# Patient Record
Sex: FEMALE | Race: BLACK OR AFRICAN AMERICAN | Employment: PART TIME | URBAN - METROPOLITAN AREA
[De-identification: names, ages, dates, MRNs, and addresses within clinical notes are randomized per-mention and may not be internally consistent; named-entity substitution may affect disease eponyms.]

---

## 2017-01-12 ENCOUNTER — TELEPHONE (OUTPATIENT)
Dept: CARDIOLOGY CLINIC | Age: 67
End: 2017-01-12

## 2017-01-12 PROBLEM — I10 ESSENTIAL HYPERTENSION: Status: ACTIVE | Noted: 2017-01-12

## 2017-01-12 PROBLEM — I71.20 THORACIC AORTIC ANEURYSM WITHOUT RUPTURE (HCC): Status: ACTIVE | Noted: 2017-01-12

## 2017-01-13 ENCOUNTER — OFFICE VISIT (OUTPATIENT)
Dept: CARDIOLOGY CLINIC | Age: 67
End: 2017-01-13

## 2017-01-13 VITALS
OXYGEN SATURATION: 97 % | BODY MASS INDEX: 40.95 KG/M2 | DIASTOLIC BLOOD PRESSURE: 70 MMHG | HEART RATE: 86 BPM | SYSTOLIC BLOOD PRESSURE: 112 MMHG | HEIGHT: 65 IN | WEIGHT: 245.8 LBS | RESPIRATION RATE: 16 BRPM

## 2017-01-13 DIAGNOSIS — I10 ESSENTIAL HYPERTENSION: ICD-10-CM

## 2017-01-13 DIAGNOSIS — I71.20 THORACIC AORTIC ANEURYSM WITHOUT RUPTURE: Primary | ICD-10-CM

## 2017-01-13 RX ORDER — MELATONIN
DAILY
COMMUNITY
End: 2019-12-13 | Stop reason: SDUPTHER

## 2017-01-13 RX ORDER — HYDROCHLOROTHIAZIDE 25 MG/1
25 TABLET ORAL DAILY
COMMUNITY
End: 2017-10-05 | Stop reason: SDUPTHER

## 2017-01-13 RX ORDER — ASPIRIN 81 MG/1
81 TABLET ORAL DAILY
COMMUNITY
End: 2022-06-18

## 2017-01-13 NOTE — MR AVS SNAPSHOT
Visit Information Date & Time Provider Department Dept. Phone Encounter #  
 1/13/2017  2:40 PM Klaus Aguilar MD [facility] Teresa Larose [phone] [id number] Follow-up Instructions Return in about 6 months (around 7/13/2017). Your Appointments 1/13/2017  2:40 PM  
New Patient with Klaus Aguilar MD  
[facility] ([address]) Appt Note: appt belia'd by [facility]/[address] Mely Llamas MD 2nd opinion aortic aneursym kmr  
 [address] [facility] [address] [facility] [address] [facility] [address] Upcoming Health Maintenance Date Due Hepatitis C Screening 1950 DTaP/Tdap/Td series (1 - Tdap) 5/10/1972 FOBT Q 1 YEAR AGE 50-75 5/10/2001 ZOSTER VACCINE AGE 60> 5/10/2011 GLAUCOMA SCREENING Q2Y 5/10/2016 OSTEOPOROSIS SCREENING (DEXA) 5/10/2016 Pneumococcal 65+ Low/Medium Risk (1 of 2 - PCV13) 5/10/2016 MEDICARE YEARLY EXAM 5/10/2016 INFLUENZA AGE 9 TO ADULT 8/1/2016 BREAST CANCER SCRN MAMMOGRAM 8/4/2017 Allergies as of 1/13/2017  Review Complete On: 1/13/2017 By: Klaus Aguilar MD  
  
 Severity Noted Reaction Type Reactions Latex  12/07/2012    Hives Fruit C [Ascorbic Acid]  12/17/2012    Hives ALL FRUITS Pcn [Penicillins]  07/10/2012    Hives Current Immunizations  Never Reviewed No immunizations on file. Not reviewed this visit You Were Diagnosed With   
  
 Codes Comments Thoracic aortic aneurysm without rupture ([facility] [address]    -  Primary ICD-10-CM: I71.2 ICD-9-CM: 441.2 Essential hypertension     ICD-10-CM: I10 
ICD-9-CM: 401.9 Vitals BP Pulse Resp Height(growth percentile) Weight(growth percentile) SpO2  
 112/70 (BP 1 Location: Left arm, BP Patient Position: Sitting) 86 16 5' 4.5\" (1.638 m) 245 lb 12.8 oz (111.5 kg) 97% BMI OB Status Smoking Status 41.54 kg/m2 Hysterectomy Never Smoker Vitals History BMI and BSA Data Body Mass Index Body Surface Area 41.54 kg/m 2 2.25 m 2 Preferred Pharmacy Pharmacy Name Phone [facility] AT [address] [place] [address] AT [address] [place] [address] [phone] Your Updated Medication List  
  
   
This list is accurate as of: 1/13/17  2:17 PM.  Always use your most recent med list.  
  
  
  
  
 aspirin delayed-release 81 mg tablet Take  by mouth daily. fluticasone 50 mcg/actuation nasal spray Commonly known as:  Caffie Euler 2 Sprays by Both Nostrils route daily. 1 spray in each nostril.  
  
 hydroCHLOROthiazide 25 mg tablet Commonly known as:  HYDRODIURIL Take 25 mg by mouth daily. VITAMIN D3 1,000 unit tablet Generic drug:  cholecalciferol Take  by mouth daily. Follow-up Instructions Return in about 6 months (around 7/13/2017). Introducing [facility]! Beti Rojas introduces iWarda patient portal. Now you can access parts of your medical record, email your doctor's office, and request medication refills online. 1. In your internet browser, go to [email or web address] 2. Click on the First Time User? Click Here link in the Sign In box. You will see the New Member Sign Up page. 3. Enter your iWarda Access Code exactly as it appears below. You will not need to use this code after youve completed the sign-up process. If you do not sign up before the expiration date, you must request a new code. · iWarda Access Code: [email or web address] Expires: 3/16/2017 10:51 AM 
 
4. Enter the last four digits of your Social Security Number (xxxx) and Date of Birth (mm/dd/yyyy) as indicated and click Submit. You will be taken to the next sign-up page. 5. Create a iWarda ID.  This will be your iWarda login ID and cannot be changed, so think of one that is secure and easy to remember. 6. Create a [email or web address] password. You can change your password at any time. 7. Enter your Password Reset Question and Answer. This can be used at a later time if you forget your password. 8. Enter your e-mail address. You will receive e-mail notification when new information is available in [address] 9. Click Sign Up. You can now view and download portions of your medical record. 10. Click the Download Summary menu link to download a portable copy of your medical information. If you have questions, please visit the Frequently Asked Questions section of the [email or web address] website. Remember, [email or web address] is NOT to be used for urgent needs. For medical emergencies, dial 911. Now available from your iPhone and Android! Please provide this summary of care documentation to your next provider. Your primary care clinician is listed as Marcos Roca. If you have any questions after today's visit, please call [phone].

## 2017-01-13 NOTE — PROGRESS NOTES
HISTORY OF PRESENT ILLNESS  Linwood Yates is a 73 y.o. female     SUMMARY:   Problem List  Date Reviewed: 1/13/2017          Codes Class Noted    Thoracic aortic aneurysm without rupture [facility]) ICD-10-CM: I71.2  ICD-9-CM: 441.2  1/12/2017    Overview Signed 1/12/2017  5:05 PM by Chet Dennis MD     12/16/2016 CTA chest 2.8 by 2.5 by 2.9cm descending thoracic aortic aneursym without evidence of rupture. Multiple bilateral lung nodules with mediastinal and axillary adenopathy suspicious for metastatic disease. Essential hypertension ICD-10-CM: I10  ICD-9-CM: 401.9  1/12/2017    Overview Signed 1/12/2017  5:18 PM by Chet Dennis MD     12/22/16 normal echo, [facility]                   Current Outpatient Prescriptions on File Prior to Visit   Medication Sig    fluticasone (FLONASE) 50 mcg/actuation nasal spray 2 Sprays by Both Nostrils route daily. 1 spray in each nostril. (Patient taking differently: 2 Sprays by Both Nostrils route daily as needed. 1 spray in each nostril.)     No current facility-administered medications on file prior to visit. CARDIOLOGY STUDIES TO DATE:  12/22/16 normal echo, [facility]      Chief Complaint   Patient presents with    Hypertension     HPI :  Ms. Lulu Yusuf is a 73year old referred by her primary care for an opinion on a recent CT scan of the chest. Prior to Christmas she woke up with swelling in her throat and neck area, thought she had a sinus infection, and ended up at urgent care where they got a chest x-ray, and based on an abnormality noted there she was sent to the emergency room where she underwent a CTA of the chest. This revealed a relatively small calcified saccular aneurysm in the proximal descending thoracic aorta, and she had multiple enlarged lymph nodes and parenchymal lung densities. She was then sent to [facility] where she had an echocardiogram which was normal and showed no proximal or ascending aortic dilatation. She has long standing hypertension. She has never smoked, there is no history of diabetes, family history is negative for premature coronary disease, and she says her cholesterol has been okay. She is relatively inactive and works at [employer]. She gets no regular exercise and does notice some mild dyspnea on exertion which she has had for years with no recent change. CARDIAC ROS:   negative for chest pain, palpitations, syncope, orthopnea, paroxysmal nocturnal dyspnea, exertional chest pressure/discomfort, claudication, lower extremity edema    Family History   Problem Relation Age of Onset    Diabetes Mother     Hypertension Mother     Hypertension Sister     Hypertension Sister        Past Medical History   Diagnosis Date    Arthritis     Asthma     HTN (hypertension)     Hypertension     Sciatica        GENERAL ROS:  A comprehensive review of systems was negative except for that written in the HPI. Visit Vitals    /70 (BP 1 Location: Left arm, BP Patient Position: Sitting)    Pulse 86    Resp 16    Ht 5' 4.5\" (1.638 m)    Wt 245 lb 12.8 oz (111.5 kg)    SpO2 97%    BMI 41.54 kg/m2       Wt Readings from Last 3 Encounters:   01/13/17 245 lb 12.8 oz (111.5 kg)   12/16/16 249 lb 1.9 oz (113 kg)   04/17/15 233 lb (105.7 kg)            BP Readings from Last 3 Encounters:   01/13/17 112/70   12/16/16 131/79   04/17/15 110/60       PHYSICAL EXAM  General appearance: alert, cooperative, no distress, appears stated age  Neurologic: Alert and oriented X 3  Neck: supple, symmetrical, trachea midline, no adenopathy, no carotid bruit and no JVD  Lungs: clear to auscultation bilaterally  Heart: regular rate and rhythm, S1, S2 normal, no murmur, click, rub or gallop  Abdomen: soft, non-tender.  Bowel sounds normal. No masses,  no organomegaly  Extremities: extremities normal, atraumatic, no cyanosis or edema  Pulses: 2+ and symmetric    Lab Results   Component Value Date/Time    Cholesterol, total 153 03/21/2015 01:56 AM    HDL Cholesterol 53 03/21/2015 01:56 AM    LDL, calculated 73.8 03/21/2015 01:56 AM    Triglyceride 131 03/21/2015 01:56 AM    CHOL/HDL Ratio 2.9 03/21/2015 01:56 AM     ASSESSMENT  Ms. Joseph Mattson aneurysm is not a significant size at this point and requires no specific therapy. I would recommend that she have a CTA with contrast in about two years. It sounds like she gets multiple chest CTs probably for these abnormalities noted on the scan that is reported in her chart, and I do not have a good understanding of what her issues are in that regard. We talked about symptoms that would prompt a call to 911. current treatment plan is effective, no change in therapy  lab results and schedule of future lab studies reviewed with patient  reviewed diet, exercise and weight control    Encounter Diagnoses   Name Primary?  Thoracic aortic aneurysm without rupture (HCC) Yes    Essential hypertension      Orders Placed This Encounter    aspirin delayed-release 81 mg tablet    hydroCHLOROthiazide (HYDRODIURIL) 25 mg tablet    cholecalciferol (VITAMIN D3) 1,000 unit tablet       Follow-up Disposition:  Return in about 6 months (around 7/13/2017).     Debbie Langford MD  1/13/2017

## 2017-07-24 ENCOUNTER — OFFICE VISIT (OUTPATIENT)
Dept: FAMILY MEDICINE CLINIC | Age: 67
End: 2017-07-24

## 2017-07-24 VITALS
TEMPERATURE: 97.5 F | SYSTOLIC BLOOD PRESSURE: 133 MMHG | HEART RATE: 88 BPM | HEIGHT: 64 IN | OXYGEN SATURATION: 97 % | DIASTOLIC BLOOD PRESSURE: 82 MMHG | BODY MASS INDEX: 43.02 KG/M2 | WEIGHT: 252 LBS | RESPIRATION RATE: 18 BRPM

## 2017-07-24 DIAGNOSIS — E66.01 OBESITY, MORBID (HCC): ICD-10-CM

## 2017-07-24 DIAGNOSIS — J45.20 MILD INTERMITTENT ASTHMA WITHOUT COMPLICATION: ICD-10-CM

## 2017-07-24 DIAGNOSIS — Z00.00 LABORATORY EXAM ORDERED AS PART OF ROUTINE GENERAL MEDICAL EXAMINATION: ICD-10-CM

## 2017-07-24 DIAGNOSIS — M54.31 SCIATICA OF RIGHT SIDE: ICD-10-CM

## 2017-07-24 DIAGNOSIS — Z11.59 NEED FOR HEPATITIS C SCREENING TEST: ICD-10-CM

## 2017-07-24 DIAGNOSIS — Z13.6 ISCHEMIC HEART DISEASE SCREEN: ICD-10-CM

## 2017-07-24 DIAGNOSIS — D75.839 THROMBOCYTOSIS: ICD-10-CM

## 2017-07-24 DIAGNOSIS — Z00.00 HEALTH CARE MAINTENANCE: ICD-10-CM

## 2017-07-24 DIAGNOSIS — I10 ESSENTIAL HYPERTENSION: ICD-10-CM

## 2017-07-24 DIAGNOSIS — M17.0 PRIMARY OSTEOARTHRITIS OF BOTH KNEES: ICD-10-CM

## 2017-07-24 DIAGNOSIS — I71.20 THORACIC AORTIC ANEURYSM WITHOUT RUPTURE: ICD-10-CM

## 2017-07-24 DIAGNOSIS — R91.8 MULTIPLE LUNG NODULES ON CT: Primary | ICD-10-CM

## 2017-07-24 RX ORDER — FLUTICASONE PROPIONATE AND SALMETEROL XINAFOATE 115; 21 UG/1; UG/1
AEROSOL, METERED RESPIRATORY (INHALATION)
Refills: 11 | COMMUNITY
Start: 2017-06-29 | End: 2019-12-05

## 2017-07-24 RX ORDER — DICLOFENAC SODIUM 75 MG/1
75 TABLET, DELAYED RELEASE ORAL
COMMUNITY
End: 2017-12-21 | Stop reason: SDUPTHER

## 2017-07-24 RX ORDER — AZITHROMYCIN 250 MG/1
TABLET, FILM COATED ORAL
Refills: 0 | COMMUNITY
Start: 2017-06-14 | End: 2017-07-24

## 2017-07-24 RX ORDER — MONTELUKAST SODIUM 10 MG/1
10 TABLET ORAL DAILY
Refills: 1 | COMMUNITY
Start: 2017-04-28 | End: 2017-10-16

## 2017-07-24 RX ORDER — ALBUTEROL SULFATE 90 UG/1
AEROSOL, METERED RESPIRATORY (INHALATION)
Refills: 3 | COMMUNITY
Start: 2017-06-19 | End: 2018-06-27 | Stop reason: SDUPTHER

## 2017-07-24 RX ORDER — METHYLPREDNISOLONE 4 MG/1
TABLET ORAL
Refills: 0 | COMMUNITY
Start: 2017-06-19 | End: 2017-07-24

## 2017-07-24 RX ORDER — FLUTICASONE PROPIONATE 50 MCG
2 SPRAY, SUSPENSION (ML) NASAL
COMMUNITY
End: 2018-01-03 | Stop reason: SDUPTHER

## 2017-07-24 RX ORDER — RANITIDINE 300 MG/1
TABLET ORAL
Refills: 1 | COMMUNITY
Start: 2017-04-28 | End: 2017-07-24

## 2017-07-24 RX ORDER — OFLOXACIN 3 MG/ML
SOLUTION/ DROPS OPHTHALMIC
Refills: 0 | COMMUNITY
Start: 2017-06-14 | End: 2017-07-24
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
## 2017-07-24 NOTE — PROGRESS NOTES
Chief Complaint   Patient presents with   Osman Mares [facility]     1. Have you been to the ER, urgent care clinic since your last visit? Hospitalized since your last visit? January 2017    2. Have you seen or consulted any other health care providers outside of the [address] [place] [address] since your last visit? Include any pap smears or colon screening. Yes, NP Edward Hicks     The patient was counseled on the dangers of tobacco use, and was advised never to start. Reviewed strategies to maximize success, including never to start. Health Maintenance Due   Topic Date Due    Hepatitis C Screening Discussed with patient today and advised to follow up.    1950    DTaP/Tdap/Td series (1 - Tdap) Discussed with patient today and advised to follow up.    05/10/1972    FOBT Q 1 YEAR AGE 50-75  Discussed with patient today and advised to follow up.    05/10/2001    ZOSTER VACCINE AGE 60> Discussed with patient today and advised to follow up.    03/10/2011    GLAUCOMA SCREENING Q2Y Discussed with patient today and advised to follow up.    05/10/2016    OSTEOPOROSIS SCREENING (DEXA) Discussed with patient today and advised to follow up.    05/10/2016    Pneumococcal 65+ Low/Medium Risk (1 of 2 - PCV13) Discussed with patient today and advised to follow up. Completed, records will be requested. 05/10/2016    MEDICARE YEARLY EXAM Discussed with patient today and advised to follow up.    05/10/2016    BREAST CANCER SCRN MAMMOGRAM Discussed with patient today and advised to follow up.    08/04/2017     ACP is not on file, booklet given advised to return.

## 2017-07-24 NOTE — MR AVS SNAPSHOT
Visit Information Date & Time Provider Department Dept. Phone Encounter #  
 7/24/2017  1:20 PM Funmilayo Coleman. Rock calixto MD [facility] [phone] [id number] Upcoming Health Maintenance Date Due Hepatitis C Screening 1950 DTaP/Tdap/Td series (1 - Tdap) 5/10/1972 FOBT Q 1 YEAR AGE 50-75 5/10/2001 ZOSTER VACCINE AGE 60> 3/10/2011 GLAUCOMA SCREENING Q2Y 5/10/2016 OSTEOPOROSIS SCREENING (DEXA) 5/10/2016 Pneumococcal 65+ Low/Medium Risk (1 of 2 - PCV13) 5/10/2016 MEDICARE YEARLY EXAM 5/10/2016 BREAST CANCER SCRN MAMMOGRAM 8/4/2017 INFLUENZA AGE 9 TO ADULT 8/1/2017 Allergies as of 7/24/2017  Review Complete On: 7/24/2017 By: Funmilayo Coleman. Rock calixto MD  
  
 Severity Noted Reaction Type Reactions Latex  12/07/2012    Hives Flowers  07/24/2017    Shortness of Breath Fresh cut flowers are worse Fruit C [Ascorbic Acid]  12/17/2012    Hives ALL FRUITS Pcn [Penicillins]  07/10/2012    Hives Current Immunizations  Never Reviewed No immunizations on file. Not reviewed this visit You Were Diagnosed With   
  
 Codes Comments Multiple lung nodules on CT    -  Primary ICD-10-CM: R91.8 ICD-9-CM: 793.19 Sciatica of right side     ICD-10-CM: M54.31 
ICD-9-CM: 724.3 Primary osteoarthritis of both knees     ICD-10-CM: M17.0 ICD-9-CM: 715.16 Thoracic aortic aneurysm without rupture (HCC)     ICD-10-CM: I71.2 ICD-9-CM: 441.2 Mild intermittent asthma without complication     CHAI-74-YR: J45.20 ICD-9-CM: 493.90 Essential hypertension     ICD-10-CM: I10 
ICD-9-CM: 401.9 Health care maintenance     ICD-10-CM: Z00.00 ICD-9-CM: V70.0 Vitals BP Pulse Temp Resp Height(growth percentile) Weight(growth percentile) 133/82 (BP 1 Location: Left arm, BP Patient Position: Sitting) 88 97.5 °F (36.4 °C) (Oral) 18 5' 4\" (1.626 m) 252 lb (114.3 kg) SpO2 BMI OB Status Smoking Status 97% 43.26 kg/m2 Hysterectomy Never Smoker BMI and BSA Data Body Mass Index Body Surface Area  
 43.26 kg/m 2 2.27 m 2 Preferred Pharmacy Pharmacy Name Phone [phone] [facility] [phone] Your Updated Medication List  
  
   
This list is accurate as of: 7/24/17  2:42 PM.  Always use your most recent med list.  
  
  
  
  
 ADVAIR -21 mcg/actuation inhaler Generic drug:  fluticasone-salmeterol INL 2 PFS PO BID IN THE MORNING AND IN THE KODI  
  
 aspirin delayed-release 81 mg tablet Take  by mouth daily. diclofenac EC 75 mg EC tablet Commonly known as:  VOLTAREN Take 75 mg by mouth two (2) times daily as needed. FLONASE 50 mcg/actuation nasal spray Generic drug:  fluticasone 2 Sprays by Both Nostrils route daily as needed for Rhinitis. hydroCHLOROthiazide 25 mg tablet Commonly known as:  HYDRODIURIL Take 25 mg by mouth daily. montelukast 10 mg tablet Commonly known as:  SINGULAIR Take 10 mg by mouth daily. PROAIR HFA 90 mcg/actuation inhaler Generic drug:  albuterol INHALE 1 PUFF PO Q 6 H PRN  
  
 VITAMIN D3 1,000 unit tablet Generic drug:  cholecalciferol Take  by mouth daily. Patient Instructions Larraine Kalie Larraine Kalie TODAY, please go to: CHECK OUT Please schedule the following appointments at [address] [place] [address]: 
· Welcome to Medicare exam and lab follow up with Dr. Nicole Odom in September 2017 · Lab visit, fasting the week before our visit.   
_____________________ Today's Plan: 
· Call your prior doctor and ask when your last tetanus shot was (Tdap). If it was more than 10 years ago, call back to schedule a nurse visit for tdap vaccine. _____________________ Review your health maintenance below. Make plans to return and address anything that is due or will be due soon. Health Maintenance Due Topic Date Due  
  Hepatitis C Test  1950  
 DTaP/Tdap/Td  (1 - Tdap) 05/10/1972  Stool testing for trace blood  05/10/2001  Shingles Vaccine  03/10/2011  Glaucoma Screening   05/10/2016  Bone Density Screening  05/10/2016  Pneumococcal Vaccine (1 of 2 - PCV13) 05/10/2016 Cliff Soares Annual Well Visit  05/10/2016  Breast Cancer Screening  08/04/2017  
 
 
 
_____________________ Are you stressed out? Overwhelmed? In pain? Feeling disconnected? Consider MINDFULNESS. .. 
[email or web address] 
_____________________ If you need to get your labs done at [facility], visit this site to find a convenient location: [facility]. Introducing [facility] [place] [facility]! Meka Hinson introduces Slidely patient portal. Now you can access parts of your medical record, email your doctor's office, and request medication refills online. 1. In your internet browser, go to [email or web address] 2. Click on the First Time User? Click Here link in the Sign In box. You will see the New Member Sign Up page. 3. Enter your Slidely Access Code exactly as it appears below. You will not need to use this code after youve completed the sign-up process. If you do not sign up before the expiration date, you must request a new code. · Slidely Access Code: [email or web address] Expires: 10/22/2017  1:32 PM 
 
4. Enter the last four digits of your Social Security Number (xxxx) and Date of Birth (mm/dd/yyyy) as indicated and click Submit. You will be taken to the next sign-up page. 5. Create a [email or web address] ID. This will be your Slidely login ID and cannot be changed, so think of one that is secure and easy to remember. 6. Create a [email or web address] password. You can change your password at any time. 7. Enter your Password Reset Question and Answer. This can be used at a later time if you forget your password. 8. Enter your e-mail address. You will receive e-mail notification when new information is available in [address] 9. Click Sign Up. You can now view and download portions of your medical record. 10. Click the Download Summary menu link to download a portable copy of your medical information. If you have questions, please visit the Frequently Asked Questions section of the Ganji website. Remember, Ganji is NOT to be used for urgent needs. For medical emergencies, dial 911. Now available from your iPhone and Android! Please provide this summary of care documentation to your next provider. Your primary care clinician is listed as Vernon Matthews. Christiano Yanez. If you have any questions after today's visit, please call [phone].

## 2017-07-24 NOTE — ASSESSMENT & PLAN NOTE
See media for more  CT thorax wo cont 7/6/2017     \"Impression:   1. Multiple pulmonary nodules redemonstrated in the left hemithorax. The nodules previously seen on CT of abdomen and pelvis have increased in size. However, these nodules were previoulsy worked up in 2012 and were proven to be related to splenosis. No furter workup is needed. 2. Status post-splenectomy. 3. 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 \"

## 2017-07-24 NOTE — PATIENT INSTRUCTIONS
.. TODAY, please go to:   CHECK OUT     Please schedule the following appointments at [address] [place] [address]:  · Welcome to Medicare exam and lab follow up with Dr. Donna Sales in September 2017  · Lab visit, fasting the week before our visit.    _____________________     KTEXQ'X Plan:  · Call your prior doctor and ask when your last tetanus shot was (Tdap). If it was more than 10 years ago, call back to schedule a nurse visit for tdap vaccine. _____________________     Review your health maintenance below. Make plans to return and address anything that is due or will be due soon. Health Maintenance Due   Topic Date Due    Hepatitis C Test  1950    DTaP/Tdap/Td  (1 - Tdap) 05/10/1972    Stool testing for trace blood  05/10/2001    Shingles Vaccine  03/10/2011    Glaucoma Screening   05/10/2016    Bone Density Screening  05/10/2016    Pneumococcal Vaccine (1 of 2 - PCV13) 05/10/2016    Annual Well Visit  05/10/2016    Breast Cancer Screening  08/04/2017         _____________________     Are you stressed out? Overwhelmed? In pain? Feeling disconnected? Consider MINDFULNESS. ..  [email or web address]  _____________________     If you need to get your labs done at [facility], visit this site to find a convenient location: [email or web address]

## 2017-09-29 LAB
ALBUMIN SERPL-MCNC: 4 G/DL (ref 3.6–4.8)
ALBUMIN/GLOB SERPL: 1.3 {RATIO} (ref 1.2–2.2)
ALP SERPL-CCNC: 95 IU/L (ref 39–117)
ALT SERPL-CCNC: 13 IU/L (ref 0–32)
AST SERPL-CCNC: 15 IU/L (ref 0–40)
BILIRUB SERPL-MCNC: 0.5 MG/DL (ref 0–1.2)
BUN SERPL-MCNC: 26 MG/DL (ref 8–27)
BUN/CREAT SERPL: 32 (ref 12–28)
CALCIUM SERPL-MCNC: 9.9 MG/DL (ref 8.7–10.3)
CHLORIDE SERPL-SCNC: 100 MMOL/L (ref 96–106)
CHOLEST SERPL-MCNC: 183 MG/DL (ref 100–199)
CO2 SERPL-SCNC: 26 MMOL/L (ref 18–29)
CREAT SERPL-MCNC: 0.82 MG/DL (ref 0.57–1)
ERYTHROCYTE [DISTWIDTH] IN BLOOD BY AUTOMATED COUNT: 15.2 % (ref 12.3–15.4)
GLOBULIN SER CALC-MCNC: 3.1 G/DL (ref 1.5–4.5)
GLUCOSE SERPL-MCNC: 98 MG/DL (ref 65–99)
HCT VFR BLD AUTO: 40.9 % (ref 34–46.6)
HCV AB S/CO SERPL IA: <0.1 S/CO RATIO (ref 0–0.9)
HDLC SERPL-MCNC: 52 MG/DL
HGB BLD-MCNC: 13.8 G/DL (ref 11.1–15.9)
INTERPRETATION, 910389: NORMAL
LDLC SERPL CALC-MCNC: 107 MG/DL (ref 0–99)
MCH RBC QN AUTO: 29 PG (ref 26.6–33)
MCHC RBC AUTO-ENTMCNC: 33.7 G/DL (ref 31.5–35.7)
MCV RBC AUTO: 86 FL (ref 79–97)
PLATELET # BLD AUTO: 444 X10E3/UL (ref 150–379)
POTASSIUM SERPL-SCNC: 4.3 MMOL/L (ref 3.5–5.2)
PROT SERPL-MCNC: 7.1 G/DL (ref 6–8.5)
RBC # BLD AUTO: 4.76 X10E6/UL (ref 3.77–5.28)
SODIUM SERPL-SCNC: 142 MMOL/L (ref 134–144)
TRIGL SERPL-MCNC: 121 MG/DL (ref 0–149)
VLDLC SERPL CALC-MCNC: 24 MG/DL (ref 5–40)
WBC # BLD AUTO: 9.1 X10E3/UL (ref 3.4–10.8)

## 2017-10-05 ENCOUNTER — OFFICE VISIT (OUTPATIENT)
Dept: FAMILY MEDICINE CLINIC | Age: 67
End: 2017-10-05

## 2017-10-05 VITALS
WEIGHT: 251.5 LBS | BODY MASS INDEX: 42.94 KG/M2 | TEMPERATURE: 98.1 F | DIASTOLIC BLOOD PRESSURE: 80 MMHG | HEART RATE: 82 BPM | OXYGEN SATURATION: 96 % | SYSTOLIC BLOOD PRESSURE: 135 MMHG | RESPIRATION RATE: 15 BRPM | HEIGHT: 64 IN

## 2017-10-05 DIAGNOSIS — Z23 ENCOUNTER FOR IMMUNIZATION: ICD-10-CM

## 2017-10-05 DIAGNOSIS — Z00.00 WELCOME TO MEDICARE PREVENTIVE VISIT: Primary | ICD-10-CM

## 2017-10-05 DIAGNOSIS — D75.839 THROMBOCYTOSIS: ICD-10-CM

## 2017-10-05 DIAGNOSIS — I10 ESSENTIAL HYPERTENSION: ICD-10-CM

## 2017-10-05 DIAGNOSIS — Z78.0 POSTMENOPAUSAL STATE: ICD-10-CM

## 2017-10-05 DIAGNOSIS — Z13.6 SCREENING FOR ISCHEMIC HEART DISEASE: ICD-10-CM

## 2017-10-05 DIAGNOSIS — Z78.0 POSTMENOPAUSAL: ICD-10-CM

## 2017-10-05 DIAGNOSIS — Z13.39 SCREENING FOR ALCOHOLISM: ICD-10-CM

## 2017-10-05 DIAGNOSIS — Z13.31 SCREENING FOR DEPRESSION: ICD-10-CM

## 2017-10-05 RX ORDER — HYDROCHLOROTHIAZIDE 25 MG/1
25 TABLET ORAL DAILY
Qty: 90 TAB | Refills: 1 | Status: SHIPPED | OUTPATIENT
Start: 2017-10-05 | End: 2018-03-26 | Stop reason: SDUPTHER

## 2017-10-05 NOTE — LETTER
10/5/2017 8:37 AM 
 
Ms. Anika Jaime [phone] Office Visit on 07/24/2017 Component Date Value Ref Range Status  WBC 09/28/2017 9.1  3.4 - 10.8 x10E3/uL Final  
 RBC 09/28/2017 4.76  3.77 - 5.28 x10E6/uL Final  
 HGB 09/28/2017 13.8  11.1 - 15.9 g/dL Final  
 HCT 09/28/2017 40.9  34.0 - 46.6 % Final  
 MCV 09/28/2017 86  79 - 97 fL Final  
 MCH 09/28/2017 29.0  26.6 - 33.0 pg Final  
 MCHC 09/28/2017 33.7  31.5 - 35.7 g/dL Final  
 RDW 09/28/2017 15.2  12.3 - 15.4 % Final  
 PLATELET 29/59/0555 228* 150 - 379 x10E3/uL Final  
 Glucose 09/28/2017 98  65 - 99 mg/dL Final  
 BUN 09/28/2017 26  8 - 27 mg/dL Final  
 Creatinine 09/28/2017 0.82  0.57 - 1.00 mg/dL Final  
 GFR est non-AA 09/28/2017 75  >59 mL/min/1.73 Final  
 GFR est AA 09/28/2017 86  >59 mL/min/1.73 Final  
 BUN/Creatinine ratio 09/28/2017 32* 12 - 28 Final  
 Sodium 09/28/2017 142  134 - 144 mmol/L Final  
 Potassium 09/28/2017 4.3  3.5 - 5.2 mmol/L Final  
 Chloride 09/28/2017 100  96 - 106 mmol/L Final  
 CO2 09/28/2017 26  18 - 29 mmol/L Final  
 Calcium 09/28/2017 9.9  8.7 - 10.3 mg/dL Final  
 Protein, total 09/28/2017 7.1  6.0 - 8.5 g/dL Final  
 Albumin 09/28/2017 4.0  3.6 - 4.8 g/dL Final  
 GLOBULIN, TOTAL 09/28/2017 3.1  1.5 - 4.5 g/dL Final  
 A-G Ratio 09/28/2017 1.3  1.2 - 2.2 Final  
 Bilirubin, total 09/28/2017 0.5  0.0 - 1.2 mg/dL Final  
 Alk.  phosphatase 09/28/2017 95  39 - 117 IU/L Final  
 AST (SGOT) 09/28/2017 15  0 - 40 IU/L Final  
 ALT (SGPT) 09/28/2017 13  0 - 32 IU/L Final  
 Cholesterol, total 09/28/2017 183  100 - 199 mg/dL Final  
 Triglyceride 09/28/2017 121  0 - 149 mg/dL Final  
 HDL Cholesterol 09/28/2017 52  >39 mg/dL Final  
 VLDL, calculated 09/28/2017 24  5 - 40 mg/dL Final  
 LDL, calculated 09/28/2017 107* 0 - 99 mg/dL Final  
 Hep C Virus Ab 09/28/2017 <0.1  0.0 - 0.9 s/co ratio Final  
 INTERPRETATION 09/28/2017 Note   Final  
 
 
 
 Legend: Use this to help understand your labs. You may not have all of these ordered · WBC- White blood cell count · HGB- Hemoglobin, red blood cell count · HCT- Hematocrit, red blood cell count · PLT- Platelets · Sodium, Potassium, Chloride, Magnesium- electrolytes · Creatinine, GFR- kidney function · AST, ALT, Alkaline phosphatase, bilirubin- liver and gallbladder · Lipase- pancreas · RPR- Syphilis test, STD 
· HIV, Gonorrhea, Chlamydia, Trichomonas- STDs · Candida- yeast infection · Nuswab- vaginal infections · Urinalysis- a quick test about your urine · Hemoglobin A1C- diabetes test 
· Glucose- blood sugar · HDL- \"Good\" Cholesterol. Goal >=40 
· LDL- \"Bad\" Cholesterol. Goal <100 · Triglycerides- Goal <150 · Vit D- Vitamin D level · TSH, FT3, FT4- thyroid tests · HCV Ab- test for Hepatitis C  
· Sincerely, 
 
 
Pauline Gutierrez MD
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
## 2017-10-05 NOTE — PATIENT INSTRUCTIONS
TODAY, please go to:   Flu vaccine   Heart sheet   Dexa order   Release of records- [facility] (pneumonia vaccine), colonoscopy, eye exam     CHECK OUT    Show the  your Referral Requisition   LAB    Please schedule the following appointments at CHECK OUT:  · Lifestyle follow up with Dr. James Gutierrez in April 2017  · Lab visit, fasting the week before our visit. · Honoring Choices appointment with Nurse navigator in 1-2 months      Today's Plan:        Medicare Wellness Visit, Female    The best way to live healthy is to have a healthy lifestyle by eating a well-balanced diet, exercising regularly, limiting alcohol and stopping smoking. Regular physical exams and screening tests are another way to keep healthy. Preventive exams provided by your health care provider can find health problems before they become diseases or illnesses. Preventive services including immunizations, screening tests, monitoring and exams can help you take care of your own health. All people over age 72 should have a pneumovax  and and a prevnar shot to prevent pneumonia. These are once in a lifetime unless you and your provider decide differently. All people over 65 should have a yearly flu shot and a tetanus vaccine every 10 years. A bone mass density to screen for osteoporosis or thinning of the bones should be done every 2 years after 65. Screening for diabetes mellitus with a blood sugar test should be done every year. Glaucoma is a disease of the eye due to increased ocular pressure that can lead to blindness and it should be done every year by an eye professional.    Cardiovascular screening tests that check for elevated lipids (fatty part of blood) which can lead to heart disease and strokes should be done every 5 years.     Colorectal screening that evaluates for blood or polyps in your colon should be done yearly as a stool test or every five years as a flexible sigmoidoscope or every 10 years as a colonoscopy up to age 76. Breast cancer screening with a mammogram is recommended biennially  for women age 54-69. Screening for cervical cancer with a pap smear and pelvic exam is recommended for women after age 72 years every 2 years up to age 79 or when the provider and patient decide to stop. If there is a history of cervical abnormalities or other increased risk for cancer then the test is recommended yearly. Hepatitis C screening is also recommended for anyone born between 80 through [address]. A shingles vaccine is also recommended once in a lifetime after age 61. Your Medicare Wellness Exam is recommended annually.     Here is a list of your current Health Maintenance items with a due date:  Health Maintenance Due   Topic Date Due    DTaP/Tdap/Td  (1 - Tdap) 05/10/1972    Stool testing for trace blood  05/10/2001    Shingles Vaccine  03/10/2011    Glaucoma Screening   05/10/2016    Bone Density Screening  05/10/2016    Pneumococcal Vaccine (1 of 2 - PCV13) 05/10/2016    Annual Well Visit  05/10/2016    Flu Vaccine  08/01/2017    Breast Cancer Screening  08/04/2017

## 2017-10-05 NOTE — PROGRESS NOTES
Identified pt with two pt identifiers(name and ). Reviewed record in preparation for visit and have obtained necessary documentation. Chief Complaint   Patient presents with    Welcome To Medicare    Medication Refill     hctz pended       Health Maintenance Due   Topic    DTaP/Tdap/Td series (1 - Tdap)    FOBT Q 1 YEAR AGE 54-65     ZOSTER VACCINE AGE 60>     GLAUCOMA SCREENING Q2Y     OSTEOPOROSIS SCREENING (DEXA)     Pneumococcal 65+ Low/Medium Risk (1 of 2 - PCV13)    MEDICARE YEARLY EXAM     INFLUENZA AGE 9 TO ADULT     BREAST CANCER SCRN MAMMOGRAM    - mammo- done 2017 [facility]  - has not had Dexa  - glaucoma @ [facility]   - Prevnar 13 @ [facility]  - pt received flu shot at work   -not UTD    Coordination of Care Questionnaire:  :   1) Have you been to an emergency room, urgent care clinic since your last visit? no   Hospitalized since your last visit? no             2. Have seen or consulted any other health care provider since your last visit? NO  If yes, where when, and reason for visit? 3) Do you have an Advanced Directive/ Living Will in place? NO  If yes, do we have a copy on file NO  If no, would you like information YES- form given to pt    Patient is accompanied by self I have received verbal consent from Amanda Cameron to discuss any/all medical information while they are present in the room.

## 2017-10-05 NOTE — PROGRESS NOTES
Sebastian Zambrano is a 78 y.o. female who presents for routine immunizations. She denies any symptoms , reactions or allergies that would exclude them from being immunized today. Risks and adverse reactions were discussed and the VIS was given to them. All questions were addressed. She was observed for 15 min post injection. There were no reactions observed.     Wolf Bravo

## 2017-10-05 NOTE — MR AVS SNAPSHOT
Visit Information Date & Time Provider Department Dept. Phone Encounter #  
 10/5/2017  8:00 AM Kami Barber MD [place] [address] [phone] [id number] Upcoming Health Maintenance Date Due DTaP/Tdap/Td series (1 - Tdap) 5/10/1972 ZOSTER VACCINE AGE 60> 3/10/2011 GLAUCOMA SCREENING Q2Y 5/10/2016 OSTEOPOROSIS SCREENING (DEXA) 5/10/2016 Pneumococcal 65+ Low/Medium Risk (1 of 2 - PCV13) 5/10/2016 MEDICARE YEARLY EXAM 5/10/2016 INFLUENZA AGE 9 TO ADULT 8/1/2017 BREAST CANCER SCRN MAMMOGRAM 8/4/2017 COLONOSCOPY 6/3/2024 Allergies as of 10/5/2017  Review Complete On: 10/5/2017 By: Sri Magdaleno LPN Severity Noted Reaction Type Reactions Latex  12/07/2012    Hives Flowers  07/24/2017    Shortness of Breath Fresh cut flowers are worse Fruit C [Ascorbic Acid]  12/17/2012    Hives ALL FRUITS Pcn [Penicillins]  07/10/2012    Hives Current Immunizations  Never Reviewed Name Date Influenza High Dose Vaccine PF  Incomplete Not reviewed this visit You Were Diagnosed With   
  
 Codes Comments Welcome to Medicare preventive visit    -  Primary ICD-10-CM: Z00.00 ICD-9-CM: V70.0 Essential hypertension     ICD-10-CM: I10 
ICD-9-CM: 401.9 Screening for alcoholism     ICD-10-CM: Z13.89 ICD-9-CM: V79.1 Screening for depression     ICD-10-CM: Z13.89 ICD-9-CM: V79.0 Screening for ischemic heart disease     ICD-10-CM: Z13.6 ICD-9-CM: V81.0 Encounter for immunization     ICD-10-CM: S48 ICD-9-CM: V03.89 Postmenopausal     ICD-10-CM: Z78.0 ICD-9-CM: V49.81 Thrombocytosis ([address]     ICD-10-CM: D47.3 ICD-9-CM: 238.71 Postmenopausal state     ICD-10-CM: Z78.0 ICD-9-CM: V49.81 Vitals BP Pulse Temp Resp Height(growth percentile) Weight(growth percentile)  135/80 (BP 1 Location: Left arm, BP Patient Position: Sitting) 82 98.1 °F (36.7 °C) (Oral) 15 5' 4\" (1.626 m) 251 lb 8 oz (114.1 kg) SpO2 BMI OB Status Smoking Status 96% 43.17 kg/m2 Hysterectomy Never Smoker BMI and BSA Data Body Mass Index Body Surface Area  
 43.17 kg/m 2 2.27 m 2 Preferred Pharmacy Pharmacy Name Phone [id number] [facility] [phone] Your Updated Medication List  
  
   
This list is accurate as of: 10/5/17 10:06 AM.  Always use your most recent med list.  
  
  
  
  
 Tattnall Frater -21 mcg/actuation inhaler Generic drug:  fluticasone-salmeterol INL 2 PFS PO BID IN THE MORNING AND IN THE KODI  
  
 aspirin delayed-release 81 mg tablet Take  by mouth daily. diclofenac EC 75 mg EC tablet Commonly known as:  VOLTAREN Take 75 mg by mouth two (2) times daily as needed. FLONASE 50 mcg/actuation nasal spray Generic drug:  fluticasone 2 Sprays by Both Nostrils route daily as needed for Rhinitis. hydroCHLOROthiazide 25 mg tablet Commonly known as:  HYDRODIURIL Take 1 Tab by mouth daily. montelukast 10 mg tablet Commonly known as:  SINGULAIR Take 10 mg by mouth daily. PROAIR HFA 90 mcg/actuation inhaler Generic drug:  albuterol INHALE 1 PUFF PO Q 6 H PRN  
  
 VITAMIN D3 1,000 unit tablet Generic drug:  cholecalciferol Take  by mouth daily. Prescriptions Sent to Pharmacy Refills  
 hydroCHLOROthiazide (HYDRODIURIL) 25 mg tablet 1 Sig: Take 1 Tab by mouth daily. Class: Normal  
 Pharmacy: [address] Ph #: [phone] Route: Oral  
  
We Performed the Following ADMIN INFLUENZA VIRUS VAC [ [facility]] AMB POC EKG ROUTINE W/ 12 LEADS, SCREEN () [ [facility]] CBC WITH AUTOMATED DIFF [69935 CPT(R)] [facility] [[id number] [facility]"] INFLUENZA VIRUS VACCINE, HIGH DOSE SEASONAL, PRESERVATIVE FREE [84709 CPT(R)] LIPID PANEL [90199 CPT(R)] ND ANNUAL ALCOHOL SCREEN 15 MIN [id number] [facility]] To-Do List   
 10/08/2017 Imaging:  DEXA BONE DENSITY STUDY AXIAL Patient Instructions TODAY, please go to: 
 Flu vaccine Heart sheet Dexa order Release of records- [facility] (pneumonia vaccine), colonoscopy, eye exam 
 
 CHECK OUT Show the  your Referral Requisition LAB Please schedule the following appointments at [address] [place] [address]: 
· Lifestyle follow up with Dr. Mishel Cantu in April 2017 · Lab visit, fasting the week before our visit. · Honoring Choices appointment with Nurse navigator in 1-2 months Today's Plan: 
 
 
 
Medicare Wellness Visit, Female The best way to live healthy is to have a healthy lifestyle by eating a well-balanced diet, exercising regularly, limiting alcohol and stopping smoking. Regular physical exams and screening tests are another way to keep healthy. Preventive exams provided by your health care provider can find health problems before they become diseases or illnesses. Preventive services including immunizations, screening tests, monitoring and exams can help you take care of your own health. All people over age 72 should have a pneumovax  and and a prevnar shot to prevent pneumonia. These are once in a lifetime unless you and your provider decide differently. All people over 65 should have a yearly flu shot and a tetanus vaccine every 10 years. A bone mass density to screen for osteoporosis or thinning of the bones should be done every 2 years after 65. Screening for diabetes mellitus with a blood sugar test should be done every year.  
 
Glaucoma is a disease of the eye due to increased ocular pressure that can lead to blindness and it should be done every year by an eye professional. 
 
Cardiovascular screening tests that check for elevated lipids (fatty part of blood) which can lead to heart disease and strokes should be done every 5 years. Colorectal screening that evaluates for blood or polyps in your colon should be done yearly as a stool test or every five years as a flexible sigmoidoscope or every 10 years as a colonoscopy up to age 76. Breast cancer screening with a mammogram is recommended biennially  for women age 54-69. Screening for cervical cancer with a pap smear and pelvic exam is recommended for women after age 72 years every 2 years up to age 79 or when the provider and patient decide to stop. If there is a history of cervical abnormalities or other increased risk for cancer then the test is recommended yearly. Hepatitis C screening is also recommended for anyone born between 80 through [address]. A shingles vaccine is also recommended once in a lifetime after age 61. Your Medicare Wellness Exam is recommended annually. Here is a list of your current Health Maintenance items with a due date: 
Health Maintenance Due Topic Date Due  
 DTaP/Tdap/Td  (1 - Tdap) 05/10/1972  Stool testing for trace blood  05/10/2001  Shingles Vaccine  03/10/2011  Glaucoma Screening   05/10/2016  Bone Density Screening  05/10/2016  Pneumococcal Vaccine (1 of 2 - PCV13) 05/10/2016 [address] [place] [address] Annual Well Visit  05/10/2016  Flu Vaccine  08/01/2017  Breast Cancer Screening  08/04/2017 Introducing [facility]! [facility] introduces [email or web address] patient portal. Now you can access parts of your medical record, email your doctor's office, and request medication refills online. 1. In your internet browser, go to [email or web address] 2. Click on the First Time User? Click Here link in the Sign In box. You will see the New Member Sign Up page. 3. Enter your [email or web address] Access Code exactly as it appears below. You will not need to use this code after youve completed the sign-up process.  If you do not sign up before the expiration date, you must request a new code. · [employer] Access Code: [email or web address] Expires: 10/22/2017  1:32 PM 
 
4. Enter the last four digits of your Social Security Number (xxxx) and Date of Birth (mm/dd/yyyy) as indicated and click Submit. You will be taken to the next sign-up page. 5. Create a IM[employer]pping ID. This will be your IM[employer] login ID and cannot be changed, so think of one that is secure and easy to remember. 6. Create a [employer] password. You can change your password at any time. 7. Enter your Password Reset Question and Answer. This can be used at a later time if you forget your password. 8. Enter your e-mail address. You will receive e-mail notification when new information is available in [address] 9. Click Sign Up. You can now view and download portions of your medical record. 10. Click the Download Summary menu link to download a portable copy of your medical information. If you have questions, please visit the Frequently Asked Questions section of the [employer] website. Remember, IM[employer] is NOT to be used for urgent needs. For medical emergencies, dial 911. Now available from your iPhone and Android! Please provide this summary of care documentation to your next provider. Your primary care clinician is listed as Aleida Hunter. If you have any questions after today's visit, please call [phone].

## 2017-10-06 LAB
BASOPHILS # BLD AUTO: 0 X10E3/UL (ref 0–0.2)
BASOPHILS NFR BLD AUTO: 0 %
CHOLEST SERPL-MCNC: 176 MG/DL (ref 100–199)
EOSINOPHIL # BLD AUTO: 0.2 X10E3/UL (ref 0–0.4)
EOSINOPHIL NFR BLD AUTO: 2 %
ERYTHROCYTE [DISTWIDTH] IN BLOOD BY AUTOMATED COUNT: 15.2 % (ref 12.3–15.4)
HCT VFR BLD AUTO: 39.8 % (ref 34–46.6)
HDLC SERPL-MCNC: 49 MG/DL
HGB BLD-MCNC: 13.6 G/DL (ref 11.1–15.9)
IMM GRANULOCYTES # BLD: 0 X10E3/UL (ref 0–0.1)
IMM GRANULOCYTES NFR BLD: 0 %
INTERPRETATION, 910389: NORMAL
LDLC SERPL CALC-MCNC: 97 MG/DL (ref 0–99)
LYMPHOCYTES # BLD AUTO: 3.3 X10E3/UL (ref 0.7–3.1)
LYMPHOCYTES NFR BLD AUTO: 34 %
MCH RBC QN AUTO: 29.4 PG (ref 26.6–33)
MCHC RBC AUTO-ENTMCNC: 34.2 G/DL (ref 31.5–35.7)
MCV RBC AUTO: 86 FL (ref 79–97)
MONOCYTES # BLD AUTO: 0.9 X10E3/UL (ref 0.1–0.9)
MONOCYTES NFR BLD AUTO: 10 %
NEUTROPHILS # BLD AUTO: 5.1 X10E3/UL (ref 1.4–7)
NEUTROPHILS NFR BLD AUTO: 54 %
PLATELET # BLD AUTO: 455 X10E3/UL (ref 150–379)
RBC # BLD AUTO: 4.63 X10E6/UL (ref 3.77–5.28)
TRIGL SERPL-MCNC: 151 MG/DL (ref 0–149)
VLDLC SERPL CALC-MCNC: 30 MG/DL (ref 5–40)
WBC # BLD AUTO: 9.6 X10E3/UL (ref 3.4–10.8)

## 2017-10-16 ENCOUNTER — HOSPITAL ENCOUNTER (EMERGENCY)
Age: 67
Discharge: HOME | End: 2017-10-16
Attending: FAMILY MEDICINE

## 2017-10-16 VITALS
RESPIRATION RATE: 22 BRPM | HEIGHT: 64 IN | HEART RATE: 103 BPM | TEMPERATURE: 98.4 F | BODY MASS INDEX: 42.36 KG/M2 | OXYGEN SATURATION: 95 % | SYSTOLIC BLOOD PRESSURE: 149 MMHG | DIASTOLIC BLOOD PRESSURE: 74 MMHG | WEIGHT: 248.1 LBS

## 2017-10-16 DIAGNOSIS — J30.89 ACUTE NONSEASONAL ALLERGIC RHINITIS DUE TO OTHER ALLERGEN: Primary | ICD-10-CM

## 2017-10-16 DIAGNOSIS — J98.01 ACUTE BRONCHOSPASM: ICD-10-CM

## 2017-10-16 RX ORDER — BENZONATATE 200 MG/1
200 CAPSULE ORAL
Qty: 21 CAP | Refills: 0 | Status: SHIPPED | OUTPATIENT
Start: 2017-10-16 | End: 2017-10-23

## 2017-10-16 RX ORDER — PREDNISONE 20 MG/1
TABLET ORAL
Qty: 21 TAB | Refills: 0 | Status: SHIPPED | OUTPATIENT
Start: 2017-10-16 | End: 2017-12-30

## 2017-10-16 RX ORDER — IPRATROPIUM BROMIDE AND ALBUTEROL SULFATE 2.5; .5 MG/3ML; MG/3ML
3 SOLUTION RESPIRATORY (INHALATION)
Status: COMPLETED | OUTPATIENT
Start: 2017-10-16 | End: 2017-10-16

## 2017-10-16 RX ORDER — FLUTICASONE PROPIONATE 50 MCG
2 SPRAY, SUSPENSION (ML) NASAL DAILY
Qty: 1 BOTTLE | Refills: 0 | Status: SHIPPED | OUTPATIENT
Start: 2017-10-16 | End: 2017-12-21 | Stop reason: SDUPTHER

## 2017-10-16 RX ADMIN — IPRATROPIUM BROMIDE AND ALBUTEROL SULFATE 3 ML: 2.5; .5 SOLUTION RESPIRATORY (INHALATION) at 09:35

## 2017-10-16 NOTE — DISCHARGE INSTRUCTIONS
Managing Your Allergies: Care Instructions  Your Care Instructions  Managing your allergies is an important part of staying healthy. Your doctor will help you find out what may be causing the allergies. Common causes of allergy symptoms are house dust and dust mites, animal dander, mold, and pollen. As soon as you know what triggers your symptoms, try to reduce your exposure to your triggers. This can help prevent allergy symptoms, asthma, and other health problems. Ask your doctor about allergy medicine or immunotherapy. These treatments may help reduce or prevent allergy symptoms. Follow-up care is a key part of your treatment and safety. Be sure to make and go to all appointments, and call your doctor if you are having problems. It's also a good idea to know your test results and keep a list of the medicines you take. How can you care for yourself at home? · If you think that dust or dust mites are causing your allergies:  ¨ Wash sheets, pillowcases, and other bedding every week in hot water. ¨ Use airtight, dust-proof covers for pillows, duvets, and mattresses. Avoid plastic covers, because they tend to tear quickly and do not \"breathe. \" Wash according to the instructions. ¨ Remove extra blankets and pillows that you don't need. ¨ Use blankets that are machine-washable. ¨ Don't use home humidifiers. They can help mites live longer. · Use air-conditioning. Change or clean all filters every month. Keep windows closed. Use high-efficiency air filters. Don't use window or attic fans, which draw dust into the air. · If you're allergic to pet dander, keep pets outside or, at the very least, out of your bedroom. Old carpet and cloth-covered furniture can hold a lot of animal dander. You may need to replace them. · Look for signs of cockroaches. Use cockroach baits to get rid of them. Then clean your home well. · If you're allergic to mold, don't keep indoor plants, because molds can grow in soil.  Get rid of furniture, rugs, and drapes that smell musty. Check for mold in the bathroom. · If you're allergic to pollen, stay inside when pollen counts are high. · Don't smoke or let anyone else smoke in your house. Don't use fireplaces or wood-burning stoves. Avoid paint fumes, perfumes, and other strong odors. When should you call for help? Give an epinephrine shot if:  · You think you are having a severe allergic reaction. After giving an epinephrine shot call 911, even if you feel better. Call 911 if:  · You have symptoms of a severe allergic reaction. These may include:  ¨ Sudden raised, red areas (hives) all over your body. ¨ Swelling of the throat, mouth, lips, or tongue. ¨ Trouble breathing. ¨ Passing out (losing consciousness). Or you may feel very lightheaded or suddenly feel weak, confused, or restless. · You have been given an epinephrine shot, even if you feel better. Call your doctor now or seek immediate medical care if:  · You have symptoms of an allergic reaction, such as:  ¨ A rash or hives (raised, red areas on the skin). ¨ Itching. ¨ Swelling. ¨ Belly pain, nausea, or vomiting. Watch closely for changes in your health, and be sure to contact your doctor if:  · Your allergies get worse. · You need help controlling your allergies. · You have questions about allergy testing. · You do not get better as expected. Where can you learn more? Go to http://zeina-mary jane.info/. Enter L249 in the search box to learn more about \"Managing Your Allergies: Care Instructions. \"  Current as of: April 3, 2017  Content Version: 11.3  © 6581-5470 [employer]. Care instructions adapted under license by [employer] (which disclaims liability or warranty for this information).  If you have questions about a medical condition or this instruction, always ask your healthcare professional. [address] any warranty or liability for your use of
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
## 2017-10-23 ENCOUNTER — HOSPITAL ENCOUNTER (EMERGENCY)
Age: 67
Discharge: HOME | End: 2017-10-23
Attending: FAMILY MEDICINE

## 2017-10-23 VITALS
DIASTOLIC BLOOD PRESSURE: 81 MMHG | TEMPERATURE: 98.2 F | OXYGEN SATURATION: 98 % | BODY MASS INDEX: 43.19 KG/M2 | SYSTOLIC BLOOD PRESSURE: 163 MMHG | WEIGHT: 253 LBS | HEART RATE: 98 BPM | RESPIRATION RATE: 18 BRPM | HEIGHT: 64 IN

## 2017-10-23 DIAGNOSIS — J06.9 ACUTE UPPER RESPIRATORY INFECTION: Primary | ICD-10-CM

## 2017-10-23 RX ORDER — CEFDINIR 300 MG/1
300 CAPSULE ORAL 2 TIMES DAILY
Qty: 20 CAP | Refills: 0 | Status: SHIPPED | OUTPATIENT
Start: 2017-10-23 | End: 2017-12-30

## 2017-10-23 NOTE — DISCHARGE INSTRUCTIONS
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
## 2017-11-01 ENCOUNTER — HOSPITAL ENCOUNTER (OUTPATIENT)
Dept: BONE DENSITY | Age: 67
Discharge: HOME | End: 2017-11-01
Attending: FAMILY MEDICINE
Payer: MEDICARE

## 2017-11-01 DIAGNOSIS — Z78.0 POSTMENOPAUSAL STATE: ICD-10-CM

## 2017-11-01 PROCEDURE — 77080 DXA BONE DENSITY AXIAL: CPT

## 2017-12-13 ENCOUNTER — PATIENT OUTREACH (OUTPATIENT)
Dept: FAMILY MEDICINE CLINIC | Age: 67
End: 2017-12-13

## 2017-12-13 NOTE — PROGRESS NOTES
NN Note    - Met with pt & son Anjelica Hopkins for [employer] ACP Facilitation  - See ACP Progress Note

## 2017-12-13 NOTE — ACP (ADVANCE CARE PLANNING)
[facility]     Prior to today's conversation, did individual have existing ACP documents? [] Yes  [x] No  If Yes, type of document: n/a  Copy of previous document in electronic health record?  n/a [] Yes  [] No  If no, requested copy of document?  n/a [] Yes  [] No    Date of conversation: 12/13/2017   Location: [facility] Conference Room    Participants:   [x] Patient    [x] Prospective agent (not yet named in a document) / Other surrogate decision  maker / next of kin    Name: Concepcion Thornton     Relationship to patient: Son    Phone number: [phone]   [] Healthcare agent (already designated in prior ACP document)    Name: n/a    Relationship to patient:n/a    Phone number: na/      Other persons present:   Name: n/a    Relationship to patient: n/a   Name: n/a    Relationship to patient: n/a    Individual's Fears/Concerns about planning: none verbalized. \"Have meaning to do this for some time\". Goals/Narrative of Conversation: doesn't want son to be burdened with having to question if he was doing the right thing. \"I want him to know what my decisions/wishes are so he won't have to question anything\". Conversation topics for Individual    Has learned the following from prior experiences with serious illness: hasn't really had much prior experience with serious illness. Identifies the following as important for living well: \"able to feed myself dessert. Be able to get around on my own, even if it's doing it slow. Being to be independent, Live on my own. Be in right mind\". \"What cultural, Yazidism, spiritual, or personal beliefs (if any) do you have that might help you choose the care you want, or do not want? \"  Patient response: none    \"Would you like to talk to someone about these beliefs or concerns? \"  Patient response: n/a      Conversation topics for Agent / Prospective Agent    Understanding of the role of healthcare agent: yes    Agent confirms Willingness to:   [x]Yes []No Accept role   [x] Yes []No Talk with individual about his/her goals, values, & preferences   [x] Yes [] No   Follow individual's decisions, even if do not agree with those    decisions   [x]Yes  []No Make decisions in difficult moments    Other questions/concerns about role of agent: none verbalized    Topics for Chronic Illness (if applicable): \"What do you understand about your (name of illness)? \"  Patient response: \"I have Arthritis, HTN\". \"What do you understand about the complications that may occur with your (name of illness)? \"  Patient response: \"decrease in movement from Arthritis\". \"What do you understand about CPR? \" Patient response: \"can get cracked ribs, which I don't want. Doesn't always work\". \"What would be an unacceptable outcome of CPR? \" Patient response: \"being in a vegetative state. Not being able know who I am or anyone else. Not being able to speak, move\".     [] Yes  [x] No   Educated patient regarding differences between AMD and DDNR  [] Yes  [] No   If patient requested DDNR order, referred to provider for follow-up    [employer]® ACP Facilitator: Delaney York RN    Length (minutes): 60    The following was provided (check all that apply):   [x] Clarification of the goals of ACP    [x] Criteria for choosing a healthcare agent   [x] Written information on the planning process      Healthcare Decision Information Cards:   [x] Help with Breathing Facts   [x] Tube Feeding Facts   [x] CPR Facts      [x] Review of the completion of the advance directive    [] Review of existing advance directive       Meeting Outcomes:   [x] ACP discussion completed   [x] Advance directive form completed   [x] Original of completed advance directive given to patient   [x] Copy given to healthcare agent    [x] Copy scanned to electronic medical record    If ACP discussion not completed, last interview topic discussed: n/a     Follow-up plan: [] Schedule follow-up conversation to continue planning   [] Referred individual to provider for additional questions/concerns    [] Individual will invite agent/prospective agent to next ACP appointment   [x] Recommended to review completed AMD annually or upon change in health status     [x] This note routed to one or more involved healthcare providers

## 2017-12-21 NOTE — TELEPHONE ENCOUNTER
----- Message from Milady Armijo sent at 12/21/2017 10:23 AM EST -----  Regarding: Dr. Carl Bennett  Pt stated [facility] on [address] requested refill on her Rxs(Nasal Spray, and \"Diclofenac\" 90 day supply) 2 wks ago, and no response from the doctor. Pt stated she is out of medication. Best contact number [phone].

## 2017-12-21 NOTE — TELEPHONE ENCOUNTER
PCP: Syed Aj. Bjorn De Luna MD    Last appt: 2017  Future Appointments  Date Time Provider [place]   3/28/2018 8:20 AM LAB [facility]OPEZA   2018 8:00 AM Syed Aj. MD [facility]ET[facility] Lee       Requested Prescriptions     Pending Prescriptions Disp Refills    diclofenac EC (VOLTAREN) 75 mg EC tablet       Sig: Take 1 Tab by mouth two (2) times daily as needed.  fluticasone (FLONASE) 50 mcg/actuation nasal spray 1 Bottle 0     Si Sprays by Both Nostrils route daily. Request for a 30 or 90 day supply?  90    Pharmacy: Pharmacy listed in chart     Other Comments:

## 2017-12-22 RX ORDER — FLUTICASONE PROPIONATE 50 MCG
2 SPRAY, SUSPENSION (ML) NASAL DAILY
Qty: 1 BOTTLE | Refills: 11 | Status: SHIPPED | OUTPATIENT
Start: 2017-12-22 | End: 2018-10-16 | Stop reason: SDUPTHER

## 2017-12-22 RX ORDER — DICLOFENAC SODIUM 75 MG/1
75 TABLET, DELAYED RELEASE ORAL
Qty: 180 TAB | Refills: 1 | Status: SHIPPED | OUTPATIENT
Start: 2017-12-22 | End: 2018-03-19 | Stop reason: SDUPTHER

## 2017-12-30 ENCOUNTER — HOSPITAL ENCOUNTER (EMERGENCY)
Age: 67
Discharge: HOME | End: 2017-12-30
Attending: FAMILY MEDICINE

## 2017-12-30 VITALS
HEART RATE: 97 BPM | SYSTOLIC BLOOD PRESSURE: 162 MMHG | OXYGEN SATURATION: 99 % | WEIGHT: 257 LBS | RESPIRATION RATE: 16 BRPM | DIASTOLIC BLOOD PRESSURE: 87 MMHG | TEMPERATURE: 97.4 F | HEIGHT: 64 IN | BODY MASS INDEX: 43.87 KG/M2

## 2017-12-30 DIAGNOSIS — J06.9 VIRAL URI WITH COUGH: Primary | ICD-10-CM

## 2017-12-30 LAB
INFLUENZA A AG (POC): NORMAL
INFLUENZA AG (POC) NEGATIVE CTRL.: NORMAL
INFLUENZA AG (POC) POSITIVE CTRL.: NORMAL
INFLUENZA B AG (POC): NORMAL
LOT NUMBER POC: NORMAL

## 2017-12-30 RX ORDER — BENZONATATE 100 MG/1
100 CAPSULE ORAL
Qty: 21 CAP | Refills: 0 | Status: SHIPPED | OUTPATIENT
Start: 2017-12-30 | End: 2018-01-03 | Stop reason: SDUPTHER

## 2017-12-30 NOTE — DISCHARGE INSTRUCTIONS
Viral Respiratory Infection: Care Instructions  Your Care Instructions    Viruses are very small organisms. They grow in number after they enter your body. There are many types that cause different illnesses, such as colds and the mumps. The symptoms of a viral respiratory infection often start quickly. They include a fever, sore throat, and runny nose. You may also just not feel well. Or you may not want to eat much. Most viral respiratory infections are not serious. They usually get better with time and self-care. Antibiotics are not used to treat a viral infection. That's because antibiotics will not help cure a viral illness. In some cases, antiviral medicine can help your body fight a serious viral infection. Follow-up care is a key part of your treatment and safety. Be sure to make and go to all appointments, and call your doctor if you are having problems. It's also a good idea to know your test results and keep a list of the medicines you take. How can you care for yourself at home? · Rest as much as possible until you feel better. · Be safe with medicines. Take your medicine exactly as prescribed. Call your doctor if you think you are having a problem with your medicine. You will get more details on the specific medicine your doctor prescribes. · Take an over-the-counter pain medicine, such as acetaminophen (Tylenol), ibuprofen (Advil, Motrin), or naproxen (Aleve), as needed for pain and fever. Read and follow all instructions on the label. Do not give aspirin to anyone younger than 20. It has been linked to Reye syndrome, a serious illness. · Drink plenty of fluids, enough so that your urine is light yellow or clear like water. Hot fluids, such as tea or soup, may help relieve congestion in your nose and throat. If you have kidney, heart, or liver disease and have to limit fluids, talk with your doctor before you increase the amount of fluids you drink.   · Try to clear mucus from your lungs by breathing deeply and coughing. · Gargle with warm salt water once an hour. This can help reduce swelling and throat pain. Use 1 teaspoon of salt mixed in 1 cup of warm water. · Do not smoke or allow others to smoke around you. If you need help quitting, talk to your doctor about stop-smoking programs and medicines. These can increase your chances of quitting for good. To avoid spreading the virus  · Cough or sneeze into a tissue. Then throw the tissue away. · If you don't have a tissue, use your hand to cover your cough or sneeze. Then clean your hand. You can also cough into your sleeve. · Wash your hands often. Use soap and warm water. Wash for 15 to 20 seconds each time. · If you don't have soap and water near you, you can clean your hands with alcohol wipes or gel. When should you call for help? Call your doctor now or seek immediate medical care if:  ? · You have a new or higher fever. ? · Your fever lasts more than 48 hours. ? · You have trouble breathing. ? · You have a fever with a stiff neck or a severe headache. ? · You are sensitive to light. ? · You feel very sleepy or confused. ? Watch closely for changes in your health, and be sure to contact your doctor if:  ? · You do not get better as expected. Where can you learn more? Go to http://zeina-mary jane.info/. Enter K821 in the search box to learn more about \"Viral Respiratory Infection: Care Instructions. \"  Current as of: May 12, 2017  Content Version: 11.4  © 8790-6011 [employer]. Care instructions adapted under license by [employer] (which disclaims liability or warranty for this information). If you have questions about a medical condition or this instruction, always ask your healthcare professional. [address] any warranty or liability for your use of this information.
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
## 2018-01-03 ENCOUNTER — OFFICE VISIT (OUTPATIENT)
Dept: FAMILY MEDICINE CLINIC | Age: 68
End: 2018-01-03

## 2018-01-03 VITALS
RESPIRATION RATE: 20 BRPM | BODY MASS INDEX: 43.71 KG/M2 | HEART RATE: 88 BPM | WEIGHT: 256 LBS | DIASTOLIC BLOOD PRESSURE: 80 MMHG | SYSTOLIC BLOOD PRESSURE: 138 MMHG | TEMPERATURE: 97 F | HEIGHT: 64 IN | OXYGEN SATURATION: 95 %

## 2018-01-03 DIAGNOSIS — J06.9 VIRAL URI WITH COUGH: Primary | ICD-10-CM

## 2018-01-03 PROBLEM — E66.01 OBESITY, MORBID (HCC): Status: ACTIVE | Noted: 2018-01-03

## 2018-01-03 RX ORDER — BENZONATATE 100 MG/1
100-200 CAPSULE ORAL
Qty: 30 CAP | Refills: 0 | Status: SHIPPED | OUTPATIENT
Start: 2018-01-03 | End: 2018-04-04

## 2018-01-03 RX ORDER — DOXYCYCLINE 100 MG/1
100 CAPSULE ORAL 2 TIMES DAILY
Qty: 14 CAP | Refills: 0 | Status: SHIPPED | OUTPATIENT
Start: 2018-01-03 | End: 2018-01-10

## 2018-01-03 NOTE — PATIENT INSTRUCTIONS
Venkat Flores TODAY, please go to:   CHECK OUT     If you received a referral, Show the     Please schedule the following appointments at [address] [place] [address]:  · Lifestyle follow up with Dr. Simona Lyons in April 2017  · Lab visit, fasting the week before our visit. · Honoring Choices appointment with Nurse navigator in 1-2 months      Today's Plan: For your symptoms: Your symptoms may improve with an oral antihistamine. These are available over the counter and include:  Loratadine/claritin  Cetirizine/Zyrtec  Fexofenadine/Allegra  Levocetirizine/Xyzal    · Your symptoms may improve with a nasal steroid. These are available over the counter and include:  · Flonase (aka fluticasone)  · Nasocort (aka triamcinolone)  · Nasonex (aka mometasone)  · Rhinocort (aka budesonide)    · Increase fluid intake, especially water to thin mucous and boost the immune system. · Avoid sugar and dairy while congested since they thicken mucous. · Get plenty of rest!    · Gargle 3 times daily and as needed in Listerine or warm salt water vinegar solutions (1 tsp salt, 1 tsp vinegar in 1 cup lukewarm water.)    · Use OTC nasal saline spray up each nostril four times daily. You could also consider using a netipot with distilled water. · Use humidifier at bedtime. · Use OTC Mucinex 600 mg twice daily to loosen mucous. · Use OTC Tylenol  (up to 650mg every 6 hours) or Ibuprofen (up to 800 mg every 8 hours) as needed for pain, fever or headaches. ·  Avoid decongestants and Ibuprofen if you have high blood pressure!       Return to the doctor for evaluation:  · If mucous is consistently discolored yellow or green throughout the day for more than a week  · If you develop worsening facial pain  · If you develop a fever that will not go away  · If your symptoms worsen instead of improve

## 2018-01-03 NOTE — PROGRESS NOTES
Deven Coleman [address] [place] [address] Visit   01/03/2018  Patient ID: Kiya Alicea is a 78 y.o. female. Assessment/Plan:    Diagnoses and all orders for this visit:    1. Viral URI with cough  -     doxycycline (VIBRAMYCIN) 100 mg capsule; Take 1 Cap by mouth two (2) times a day for 7 days. IF SYMPTOMS WORSEN  -     benzonatate (TESSALON PERLES) 100 mg capsule; Take 1-2 Caps by mouth three (3) times daily as needed for Cough. Overall improving. Refilled tessalon. No asthma exacerbation. abx rx incase sinus sx worsen. Review precautions. Counselled pt on Patient health concerns and plans. Patient was offered a choice/choices in the treatment plan today. Reviewed return precautions as appropriate. Patient expresses understanding of the plan and agrees with recommendations. See patient instructions for more. Patient Instructions   . TODAY, please go to:   CHECK OUT     If you received a referral, Show the     Please schedule the following appointments at [address] [place] [address]:  · Lifestyle follow up with Dr. Marilynn Rees in April 2017  · Lab visit, fasting the week before our visit. · Honoring Choices appointment with Nurse navigator in 1-2 months      Today's Plan: For your symptoms: Your symptoms may improve with an oral antihistamine. These are available over the counter and include:  Loratadine/claritin  Cetirizine/Zyrtec  Fexofenadine/Allegra  Levocetirizine/Xyzal    · Your symptoms may improve with a nasal steroid. These are available over the counter and include:  · Flonase (aka fluticasone)  · Nasocort (aka triamcinolone)  · Nasonex (aka mometasone)  · Rhinocort (aka budesonide)    · Increase fluid intake, especially water to thin mucous and boost the immune system. · Avoid sugar and dairy while congested since they thicken mucous.     · Get plenty of rest!    · Gargle 3 times daily and as needed in Listerine or warm salt water vinegar solutions (1 tsp salt, 1 tsp vinegar in 1 cup lukewarm water.)    · Use OTC nasal saline spray up each nostril four times daily. You could also consider using a netipot with distilled water. · Use humidifier at bedtime. · Use OTC Mucinex 600 mg twice daily to loosen mucous. · Use OTC Tylenol  (up to 650mg every 6 hours) or Ibuprofen (up to 800 mg every 8 hours) as needed for pain, fever or headaches. ·  Avoid decongestants and Ibuprofen if you have high blood pressure! Return to the doctor for evaluation:  · If mucous is consistently discolored yellow or green throughout the day for more than a week  · If you develop worsening facial pain  · If you develop a fever that will not go away  · If your symptoms worsen instead of improve          Subjective:   HPI:  Robert Min is a 78 y.o. female being seen for:   Chief Complaint   Patient presents with    URI     FU       · To  on 12/30 dx with viral URI. Flu negative rxed tressalon. · Feeling better first day out of the house today  · Cough improved. · Nasal congestion is still present  · Not using any OTC medications   · Nothing is getting worse. · Right ear itching  · No fever. · No wheezing or SOB  · Using humidifier. · Nasal passages are congested. Some blood on left side. Review of Systems  Otherwise as noted in HPI  ?   Objective:     Visit Vitals    /80 (BP 1 Location: Left arm, BP Patient Position: Sitting)    Pulse 88    Temp 97 °F (36.1 °C) (Oral)    Resp 20    Ht 5' 4\" (1.626 m)    Wt 256 lb (116.1 kg)    SpO2 95%    BMI 43.94 kg/m2     Wt Readings from Last 3 Encounters:   01/03/18 256 lb (116.1 kg)   12/30/17 257 lb (116.6 kg)   10/23/17 253 lb (114.8 kg)     BP Readings from Last 3 Encounters:   01/03/18 138/80   12/30/17 162/87   10/23/17 163/81     PHQ over the last two weeks 1/3/2018   Little interest or pleasure in doing things Not at all   Feeling down, depressed or hopeless Not at all   Total Score PHQ 2 0         Physical Exam Constitutional: She appears well-developed and well-nourished. No distress. HENT:   Head: Normocephalic. Right Ear: Tympanic membrane and ear canal normal. Tympanic membrane is not erythematous and not bulging. Right ear injected TM: minimal injection of TM. Left Ear: Tympanic membrane and ear canal normal. Tympanic membrane is not erythematous and not bulging. Nose: Mucosal edema present. Right sinus exhibits no maxillary sinus tenderness and no frontal sinus tenderness. Left sinus exhibits no maxillary sinus tenderness and no frontal sinus tenderness. Mouth/Throat: Uvula is midline. No posterior oropharyngeal edema or posterior oropharyngeal erythema. Eyes: Conjunctivae are normal. Right eye exhibits no discharge and no exudate. Left eye exhibits no discharge and no exudate. Cardiovascular: Normal rate, regular rhythm and normal heart sounds. Exam reveals no gallop and no friction rub. No murmur heard. Pulmonary/Chest: Effort normal. No accessory muscle usage. No respiratory distress. She has no decreased breath sounds. She has no wheezes. She has no rhonchi. She has no rales. Lymphadenopathy:        Head (right side): No submental, no submandibular, no preauricular and no posterior auricular adenopathy present. Head (left side): No submental, no submandibular, no preauricular and no posterior auricular adenopathy present. She has no cervical adenopathy. Right: No supraclavicular adenopathy present. Left: No supraclavicular adenopathy present. Neurological: She is alert. Psychiatric: She has a normal mood and affect. Her behavior is normal.       Allergies   Allergen Reactions    Latex Hives    Flowers Shortness of Breath     Fresh cut flowers are worse    Fruit C [Ascorbic Acid] Hives     ALL FRUITS    Pcn [Penicillins] Hives     Prior to Admission medications    Medication Sig Start Date End Date Taking?  Authorizing Provider   doxycycline (VIBRAMYCIN) 100 mg capsule Take 1 Cap by mouth two (2) times a day for 7 days. IF SYMPTOMS WORSEN 1/3/18 1/10/18 Yes Tisha Woo. Particdarlin Walsh MD   benzonatate (TESSALON PERLES) 100 mg capsule Take 1-2 Caps by mouth three (3) times daily as needed for Cough. 1/3/18  Yes Tisha Woo. Ajay Walsh MD   diclofenac EC (VOLTAREN) 75 mg EC tablet Take 1 Tab by mouth two (2) times daily as needed. 12/22/17  Yes Tisha Woo. Ajay Walsh MD   fluticasone [facility]) 50 mcg/actuation nasal spray 2 Sprays by Both Nostrils route daily. 12/22/17  Yes Tisha Woo. Ajay Walsh MD   hydroCHLOROthiazide (HYDRODIURIL) 25 mg tablet Take 1 Tab by mouth daily. 10/5/17  Yes Tisha Woo. Ajay Walsh MD   PROAIR HFA 90 mcg/actuation inhaler INHALE 1 PUFF PO Q 6 H PRN 6/19/17  Yes Historical Provider   ADVAIR -21 mcg/actuation inhaler INL 2 PFS PO BID IN THE MORNING AND IN THE KODI 6/29/17  Yes Historical Provider   aspirin delayed-release 81 mg tablet Take  by mouth daily. Yes Historical Provider   cholecalciferol (VITAMIN D3) 1,000 unit tablet Take  by mouth daily.    Yes Historical Provider     Patient Active Problem List   Diagnosis Code    Thoracic aortic aneurysm without rupture ([facility] I71.2    Essential hypertension I10    Sciatica M54.30    Asthma J45.909    Knee osteoarthritis M17.10    Multiple lung nodules on CT R91.8    Obesity, morbid ([facility] E66.01

## 2018-01-03 NOTE — PROGRESS NOTES
Chief Complaint   Patient presents with    URI     FU     1. Have you been to the ER, urgent care clinic since your last visit? Hospitalized since your last visit? Yes, urgent care for Respiratory infection     2. Have you seen or consulted any other health care providers outside of the [address] [place] [address] since your last visit? Include any pap smears or colon screening. No     The patient was counseled on the dangers of tobacco use, and was advised never to start again. Reviewed strategies to maximize success, including never to start again. Health Maintenance Due   Topic Date Due    DTaP/Tdap/Td series (1 - Tdap) Discussed with patient today and advised to follow up.    05/10/1972    ZOSTER VACCINE AGE 60> Discussed with patient today and advised to follow up.    03/10/2011    GLAUCOMA SCREENING Q2Y Discussed with patient today and advised to follow up.    05/10/2016    Pneumococcal 65+ Low/Medium Risk (1 of 2 - PCV13) Discussed with patient today and advised to follow up.    05/10/2016     ACP is not on file, advised to return.

## 2018-01-03 NOTE — MR AVS SNAPSHOT
Visit Information Date & Time Provider Department Dept. Phone Encounter #  
 1/3/2018  8:00 AM MD Josi Zambrano [phone] [id number] Your Appointments 3/28/2018  8:20 AM  
LAB with LAB [facility] [employer] ([facility]LE[facility] Aguilar) Appt Note: Dr. Isidra Ramirez Fasting; Dr. Isidra Ramirez Fasting [address] [facility] [address] [facility] [address]  
  
   
 [address]  
  
    
 4/4/2018  8:00 AM  
ROUTINE CARE with Rebecca Zambrano 28 ([facility]) Appt Note: 6 MO F/U Lifestyle & Results [address] [facility] [address] [facility] [address]  
[phone]  
  
   
 [address] [place] [address] Upcoming Health Maintenance Date Due DTaP/Tdap/Td series (1 - Tdap) 5/10/1972 ZOSTER VACCINE AGE 60> 3/10/2011 GLAUCOMA SCREENING Q2Y 5/10/2016 Pneumococcal 65+ Low/Medium Risk (1 of 2 - PCV13) 5/10/2016 MEDICARE YEARLY EXAM 10/6/2018 BREAST CANCER SCRN MAMMOGRAM 8/25/2019 COLONOSCOPY 6/3/2024 Allergies as of 1/3/2018  Review Complete On: 1/3/2018 By: Kellee Akhtar LPN Severity Noted Reaction Type Reactions Latex  12/07/2012    Hives Flowers  07/24/2017    Shortness of Breath Fresh cut flowers are worse Fruit C [Ascorbic Acid]  12/17/2012    Hives ALL FRUITS Pcn [Penicillins]  07/10/2012    Hives Current Immunizations  Never Reviewed Name Date Influenza High Dose Vaccine PF 10/5/2017 Not reviewed this visit You Were Diagnosed With   
  
 Codes Comments Viral URI    -  Primary ICD-10-CM: J06.9, B97.89 ICD-9-CM: 465.9 Viral URI with cough     ICD-10-CM: J06.9, B97.89 ICD-9-CM: 465.9 Vitals BP Pulse Temp Resp Height(growth percentile) Weight(growth percentile)  138/80 (BP 1 Location: Left arm, BP Patient Position: Sitting) 88 97 °F (36.1 °C) (Oral) 20 5' 4\" (1.626 m) 256 lb (116.1 kg) SpO2 BMI OB Status Smoking Status 95% 43.94 kg/m2 Hysterectomy Never Smoker Vitals History BMI and BSA Data Body Mass Index Body Surface Area 43.94 kg/m 2 2.29 m 2 Preferred Pharmacy Pharmacy Name Phone [facility]/PHARMACY #[id number]- [id number] [facility] [phone] Your Updated Medication List  
  
   
This list is accurate as of: 1/3/18  9:05 AM.  Always use your most recent med list.  
  
  
  
  
 ADVAIR -21 mcg/actuation inhaler Generic drug:  fluticasone-salmeterol INL 2 PFS PO BID IN THE MORNING AND IN THE KODI  
  
 aspirin delayed-release 81 mg tablet Take  by mouth daily. benzonatate 100 mg capsule Commonly known as:  TESSALON PERLES Take 1-2 Caps by mouth three (3) times daily as needed for Cough. diclofenac EC 75 mg EC tablet Commonly known as:  VOLTAREN Take 1 Tab by mouth two (2) times daily as needed. doxycycline 100 mg capsule Commonly known as:  VIBRAMYCIN Take 1 Cap by mouth two (2) times a day for 7 days. IF SYMPTOMS WORSEN  
  
 fluticasone 50 mcg/actuation nasal spray Commonly known as:  Aubery Cables 2 Sprays by Both Nostrils route daily. hydroCHLOROthiazide 25 mg tablet Commonly known as:  HYDRODIURIL Take 1 Tab by mouth daily. PROAIR HFA 90 mcg/actuation inhaler Generic drug:  albuterol INHALE 1 PUFF PO Q 6 H PRN  
  
 VITAMIN D3 1,000 unit tablet Generic drug:  cholecalciferol Take  by mouth daily. Prescriptions Printed Refills  
 doxycycline (VIBRAMYCIN) 100 mg capsule 0 Sig: Take 1 Cap by mouth two (2) times a day for 7 days. IF SYMPTOMS WORSEN Class: Print Route: Oral  
  
Prescriptions Sent to Pharmacy Refills  
 benzonatate (TESSALON PERLES) 100 mg capsule 0 Sig: Take 1-2 Caps by mouth three (3) times daily as needed for Cough. Class: Normal  
 [facility], [address] [place] [address] #: [phone] Route: Oral  
  
Patient Instructions [place] TODAY, please go to: CHECK OUT If you received a referral, Show the  Please schedule the following appointments at [address] [place] [address]: 
· Lifestyle follow up with Dr. Jorden Krishna in April 2017 · Lab visit, fasting the week before our visit. · Honoring Choices appointment with Nurse navigator in 1-2 months Today's Plan: For your symptoms: Your symptoms may improve with an oral antihistamine. These are available over the counter and include: 
Loratadine/claritin Cetirizine/Zyrtec Fexofenadine/Allegra Levocetirizine/Xyzal 
 
· Your symptoms may improve with a nasal steroid. These are available over the counter and include: · Flonase (aka fluticasone) · Nasocort (aka triamcinolone) · Nasonex (aka mometasone) · Rhinocort (aka budesonide) · Increase fluid intake, especially water to thin mucous and boost the immune system. · Avoid sugar and dairy while congested since they thicken mucous. · Get plenty of rest!   
· Gargle 3 times daily and as needed in Listerine or warm salt water vinegar solutions (1 tsp salt, 1 tsp vinegar in 1 cup lukewarm water.) · Use OTC nasal saline spray up each nostril four times daily. You could also consider using a netipot with distilled water. · Use humidifier at bedtime. · Use OTC Mucinex 600 mg twice daily to loosen mucous. · Use OTC Tylenol  (up to 650mg every 6 hours) or Ibuprofen (up to 800 mg every 8 hours) as needed for pain, fever or headaches. ·  Avoid decongestants and Ibuprofen if you have high blood pressure! Return to the doctor for evaluation: · If mucous is consistently discolored yellow or green throughout the day for more than a week · If you develop worsening facial pain · If you develop a fever that will not go away · If your symptoms worsen instead of improve Introducing [facility]! Dear Dilshad Stanton: Thank you for requesting a [employer] account. Our records indicate that you already have an active [employer] account. You can access your account anytime at [email or web address]. [email or web address] Did you know that you can access your hospital and ER discharge instructions at any time in [employer]? You can also review all of your test results from your hospital stay or ER visit. Additional Information If you have questions, please visit the Frequently Asked Questions section of the [employer] website at [email or web address] Remember, [employer] is NOT to be used for urgent needs. For medical emergencies, dial 911. Now available from your iPhone and Android! Please provide this summary of care documentation to your next provider. Your primary care clinician is listed as Genaro Jennings. If you have any questions after today's visit, please call [phone].

## 2018-03-19 ENCOUNTER — TELEPHONE (OUTPATIENT)
Dept: FAMILY MEDICINE CLINIC | Age: 68
End: 2018-03-19

## 2018-03-19 RX ORDER — DICLOFENAC SODIUM 75 MG/1
75 TABLET, DELAYED RELEASE ORAL
Qty: 180 TAB | Refills: 1 | Status: SHIPPED | OUTPATIENT
Start: 2018-03-19 | End: 2019-04-16 | Stop reason: SDUPTHER

## 2018-03-19 NOTE — TELEPHONE ENCOUNTER
PCP: Carlos Kirby. Anna Chance MD    Last appt: 1/3/2018  Future Appointments  Date Time Provider [place]   3/28/2018 8:20 AM LAB [facility] SCHED   4/4/2018 8:00 AM Carlos Kirby. Anna Chance MD [facility]       Requested Prescriptions     Pending Prescriptions Disp Refills    diclofenac EC (VOLTAREN) 75 mg EC tablet 180 Tab 1     Sig: Take 1 Tab by mouth two (2) times daily as needed.      Lab Results   Component Value Date/Time    Sodium 142 09/28/2017 08:10 AM    Potassium 4.3 09/28/2017 08:10 AM    Chloride 100 09/28/2017 08:10 AM    CO2 26 09/28/2017 08:10 AM    Anion gap 6 03/21/2015 01:56 AM    Glucose 98 09/28/2017 08:10 AM    BUN 26 09/28/2017 08:10 AM    Creatinine 0.82 09/28/2017 08:10 AM    BUN/Creatinine ratio 32 (H) 09/28/2017 08:10 AM    GFR est AA 86 09/28/2017 08:10 AM    GFR est non-AA 75 09/28/2017 08:10 AM    Calcium 9.9 09/28/2017 08:10 AM     No results found for: HBA1C, HGBE8, XJM8YFAZ, ASX7XSKF  Lab Results   Component Value Date/Time    Cholesterol, total 176 10/05/2017 10:32 AM    HDL Cholesterol 49 10/05/2017 10:32 AM    LDL, calculated 97 10/05/2017 10:32 AM    VLDL, calculated 30 10/05/2017 10:32 AM    Triglyceride 151 (H) 10/05/2017 10:32 AM    CHOL/HDL Ratio 2.9 03/21/2015 01:56 AM     Lab Results   Component Value Date/Time    TSH 4.26 (H) 03/21/2015 01:56 AM

## 2018-03-19 NOTE — TELEPHONE ENCOUNTER
----- Message from Joey Cole sent at 3/19/2018 10:54 AM EDT -----  Regarding: / Refill  Pt is requesting a refill on \"Diclofenac\" sent to [address] [facility] on [address] [facility]. Pt is also asking the doctor or nurse give her a call in regards to lab work and coding for blood work as this is the pt 3rd time calling. Best Contact [phone].

## 2018-03-19 NOTE — TELEPHONE ENCOUNTER
From: Merari Yepez  To: [address] [facility] Emily Sellers MD  Sent: 3/19/2018 11:10 AM EDT  Subject: Medication Renewal Request    Original authorizing provider: [id number] Emily Sellers MD    Cindy Resendez would like a refill of the following medications:  diclofenac EC (VOLTAREN) 75 mg EC tablet Hiram Soler. Emily Sellers MD]    Preferred pharmacy: [facility]/PHARMACY #[id number] - [address]:  3/19/2018 Please refill the requested prescription as soon as possible. I just found out this morning that I only have 2 pills left and I am taking 1 this morning. I use [facility] on [facility]. [phone].  Thanks, Rudy Gonzalez

## 2018-03-20 NOTE — TELEPHONE ENCOUNTER
Spoke with patient I.D. X 2 to see what her concerns were. No answer, so a message was left advising the patient to contact me here at the office.

## 2018-03-20 NOTE — TELEPHONE ENCOUNTER
Spoke with patient I.D. X 2 to see what her concerns were. Patient stated that she has received her RX for Diclofenac, but needs the coding to be changed on recent blood work. She stated that Medicare does not pay for routine blood work. She was informed that the  would be made aware. Patient verbalized understanding.

## 2018-03-26 DIAGNOSIS — I10 ESSENTIAL HYPERTENSION: ICD-10-CM

## 2018-03-27 ENCOUNTER — TELEPHONE (OUTPATIENT)
Dept: FAMILY MEDICINE CLINIC | Age: 68
End: 2018-03-27

## 2018-03-27 NOTE — TELEPHONE ENCOUNTER
Spoke with patient I.D. X 2 to see what her concerns were, she wanted to know if coding for prior labs were corrected. (Please see my prior note). Patient was then informed that message was given to , and she should F/U regarding the issue soon. Patient verbalized understanding.

## 2018-03-27 NOTE — TELEPHONE ENCOUNTER
----- Message from Delfin Baker sent at 3/27/2018  8:57 AM EDT -----  Regarding: Dr. Josr Curry  Pt is calling to speak with Briseyda Le regarding visit being coded incorrectly  back in 9/2017. She asking why she is having blood work so soon. The best contact is [phone].

## 2018-03-28 ENCOUNTER — LAB ONLY (OUTPATIENT)
Dept: FAMILY MEDICINE CLINIC | Age: 68
End: 2018-03-28

## 2018-03-28 DIAGNOSIS — E78.1 HYPERTRIGLYCERIDEMIA: ICD-10-CM

## 2018-03-28 DIAGNOSIS — E78.5 DYSLIPIDEMIA: ICD-10-CM

## 2018-03-28 DIAGNOSIS — E66.01 OBESITY, MORBID (HCC): ICD-10-CM

## 2018-03-28 DIAGNOSIS — I10 ESSENTIAL HYPERTENSION: Primary | ICD-10-CM

## 2018-03-29 LAB
BUN SERPL-MCNC: 19 MG/DL (ref 8–27)
BUN/CREAT SERPL: 21 (ref 12–28)
CALCIUM SERPL-MCNC: 9.4 MG/DL (ref 8.7–10.3)
CHLORIDE SERPL-SCNC: 101 MMOL/L (ref 96–106)
CHOLEST SERPL-MCNC: 165 MG/DL (ref 100–199)
CO2 SERPL-SCNC: 27 MMOL/L (ref 18–29)
CREAT SERPL-MCNC: 0.89 MG/DL (ref 0.57–1)
GFR SERPLBLD CREATININE-BSD FMLA CKD-EPI: 68 ML/MIN/1.73
GFR SERPLBLD CREATININE-BSD FMLA CKD-EPI: 78 ML/MIN/1.73
GLUCOSE SERPL-MCNC: 100 MG/DL (ref 65–99)
HDLC SERPL-MCNC: 54 MG/DL
INTERPRETATION, 910389: NORMAL
LDLC SERPL CALC-MCNC: 92 MG/DL (ref 0–99)
POTASSIUM SERPL-SCNC: 4.9 MMOL/L (ref 3.5–5.2)
SODIUM SERPL-SCNC: 143 MMOL/L (ref 134–144)
TRIGL SERPL-MCNC: 94 MG/DL (ref 0–149)
VLDLC SERPL CALC-MCNC: 19 MG/DL (ref 5–40)

## 2018-03-30 RX ORDER — HYDROCHLOROTHIAZIDE 25 MG/1
TABLET ORAL
Qty: 90 TAB | Refills: 1 | Status: SHIPPED | OUTPATIENT
Start: 2018-03-30 | End: 2018-10-03 | Stop reason: SDUPTHER

## 2018-03-30 NOTE — TELEPHONE ENCOUNTER
PCP: Dali Sellers MD    Last appt: 1/3/2018  Future Appointments  Date Time Provider [place]   4/4/2018 8:00 AM Dali Sellers MD [facility]       Requested Prescriptions     Pending Prescriptions Disp Refills    hydroCHLOROthiazide (HYDRODIURIL) 25 mg tablet [Pharmacy Med Name: HYDROCHLOROTHIAZIDE 25 MG TAB] 90 Tab 1     Sig: TAKE 1 TABLET BY MOUTH DAILY     Lab Results   Component Value Date/Time    Sodium 143 03/28/2018 09:14 AM    Potassium 4.9 03/28/2018 09:14 AM    Chloride 101 03/28/2018 09:14 AM    CO2 27 03/28/2018 09:14 AM    Anion gap 6 03/21/2015 01:56 AM    Glucose 100 (H) 03/28/2018 09:14 AM    BUN 19 03/28/2018 09:14 AM    Creatinine 0.89 03/28/2018 09:14 AM    BUN/Creatinine ratio 21 03/28/2018 09:14 AM    GFR est AA 78 03/28/2018 09:14 AM    GFR est non-AA 68 03/28/2018 09:14 AM    Calcium 9.4 03/28/2018 09:14 AM     No results found for: HBA1C, HGBE8, DXC2UCIL, IIM8ARKO  Lab Results   Component Value Date/Time    Cholesterol, total 165 03/28/2018 09:14 AM    HDL Cholesterol 54 03/28/2018 09:14 AM    LDL, calculated 92 03/28/2018 09:14 AM    VLDL, calculated 19 03/28/2018 09:14 AM    Triglyceride 94 03/28/2018 09:14 AM    CHOL/HDL Ratio 2.9 03/21/2015 01:56 AM     Lab Results   Component Value Date/Time    TSH 4.26 (H) 03/21/2015 01:56 AM

## 2018-04-04 ENCOUNTER — OFFICE VISIT (OUTPATIENT)
Dept: FAMILY MEDICINE CLINIC | Age: 68
End: 2018-04-04

## 2018-04-04 VITALS
RESPIRATION RATE: 18 BRPM | HEIGHT: 64 IN | OXYGEN SATURATION: 95 % | TEMPERATURE: 97.4 F | DIASTOLIC BLOOD PRESSURE: 76 MMHG | HEART RATE: 88 BPM | WEIGHT: 257 LBS | SYSTOLIC BLOOD PRESSURE: 119 MMHG | BODY MASS INDEX: 43.87 KG/M2

## 2018-04-04 DIAGNOSIS — E66.01 OBESITY, MORBID (HCC): ICD-10-CM

## 2018-04-04 DIAGNOSIS — I10 ESSENTIAL HYPERTENSION: ICD-10-CM

## 2018-04-04 DIAGNOSIS — H69.81 DYSFUNCTION OF RIGHT EUSTACHIAN TUBE: ICD-10-CM

## 2018-04-04 DIAGNOSIS — J45.20 MILD INTERMITTENT ASTHMA WITHOUT COMPLICATION: ICD-10-CM

## 2018-04-04 DIAGNOSIS — Z23 ENCOUNTER FOR IMMUNIZATION: ICD-10-CM

## 2018-04-04 DIAGNOSIS — Z86.39 HISTORY OF HYPERLIPIDEMIA: Primary | ICD-10-CM

## 2018-04-04 RX ORDER — MONTELUKAST SODIUM 10 MG/1
10 TABLET ORAL DAILY
Qty: 90 TAB | Refills: 3 | Status: SHIPPED | OUTPATIENT
Start: 2018-04-04 | End: 2018-10-16 | Stop reason: SDUPTHER

## 2018-04-04 NOTE — PATIENT INSTRUCTIONS
Arline Ramirez TODAY, please go to:   Prevnar/pcv13   Release of records- Eye doctor     CHECK OUT     If you received a referral, Show the     Please schedule the following appointments at CHECK OUT:  · Lab visit, fasting, the week before our visit. · Medicare wellness visit with Dr. Tequila Mireles in Oct 2018      Today's Plan:  - cholesterol is improving  - keep up the good work with food  - make time for physical activity    Take singulair        Shingles vaccine  Please get a shingles vaccine at your pharmacy. This is also called SHINGRIX. Ask the pharmacist to tell you what your copay will be. If you need to you can call your insurance company to ask more questions. After you get the vaccine, have the pharmacist fax in documentation to the office. Tdap vaccine  Please get a tetanus plus pertussis (whooping cough) vaccine at your pharmacy. This is also known as the Tdap vaccine. Ask the pharmacist to tell you what your copay will be. If you need to you can call your insurance company to ask more questions. After you get the vaccine, have the pharmacist fax in documentation to the office.

## 2018-04-04 NOTE — MR AVS SNAPSHOT
[address] [facility], [place] 
 
 
 [address] [facility] 
[address] [facility] [address] 
[phone] Patient: William Davison MRN: [id number] YEK:[id number] Visit Information Date & Time Provider Department Dept. Phone Encounter #  
 4/4/2018  8:00 AM Miller Patel MD [facility] [phone] [id number] Upcoming Health Maintenance Date Due Pneumococcal 65+ Low/Medium Risk (1 of 2 - PCV13) 5/10/2016 ZOSTER VACCINE AGE 60> 10/4/2018* DTaP/Tdap/Td series (1 - Tdap) 10/4/2018* GLAUCOMA SCREENING Q2Y 4/4/2019* MEDICARE YEARLY EXAM 10/6/2018 BREAST CANCER SCRN MAMMOGRAM 8/25/2019 COLONOSCOPY 6/3/2024 *Topic was postponed. The date shown is not the original due date. Allergies as of 4/4/2018  Review Complete On: 4/4/2018 By: Olivia Gotti LPN Severity Noted Reaction Type Reactions Latex  12/07/2012    Hives Flowers  07/24/2017    Shortness of Breath Fresh cut flowers are worse Fruit C [Ascorbic Acid]  12/17/2012    Hives ALL FRUITS Pcn [Penicillins]  07/10/2012    Hives Current Immunizations  Never Reviewed Name Date Influenza High Dose Vaccine PF 10/5/2017 Not reviewed this visit You Were Diagnosed With   
  
 Codes Comments Mild intermittent asthma without complication    -  Primary ICD-10-CM: J45.20 ICD-9-CM: 493.90 Vitals BP Pulse Temp Resp Height(growth percentile) Weight(growth percentile) 119/76 (BP 1 Location: Right arm, BP Patient Position: Sitting) 88 97.4 °F (36.3 °C) (Oral) 18 5' 4\" (1.626 m) 257 lb (116.6 kg) SpO2 BMI OB Status Smoking Status 95% 44.11 kg/m2 Hysterectomy Never Smoker BMI and BSA Data Body Mass Index Body Surface Area  
 44.11 kg/m 2 2.29 m 2 Preferred Pharmacy Pharmacy Name Phone [id number] [facility] [phone] Your Updated Medication List  
  
   
This list is accurate as of 4/4/18  8:53 AM.  Always use your most recent med list.  
  
  
  
  
 ADVAIR -21 mcg/actuation inhaler Generic drug:  fluticasone-salmeterol INL 2 PFS PO BID IN THE MORNING AND IN THE KODI  
  
 aspirin delayed-release 81 mg tablet Take  by mouth daily. diclofenac EC 75 mg EC tablet Commonly known as:  VOLTAREN Take 1 Tab by mouth two (2) times daily as needed. fluticasone 50 mcg/actuation nasal spray Commonly known as:  Art Delcid 2 Sprays by Both Nostrils route daily. hydroCHLOROthiazide 25 mg tablet Commonly known as:  HYDRODIURIL  
TAKE 1 TABLET BY MOUTH DAILY PROAIR HFA 90 mcg/actuation inhaler Generic drug:  albuterol INHALE 1 PUFF PO Q 6 H PRN  
  
 VITAMIN D3 1,000 unit tablet Generic drug:  cholecalciferol Take  by mouth daily. Patient Instructions Samantha Milton TODAY, please go to: 
 Prevnar/pcv13 Release of records- Eye doctor CHECK OUT If you received a referral, Show the  Please schedule the following appointments at [facility] OUT: 
· Lab visit, fasting, the week before our visit. · Medicare wellness visit with Dr. Torri Saha in Oct 2018 Today's Plan: 
- cholesterol is improving 
- keep up the good work with food 
- make time for physical activity Take singulair Shingles vaccine Please get a shingles vaccine at your pharmacy. This is also called SHINGRIX. Ask the pharmacist to tell you what your copay will be. If you need to you can call your insurance company to ask more questions. After you get the vaccine, have the pharmacist fax in documentation to the office. Tdap vaccine Please get a tetanus plus pertussis (whooping cough) vaccine at your pharmacy. This is also known as the Tdap vaccine. Ask the pharmacist to tell you what your copay will be.  If you need to you can call your insurance company to ask more questions. After you get the vaccine, have the pharmacist fax in documentation to the office. Introducing [facility] HE[facility]! Dear Darrian Machuca: Thank you for requesting a [employer] account. Our records indicate that you already have an active [employer] account. You can access your account anytime at [email or web address] Did you know that you can access your hospital and ER discharge instructions at any time in [employer]? You can also review all of your test results from your hospital stay or ER visit. Additional Information If you have questions, please visit the Frequently Asked Questions section of the [employer] website at [email or web address] Remember, [employer] is NOT to be used for urgent needs. For medical emergencies, dial 911. Now available from your iPhone and Android! Please provide this summary of care documentation to your next provider. Your [facility] . If you have any questions after today's visit, please call [phone].

## 2018-04-04 NOTE — PROGRESS NOTES
Chief Complaint   Patient presents with    Results     Labs    Ear Fullness     1. Have you been to the ER, urgent care clinic since your last visit? Hospitalized since your last visit? No     2. Have you seen or consulted any other health care providers outside of the [address] [place] [address] since your last visit? Include any pap smears or colon screening. No     The patient was counseled on the dangers of tobacco use, and was advised never to start. Reviewed strategies to maximize success, including never to start. Ching Gonzales  Identified pt with two pt identifiers(name and ). Chief Complaint   Patient presents with    Results     Labs    Ear Fullness       1. Have you been to the ER, urgent care clinic since your last visit? Hospitalized since your last visit? NO    2. Have you seen or consulted any other health care providers outside of the [address] [place] [address] since your last visit? Include any pap smears or colon screening. NO    Today's provider has been notified of reason for visit, vitals and flowsheets obtained on patients. Patient received paperwork for advance directive during previous visit but has not completed at this time     Reviewed record In preparation for visit, huddled with provider and have obtained necessary documentation      Health Maintenance Due   Topic    DTaP/Tdap/Td series (1 - Tdap) Discussed with patient today and advised to follow up.  ZOSTER VACCINE AGE 60> Discussed with patient today and advised to follow up.  GLAUCOMA SCREENING Q2Y Discussed with patient today and advised to follow up.  Pneumococcal 65+ Low/Medium Risk (1 of 2 - PCV13) Discussed with patient today and advised to follow up.           Wt Readings from Last 3 Encounters:   18 256 lb (116.1 kg)   17 257 lb (116.6 kg)   10/23/17 253 lb (114.8 kg)     Temp Readings from Last 3 Encounters:   18 97 °F (36.1 °C) (Oral)   17 97.4 °F (36.3 °C) 10/23/17 98.2 °F (36.8 °C)     BP Readings from Last 3 Encounters:   01/03/18 138/80   12/30/17 162/87   10/23/17 163/81     Pulse Readings from Last 3 Encounters:   01/03/18 88   12/30/17 97   10/23/17 98     There were no vitals filed for this visit. Learning Assessment:  :     Learning Assessment 4/17/2015   PRIMARY LEARNER Patient   PRIMARY LANGUAGE ENGLISH   LEARNER PREFERENCE PRIMARY DEMONSTRATION   ANSWERED BY patient   RELATIONSHIP SELF       Depression Screening:  :     PHQ over the last two weeks 4/4/2018   Little interest or pleasure in doing things Not at all   Feeling down, depressed or hopeless Not at all   Total Score PHQ 2 0       Fall Risk Assessment:  :     Fall Risk Assessment, last 12 mths 4/4/2018   Able to walk? Yes   Fall in past 12 months? No   Fall with injury? -   Number of falls in past 12 months -   Fall Risk Score -       Abuse Screening:  :     Abuse Screening Questionnaire 10/5/2017   Do you ever feel afraid of your partner? N   Are you in a relationship with someone who physically or mentally threatens you? N   Is it safe for you to go home? Y       ADL Screening:  :     ADL Assessment 10/5/2017   Feeding yourself No Help Needed   Getting from bed to chair No Help Needed   Getting dressed No Help Needed   Bathing or showering No Help Needed   Walk across the room (includes cane/walker) No Help Needed   Using the telphone No Help Needed   Taking your medications No Help Needed   Preparing meals No Help Needed   Managing money (expenses/bills) No Help Needed   Moderately strenuous housework (laundry) No Help Needed   Shopping for personal items (toiletries/medicines) No Help Needed   Shopping for groceries No Help Needed   Driving No Help Needed   Climbing a flight of stairs No Help Needed   Getting to places beyond walking distances No Help Needed         ACP is on file. Medication reconciliation up to date and corrected with patient at this time.

## 2018-04-04 NOTE — PROGRESS NOTES
[address] [place] [address] Visit   04/04/2018  Patient ID: Brandi Grande is a 78 y.o. female. Assessment/Plan:    Diagnoses and all orders for this visit:    1. History of hyperlipidemia  Improved. Counseled    2. Obesity, morbid ([address]  Counseled on food and activity    3. Essential hypertension  Well managed    4. Mild intermittent asthma without complication  -     montelukast (SINGULAIR) 10 mg tablet; Take 1 Tab by mouth daily. Restart singulair    5. Dysfunction of right eustachian tube  Restart singulair to manage allergies. Continue flonase. See patient instructions for more. Counselled pt on Patient health concerns and plans. Patient was offered a choice/choices in the treatment plan today. Reviewed return precautions as appropriate. Patient expresses understanding of the plan and agrees with recommendations. Patient Instructions   . TODAY, please go to:   Prevnar/pcv13   Release of records- Eye doctor     CHECK OUT     If you received a referral, Show the     Please schedule the following appointments at CHECK OUT:  · Lab visit, fasting, the week before our visit. · Medicare wellness visit with Dr. Allyson Echeverria in Oct 2018      Today's Plan:  - cholesterol is improving  - keep up the good work with food  - make time for physical activity    Take singulair        Shingles vaccine  Please get a shingles vaccine at your pharmacy. This is also called SHINGRIX. Ask the pharmacist to tell you what your copay will be. If you need to you can call your insurance company to ask more questions. After you get the vaccine, have the pharmacist fax in documentation to the office. Tdap vaccine  Please get a tetanus plus pertussis (whooping cough) vaccine at your pharmacy. This is also known as the Tdap vaccine. Ask the pharmacist to tell you what your copay will be. If you need to you can call your insurance company to ask more questions.  After you get the vaccine, have the pharmacist fax in documentation to the office. Subjective:   HPI:  Kt Carlson is a 78 y.o. female being seen for:   Chief Complaint   Patient presents with    Results     Labs    Ear Fullness     Retired now     Octavio Alatorre reviewed in detail  · Cholesterol a little better. · ascvd in Oct 2017 was 10.1. Today ascvd 10 year risk is 6.9%  · Since Oct has been cooking more. Roasting vegetables. Lots of greens. · Less fried food. · Vegetables every meal.  · Joined [employer]- does water aerobics. Has not been about a month. Right ear pain   · Brother was screaming in car a few days ago, felt full then pain. No fever or drainage. Has allergies. Sometimes uses humidifier. Screening and Prevention Due:  Health Maintenance Due   Topic Date Due    Pneumococcal 65+ Low/Medium Risk (1 of 2 - PCV13) 05/10/2016      Immunization History   Administered Date(s) Administered    Influenza High Dose Vaccine PF 10/05/2017        Review of Systems  Otherwise as noted in HPI    Social History     Social History Narrative    One adult son. . Retired 7/7/2017. History   Smoking Status    Never Smoker   Smokeless Tobacco    Never Used     ? Objective:     Visit Vitals    /76 (BP 1 Location: Right arm, BP Patient Position: Sitting)    Pulse 88    Temp 97.4 °F (36.3 °C) (Oral)    Resp 18    Ht 5' 4\" (1.626 m)    Wt 257 lb (116.6 kg)    SpO2 95%    BMI 44.11 kg/m2     . BP Readings from Last 3 Encounters:   04/04/18 119/76   01/03/18 138/80   12/30/17 162/87     . Wt Readings from Last 3 Encounters:   04/04/18 257 lb (116.6 kg)   01/03/18 256 lb (116.1 kg)   12/30/17 257 lb (116.6 kg)       Physical Exam   Constitutional: She appears well-developed and well-nourished. No distress.    HENT:   Right Ear: Tympanic membrane and ear canal normal.   Left Ear: Tympanic membrane and ear canal normal.   Nose: Right sinus exhibits no maxillary sinus tenderness and no frontal sinus tenderness. Left sinus exhibits no maxillary sinus tenderness and no frontal sinus tenderness. No ttp around ears   Pulmonary/Chest: Effort normal. No respiratory distress. Neurological: She is alert. Psychiatric: She has a normal mood and affect. Her behavior is normal.       Allergies   Allergen Reactions    Latex Hives    Flowers Shortness of Breath     Fresh cut flowers are worse    Fruit C [Ascorbic Acid] Hives     ALL FRUITS    Pcn [Penicillins] Hives     Prior to Admission medications    Medication Sig Start Date End Date Taking? Authorizing Provider   montelukast (SINGULAIR) 10 mg tablet Take 1 Tab by mouth daily. 4/4/18  Yes Lonn Goody Kelsey Ohara MD   hydroCHLOROthiazide (HYDRODIURIL) 25 mg tablet TAKE 1 TABLET BY MOUTH DAILY 3/30/18  Yes Ángel Elkmont. Kelsey Ohara MD   diclofenac EC (VOLTAREN) 75 mg EC tablet Take 1 Tab by mouth two (2) times daily as needed. 3/19/18  Yes Ángel [place]. Kelsey Ohara MD   fluticasone [facility]) 50 mcg/actuation nasal spray 2 Sprays by Both Nostrils route daily. 12/22/17  Yes Ángel [place]. Kelsey Ohara MD   PROAIR HFA 90 mcg/actuation inhaler INHALE 1 PUFF PO Q 6 H PRN 6/19/17  Yes Historical Provider   ADVAIR -21 mcg/actuation inhaler INL 2 PFS PO BID IN THE MORNING AND IN THE KODI 6/29/17  Yes Historical Provider   aspirin delayed-release 81 mg tablet Take  by mouth daily. Yes Historical Provider   cholecalciferol (VITAMIN D3) 1,000 unit tablet Take  by mouth daily.    Yes Historical Provider     Patient Active Problem List   Diagnosis Code    Thoracic aortic aneurysm without rupture ([facility] I71.2    Essential hypertension I10    Sciatica M54.30    Asthma J45.909    Knee osteoarthritis M17.10    Multiple lung nodules on CT R91.8    Ob[facility] morbid ([facility] E66.01

## 2018-04-04 NOTE — PROGRESS NOTES
ZEFERINO to give Joaquina Robertson is a 78 y.o. female who presents for routine immunizations. She denies any symptoms , reactions or allergies that would exclude them from being immunized today. Risks and adverse reactions were discussed and the VIS was given to them. All questions were addressed. She was observed for 15  min post injection. There were no reactions observed.     Catarina Edmonds LPN

## 2018-04-12 NOTE — PROGRESS NOTES
Dear Ms. Bubba,    We did not get to discuss this at your last appointment. Your DEXA scan (bone density test) shows osteoporosis. This means your bones are thinner than average and you are at increased risk for a serious fracture. Please reply and let me know that you would like to try one of these medications. Sometimes people are not sure if they want to take a medication and they have more questions. If you are unsure or have more questions, please call our office at [phone] and schedule a follow up appointment to discuss your DEXA results. You may benefit from a type of medication called a Bisphosphonate  These medicines work by slowing down the rate of bone turnover  Plan to take this medication for about 5 years and then we may repeat your DEXA scan  Take with 8 oz of water, not with food  after taking, remain upright for 30 min  Let me know if you have Leon's esophagus or esophagitis, Chronic kidney disease or are on dialysis.    Examples include:  Risedronate/actonel- daily, weekly, or monthly  Risedronate/atelvia (delayed release)-  weekly  Alendronare/Fosamax- daily or weekly  Ibandronate/Boniva- daily or monthly    Also, take these supplments  Calcium  Take 2 hour after bisphosphonate  Goal of 1200mg per day  Max of 500mg per dose if you take a supplement    Vitamin D  Vitamin D3 (Cholecalciferol) is best.  May take 800-4000 international units daily, unless advised otherwise      Best,  Dr. Murtaza Dukes

## 2018-04-13 ENCOUNTER — TELEPHONE (OUTPATIENT)
Dept: FAMILY MEDICINE CLINIC | Age: 68
End: 2018-04-13

## 2018-04-13 NOTE — TELEPHONE ENCOUNTER
Patient called stating that she spoke with Natalia about a [facility] bill. Patient stated that it was coded wrong and Medicare does not pay for routine lab.

## 2018-04-25 ENCOUNTER — TELEPHONE (OUTPATIENT)
Dept: FAMILY MEDICINE CLINIC | Age: 68
End: 2018-04-25

## 2018-04-25 NOTE — TELEPHONE ENCOUNTER
----- Message from Susanna Goldmann sent at 4/25/2018 10:31 AM EDT -----  Regarding: /Telephone  Pt is requesting a call back as soon as possible in regards to a billing issue that has occurred back on 09/28/2017.  P [phone]

## 2018-05-01 NOTE — TELEPHONE ENCOUNTER
Writer spoke with the patient regarding her labs not being covered on DOS 9/28/2017. Patient stated that the insurance denied coverage due to the diagnoses codes  that were provided are considered routine. Please review diagnoses codes to ensure resubmission is not warranted.

## 2018-05-07 NOTE — TELEPHONE ENCOUNTER
Patient requesting a return call. Says [facility] is going to send the lab to [facility]. She wants to discuss this. Her contact # is [phone].

## 2018-05-08 NOTE — TELEPHONE ENCOUNTER
----- Message from Elvira Horner sent at 5/8/2018  3:52 PM EDT -----  Regarding: /Telephone  Pt is requesting a call back in reference to a billing concern on 09/28/2017, pt has already contacted the billing dept. No success within the past 3 months .  P [phone]

## 2018-05-10 ENCOUNTER — TELEPHONE (OUTPATIENT)
Dept: FAMILY MEDICINE CLINIC | Age: 68
End: 2018-05-10

## 2018-05-10 NOTE — TELEPHONE ENCOUNTER
----- Message from Yuan Narvaez sent at 5/10/2018 10:16 AM EDT -----  Regarding: Dr. Sandra Mcnamara  The patient is requesting a call back in regards to a [facility] bill from 9/28/17 having incorrect coding. The patient stated that she has been waiting for a call back for 3 months and the problem has not been resolved. The bill is now in collections and she has called several times a month.  (s)[phone]

## 2018-05-25 ENCOUNTER — TELEPHONE (OUTPATIENT)
Dept: FAMILY MEDICINE CLINIC | Age: 68
End: 2018-05-25

## 2018-05-25 NOTE — TELEPHONE ENCOUNTER
----- Message from Kira De Jesus sent at 5/25/2018  3:34 PM EDT -----  Regarding: Dr Stu Guidry  Pt requesting call back from . She just got another bill from [facility], it has now gone to collections. She came by last month and was told it would be taken care of. It just needs a call from your office to change a code. Her number is [phone].

## 2018-06-12 ENCOUNTER — OFFICE VISIT (OUTPATIENT)
Dept: FAMILY MEDICINE CLINIC | Age: 68
End: 2018-06-12

## 2018-06-12 VITALS
DIASTOLIC BLOOD PRESSURE: 83 MMHG | SYSTOLIC BLOOD PRESSURE: 132 MMHG | HEART RATE: 88 BPM | WEIGHT: 255.8 LBS | OXYGEN SATURATION: 94 % | RESPIRATION RATE: 16 BRPM | HEIGHT: 64 IN | TEMPERATURE: 96.4 F | BODY MASS INDEX: 43.67 KG/M2

## 2018-06-12 DIAGNOSIS — J02.9 SORE THROAT: ICD-10-CM

## 2018-06-12 DIAGNOSIS — M81.0 OSTEOPOROSIS WITHOUT CURRENT PATHOLOGICAL FRACTURE, UNSPECIFIED OSTEOPOROSIS TYPE: ICD-10-CM

## 2018-06-12 DIAGNOSIS — J45.20 MILD INTERMITTENT ASTHMA WITHOUT COMPLICATION: ICD-10-CM

## 2018-06-12 DIAGNOSIS — J06.9 VIRAL UPPER RESPIRATORY TRACT INFECTION: Primary | ICD-10-CM

## 2018-06-12 LAB
S PYO AG THROAT QL: NEGATIVE
VALID INTERNAL CONTROL?: YES

## 2018-06-12 RX ORDER — DOXYCYCLINE 100 MG/1
100 CAPSULE ORAL 2 TIMES DAILY
Qty: 14 CAP | Refills: 0 | Status: SHIPPED | OUTPATIENT
Start: 2018-06-12 | End: 2018-06-19

## 2018-06-12 RX ORDER — PREDNISONE 20 MG/1
40 TABLET ORAL DAILY
Qty: 10 TAB | Refills: 0 | Status: SHIPPED | OUTPATIENT
Start: 2018-06-12 | End: 2018-06-17

## 2018-06-12 NOTE — PROGRESS NOTES
[address] [place] [address] Visit   06/12/2018  Patient ID: Jenetta Boast is a 80 y.o. female. Assessment/Plan:    Diagnoses and all orders for this visit:    1. Viral upper respiratory tract infection  -     doxycycline (VIBRAMYCIN) 100 mg capsule; Take 1 Cap by mouth two (2) times a day for 7 days. IF SINUS SYMPTOMS WORSEN    2. Sore throat  -     AMB POC RAPID STREP A  -     CULTURE, STREP THROAT    3. Mild intermittent asthma without complication  -     predniSONE (DELTASONE) 20 mg tablet; Take 2 Tabs by mouth daily for 5 days. IF COUGH WORSENS    4. Osteoporosis without current pathological fracture, unspecified osteoporosis type    declines bisphosphonate. Reviewed r/b/a. Given additional info  Paper abx and steroid Rx incase symptoms worsen. See patient instructions for more. Counselled pt on Patient health concerns and plans. Patient was offered a choice/choices in the treatment plan today. Reviewed return precautions as appropriate. Patient expresses understanding of the plan and agrees with recommendations. Patient Instructions   TODAY, please go to:   [facility] - If you received a referral, Show the       Please schedule the following appointments at [facility]:  · Lab visit, fasting, the week before our visit. · Medicare wellness visit with Dr. Steph Carty. in Oct 2018      Today's Plan: For cold symptoms, see below  - take singulair and nasonex EVERYDAY  - use albuterol up to every 4 hours as needed while feeling poorly  - if sinus symptoms worsen, take antibiotic  - if cough worsens, take steroid  _____________________     Your DEXA scan (bone density test) shows osteoporosis. This means your bones are thinner than average and you are at increased risk for a serious fracture. You may benefit from a type of medication called a Bisphosphonate. For now you have chosen not to take this. Let me know if you change your mind.      take these supplments  Calcium  · Take 2 hour after bisphosphonate  · Goal of 1200mg per day  · Max of 500mg per dose if you take a supplement    Vitamin D  · Vitamin D3 (Cholecalciferol) is best.  · May take 800-4000 international units daily, unless advised otherwise        _____________________     For your symptoms: Your symptoms may improve with an oral antihistamine. These are available over the counter and include:  Loratadine/claritin  Cetirizine/Zyrtec  Fexofenadine/Allegra  Levocetirizine/Xyzal    · Your symptoms may improve with a nasal steroid. These are available over the counter and include:  · Flonase (aka fluticasone)  · Nasocort (aka triamcinolone)  · Nasonex (aka mometasone)  · Rhinocort (aka budesonide)    · Increase fluid intake, especially water to thin mucous and boost the immune system. · Avoid sugar and dairy while congested since they thicken mucous. · Get plenty of rest!    · Gargle 3 times daily and as needed in Listerine or warm salt water vinegar solutions (1 tsp salt, 1 tsp vinegar in 1 cup lukewarm water.)    · Use OTC nasal saline spray up each nostril four times daily. You could also consider using a netipot with distilled water. · Use humidifier at bedtime. · Use OTC Mucinex 600 mg twice daily to loosen mucous. · Use OTC Tylenol  (up to 650mg every 6 hours) or Ibuprofen (up to 800 mg every 8 hours) as needed for pain, fever or headaches. ·  Avoid decongestants and Ibuprofen if you have high blood pressure! Return to the doctor for evaluation:  · If mucous is consistently discolored yellow or green throughout the day for more than a week  · If you develop worsening facial pain  · If you develop a fever that will not go away  · If your symptoms worsen instead of improve    _____________________          Osteoporosis: Care Instructions  Your Care Instructions    Osteoporosis causes bones to become thin and weak. It is much more common in women than in men.  Osteoporosis may be very advanced before you know you have it. Sometimes the first sign is a broken bone in the hip, spine, or wrist or sudden pain in your middle or lower back. Follow-up care is a key part of your treatment and safety. Be sure to make and go to all appointments, and call your doctor if you are having problems. It's also a good idea to know your test results and keep a list of the medicines you take. How can you care for yourself at home? · Your doctor may prescribe a bisphosphonate, such as risedronate (Actonel) or alendronate (Fosamax), for osteoporosis. If you are taking one of these medicines by mouth:  ¨ Take your medicine with a full glass of water when you first get up in the morning. ¨ Do not lie down, eat, drink a beverage, or take any other medicine for at least 30 minutes after taking the drug. This helps prevent stomach problems. ¨ Do not take your medicine late in the day if you forgot to take it in the morning. Skip it, and take the usual dose the next morning. ¨ If you have side effects, tell your doctor. He or she may prescribe another medicine. · Get enough calcium and vitamin D. The [employer] recommends adults younger than age 46 need 1,000 mg of calcium and 600 IU of vitamin D each day. Women ages 46 to 79 need 1,200 mg of calcium and 600 IU of vitamin D each day. Men ages 46 to 79 need 1,000 mg of calcium and 600 IU of vitamin D each day. Adults 71 and older need 1,200 mg of calcium and 800 IU of vitamin D each day. ¨ Eat foods rich in calcium, like yogurt, cheese, milk, and dark green vegetables. This is a good way to get the calcium you need. You can get vitamin D from eggs, fatty fish, cereal, and milk. ¨ Talk to your doctor about taking a calcium plus vitamin D supplement. Be careful, though. Adults ages 23 to 48 should not get more than 2,500 mg of calcium and 4,000 IU of vitamin D each day, whether it is from supplements and/or food.  Adults ages 46 and older should not get more than 2,000 mg of calcium and 4,000 IU of vitamin D each day from supplements and/or food. · Limit alcohol to 2 drinks a day for men and 1 drink a day for women. Too much alcohol can cause health problems. · Do not smoke. Smoking puts you at a much higher risk for osteoporosis. If you need help quitting, talk to your doctor about stop-smoking programs and medicines. These can increase your chances of quitting for good. · Get regular bone-building exercise. Weight-bearing and resistance exercises keep bones healthy by working the muscles and bones against gravity. Start out at an exercise level that feels right for you. Add a little at a time until you can do the following:  ¨ Do 30 minutes of weight-bearing exercise on most days of the week. Walking, jogging, stair climbing, and dancing are good choices. ¨ Do resistance exercises with weights or elastic bands 2 to 3 days a week. · Reduce your risk of falls:  ¨ Wear supportive shoes with low heels and nonslip soles. ¨ Use a cane or walker, if you need it. Use shower chairs and bath benches. Put in handrails on stairways, around your shower or tub area, and near the toilet. ¨ Keep stairs, porches, and walkways well lit. Use night-lights. ¨ Remove throw rugs and other objects that are in the way. ¨ Avoid icy, wet, or slippery surfaces. ¨ Keep a cordless phone and a flashlight with new batteries by your bed. When should you call for help? Watch closely for changes in your health, and be sure to contact your doctor if you have any problems. Where can you learn more? Go to http://zeina-mary jane.info/. Enter K100 in the search box to learn more about \"Osteoporosis: Care Instructions. \"  Current as of: May 12, 2017  Content Version: 11.4  © 3293-7771 [employer]. Care instructions adapted under license by [employer] (which disclaims liability or warranty for this information).  If you have questions about a medical condition or this instruction, always ask your healthcare professional. [address] any warranty or liability for your use of this information. _____________________        Deciding About Bisphosphonate Medicine for Osteoporosis  Deciding About Bisphosphonate Medicine for Osteoporosis  What is osteoporosis? Osteoporosis is a disease that affects your bones. It means you have bones that are thin and brittle and have lots of holes inside them like a sponge. This makes them easy to break. It also increases your risk for spine and hip fractures. These fractures may make it hard for you to live on your own. Bisphosphonates are the most common medicines used to prevent bone loss. Most of the time, you take them as pills. They slow the way bone dissolves and is absorbed by your body. They can increase bone thickness and strength. What are key points about this decision? · If you have osteoporosis, these medicines can increase bone thickness. And they can lower your risk of spine and hip fractures. You may also want to think about taking them if you have osteopenia or risk factors for osteoporosis. · These medicines can have side effects, such as heartburn and belly pain. They also can cause headaches. Their long-term effects are not known. · Whether you take medicine or not, healthy habits can also help protect your bones. Talk to your doctor about taking calcium and vitamin D supplements. Get regular weight-bearing exercise. Cut back on alcohol. And if you smoke, quit. Why might you choose to take bisphosphonate medicines? · You have bone loss that is not normal for your age. · You have osteoporosis or osteopenia. Or you have risk factors for osteoporosis. · You have been taking hormone therapy (HT) and stopped. Bone loss medicines can protect against rapid bone loss after you quit HT. · You do not mind taking pills.  Or you do not mind getting medicines through a tube in your vein, called an IV. · You think the benefits of taking these medicines outweigh the side effects. Why might you choose not to take these medicines? · You want to try other medicines that help prevent bone loss. These include calcitonin (Calcimar or Miacalcin), denosumab (Prolia or Xgeva), hormone therapy, raloxifene (Evista), and teriparatide (Forteo). · You want to try healthy habits to prevent bone fractures. · You do not like the idea of taking pills. Or you do not like getting medicines through a tube in your vein, called an IV. · You are worried about the side effects of these medicines. Your decision  Thinking about the facts and your feelings can help you make a decision that is right for you. Be sure you understand the benefits and risks of your options, and think about what else you need to do before you make the decision. Where can you learn more? Go to http://zeinaNavis Holdingsmary jane.info/. Enter K285 in the search box to learn more about \"Deciding About Bisphosphonate Medicine for Osteoporosis. \"  Current as of: May 12, 2017  Content Version: 11.4  © 6063-6641 [employer]. Care instructions adapted under license by [employer] (which disclaims liability or warranty for this information). If you have questions about a medical condition or this instruction, always ask your healthcare professional. [address] any warranty or liability for your use of this information. Subjective:   HPI:  Sameer Ramos is a 80 y.o. female being seen for:   Chief Complaint   Patient presents with    Ear Pain     Left ear started hurting on Sunday.  Sore Throat     Last night throat felt dry and nasal passages are dry also. Lymph nodes swollen started last night. Sinus headache this A.M.     · Ear pain X 2 days, left.  Improving some  · This AM feels like LN in neck are swollen  · Nasal passage dry  · Throat dry  · A little coughing, dry  · Sinus headache  · No known sick contacts or travel      Screening and Prevention Due:  There are no preventive care reminders to display for this patient. Review of Systems   Constitutional: Positive for fatigue. Negative for chills, diaphoresis and fever. HENT: Positive for ear pain, sinus pain, sinus pressure and sore throat. Negative for ear discharge and postnasal drip. Eyes: Negative for pain. Respiratory: Positive for cough. Negative for shortness of breath and wheezing. Gastrointestinal: Negative for abdominal pain, diarrhea and vomiting. Genitourinary: Negative for dysuria and hematuria. Musculoskeletal: Negative for arthralgias and myalgias. Skin: Negative for rash. Otherwise as noted in HPI    Social History     Social History Narrative    One adult son. . Retired 7/7/2017. History   Smoking Status    Never Smoker   Smokeless Tobacco    Never Used     ? Objective:     Visit Vitals    /83 (BP 1 Location: Left arm, BP Patient Position: Sitting)    Pulse 88    Temp 96.4 °F (35.8 °C) (Oral)    Resp 16    Ht 5' 4\" (1.626 m)    Wt 255 lb 12.8 oz (116 kg)    SpO2 94%    BMI 43.91 kg/m2       BP Readings from Last 3 Encounters:   06/12/18 132/83   04/04/18 119/76   01/03/18 138/80       Wt Readings from Last 3 Encounters:   06/12/18 255 lb 12.8 oz (116 kg)   04/04/18 257 lb (116.6 kg)   01/03/18 256 lb (116.1 kg)       Physical Exam   Constitutional: She appears well-developed and well-nourished. Non-toxic appearance. No distress. HENT:   Head: Normocephalic. Right Ear: Tympanic membrane and ear canal normal. Tympanic membrane is not erythematous and not bulging. Left Ear: Ear canal normal. Tympanic membrane is injected. Tympanic membrane is not bulging. Nose: Mucosal edema present. No rhinorrhea. Right sinus exhibits no maxillary sinus tenderness and no frontal sinus tenderness.  Left sinus exhibits no maxillary sinus tenderness and no frontal sinus tenderness. Mouth/Throat: Uvula is midline. Posterior oropharyngeal edema and posterior oropharyngeal erythema present. No oropharyngeal exudate. No tonsillar exudate. Eyes: Right eye exhibits no exudate. Left eye exhibits no exudate. Cardiovascular: Normal rate, regular rhythm and normal heart sounds. Exam reveals no gallop and no friction rub. No murmur heard. Pulmonary/Chest: Effort normal. No accessory muscle usage. No respiratory distress. She has no decreased breath sounds. She has wheezes (rare, scattered). She has no rhonchi. She has no rales. Lymphadenopathy:        Head (right side): No submental, no submandibular, no preauricular and no posterior auricular adenopathy present. Head (left side): No submental, no submandibular, no preauricular and no posterior auricular adenopathy present. Right: No supraclavicular adenopathy present. Left: No supraclavicular adenopathy present. Neurological: She is alert. Psychiatric: She has a normal mood and affect. Her behavior is normal.       Allergies   Allergen Reactions    Latex Hives    Flowers Shortness of Breath     Fresh cut flowers are worse    Fruit C [Ascorbic Acid] Hives     ALL FRUITS    Pcn [Penicillins] Hives     Prior to Admission medications    Medication Sig Start Date End Date Taking? Authorizing Provider   doxycycline (VIBRAMYCIN) 100 mg capsule Take 1 Cap by mouth two (2) times a day for 7 days. IF SINUS SYMPTOMS WORSEN 6/12/18 6/19/18 Yes Letty Woo. Héctor Langford MD   predniSONE (DELTASONE) 20 mg tablet Take 2 Tabs by mouth daily for 5 days. IF COUGH WORSENS 6/12/18 6/17/18 Yes Letty Woo. Héctor Langford MD   hydroCHLOROthiazide (HYDRODIURIL) 25 mg tablet TAKE 1 TABLET BY MOUTH DAILY 3/30/18  Yes Letty Woo. Héctor Langford MD   diclofenac EC (VOLTAREN) 75 mg EC tablet Take 1 Tab by mouth two (2) times daily as needed. 3/19/18  Yes Letty Woo.  Héctor Langford MD   fluticasone [facility]) 50 mcg/actuation nasal spray 2 Sprays by Both Nostrils route daily. 12/22/17  Yes Baby Overall. Donna Sales MD   PROAIR HFA 90 mcg/actuation inhaler INHALE 1 PUFF PO Q 6 H PRN 6/19/17  Yes Historical Provider   ADVAIR -21 mcg/actuation inhaler INL 2 PFS PO BID IN THE MORNING AND IN THE KODI 6/29/17  Yes Historical Provider   aspirin delayed-release 81 mg tablet Take  by mouth daily. Yes Historical Provider   cholecalciferol (VITAMIN D3) 1,000 unit tablet Take  by mouth daily. Yes Historical Provider   montelukast (SINGULAIR) 10 mg tablet Take 1 Tab by mouth daily. 4/4/18   Baby Overall.  Donna Sales MD     Patient Active Problem List   Diagnosis Code    Thoracic aortic aneurysm without rupture ([facility] [address] I71.2    Essential hypertension I10    Sciatica M54.30    Asthma J45.909    Knee osteoarthritis M17.10    Multiple lung nodules on CT R91.8    Obesity, morbid ([address] E66.01    Osteoporosis without current pathological fracture M81.0

## 2018-06-12 NOTE — PROGRESS NOTES
Carolina Dunne  Identified pt with two pt identifiers(name and ). Chief Complaint   Patient presents with    Ear Pain     Left ear started hurting on .  Sore Throat     Last night throat felt dry and nasal passages are dry also. Lymph nodes swollen started last night. Sinus headache this A.M.       1. Have you been to the ER, urgent care clinic since your last visit? Hospitalized since your last visit? No    2. Have you seen or consulted any other health care providers outside of the [address] [place] [address] since your last visit? Include any pap smears or colon screening. No    Today's provider has been notified of reason for visit, vitals and flowsheets obtained on patients. Patient received paperwork for advance directive during previous visit but has not completed at this time     Reviewed record In preparation for visit, huddled with provider and have obtained necessary documentation      There are no preventive care reminders to display for this patient.     Wt Readings from Last 3 Encounters:   18 255 lb 12.8 oz (116 kg)   18 257 lb (116.6 kg)   18 256 lb (116.1 kg)     Temp Readings from Last 3 Encounters:   18 97.4 °F (36.3 °C) (Oral)   18 97 °F (36.1 °C) (Oral)   17 97.4 °F (36.3 °C)     BP Readings from Last 3 Encounters:   18 119/76   18 138/80   17 162/87     Pulse Readings from Last 3 Encounters:   18 88   18 88   17 97     Vitals:    18 0951   Weight: 255 lb 12.8 oz (116 kg)   Height: 5' 4\" (1.626 m)   PainSc:   0 - No pain         Learning Assessment:  :     Learning Assessment 2015   PRIMARY LEARNER Patient   PRIMARY LANGUAGE ENGLISH   LEARNER PREFERENCE PRIMARY DEMONSTRATION   ANSWERED BY patient   RELATIONSHIP SELF       Depression Screening:  :     PHQ over the last two weeks 2018   Little interest or pleasure in doing things Not at all   Feeling down, depressed or hopeless Not at all Total Score PHQ 2 0       Fall Risk Assessment:  :     Fall Risk Assessment, last 12 mths 2018   Able to walk? Yes   Fall in past 12 months? No   Fall with injury? -   Number of falls in past 12 months -   Fall Risk Score -       Abuse Screening:  :     Abuse Screening Questionnaire 10/5/2017   Do you ever feel afraid of your partner? N   Are you in a relationship with someone who physically or mentally threatens you? N   Is it safe for you to go home? Y       ADL Screening:  :     ADL Assessment 10/5/2017   Feeding yourself No Help Needed   Getting from bed to chair No Help Needed   Getting dressed No Help Needed   Bathing or showering No Help Needed   Walk across the room (includes cane/walker) No Help Needed   Using the telphone No Help Needed   Taking your medications No Help Needed   Preparing meals No Help Needed   Managing money (expenses/bills) No Help Needed   Moderately strenuous housework (laundry) No Help Needed   Shopping for personal items (toiletries/medicines) No Help Needed   Shopping for groceries No Help Needed   Driving No Help Needed   Climbing a flight of stairs No Help Needed   Getting to places beyond walking distances No Help Needed                 Medication reconciliation up to date and corrected with patient at this time. Verbal Order Read Back Per Zakiya Diego MD for Throat culture.  Chula Lemus verified correct name and  with PCP

## 2018-06-12 NOTE — PATIENT INSTRUCTIONS
TODAY, please go to:   CHECK OUT - If you received a referral, Show the       Please schedule the following appointments at [address] [place] [address]:  · Lab visit, fasting, the week before our visit. · Medicare wellness visit with Dr. Dottie Dumont. in Oct 2018      Today's Plan: For cold symptoms, see below  - take singulair and nasonex EVERYDAY  - use albuterol up to every 4 hours as needed while feeling poorly  - if sinus symptoms worsen, take antibiotic  - if cough worsens, take steroid  _____________________     Your DEXA scan (bone density test) shows osteoporosis. This means your bones are thinner than average and you are at increased risk for a serious fracture. You may benefit from a type of medication called a Bisphosphonate. For now you have chosen not to take this. Let me know if you change your mind. take these supplments  Calcium  · Take 2 hour after bisphosphonate  · Goal of 1200mg per day  · Max of 500mg per dose if you take a supplement    Vitamin D  · Vitamin D3 (Cholecalciferol) is best.  · May take 800-4000 international units daily, unless advised otherwise        _____________________     For your symptoms: Your symptoms may improve with an oral antihistamine. These are available over the counter and include:  Loratadine/claritin  Cetirizine/Zyrtec  Fexofenadine/Allegra  Levocetirizine/Xyzal    · Your symptoms may improve with a nasal steroid. These are available over the counter and include:  · Flonase (aka fluticasone)  · Nasocort (aka triamcinolone)  · Nasonex (aka mometasone)  · Rhinocort (aka budesonide)    · Increase fluid intake, especially water to thin mucous and boost the immune system. · Avoid sugar and dairy while congested since they thicken mucous.     · Get plenty of rest!    · Gargle 3 times daily and as needed in Listerine or warm salt water vinegar solutions (1 tsp salt, 1 tsp vinegar in 1 cup lukewarm water.)    · Use OTC nasal saline spray up each nostril four times daily. You could also consider using a netipot with distilled water. · Use humidifier at bedtime. · Use OTC Mucinex 600 mg twice daily to loosen mucous. · Use OTC Tylenol  (up to 650mg every 6 hours) or Ibuprofen (up to 800 mg every 8 hours) as needed for pain, fever or headaches. ·  Avoid decongestants and Ibuprofen if you have high blood pressure! Return to the doctor for evaluation:  · If mucous is consistently discolored yellow or green throughout the day for more than a week  · If you develop worsening facial pain  · If you develop a fever that will not go away  · If your symptoms worsen instead of improve    _____________________          Osteoporosis: Care Instructions  Your Care Instructions    Osteoporosis causes bones to become thin and weak. It is much more common in women than in men. Osteoporosis may be very advanced before you know you have it. Sometimes the first sign is a broken bone in the hip, spine, or wrist or sudden pain in your middle or lower back. Follow-up care is a key part of your treatment and safety. Be sure to make and go to all appointments, and call your doctor if you are having problems. It's also a good idea to know your test results and keep a list of the medicines you take. How can you care for yourself at home? · Your doctor may prescribe a bisphosphonate, such as risedronate (Actonel) or alendronate (Fosamax), for osteoporosis. If you are taking one of these medicines by mouth:  ¨ Take your medicine with a full glass of water when you first get up in the morning. ¨ Do not lie down, eat, drink a beverage, or take any other medicine for at least 30 minutes after taking the drug. This helps prevent stomach problems. ¨ Do not take your medicine late in the day if you forgot to take it in the morning. Skip it, and take the usual dose the next morning. ¨ If you have side effects, tell your doctor. He or she may prescribe another medicine.   · Get enough calcium and vitamin D. The [employer] recommends adults younger than age 46 need 1,000 mg of calcium and 600 IU of vitamin D each day. Women ages 46 to 79 need 1,200 mg of calcium and 600 IU of vitamin D each day. Men ages 46 to 79 need 1,000 mg of calcium and 600 IU of vitamin D each day. Adults 71 and older need 1,200 mg of calcium and 800 IU of vitamin D each day. ¨ Eat foods rich in calcium, like yogurt, cheese, milk, and dark green vegetables. This is a good way to get the calcium you need. You can get vitamin D from eggs, fatty fish, cereal, and milk. ¨ Talk to your doctor about taking a calcium plus vitamin D supplement. Be careful, though. Adults ages 23 to 48 should not get more than 2,500 mg of calcium and 4,000 IU of vitamin D each day, whether it is from supplements and/or food. Adults ages 46 and older should not get more than 2,000 mg of calcium and 4,000 IU of vitamin D each day from supplements and/or food. · Limit alcohol to 2 drinks a day for men and 1 drink a day for women. Too much alcohol can cause health problems. · Do not smoke. Smoking puts you at a much higher risk for osteoporosis. If you need help quitting, talk to your doctor about stop-smoking programs and medicines. These can increase your chances of quitting for good. · Get regular bone-building exercise. Weight-bearing and resistance exercises keep bones healthy by working the muscles and bones against gravity. Start out at an exercise level that feels right for you. Add a little at a time until you can do the following:  ¨ Do 30 minutes of weight-bearing exercise on most days of the week. Walking, jogging, stair climbing, and dancing are good choices. ¨ Do resistance exercises with weights or elastic bands 2 to 3 days a week. · Reduce your risk of falls:  ¨ Wear supportive shoes with low heels and nonslip soles. ¨ Use a cane or walker, if you need it. Use shower chairs and bath benches.  Put in handrails on stairways, around your shower or tub area, and near the toilet. ¨ Keep stairs, porches, and walkways well lit. Use night-lights. ¨ Remove throw rugs and other objects that are in the way. ¨ Avoid icy, wet, or slippery surfaces. ¨ Keep a cordless phone and a flashlight with new batteries by your bed. When should you call for help? Watch closely for changes in your health, and be sure to contact your doctor if you have any problems. Where can you learn more? Go to http://zeina-mary jane.info/. Enter K100 in the search box to learn more about \"Osteoporosis: Care Instructions. \"  Current as of: May 12, 2017  Content Version: 11.4  © 7778-0299 [employer]. Care instructions adapted under license by [employer] (which disclaims liability or warranty for this information). If you have questions about a medical condition or this instruction, always ask your healthcare professional. [place] [address] any warranty or liability for your use of this information. _____________________        Deciding About Bisphosphonate Medicine for Osteoporosis  Deciding About Bisphosphonate Medicine for Osteoporosis  What is osteoporosis? Osteoporosis is a disease that affects your bones. It means you have bones that are thin and brittle and have lots of holes inside them like a sponge. This makes them easy to break. It also increases your risk for spine and hip fractures. These fractures may make it hard for you to live on your own. Bisphosphonates are the most common medicines used to prevent bone loss. Most of the time, you take them as pills. They slow the way bone dissolves and is absorbed by your body. They can increase bone thickness and strength. What are key points about this decision? · If you have osteoporosis, these medicines can increase bone thickness. And they can lower your risk of spine and hip fractures.  You may also want to think about taking them if you have osteopenia or risk factors for osteoporosis. · These medicines can have side effects, such as heartburn and belly pain. They also can cause headaches. Their long-term effects are not known. · Whether you take medicine or not, healthy habits can also help protect your bones. Talk to your doctor about taking calcium and vitamin D supplements. Get regular weight-bearing exercise. Cut back on alcohol. And if you smoke, quit. Why might you choose to take bisphosphonate medicines? · You have bone loss that is not normal for your age. · You have osteoporosis or osteopenia. Or you have risk factors for osteoporosis. · You have been taking hormone therapy (HT) and stopped. Bone loss medicines can protect against rapid bone loss after you quit HT. · You do not mind taking pills. Or you do not mind getting medicines through a tube in your vein, called an IV. · You think the benefits of taking these medicines outweigh the side effects. Why might you choose not to take these medicines? · You want to try other medicines that help prevent bone loss. These include calcitonin (Calcimar or Miacalcin), denosumab (Prolia or Xgeva), hormone therapy, raloxifene (Evista), and teriparatide (Forteo). · You want to try healthy habits to prevent bone fractures. · You do not like the idea of taking pills. Or you do not like getting medicines through a tube in your vein, called an IV. · You are worried about the side effects of these medicines. Your decision  Thinking about the facts and your feelings can help you make a decision that is right for you. Be sure you understand the benefits and risks of your options, and think about what else you need to do before you make the decision. Where can you learn more? Go to http://zeina-mary jane.info/. Enter I796 in the search box to learn more about \"Deciding About Bisphosphonate Medicine for Osteoporosis. \"  Current as of:  May 12, 2017  Content Version: 11.4  © 4865-6405 [employer]. Care instructions adapted under license by [employer] (which disclaims liability or warranty for this information). If you have questions about a medical condition or this instruction, always ask your healthcare professional. [place] [address] any warranty or liability for your use of this information.

## 2018-06-12 NOTE — MR AVS SNAPSHOT
[address] [facility] [address] 
 
 
 [address] 
[address] [facility] [address] 
[phone] Patient: Carolina Dunne MRN: [id number] [facility]:2/63/0890 Visit Information Date & Time Provider Department Dept. Phone Encounter #  
 6/12/2018  9:40 AM Manisha Presley. MD Josi Cavazos [phone] [id number] Your Appointments 10/3/2018  8:00 AM  
LAB with LAB [facility] [employer] (ASHLEIGH Aguilar) Appt Note: fasting labs [address] [facility] [address]  
  
   
 [address] [place] [address]  
  
    
 10/10/2018  8:00 AM  
Medicare Physical with Jacqualine SailRebecca Fajardo 28 ([address]) Appt Note: [address] 
[address] [facility] [address]  
[phone]  
  
   
 [address] [facility] [address] [place] [address] 13 [facility] Upcoming Health Maintenance Date Due ZOSTER VACCINE AGE 60> 10/4/2018* GLAUCOMA SCREENING Q2Y 4/4/2019* Influenza Age 5 to Adult 8/1/2018 MEDICARE YEARLY EXAM 10/6/2018 Pneumococcal 65+ Low/Medium Risk (2 of 2 - PPSV23) 4/4/2019 BREAST CANCER SCRN MAMMOGRAM 8/25/2019 COLONOSCOPY 6/3/2024 DTaP/Tdap/Td series (2 - Td) 4/4/2028 *Topic was postponed. The date shown is not the original due date. Allergies as of 6/12/2018  Review Complete On: 6/12/2018 By: Neelam Lees RN Severity Noted Reaction Type Reactions Latex  12/07/2012    Hives Flowers  07/24/2017    Shortness of Breath Fresh cut flowers are worse Fruit C [Ascorbic Acid]  12/17/2012    Hives ALL FRUITS Pcn [Penicillins]  07/10/2012    Hives Current Immunizations  Reviewed on 6/12/2018 Name Date Influenza High Dose Vaccine PF 10/5/2017 Pneumococcal Conjugate (PCV-13) 4/4/2018 Tdap 4/4/2018 Zoster Recombinant 4/4/2018  Reviewed by Neelam Lees RN on 6/12/2018 at  9:54 AM  
 You Were Diagnosed With   
  
 Codes Comments Viral upper respiratory tract infection    -  Primary ICD-10-CM: J06.9 ICD-9-CM: 465.9 Sore throat     ICD-10-CM: J02.9 ICD-9-CM: 456 Essential hypertension     ICD-10-CM: I10 
ICD-9-CM: 401.9 Mild intermittent asthma without complication     T-06-CA: J45.20 ICD-9-CM: 493.90 Vitals BP Pulse Temp Resp Height(growth percentile) Weight(growth percentile) 132/83 (BP 1 Location: Left arm, BP Patient Position: Sitting) 88 96.4 °F (35.8 °C) (Oral) 16 5' 4\" (1.626 m) 255 lb 12.8 oz (116 kg) SpO2 BMI OB Status Smoking Status 94% 43.91 kg/m2 Hysterectomy Never Smoker Vitals History BMI and BSA Data Body Mass Index Body Surface Area  
 43.91 kg/m 2 2.29 m 2 Preferred Pharmacy Pharmacy Name Phone [facility] #[id number] [facility] [phone] Your Updated Medication List  
  
   
This list is accurate as of 6/12/18 11:13 AM.  Always use your most recent med list.  
  
  
  
  
 ADVAIR -21 mcg/actuation inhaler Generic drug:  fluticasone-salmeterol INL 2 PFS PO BID IN THE MORNING AND IN THE KODI  
  
 aspirin delayed-release 81 mg tablet Take  by mouth daily. diclofenac EC 75 mg EC tablet Commonly known as:  VOLTAREN Take 1 Tab by mouth two (2) times daily as needed. doxycycline 100 mg capsule Commonly known as:  VIBRAMYCIN Take 1 Cap by mouth two (2) times a day for 7 days. IF SINUS SYMPTOMS WORSEN  
  
 fluticasone 50 mcg/actuation nasal spray Commonly known as:  Jewels Courser 2 Sprays by Both Nostrils route daily. hydroCHLOROthiazide 25 mg tablet Commonly known as:  HYDRODIURIL  
TAKE 1 TABLET BY MOUTH DAILY  
  
 montelukast 10 mg tablet Commonly known as:  SINGULAIR Take 1 Tab by mouth daily. predniSONE 20 mg tablet Commonly known as:  Juvenal Weathers  
 Take 2 Tabs by mouth daily for 5 days. IF COUGH WORSENS  
  
 PROAIR HFA 90 mcg/actuation inhaler Generic drug:  albuterol INHALE 1 PUFF PO Q 6 H PRN  
  
 VITAMIN D3 1,000 unit tablet Generic drug:  cholecalciferol Take  by mouth daily. Prescriptions Printed Refills  
 doxycycline (VIBRAMYCIN) 100 mg capsule 0 Sig: Take 1 Cap by mouth two (2) times a day for 7 days. IF SINUS SYMPTOMS WORSEN Class: Print Route: Oral  
 predniSONE (DELTASONE) 20 mg tablet 0 Sig: Take 2 Tabs by mouth daily for 5 days. IF COUGH WORSENS Class: Print Route: Oral  
  
We Performed the Following AMB POC RAPID STREP A [09269 CPT(R)] CULTURE, STREP THROAT [id number] CPT(R)] Patient Instructions TODAY, please go to: CHECK OUT - If you received a referral, Show the  Please schedule the following appointments at [facility] OUT: 
· Lab visit, fasting, the week before our visit. · Medicare wellness visit with Dr. Carito Coronel. in Oct 2018 Today's Plan: For cold symptoms, see below 
- take singulair and nasonex EVERYDAY 
- use albuterol up to every 4 hours as needed while feeling poorly 
- if sinus symptoms worsen, take antibiotic 
- if cough worsens, take steroid 
_____________________ Your DEXA scan (bone density test) shows osteoporosis. This means your bones are thinner than average and you are at increased risk for a serious fracture. You may benefit from a type of medication called a Bisphosphonate. For now you have chosen not to take this. Let me know if you change your mind. take these supplments Calcium · Take 2 hour after bisphosphonate · Goal of 1200mg per day · Max of 500mg per dose if you take a supplement Vitamin D · Vitamin D3 (Cholecalciferol) is best. 
· May take 800-4000 international units daily, unless advised otherwise 
 
 
 
_____________________ For your symptoms: Your symptoms may improve with an oral antihistamine. These are available over the counter and include: 
Loratadine/claritin Cetirizine/Zyrtec Fexofenadine/Allegra Levocetirizine/Xyzal 
 
· Your symptoms may improve with a nasal steroid. These are available over the counter and include: · Flonase (aka fluticasone) · Nasocort (aka triamcinolone) · Nasonex (aka mometasone) · Rhinocort (aka budesonide) · Increase fluid intake, especially water to thin mucous and boost the immune system. · Avoid sugar and dairy while congested since they thicken mucous. · Get plenty of rest!   
· Gargle 3 times daily and as needed in Listerine or warm salt water vinegar solutions (1 tsp salt, 1 tsp vinegar in 1 cup lukewarm water.) · Use OTC nasal saline spray up each nostril four times daily. You could also consider using a netipot with distilled water. · Use humidifier at bedtime. · Use OTC Mucinex 600 mg twice daily to loosen mucous. · Use OTC Tylenol  (up to 650mg every 6 hours) or Ibuprofen (up to 800 mg every 8 hours) as needed for pain, fever or headaches. ·  Avoid decongestants and Ibuprofen if you have high blood pressure! Return to the doctor for evaluation: · If mucous is consistently discolored yellow or green throughout the day for more than a week · If you develop worsening facial pain · If you develop a fever that will not go away · If your symptoms worsen instead of improve 
 
_____________________ Osteoporosis: Care Instructions Your Care Instructions Osteoporosis causes bones to become thin and weak. It is much more common in women than in men. Osteoporosis may be very advanced before you know you have it. Sometimes the first sign is a broken bone in the hip, spine, or wrist or sudden pain in your middle or lower back. Follow-up care is a key part of your treatment and safety.  Be sure to make and go to all appointments, and call your doctor if you are having problems. It's also a good idea to know your test results and keep a list of the medicines you take. How can you care for yourself at home? · Your doctor may prescribe a bisphosphonate, such as risedronate (Actonel) or alendronate (Fosamax), for osteoporosis. If you are taking one of these medicines by mouth: 
¨ Take your medicine with a full glass of water when you first get up in the morning. ¨ Do not lie down, eat, drink a beverage, or take any other medicine for at least 30 minutes after taking the drug. This helps prevent stomach problems. ¨ Do not take your medicine late in the day if you forgot to take it in the morning. Skip it, and take the usual dose the next morning. ¨ If you have side effects, tell your doctor. He or she may prescribe another medicine. · Get enough calcium and vitamin D. The [employer] recommends adults younger than age 46 need 1,000 mg of calcium and 600 IU of vitamin D each day. Women ages 46 to 79 need 1,200 mg of calcium and 600 IU of vitamin D each day. Men ages 46 to 79 need 1,000 mg of calcium and 600 IU of vitamin D each day. Adults 71 and older need 1,200 mg of calcium and 800 IU of vitamin D each day. ¨ Eat foods rich in calcium, like yogurt, cheese, milk, and dark green vegetables. This is a good way to get the calcium you need. You can get vitamin D from eggs, fatty fish, cereal, and milk. ¨ Talk to your doctor about taking a calcium plus vitamin D supplement. Be careful, though. Adults ages 23 to 48 should not get more than 2,500 mg of calcium and 4,000 IU of vitamin D each day, whether it is from supplements and/or food. Adults ages 46 and older should not get more than 2,000 mg of calcium and 4,000 IU of vitamin D each day from supplements and/or food. · Limit alcohol to 2 drinks a day for men and 1 drink a day for women. Too much alcohol can cause health problems. · Do not smoke. Smoking puts you at a much higher risk for osteoporosis. If you need help quitting, talk to your doctor about stop-smoking programs and medicines. These can increase your chances of quitting for good. · Get regular bone-building exercise. Weight-bearing and resistance exercises keep bones healthy by working the muscles and bones against gravity. Start out at an exercise level that feels right for you. Add a little at a time until you can do the following: ¨ Do 30 minutes of weight-bearing exercise on most days of the week. Walking, jogging, stair climbing, and dancing are good choices. ¨ Do resistance exercises with weights or elastic bands 2 to 3 days a week. · Reduce your risk of falls: 
¨ Wear supportive shoes with low heels and nonslip soles. ¨ Use a cane or walker, if you need it. Use shower chairs and bath benches. Put in handrails on stairways, around your shower or tub area, and near the toilet. ¨ Keep stairs, porches, and walkways well lit. Use night-lights. ¨ Remove throw rugs and other objects that are in the way. ¨ Avoid icy, wet, or slippery surfaces. ¨ Keep a cordless phone and a flashlight with new batteries by your bed. When should you call for help? Watch closely for changes in your health, and be sure to contact your doctor if you have any problems. Where can you learn more? Go to http://zeina-mary jane.info/. Enter K100 in the search box to learn more about \"Osteoporosis: Care Instructions. \" Current as of: May 12, 2017 Content Version: 11.4 © 3695-1154 [employer]. Care instructions adapted under license by [employer] (which disclaims liability or warranty for this information). If you have questions about a medical condition or this instruction, always ask your healthcare professional. [address] any warranty or liability for your use of this information. _____________________ Deciding About Bisphosphonate Medicine for Osteoporosis Deciding About Bisphosphonate Medicine for Osteoporosis What is osteoporosis? Osteoporosis is a disease that affects your bones. It means you have bones that are thin and brittle and have lots of holes inside them like a sponge. This makes them easy to break. It also increases your risk for spine and hip fractures. These fractures may make it hard for you to live on your own. Bisphosphonates are the most common medicines used to prevent bone loss. Most of the time, you take them as pills. They slow the way bone dissolves and is absorbed by your body. They can increase bone thickness and strength. What are key points about this decision? · If you have osteoporosis, these medicines can increase bone thickness. And they can lower your risk of spine and hip fractures. You may also want to think about taking them if you have osteopenia or risk factors for osteoporosis. · These medicines can have side effects, such as heartburn and belly pain. They also can cause headaches. Their long-term effects are not known. · Whether you take medicine or not, healthy habits can also help protect your bones. Talk to your doctor about taking calcium and vitamin D supplements. Get regular weight-bearing exercise. Cut back on alcohol. And if you smoke, quit. Why might you choose to take bisphosphonate medicines? · You have bone loss that is not normal for your age. · You have osteoporosis or osteopenia. Or you have risk factors for osteoporosis. · You have been taking hormone therapy (HT) and stopped. Bone loss medicines can protect against rapid bone loss after you quit HT. · You do not mind taking pills. Or you do not mind getting medicines through a tube in your vein, called an IV. · You think the benefits of taking these medicines outweigh the side effects. Why might you choose not to take these medicines? · You want to try other medicines that help prevent bone loss.  These include calcitonin (Calcimar or Miacalcin), denosumab (Prolia or Xgeva), hormone therapy, raloxifene (Evista), and teriparatide (Forteo). · You want to try healthy habits to prevent bone fractures. · You do not like the idea of taking pills. Or you do not like getting medicines through a tube in your vein, called an IV. · You are worried about the side effects of these medicines. Your decision Thinking about the facts and your feelings can help you make a decision that is right for you. Be sure you understand the benefits and risks of your options, and think about what else you need to do before you make the decision. Where can you learn more? Go to http://zeina-mary jane.info/. Enter A675 in the search box to learn more about \"Deciding About Bisphosphonate Medicine for Osteoporosis. \" Current as of: May 12, 2017 Content Version: 11.4 © 1901-0107 [employer]. Care instructions adapted under license by [employer] (which disclaims liability or warranty for this information). If you have questions about a medical condition or this instruction, always ask your healthcare professional. [place] [address] any warranty or liability for your use of this information. Introducing [facility]! Dear Moises Long: Thank you for requesting a [employer] account. Our records indicate that you already have an active [employer] account. You can access your account anytime at [email or web address] Did you know that you can access your hospital and ER discharge instructions at any time in [employer]? You can also review all of your test results from your hospital stay or ER visit. Additional Information If you have questions, please visit the Frequently Asked Questions section of the [employer] website at [email or web address] Remember, [employer] is NOT to be used for urgent needs. For medical emergencies, dial 911. Now available from your iPhone and Android! Please provide this summary of care documentation to your next provider. Your primary care clinician is listed as Durel Lassen. If you have any questions after today's visit, please call [phone].

## 2018-06-14 LAB — S PYO THROAT QL CULT: NEGATIVE

## 2018-06-27 RX ORDER — ALBUTEROL SULFATE 90 UG/1
AEROSOL, METERED RESPIRATORY (INHALATION)
Qty: 1 INHALER | Refills: 3 | Status: SHIPPED | OUTPATIENT
Start: 2018-06-27 | End: 2018-10-16 | Stop reason: SDUPTHER

## 2018-06-27 NOTE — TELEPHONE ENCOUNTER
PCP: Letty Woo.  Héctor Langford MD    Last appt: 6/12/2018  Future Appointments  Date Time Provider [place]   10/3/2018 8:00 AM LAB [facility] BR[facility] A[facility] SCHED   10/10/2018 8:00 AM Sharee Jackson NP [facility] A[facility]LEIGH OROPEZA       Requested Prescriptions     Pending Prescriptions Disp Refills    PROAIR HFA 90 mcg/actuation inhaler 1 Inhaler 3       Prior labs and Blood pressures:  BP Readings from Last 3 Encounters:   06/12/18 132/83   04/04/18 119/76   01/03/18 138/80     Lab Results   Component Value Date/Time    Sodium 143 03/28/2018 09:14 AM    Potassium 4.9 03/28/2018 09:14 AM    Chloride 101 03/28/2018 09:14 AM    CO2 27 03/28/2018 09:14 AM    Anion gap 6 03/21/2015 01:56 AM    Glucose 100 (H) 03/28/2018 09:14 AM    BUN 19 03/28/2018 09:14 AM    Creatinine 0.89 03/28/2018 09:14 AM    BUN/Creatinine ratio 21 03/28/2018 09:14 AM    GFR est AA 78 03/28/2018 09:14 AM    GFR est non-AA 68 03/28/2018 09:14 AM    Calcium 9.4 03/28/2018 09:14 AM     No results found for: HBA1C, HGBE8, WDA2YBVR, GVO8QHKJ  Lab Results   Component Value Date/Time    Cholesterol, total 165 03/28/2018 09:14 AM    HDL Cholesterol 54 03/28/2018 09:14 AM    LDL, calculated 92 03/28/2018 09:14 AM    VLDL, calculated 19 03/28/2018 09:14 AM    Triglyceride 94 03/28/2018 09:14 AM    CHOL/HDL Ratio 2.9 03/21/2015 01:56 AM     No results found for: Smitha Ramus, VD3RIA    Lab Results   Component Value Date/Time    TSH 4.26 (H) 03/21/2015 01:56 AM

## 2018-06-27 NOTE — TELEPHONE ENCOUNTER
----- Message from [facility] sent at 6/27/2018  8:06 AM EDT -----  Regarding: /Susy  Pt would like a Rx for Ryla  HFA 8.5g    Pt uses [facility] [phone]

## 2018-07-09 ENCOUNTER — TELEPHONE (OUTPATIENT)
Dept: FAMILY MEDICINE CLINIC | Age: 68
End: 2018-07-09

## 2018-07-09 NOTE — TELEPHONE ENCOUNTER
Patient is requesting medication to be sent over for a cough she is having, patient stated that she has had something sent in before. Informed patient that she may need an office visit for the cough and sinus infection. Patient stated that she would like a request sent over first and then if she needs an appointment to call back.

## 2018-07-10 NOTE — TELEPHONE ENCOUNTER
Writer called patient to schedule her appointment per Dr. Murtaza Dukes. Patient stated that she went to [facility] in [place], [address] [facility] and she received medication for an Upper Respiratory Infection.

## 2018-10-03 DIAGNOSIS — I10 ESSENTIAL HYPERTENSION: ICD-10-CM

## 2018-10-03 RX ORDER — HYDROCHLOROTHIAZIDE 25 MG/1
TABLET ORAL
Qty: 90 TAB | Refills: 1 | Status: SHIPPED | OUTPATIENT
Start: 2018-10-03 | End: 2019-07-29 | Stop reason: SDUPTHER

## 2018-10-03 NOTE — TELEPHONE ENCOUNTER
PCP: Tim Patel. Edda Lowery MD    Last appt: 6/12/2018  No future appointments.     Requested Prescriptions     Pending Prescriptions Disp Refills    hydroCHLOROthiazide (HYDRODIURIL) 25 mg tablet [Pharmacy Med Name: HYDROCHLOROTHIAZIDE 25 MG TAB] 90 Tab 1     Sig: TAKE 1 TABLET BY MOUTH DAILY       Prior labs and Blood pressures:  BP Readings from Last 3 Encounters:   06/12/18 132/83   04/04/18 119/76   01/03/18 138/80     Lab Results   Component Value Date/Time    Sodium 143 03/28/2018 09:14 AM    Potassium 4.9 03/28/2018 09:14 AM    Chloride 101 03/28/2018 09:14 AM    CO2 27 03/28/2018 09:14 AM    Anion gap 6 03/21/2015 01:56 AM    Glucose 100 (H) 03/28/2018 09:14 AM    BUN 19 03/28/2018 09:14 AM    Creatinine 0.89 03/28/2018 09:14 AM    BUN/Creatinine ratio 21 03/28/2018 09:14 AM    GFR est AA 78 03/28/2018 09:14 AM    GFR est non-AA 68 03/28/2018 09:14 AM    Calcium 9.4 03/28/2018 09:14 AM     No results found for: HBA1C, HGBE8, TJU8BANS, [id number]  Lab Results   Component Value Date/Time    Cholesterol, total 165 03/28/2018 09:14 AM    HDL Cholesterol 54 03/28/2018 09:14 AM    LDL, calculated 92 03/28/2018 09:14 AM    VLDL, calculated 19 03/28/2018 09:14 AM    Triglyceride 94 03/28/2018 09:14 AM    CHOL/HDL Ratio 2.9 03/21/2015 01:56 AM     No results found for: CRISTIN Choudhury    Lab Results   Component Value Date/Time    TSH 4.26 (H) 03/21/2015 01:56 AM

## 2018-10-16 ENCOUNTER — OFFICE VISIT (OUTPATIENT)
Dept: FAMILY MEDICINE CLINIC | Age: 68
End: 2018-10-16

## 2018-10-16 VITALS
TEMPERATURE: 96.9 F | HEART RATE: 86 BPM | BODY MASS INDEX: 43.38 KG/M2 | HEIGHT: 64 IN | OXYGEN SATURATION: 97 % | WEIGHT: 254.1 LBS | RESPIRATION RATE: 18 BRPM | SYSTOLIC BLOOD PRESSURE: 127 MMHG | DIASTOLIC BLOOD PRESSURE: 88 MMHG

## 2018-10-16 DIAGNOSIS — M81.0 OSTEOPOROSIS WITHOUT CURRENT PATHOLOGICAL FRACTURE, UNSPECIFIED OSTEOPOROSIS TYPE: ICD-10-CM

## 2018-10-16 DIAGNOSIS — Z00.00 LABORATORY EXAM ORDERED AS PART OF ROUTINE GENERAL MEDICAL EXAMINATION: ICD-10-CM

## 2018-10-16 DIAGNOSIS — Z00.00 MEDICARE ANNUAL WELLNESS VISIT, SUBSEQUENT: Primary | ICD-10-CM

## 2018-10-16 DIAGNOSIS — R79.89 ELEVATED PLATELET COUNT: ICD-10-CM

## 2018-10-16 DIAGNOSIS — R74.01 ELEVATED ALT MEASUREMENT: ICD-10-CM

## 2018-10-16 DIAGNOSIS — E55.9 VITAMIN D DEFICIENCY: ICD-10-CM

## 2018-10-16 RX ORDER — BENZONATATE 200 MG/1
1 CAPSULE ORAL
Refills: 0 | COMMUNITY
Start: 2018-07-09 | End: 2019-04-16 | Stop reason: ALTCHOICE

## 2018-10-16 RX ORDER — ZOSTER VACCINE RECOMBINANT, ADJUVANTED 50 MCG/0.5
KIT INTRAMUSCULAR
Refills: 0 | COMMUNITY
Start: 2018-08-18 | End: 2018-10-16 | Stop reason: SDUPTHER

## 2018-10-16 RX ORDER — MONTELUKAST SODIUM 10 MG/1
TABLET ORAL
COMMUNITY
End: 2019-04-09 | Stop reason: SDUPTHER

## 2018-10-16 RX ORDER — HYDROCHLOROTHIAZIDE 25 MG/1
TABLET ORAL
COMMUNITY
End: 2018-10-16 | Stop reason: SDUPTHER

## 2018-10-16 RX ORDER — ALBUTEROL SULFATE 90 UG/1
2 AEROSOL, METERED RESPIRATORY (INHALATION)
COMMUNITY
End: 2020-01-24 | Stop reason: SDUPTHER

## 2018-10-16 RX ORDER — FLUTICASONE PROPIONATE 50 MCG
SPRAY, SUSPENSION (ML) NASAL
COMMUNITY
End: 2018-12-03 | Stop reason: SDUPTHER

## 2018-10-16 NOTE — MR AVS SNAPSHOT
[address] [place] [address] 
[address] [facility] [address] 
[phone] Patient: Vikram You MRN: [id number] MLS:1/75/2641 Visit Information Date & Time Provider Department Dept. Phone Encounter #  
 10/16/2018 12:00 PM Janine Dumont NP [facility] [phone] [id number] Follow-up Instructions Return in about 1 year (around 10/16/2019) for [address] Upcoming Health Maintenance Date Due Shingrix Vaccine Age 50> (2 of 2) 5/30/2018 Influenza Age 5 to Adult 8/1/2018 MEDICARE YEARLY EXAM 10/6/2018 GLAUCOMA SCREENING Q2Y 4/4/2019* Pneumococcal 65+ Low/Medium Risk (2 of 2 - PPSV23) 4/4/2019 BREAST CANCER SCRN MAMMOGRAM 8/25/2019 COLONOSCOPY 6/3/2024 DTaP/Tdap/Td series (2 - Td) 4/4/2028 *Topic was postponed. The date shown is not the original due date. Allergies as of 10/16/2018  Review Complete On: 10/16/2018 By: Renae Shankssins Severity Noted Reaction Type Reactions Latex  12/07/2012    Hives Flowers  07/24/2017    Shortness of Breath Fresh cut flowers are worse Fruit C [Ascorbic Acid]  12/17/2012    Hives ALL FRUITS Pcn [Penicillins]  07/10/2012    Hives Current Immunizations  Reviewed on 6/12/2018 Name Date Influenza High Dose Vaccine PF 10/5/2017 Influenza Vaccine 11/8/2016 12:00 AM, 10/16/2015 12:00 AM  
 Pneumococcal Conjugate (PCV-13) 4/4/2018, 1/17/2017 12:00 AM  
 Tdap 4/4/2018, 9/16/2016 12:00 AM  
 Zoster Recombinant 4/4/2018 Not reviewed this visit You Were Diagnosed With   
  
 Codes Comments Medicare annual wellness visit, subsequent    -  Primary ICD-10-CM: Z00.00 ICD-9-CM: V70.0 Laboratory exam ordered as part of routine general medical examination     ICD-10-CM: Z00.00 ICD-9-CM: V72.62 Vitamin D deficiency     ICD-10-CM: E55.9 ICD-9-CM: 268.9  Osteoporosis without current pathological fracture, unspecified osteoporosis type     ICD-10-CM: M81.0 ICD-9-CM: 733.00 Vitals BP Pulse Temp Resp Height(growth percentile) Weight(growth percentile) 127/88 (BP 1 Location: Right arm, BP Patient Position: Sitting) 86 96.9 °F (36.1 °C) (Oral) 18 5' 4\" (1.626 m) 254 lb 1.6 oz (115.3 kg) SpO2 BMI OB Status Smoking Status 97% 43.62 kg/m2 Hysterectomy Never Smoker BMI and BSA Data Body Mass Index Body Surface Area  
 43.62 kg/m 2 2.28 m 2 Preferred Pharmacy Pharmacy Name Phone [id number] [facility] [phone] Your Updated Medication List  
  
   
This list is accurate as of 10/16/18  1:14 PM.  Always use your most recent med list.  
  
  
  
  
 ADVAIR -21 mcg/actuation inhaler Generic drug:  fluticasone-salmeterol INL 2 PFS PO BID IN THE MORNING AND IN THE KODI  
  
 aspirin delayed-release 81 mg tablet Take  by mouth daily. benzonatate 200 mg capsule Commonly known as:  TESSALON Take 1 Cap by mouth DIALYSIS PRN. BOOSTRIX TDAP 2.5-8-5 Lf-mcg-Lf/0.5mL Syrg Generic drug:  Diphth, Pertus(Acell), Tetanus Boostrix Tdap 2.5 Lf unit-8 mcg-5 Lf/0.5 mL intramuscular syringe  
  
 diclofenac EC 75 mg EC tablet Commonly known as:  VOLTAREN Take 1 Tab by mouth two (2) times daily as needed. fluticasone 50 mcg/actuation nasal spray Commonly known as:  FLONASE  
fluticasone 50 mcg/actuation nasal spray,suspension  
  
 hydroCHLOROthiazide 25 mg tablet Commonly known as:  HYDRODIURIL  
TAKE 1 TABLET BY MOUTH DAILY  
  
 montelukast 10 mg tablet Commonly known as:  SINGULAIR  
montelukast 10 mg tablet VENTOLIN HFA 90 mcg/actuation inhaler Generic drug:  albuterol Ventolin HFA 90 mcg/actuation aerosol inhaler VITAMIN D3 1,000 unit tablet Generic drug:  cholecalciferol Take  by mouth daily. We Performed the Following CBC WITH AUTOMATED DIFF [08315 CPT(R)] LIPID PANEL [27213 CPT(R)] METABOLIC PANEL, COMPREHENSIVE [08583 CPT(R)] UA/M W/RFLX CULTURE, ROUTINE [[id number] Custom] VITAMIN D, 25 HYDROXY [id number] CPT(R)] Follow-up Instructions Return in about 1 year (around 10/16/2019) for Alan Torres Patient Instructions 1) Schedule of Personalized Health Plan The best way to stay healthy is to live a healthy lifestyle. A healthy lifestyle includes regular exercise, eating a well-balanced diet, keeping a healthy weight and not smoking. Regular physical exams and screening tests are another important way to take care of yourself. Preventive exams provided by health care providers can find health problems early when treatment works best and can keep you from getting certain diseases or illnesses. Preventive services include exams, lab tests, screening tests, shots, and learning information to help you take care of your own health. The CDC recommends pneumonia vaccines for anyone 72 years and older. These vaccines are usually only needed once in a lifetime unless your healthcare provider decides differently. The 2 pneumonia shots available presently are PCV 13 (Prevnar 13) and PPSV23 (Pneumovax 23). Adults 72 years or older who have not previously received PCV13, should receive a dose of PCV13 first, followed 1 year later by a dose of PPSV23. All people over 65 should have a yearly flu vaccine. People over 65 are at medium to high risk for Hepatitis B. Hepatitis B is transmitted through body fluids with a common source being sexual activity or IV drug use. Three shots are needed for complete protection. The CDC recommends the herpes zoster (shingles) vaccine for all adults 61 and older, regardless if a prior episode of zoster has been reported. In addition to your physical exam, some screening tests are recommended: Osteoporosis screening -There are no signs or symptoms of osteoporosis (weakening of bones). You might not know you have the disease until you break a bone. Thats why its so important to get a bone density test to measure your bone strength. Bone mass measurement is taken with a Dexa scan and is recommended every two years after 72years old or if you have certain risk factors, such as personal history of vertebral fracture or chronic steroid medication use. Diabetes Mellitus screening is recommended every year. This is a blood test, called a hemoglobin A1c, which measures the average blood sugar over a 3 month period. Glaucoma is an eye disease caused by high pressure in the eye. An eye exam is recommended every year. Cardiovascular screening tests that check your cholesterol and other blood fat (lipid) levels are recommended every five years or yearly if you are on medications for cardiovascular disease. Colorectal Cancer screening tests help to find pre-cancerous polyps (growths in the colon) so they can be removed before they turn into cancer. Screening tests are recommended starting at age 48 or earlier if you have a certain risk factors, such as a family history of colon cancer. Breast Cancer screening is done with a mammogram, a low dose x-ray that looks at breast tissue. It is recommended by the [address] that women 54 years and older get a mammogram every 2 years. After the age of 76, recommendations are based on life expectancy. Cervical Cancer screening is done by a PAP smear during a pelvic exam.  The American College of Obstetricians and Gynecologists states that screening can be discontinued after 72years old if the person has had adequate negative prior screening results and no abnormal history (abnormal = CIN2 or higher level). Here is a list of your current Health Maintenance items including a date when each one is due next: 
Health Maintenance Topic Date Due  Shingrix Vaccine Age 50> (2 of 2) 05/30/2018  Influenza Age 5 to Adult  08/01/2018  MEDICARE YEARLY EXAM  10/06/2018  GLAUCOMA SCREENING Q2Y  04/04/2019 (Originally 5/10/2016)  Pneumococcal 65+ Low/Medium Risk (2 of 2 - PPSV23) 04/04/2019  BREAST CANCER SCRN MAMMOGRAM  08/25/2019  COLONOSCOPY  06/03/2024  
 DTaP/Tdap/Td series (2 - Td) 04/04/2028  Hepatitis C Screening  Completed  Bone Densitometry (Dexa) Screening  Completed You have an [address] on file, signed 12/2017 and naming Harry Ortega as your agent. 2) Labs The following blood work was ordered today. Once the tests are completed, you will receive a letter, email or phone call with the results. If you have not heard from us within 10-14 days, please call the office for the results. Complete Blood Count (CBC) helps evaluate your overall health, including hemoglobin and red blood cells that carry oxygen, white blood cells that fight infection and platelets that help with clotting. Complete Metabolic Panel (CMP) assesses electrolytes, kidney and liver function.  (A Basic Metabolic Panel (BMP) does not include liver function.) Electrolytes include sodium, potassium, calcium, and chloride. These are necessary for cell function and an imbalance can cause serious problems. BUN and creatinine are markers of kidney function. ALT and AST are markers of liver function. Total Cholesterol is the total number of cholesterol particles in your blood, which includes triglycerides, HDL and LDL. A small amount of cholesterol is needed for the body's cells, hormones, and metabolism. Goal is less than 200. Triglycerides are the short term fats in your blood which are used for energy. Goal is less than 150. High Density Lipids (HDL) is the \"good\" cholesterol in your blood. HDL helps remove bad cholesterol from your blood. Goal is more than 40. Low Density Lipids (LDL) is the \"bad\" cholesterol in your blood. LDL can stick to your arteries, raising the risk for heart attack and stroke. Goal is less than 100 Urinalysis - this is a test of your urine to check your overall health. It is recommended as a part of a routine check up and screens for a variety of disorders, such as urinary tract infections, kidney disease and diabetes. Vitamin D is important for absorbing calcium and promoting bone growth. If you are low in Vitamin D, you may experience fatigue, back or bone pain, hair loss, muscle pain, slow wound healing, depression. Your body can make Vitamin D after exposure to sunlight or through foods like fatty fish (tuna, mackerel, salmon), food with Vitamin D added (dairy products, orange juice and cereals) and cheese, egg yolks and liver. Vitamin D decreases with age and in the winter time when the sun is not strong. A deficiency in Vitamin D has been linked to certain cancers (breast, prostate, colon) as well as heart disease, depression and weight gain. Well Visit, Over 72: Care Instructions Your Care Instructions Physical exams can help you stay healthy. Your doctor has checked your overall health and may have suggested ways to take good care of yourself. He or she also may have recommended tests. At home, you can help prevent illness with healthy eating, regular exercise, and other steps. Follow-up care is a key part of your treatment and safety. Be sure to make and go to all appointments, and call your doctor if you are having problems. It's also a good idea to know your test results and keep a list of the medicines you take. How can you care for yourself at home? · Reach and stay at a healthy weight. This will lower your risk for many problems, such as obesity, diabetes, heart disease, and high blood pressure. · Get at least 30 minutes of exercise on most days of the week.  Walking is a good choice. You also may want to do other activities, such as running, swimming, cycling, or playing tennis or team sports. · Do not smoke. Smoking can make health problems worse. If you need help quitting, talk to your doctor about stop-smoking programs and medicines. These can increase your chances of quitting for good. · Protect your skin from too much sun. When you're outdoors from 10 a.m. to 4 p.m., stay in the shade or cover up with clothing and a hat with a wide brim. Wear sunglasses that block UV rays. Even when it's cloudy, put broad-spectrum sunscreen (SPF 30 or higher) on any exposed skin. · See a dentist one or two times a year for checkups and to have your teeth cleaned. · Wear a seat belt in the car. · Limit alcohol to 2 drinks a day for men and 1 drink a day for women. Too much alcohol can cause health problems. Follow your doctor's advice about when to have certain tests. These tests can spot problems early. For men and women · Cholesterol. Your doctor will tell you how often to have this done based on your overall health and other things that can increase your risk for heart attack and stroke. · Blood pressure. Have your blood pressure checked during a routine doctor visit. Your doctor will tell you how often to check your blood pressure based on your age, your blood pressure results, and other factors. · Diabetes. Ask your doctor whether you should have tests for diabetes. · Vision. Experts recommend that you have yearly exams for glaucoma and other age-related eye problems. · Hearing. Tell your doctor if you notice any change in your hearing. You can have tests to find out how well you hear. · Colon cancer tests. Keep having colon cancer tests as your doctor recommends. You can have one of several types of tests. · Heart attack and stroke risk. At least every 4 to 6 years, you should have your risk for heart attack and stroke assessed.  Your doctor uses factors such as your age, blood pressure, cholesterol, and whether you smoke or have diabetes to show what your risk for a heart attack or stroke is over the next 10 years. · Osteoporosis. Talk to your doctor about whether you should have a bone density test to find out whether you have thinning bones. Also ask your doctor about whether you should take calcium and vitamin D supplements. For women · Pap test and pelvic exam. You may no longer need a Pap test. Talk with your doctor about whether to stop or continue to have Pap tests. · Breast exam and mammogram. Ask how often you should have a mammogram, which is an X-ray of your breasts. A mammogram can spot breast cancer before it can be felt and when it is easiest to treat. · Thyroid disease. Talk to your doctor about whether to have your thyroid checked as part of a regular physical exam. Women have an increased chance of a thyroid problem. For men · Prostate exam. Talk to your doctor about whether you should have a blood test (called a PSA test) for prostate cancer. Experts disagree on whether men should have this test. Some experts recommend that you discuss the benefits and risks of the test with your doctor. · Abdominal aortic aneurysm. Ask your doctor whether you should have a test to check for an aneurysm. You may need a test if you ever smoked or if your parent, brother, sister, or child has had an aneurysm. When should you call for help? Watch closely for changes in your health, and be sure to contact your doctor if you have any problems or symptoms that concern you. Where can you learn more? Go to http://zeina-mary jane.info/. Enter L448 in the search box to learn more about \"Well Visit, Over 65: Care Instructions. \" Current as of: March 29, 2018 Content Version: 11.8 © 5011-3789 [employer].  Care instructions adapted under license by [employer] (which disclaims liability or warranty for this information). If you have questions about a medical condition or this instruction, always ask your healthcare professional. [address] any warranty or liability for your use of this information. Introducing [facility]! Dear Prakash Goodness: Thank you for requesting a [email or web address] account. Our records indicate that you already have an active [email or web address] account. You can access your account anytime at [email or web address] Did you know that you can access your hospital and ER discharge instructions at any time in [email or web address]? You can also review all of your test results from your hospital stay or ER visit. Additional Information If you have questions, please visit the Frequently Asked Questions section of the [email or web address] website at [email or web address] Remember, [email or web address] is NOT to be used for urgent needs. For medical emergencies, dial 911. Now available from your iPhone and Android! Please provide this summary of care documentation to your next provider. Your primary care clinician is listed as Greyson Mares. If you have any questions after today's visit, please call [phone].

## 2018-10-16 NOTE — PATIENT INSTRUCTIONS
1)   Schedule of Personalized Health Plan    The best way to stay healthy is to live a healthy lifestyle. A healthy lifestyle includes regular exercise, eating a well-balanced diet, keeping a healthy weight and not smoking. Regular physical exams and screening tests are another important way to take care of yourself. Preventive exams provided by health care providers can find health problems early when treatment works best and can keep you from getting certain diseases or illnesses. Preventive services include exams, lab tests, screening tests, shots, and learning information to help you take care of your own health. The CDC recommends pneumonia vaccines for anyone 72 years and older. These vaccines are usually only needed once in a lifetime unless your healthcare provider decides differently. The 2 pneumonia shots available presently are PCV 13 (Prevnar 13) and PPSV23 (Pneumovax 23). Adults 72 years or older who have not previously received PCV13, should receive a dose of PCV13 first, followed 1 year later by a dose of PPSV23. All people over 65 should have a yearly flu vaccine. People over 65 are at medium to high risk for Hepatitis B. Hepatitis B is transmitted through body fluids with a common source being sexual activity or IV drug use. Three shots are needed for complete protection. The CDC recommends the herpes zoster (shingles) vaccine for all adults 61 and older, regardless if a prior episode of zoster has been reported. In addition to your physical exam, some screening tests are recommended:    Osteoporosis screening -There are no signs or symptoms of osteoporosis (weakening of bones). You might not know you have the disease until you break a bone. Thats why its so important to get a bone density test to measure your bone strength.  Bone mass measurement is taken with a Dexa scan and is recommended every two years after 72years old or if you have certain risk factors, such as personal history of vertebral fracture or chronic steroid medication use. Diabetes Mellitus screening is recommended every year. This is a blood test, called a hemoglobin A1c, which measures the average blood sugar over a 3 month period. Glaucoma is an eye disease caused by high pressure in the eye. An eye exam is recommended every year. Cardiovascular screening tests that check your cholesterol and other blood fat (lipid) levels are recommended every five years or yearly if you are on medications for cardiovascular disease. Colorectal Cancer screening tests help to find pre-cancerous polyps (growths in the colon) so they can be removed before they turn into cancer. Screening tests are recommended starting at age 48 or earlier if you have a certain risk factors, such as a family history of colon cancer. Breast Cancer screening is done with a mammogram, a low dose x-ray that looks at breast tissue. It is recommended by the [address] [place], [place] [address] [address] that women 54 years and older get a mammogram every 2 years. After the age of 76, recommendations are based on life expectancy. Cervical Cancer screening is done by a PAP smear during a pelvic exam.  The American College of Obstetricians and Gynecologists states that screening can be discontinued after 72years old if the person has had adequate negative prior screening results and no abnormal history (abnormal = CIN2 or higher level).     Here is a list of your current Health Maintenance items including a date when each one is due next:  Health Maintenance   Topic Date Due    Shingrix Vaccine Age 49> (2 of 2) 05/30/2018    Influenza Age 5 to Adult  08/01/2018    MEDICARE YEARLY EXAM  10/06/2018    GLAUCOMA SCREENING Q2Y  04/04/2019 (Originally 5/10/2016)    Pneumococcal 65+ Low/Medium Risk (2 of 2 - PPSV23) 04/04/2019    BREAST CANCER SCRN MAMMOGRAM  08/25/2019    COLONOSCOPY  06/03/2024    DTaP/Tdap/Td series (2 - Td) 04/04/2028    Hepatitis C Screening  Completed    Bone Densitometry (Dexa) Screening  Completed       You have an [address] on file, signed 12/2017 and naming Boris Sorenson as your agent. 2) Labs  The following blood work was ordered today. Once the tests are completed, you will receive a letter, email or phone call with the results. If you have not heard from us within 10-14 days, please call the office for the results. Complete Blood Count (CBC) helps evaluate your overall health, including hemoglobin and red blood cells that carry oxygen, white blood cells that fight infection and platelets that help with clotting. Complete Metabolic Panel (CMP) assesses electrolytes, kidney and liver function.  (A Basic Metabolic Panel (BMP) does not include liver function.)  Electrolytes include sodium, potassium, calcium, and chloride. These are necessary for cell function and an imbalance can cause serious problems. BUN and creatinine are markers of kidney function. ALT and AST are markers of liver function. Total Cholesterol is the total number of cholesterol particles in your blood, which includes triglycerides, HDL and LDL. A small amount of cholesterol is needed for the body's cells, hormones, and metabolism. Goal is less than 200. Triglycerides are the short term fats in your blood which are used for energy. Goal is less than 150. High Density Lipids (HDL) is the \"good\" cholesterol in your blood. HDL helps remove bad cholesterol from your blood. Goal is more than 40. Low Density Lipids (LDL) is the \"bad\" cholesterol in your blood. LDL can stick to your arteries, raising the risk for heart attack and stroke. Goal is less than 100    Urinalysis - this is a test of your urine to check your overall health. It is recommended as a part of a routine check up and screens for a variety of disorders, such as urinary tract infections, kidney disease and diabetes.      Vitamin D is important for absorbing calcium and promoting bone growth. If you are low in Vitamin D, you may experience fatigue, back or bone pain, hair loss, muscle pain, slow wound healing, depression. Your body can make Vitamin D after exposure to sunlight or through foods like fatty fish (tuna, mackerel, salmon), food with Vitamin D added (dairy products, orange juice and cereals) and cheese, egg yolks and liver. Vitamin D decreases with age and in the winter time when the sun is not strong. A deficiency in Vitamin D has been linked to certain cancers (breast, prostate, colon) as well as heart disease, depression and weight gain. Well Visit, Over 72: Care Instructions  Your Care Instructions    Physical exams can help you stay healthy. Your doctor has checked your overall health and may have suggested ways to take good care of yourself. He or she also may have recommended tests. At home, you can help prevent illness with healthy eating, regular exercise, and other steps. Follow-up care is a key part of your treatment and safety. Be sure to make and go to all appointments, and call your doctor if you are having problems. It's also a good idea to know your test results and keep a list of the medicines you take. How can you care for yourself at home? · Reach and stay at a healthy weight. This will lower your risk for many problems, such as obesity, diabetes, heart disease, and high blood pressure. · Get at least 30 minutes of exercise on most days of the week. Walking is a good choice. You also may want to do other activities, such as running, swimming, cycling, or playing tennis or team sports. · Do not smoke. Smoking can make health problems worse. If you need help quitting, talk to your doctor about stop-smoking programs and medicines. These can increase your chances of quitting for good. · Protect your skin from too much sun.  When you're outdoors from 10 a.m. to 4 p.m., stay in the shade or cover up with clothing and a hat with a wide brim. Wear sunglasses that block UV rays. Even when it's cloudy, put broad-spectrum sunscreen (SPF 30 or higher) on any exposed skin. · See a dentist one or two times a year for checkups and to have your teeth cleaned. · Wear a seat belt in the car. · Limit alcohol to 2 drinks a day for men and 1 drink a day for women. Too much alcohol can cause health problems. Follow your doctor's advice about when to have certain tests. These tests can spot problems early. For men and women  · Cholesterol. Your doctor will tell you how often to have this done based on your overall health and other things that can increase your risk for heart attack and stroke. · Blood pressure. Have your blood pressure checked during a routine doctor visit. Your doctor will tell you how often to check your blood pressure based on your age, your blood pressure results, and other factors. · Diabetes. Ask your doctor whether you should have tests for diabetes. · Vision. Experts recommend that you have yearly exams for glaucoma and other age-related eye problems. · Hearing. Tell your doctor if you notice any change in your hearing. You can have tests to find out how well you hear. · Colon cancer tests. Keep having colon cancer tests as your doctor recommends. You can have one of several types of tests. · Heart attack and stroke risk. At least every 4 to 6 years, you should have your risk for heart attack and stroke assessed. Your doctor uses factors such as your age, blood pressure, cholesterol, and whether you smoke or have diabetes to show what your risk for a heart attack or stroke is over the next 10 years. · Osteoporosis. Talk to your doctor about whether you should have a bone density test to find out whether you have thinning bones. Also ask your doctor about whether you should take calcium and vitamin D supplements.   For women  · Pap test and pelvic exam. You may no longer need a Pap test. Talk with your doctor about whether to stop or continue to have Pap tests. · Breast exam and mammogram. Ask how often you should have a mammogram, which is an X-ray of your breasts. A mammogram can spot breast cancer before it can be felt and when it is easiest to treat. · Thyroid disease. Talk to your doctor about whether to have your thyroid checked as part of a regular physical exam. Women have an increased chance of a thyroid problem. For men  · Prostate exam. Talk to your doctor about whether you should have a blood test (called a PSA test) for prostate cancer. Experts disagree on whether men should have this test. Some experts recommend that you discuss the benefits and risks of the test with your doctor. · Abdominal aortic aneurysm. Ask your doctor whether you should have a test to check for an aneurysm. You may need a test if you ever smoked or if your parent, brother, sister, or child has had an aneurysm. When should you call for help? Watch closely for changes in your health, and be sure to contact your doctor if you have any problems or symptoms that concern you. Where can you learn more? Go to http://zeina-mary jane.info/. Enter I365 in the search box to learn more about \"Well Visit, Over 65: Care Instructions. \"  Current as of: March 29, 2018  Content Version: 11.8  © 5729-4034 [employer]. Care instructions adapted under license by [employer] (which disclaims liability or warranty for this information). If you have questions about a medical condition or this instruction, always ask your healthcare professional. [place] [address] any warranty or liability for your use of this information.

## 2018-10-16 NOTE — LETTER
10/28/2018 9:19 PM 
 
Ms. Alysa Miller [address] [id number] Dear Ms Christianne Lucio: 
 
I wanted to update you on your recent urine culture results. Urinalysis - this is a test of your urine to check your overall health. It is recommended as a part of a routine check up and screens for a variety of disorders, such as urinary tract infections, kidney disease and diabetes. Sometimes, a small amount of mucus is noted in the specimen, which is normal.  It is produced by the urethra and bladder and helps keep your urinary tract healthy by washing out bacteria and germs. Your values showed white blood cells and casts present, so a urine culture was performed. Urinary casts are particles found in the urine and can be made up of white or red blood cells, kidney cells, protein or fat. Dehydration, strenuous exercise, and diuretic medications cause cause casts to form, as well as kidney disease. The urine culture results came back and showed some bacterial growth, but not of clinical significance (meaning not enough to be considered an infection). If you are having symptoms of a urinary tract infection (urinary urgency, frequency, burning, etc) please call the office and an antibiotic will be called into your pharmacy. Remember to drink at least 64oz of water daily for good kidney health. If you have any questions concerning these results, please don't hesitate to call ([phone]) or send a [employer] message. JACOB Biggs Please find your most recent results below. Resulted Orders CBC WITH AUTOMATED DIFF Result Value Ref Range WBC 8.9 3.4 - 10.8 x10E3/uL  
 RBC 4.72 3.77 - 5.28 x10E6/uL HGB 13.8 11.1 - 15.9 g/dL HCT 40.3 34.0 - 46.6 % MCV 85 79 - 97 fL  
 MCH 29.2 26.6 - 33.0 pg  
 MCHC 34.2 31.5 - 35.7 g/dL  
 RDW 15.4 12.3 - 15.4 % PLATELET 471 (H) 432 - 379 x10E3/uL  NEUTROPHILS 53 Not Estab. %  
 Lymphocytes 32 Not Estab. % MONOCYTES 11 Not Estab. % EOSINOPHILS 3 Not Estab. % BASOPHILS 1 Not Estab. %  
 ABS. NEUTROPHILS 4.8 1.4 - 7.0 x10E3/uL Abs Lymphocytes 2.9 0.7 - 3.1 x10E3/uL  
 ABS. MONOCYTES 0.9 0.1 - 0.9 x10E3/uL  
 ABS. EOSINOPHILS 0.3 0.0 - 0.4 x10E3/uL  
 ABS. BASOPHILS 0.0 0.0 - 0.2 x10E3/uL IMMATURE GRANULOCYTES 0 Not Estab. %  
 ABS. IMM. GRANS. 0.0 0.0 - 0.1 x10E3/uL Narrative Performed at:  [address]  [id number] : Nino Herzog MD, Phone:  [phone] UA/M W/RFLX CULTURE, ROUTINE Result Value Ref Range Specific Gravity 1.023 1.005 - 1.030  
 pH (UA) 5.0 5.0 - 7.5 Color Yellow Yellow Appearance Clear Clear Leukocyte Esterase 2+ (A) Negative Protein Negative Negative/Trace Glucose Negative Negative Ketone Negative Negative Blood Negative Negative Bilirubin Negative Negative Urobilinogen 0.2 0.2 - 1.0 mg/dL Nitrites Negative Negative Microscopic Examination See additional order Comment:  
   Microscopic was indicated and was performed. URINALYSIS REFLEX Comment Comment: This specimen has reflexed to a Urine Culture. Narrative Performed at:  [address]  [id number] : Nino Herzog MD, Phone:  [phone] METABOLIC PANEL, COMPREHENSIVE Result Value Ref Range Glucose 96 65 - 99 mg/dL BUN 18 8 - 27 mg/dL Creatinine 0.84 0.57 - 1.00 mg/dL GFR est non-AA 72 >59 mL/min/1.73 GFR est AA 83 >59 mL/min/1.73  
 BUN/Creatinine ratio 21 12 - 28 Sodium 142 134 - 144 mmol/L Potassium 4.0 3.5 - 5.2 mmol/L Chloride 99 96 - 106 mmol/L  
 CO2 24 20 - 29 mmol/L Calcium 10.0 8.7 - 10.3 mg/dL Protein, total 7.3 6.0 - 8.5 g/dL Albumin 3.9 3.6 - 4.8 g/dL GLOBULIN, TOTAL 3.4 1.5 - 4.5 g/dL A-G Ratio 1.1 (L) 1.2 - 2.2 Bilirubin, total 0.3 0.0 - 1.2 mg/dL Alk. phosphatase 104 39 - 117 IU/L  
 AST (SGOT) 22 0 - 40 IU/L  
 ALT (SGPT) 45 (H) 0 - 32 IU/L Narrative Performed at:  [address]  [id number] : Jessie Carter MD, Phone:  [phone] VITAMIN D, 25 HYDROXY Result Value Ref Range VITAMIN D, 25-HYDROXY 73.6 30.0 - 100.0 ng/mL Comment:  
   Vitamin D deficiency has been defined by the [facility] practice guideline as a 
level of serum 25-OH vitamin D less than 20 ng/mL (1,2). The Endocrine Society went on to further define vitamin D 
insufficiency as a level between 21 and 29 ng/mL (2). 1. IOM ([place] of Medicine). 2010. Dietary reference 
   intakes for calcium and D. [address] [place] [address]: The 
   [employer]. 2. Winston MF, Dian NC, Maday CHRISTIANSON, et al. 
   Evaluation, treatment, and prevention of vitamin D 
   deficiency: an Endocrine Society clinical practice 
   guideline. JCEM. 2011 Jul; 96(7):1911-30. Narrative Performed at:  [address]  [id number] : Jessie Carter MD, Phone:  [phone] MICROSCOPIC EXAMINATION Result Value Ref Range WBC 6-10 (A) 0 - 5 /hpf  
 RBC None seen 0 - 2 /hpf Epithelial cells 0-10 0 - 10 /hpf Casts Present (A) None seen /lpf Cast type Hyaline casts N/A Mucus Present Not Estab. Bacteria Few None seen/Few Narrative Performed at:  [address]  [id number] : Jessie Carter MD, Phone:  [phone] URINE CULTURE, ROUTINE Result Value Ref Range Urine Culture, Routine Culture shows less than 10,000 colony forming units of bacteria per 
milliliter of urine. This colony count is not generally considered 
to be clinically significant. Narrative Performed at:  [address]  [id number] : Eron Harrison MD, Phone:  [phone]

## 2018-10-16 NOTE — LETTER
10/24/2018 9:46 PM 
 
Ms. Rodolfo Miller [id number] Dear MsJakob Bubba: It was nice seeing you at the clinic. I wanted to update you on your recent lab results: 
 
Complete Blood Count (CBC) is a test performed to look at overall health and detect health problems, such as anemia, infection and leukemia, by looking at several components of your blood. Red blood cells (RBC) carry oxygen throughout the body, White blood cells (WBC) fight infection, Hemoglobin is the protein that carries oxygen, Platelets help with blood clotting and Hematocrit is the ratio of RBC to fluid in the blood. If any of these components are high or low, it could indicate a medical prblem and need further investigation. Your CBC is normal except your blood work shows your platelets are elevated at 417/microL. However, the upper limit of the platelet count differs among various sources and laboratories, but is generally accepted to be in the range of 350,000 to 450,000/microL (350 to 450 x 109 /L) which would mean your value is within the normal range. Platelets are important in blood clotting. Elevated platelet counts can be caused by infection or inflammation (called reactive thrombocytosis because it is secondary to another issue) and less commonly by blood or bone marrow disease. Too many platelets can cause blood clots. Symptoms can include headache, dizziness, chest pain, weakness, fainting, numbness or tingling in hands/feet. If you start experiencing these symptoms, please make an appointment to be seen in the office. ------------------------------------------------------------------------------------------------------------------- Complete Metabolic Panel (CMP) assesses electrolytes, kidney and liver function. Your electrolyte values (sodium,potassium, calcium, chloride) are normal. 
BUN and creatinine are markers of kidney function.   Your values are normal. 
 ALT and AST are markers of liver function. Your AST values are normal but your ALT is slightly high. Asparate Aminotransferase (AST) and Alanine aminotransferase (ALT) are markers of liver function. Higher than normal values can indicate inflammation or damage to the liver. This can be the result of drinking alcohol or taking over the counter (OTC) pain medications, particularly Tylenol, among other things. Please reduce alcohol intake to no more than 2 servings of alcohol a day (one servin oz beer, 5 oz wine, 1.5 ox distilled spirits (80 proof) and discontinue or reduce usage of OTC pain relievers. Please make a lab appointment to have your blood drawn in 3 months to re-evaluate your liver function. If it is still elevated, further testing may be needed. ------------------------------------------------------------------------------------------------------------------- Total Cholesterol is the total number of cholesterol particles in your blood. A small amount of cholesterol is needed for the body's cells, hormones, and metabolism. Goal is less than 200. Your value is 158 which is normal. 
 
Triglycerides are the short term fats in your blood. Triglycerides store fat that is used for energy. Goal is less than 150. Your value is 127 which is normal.  You can lower triglyceride levels by eating a healthy diet, cut back on calories (extra calories are converted to triglycerides and stored as fat,) losing weight, limit alcohol, and exercise regularly. High Density Lipids (HDL) is the \"good\" cholesterol in your blood. HDL helps remove bad cholesterol from your blood. Goal is more than 40. Your value is 47 which is normal. HDL can be increased with daily exercise. Low Density Lipids (LDL) is the \"bad\" cholesterol in your blood. LDL can stick to your arteries, raising the risk for heart attack and stroke. Goal is less than 100. Your value is 80 which is good. The [facility] [employer] of Cardiology created a formula that assesses the risk for future cardiac events (such as a stroke.)  It is based on age, sex, race, systolic blood pressure, cholesterol, history of smoking, and treatment of diabetes and hypertension. If the 10 year risk score is greater than 7.5%, a statin medication is recommended. Your ASCVD risk score states you have a 8.2% risk in the next 10 years of having a cardiac event. Consequently, at this time a statin is recommended at this time. Statin medications help lower cholesterol levels and reduce your chance of a heart attack or stroke. It takes 4-6 weeks for medication to reach full therapeutic levels. Although highly effective, statins only work when you are taking them so the [address] [place] [address] warns against stopping statins abruptly, as it may increase your risk of heart attacks and stroke. If you discontinue the medication, the therapeutic effect of lowering cholesterol also stops and cholesterol levels will typically increase. Serious but rare side effects include severe muscle pain and liver problems. Because your liver enzyme is slightly elevated, I propose we hold off starting the medication until the liver function is retested in 2-3 months and if they are back in the normal range, the statin medication can be started at that time. Try these lifestyle strategies to lower your cholesterol: ? Decrease saturated fats in diet. Saturated fats are found in:  
o meats including fatty beef, pork, lamb, chicken or turkey with skin, and ground beef,  
o high fat cheese,  
o whole fat diary, milk, cream and ice cream, 
o butter 
o most saturated fats are found in animal products ? Eliminate Trans Fats from your diet. Foods that contain TF include: 
o Fast foods: fried chicken, biscuits, fried fish for fried fish sandwichs, [place] fries 
o Donuts and muffins 
o Crackers and cookies 
o Cake, cake icing and pies o Canned biscuits or refrigerated dough 
o Microwave popcorn 
o Coffee creamer 
o Frozen pizza 
o Stick margarine or vegetable shortening ? Increase the amount of soluble fiber in your diet. Sources of soluble fiber include: 
o oats, peas, beans, apples, citrus fruits, carrots, barley and psyllium ? Include omega-3 fatty acids in your diet, these are found in:  
o FISH! Try and eat 2 servings of fish a week. Good examples include salmon, albacore tuna, halibut, trout and mackerel. 
o Take a fish oil supplement daily ? Look for foods enriched with plant-sterols. There are many products on the market which are fortified with this nutrient including buttery spreads and yogurts. Look for the [employer] and Promise brands. ? Increase your physical activity to at least 150 minutes a week. This is just 5 sessions of thirty minutes a week! ? Losing weight can significantly lower your cholesterol. ? If you are a smoker, QUIT SMOKING, this can significantly lower your cholesterol and overall cardiovascular risk. It also can help to decrease your risk for many other conditions. 
------------------------------------------------------------------------------------------------------------------- Vitamin D is necessary for calcium absorption and healthy bones. Normal range of Vitamin D is between 30 - 100 ng/mL. Your value is 68 which is normal.  Vitamin D is found in fish, eggs and fortified dairy products as well as the UV light from the sun which causes the body to make Vitamin D.  
------------------------------------------------------------------------------------------------------------- If you have any questions regarding your lab results, please don't hesitate to call me ([phone]) or send me a [email or web address] message. JCAOB Bentley Please find your most recent results below. Resulted Orders CBC WITH AUTOMATED DIFF Result Value Ref Range WBC 8.9 3.4 - 10.8 x10E3/uL RBC 4.72 3.77 - 5.28 x10E6/uL HGB 13.8 11.1 - 15.9 g/dL HCT 40.3 34.0 - 46.6 % MCV 85 79 - 97 fL  
 MCH 29.2 26.6 - 33.0 pg  
 MCHC 34.2 31.5 - 35.7 g/dL  
 RDW 15.4 12.3 - 15.4 % PLATELET 855 (H) 327 - 379 x10E3/uL NEUTROPHILS 53 Not Estab. % Lymphocytes 32 Not Estab. % MONOCYTES 11 Not Estab. % EOSINOPHILS 3 Not Estab. % BASOPHILS 1 Not Estab. %  
 ABS. NEUTROPHILS 4.8 1.4 - 7.0 x10E3/uL Abs Lymphocytes 2.9 0.7 - 3.1 x10E3/uL  
 ABS. MONOCYTES 0.9 0.1 - 0.9 x10E3/uL  
 ABS. EOSINOPHILS 0.3 0.0 - 0.4 x10E3/uL  
 ABS. BASOPHILS 0.0 0.0 - 0.2 x10E3/uL IMMATURE GRANULOCYTES 0 Not Estab. %  
 ABS. IMM. GRANS. 0.0 0.0 - 0.1 x10E3/uL Narrative Performed at:  [address]  [id number] : Angelita Mg MD, Phone:  [phone] METABOLIC PANEL, COMPREHENSIVE Result Value Ref Range Glucose 96 65 - 99 mg/dL BUN 18 8 - 27 mg/dL Creatinine 0.84 0.57 - 1.00 mg/dL GFR est non-AA 72 >59 mL/min/1.73 GFR est AA 83 >59 mL/min/1.73  
 BUN/Creatinine ratio 21 12 - 28 Sodium 142 134 - 144 mmol/L Potassium 4.0 3.5 - 5.2 mmol/L Chloride 99 96 - 106 mmol/L  
 CO2 24 20 - 29 mmol/L Calcium 10.0 8.7 - 10.3 mg/dL Protein, total 7.3 6.0 - 8.5 g/dL Albumin 3.9 3.6 - 4.8 g/dL GLOBULIN, TOTAL 3.4 1.5 - 4.5 g/dL A-G Ratio 1.1 (L) 1.2 - 2.2 Bilirubin, total 0.3 0.0 - 1.2 mg/dL Alk. phosphatase 104 39 - 117 IU/L  
 AST (SGOT) 22 0 - 40 IU/L  
 ALT (SGPT) 45 (H) 0 - 32 IU/L Narrative Performed at:  [address]  [id number] : Angelita Mg MD, Phone:  [phone] VITAMIN D, 25 HYDROXY Result Value Ref Range VITAMIN D, 25-HYDROXY 73.6 30.0 - 100.0 ng/mL    Comment:  
   Vitamin D deficiency has been defined by the [facility] practice guideline as a 
 level of serum 25-OH vitamin D less than 20 ng/mL (1,2). The Endocrine Society went on to further define vitamin D 
insufficiency as a level between 21 and 29 ng/mL (2). 1. IOM (Union of Medicine). 2010. Dietary reference 
   intakes for calcium and D. [facility]: The 
   [employer]. 2. Winston MF, Dian NC, Maday CHRISTIANSON, et al. 
   Evaluation, treatment, and prevention of vitamin D 
   deficiency: an Endocrine Society clinical practice 
   guideline. JCEM. 2011 Jul; 96(7):1911-30. Narrative Performed at:  [address]  [id number] : Sara Barksdale MD, Phone:  [phone]

## 2018-10-16 NOTE — PROGRESS NOTES
[place]  Identified pt with two pt identifiers(name and ). No chief complaint on file. 1. Have you been to the ER, urgent care clinic since your last visit? No  Hospitalized since your last visit? no    2. Have you seen or consulted any other health care providers outside of the [facility] since your last visit? Include any pap smears or colon screening. no      Advance Care Planning    In the event something were to happen to you and you were unable to speak on your behalf, do you have an Advance Directive/ Living Will in place stating your wishes? YES    If yes, do we have a copy on file YES    Medication reconciliation up to date and corrected with patient at this time. Today's provider has been notified of reason for visit, vitals and flowsheets obtained on patients. Reviewed record in preparation for visit, huddled with provider and have obtained necessary documentation.       Health Maintenance Due   Topic    Shingrix Vaccine Age 49> (2 of 2)    Influenza Age 5 to Adult     MEDICARE YEARLY EXAM        Wt Readings from Last 3 Encounters:   10/16/18 254 lb 1.6 oz (115.3 kg)   18 255 lb 12.8 oz (116 kg)   18 257 lb (116.6 kg)     Temp Readings from Last 3 Encounters:   10/16/18 96.9 °F (36.1 °C) (Oral)   18 96.4 °F (35.8 °C) (Oral)   18 97.4 °F (36.3 °C) (Oral)     BP Readings from Last 3 Encounters:   10/16/18 127/88   18 132/83   18 119/76     Pulse Readings from Last 3 Encounters:   10/16/18 86   18 88   18 88     Vitals:    10/16/18 1156   BP: 127/88   Pulse: 86   Resp: 18   Temp: 96.9 °F (36.1 °C)   TempSrc: Oral   SpO2: 97%   Weight: 254 lb 1.6 oz (115.3 kg)   Height: 5' 4\" (1.626 m)   PainSc:   0 - No pain         Learning Assessment:  :     Learning Assessment 2015   PRIMARY LEARNER Patient   PRIMARY LANGUAGE ENGLISH   LEARNER PREFERENCE PRIMARY DEMONSTRATION   ANSWERED BY patient   RELATIONSHIP SELF Depression Screening:  :     PHQ over the last two weeks 4/4/2018   Little interest or pleasure in doing things Not at all   Feeling down, depressed, irritable, or hopeless Not at all   Total Score PHQ 2 0       Fall Risk Assessment:  :     Fall Risk Assessment, last 12 mths 4/4/2018   Able to walk? Yes   Fall in past 12 months? No   Fall with injury? -   Number of falls in past 12 months -   Fall Risk Score -       Abuse Screening:  :     Abuse Screening Questionnaire 10/5/2017   Do you ever feel afraid of your partner? N   Are you in a relationship with someone who physically or mentally threatens you? N   Is it safe for you to go home? Y       ADL Screening:  :     ADL Assessment 10/5/2017   Feeding yourself No Help Needed   Getting from bed to chair No Help Needed   Getting dressed No Help Needed   Bathing or showering No Help Needed   Walk across the room (includes cane/walker) No Help Needed   Using the telphone No Help Needed   Taking your medications No Help Needed   Preparing meals No Help Needed   Managing money (expenses/bills) No Help Needed   Moderately strenuous housework (laundry) No Help Needed   Shopping for personal items (toiletries/medicines) No Help Needed   Shopping for groceries No Help Needed   Driving No Help Needed   Climbing a flight of stairs No Help Needed   Getting to places beyond walking distances No Help Needed                 Medication reconciliation up to date and corrected with patient at this time.

## 2018-10-23 DIAGNOSIS — I10 ESSENTIAL HYPERTENSION: Primary | ICD-10-CM

## 2018-10-24 LAB
25(OH)D3+25(OH)D2 SERPL-MCNC: 73.6 NG/ML (ref 30–100)
ALBUMIN SERPL-MCNC: 3.9 G/DL (ref 3.6–4.8)
ALBUMIN/GLOB SERPL: 1.1 {RATIO} (ref 1.2–2.2)
ALP SERPL-CCNC: 104 IU/L (ref 39–117)
ALT SERPL-CCNC: 45 IU/L (ref 0–32)
AST SERPL-CCNC: 22 IU/L (ref 0–40)
BASOPHILS # BLD AUTO: 0 X10E3/UL (ref 0–0.2)
BASOPHILS NFR BLD AUTO: 1 %
BILIRUB SERPL-MCNC: 0.3 MG/DL (ref 0–1.2)
BUN SERPL-MCNC: 18 MG/DL (ref 8–27)
BUN/CREAT SERPL: 21 (ref 12–28)
CALCIUM SERPL-MCNC: 10 MG/DL (ref 8.7–10.3)
CHLORIDE SERPL-SCNC: 99 MMOL/L (ref 96–106)
CHOLEST SERPL-MCNC: 158 MG/DL (ref 100–199)
CO2 SERPL-SCNC: 24 MMOL/L (ref 20–29)
CREAT SERPL-MCNC: 0.84 MG/DL (ref 0.57–1)
EOSINOPHIL # BLD AUTO: 0.3 X10E3/UL (ref 0–0.4)
EOSINOPHIL NFR BLD AUTO: 3 %
ERYTHROCYTE [DISTWIDTH] IN BLOOD BY AUTOMATED COUNT: 15.4 % (ref 12.3–15.4)
GLOBULIN SER CALC-MCNC: 3.4 G/DL (ref 1.5–4.5)
GLUCOSE SERPL-MCNC: 96 MG/DL (ref 65–99)
HCT VFR BLD AUTO: 40.3 % (ref 34–46.6)
HDLC SERPL-MCNC: 47 MG/DL
HGB BLD-MCNC: 13.8 G/DL (ref 11.1–15.9)
IMM GRANULOCYTES # BLD: 0 X10E3/UL (ref 0–0.1)
IMM GRANULOCYTES NFR BLD: 0 %
INTERPRETATION, 910389: NORMAL
LDLC SERPL CALC-MCNC: 86 MG/DL (ref 0–99)
LYMPHOCYTES # BLD AUTO: 2.9 X10E3/UL (ref 0.7–3.1)
LYMPHOCYTES NFR BLD AUTO: 32 %
MCH RBC QN AUTO: 29.2 PG (ref 26.6–33)
MCHC RBC AUTO-ENTMCNC: 34.2 G/DL (ref 31.5–35.7)
MCV RBC AUTO: 85 FL (ref 79–97)
MONOCYTES # BLD AUTO: 0.9 X10E3/UL (ref 0.1–0.9)
MONOCYTES NFR BLD AUTO: 11 %
NEUTROPHILS # BLD AUTO: 4.8 X10E3/UL (ref 1.4–7)
NEUTROPHILS NFR BLD AUTO: 53 %
PLATELET # BLD AUTO: 417 X10E3/UL (ref 150–379)
POTASSIUM SERPL-SCNC: 4 MMOL/L (ref 3.5–5.2)
PROT SERPL-MCNC: 7.3 G/DL (ref 6–8.5)
RBC # BLD AUTO: 4.72 X10E6/UL (ref 3.77–5.28)
SODIUM SERPL-SCNC: 142 MMOL/L (ref 134–144)
TRIGL SERPL-MCNC: 127 MG/DL (ref 0–149)
VLDLC SERPL CALC-MCNC: 25 MG/DL (ref 5–40)
WBC # BLD AUTO: 8.9 X10E3/UL (ref 3.4–10.8)

## 2018-10-25 LAB
APPEARANCE UR: CLEAR
BACTERIA #/AREA URNS HPF: ABNORMAL /[HPF]
BACTERIA UR CULT: NORMAL
BILIRUB UR QL STRIP: NEGATIVE
CASTS URNS MICRO: ABNORMAL
CASTS URNS QL MICRO: PRESENT /LPF
COLOR UR: YELLOW
EPI CELLS #/AREA URNS HPF: ABNORMAL /HPF
GLUCOSE UR QL: NEGATIVE
HGB UR QL STRIP: NEGATIVE
KETONES UR QL STRIP: NEGATIVE
LEUKOCYTE ESTERASE UR QL STRIP: ABNORMAL
MICRO URNS: ABNORMAL
MUCOUS THREADS URNS QL MICRO: PRESENT
NITRITE UR QL STRIP: NEGATIVE
PH UR STRIP: 5 [PH] (ref 5–7.5)
PROT UR QL STRIP: NEGATIVE
RBC #/AREA URNS HPF: ABNORMAL /HPF
SP GR UR: 1.02 (ref 1–1.03)
URINALYSIS REFLEX, 377202: ABNORMAL
UROBILINOGEN UR STRIP-MCNC: 0.2 MG/DL (ref 0.2–1)
WBC #/AREA URNS HPF: ABNORMAL /HPF

## 2018-10-25 NOTE — PROGRESS NOTES
LFT elevated; ASCVD 8.2%, statin recommended; will recheck LFT in 2-3  Months and then start statin if LFT are normal. Result ltr mailed.

## 2018-10-26 NOTE — PROGRESS NOTES
Writer called number on record [phone] incorrect number, called 2nd number on account, msg left to call office. Results letter has been mailed to patient.

## 2018-11-24 DIAGNOSIS — R79.89 ELEVATED PLATELET COUNT: ICD-10-CM

## 2018-11-30 ENCOUNTER — OFFICE VISIT (OUTPATIENT)
Dept: FAMILY MEDICINE CLINIC | Age: 68
End: 2018-11-30

## 2018-11-30 VITALS
DIASTOLIC BLOOD PRESSURE: 77 MMHG | BODY MASS INDEX: 44.32 KG/M2 | TEMPERATURE: 97.9 F | OXYGEN SATURATION: 97 % | WEIGHT: 259.6 LBS | RESPIRATION RATE: 18 BRPM | SYSTOLIC BLOOD PRESSURE: 139 MMHG | HEIGHT: 64 IN | HEART RATE: 92 BPM

## 2018-11-30 DIAGNOSIS — G44.209 TENSION-TYPE HEADACHE, NOT INTRACTABLE, UNSPECIFIED CHRONICITY PATTERN: Primary | ICD-10-CM

## 2018-11-30 DIAGNOSIS — Z23 ENCOUNTER FOR IMMUNIZATION: ICD-10-CM

## 2018-11-30 NOTE — PATIENT INSTRUCTIONS
1) Headache   Increase water intake and decrease salt intake! Work on increasing daily exercise. Headaches - Lifestyle modifications that should be started to prevent headaches include eating a healthy diet, daily exercise, getting a minimum of 7-8 hours sleep at night, and stop smoking. Additionally, keep a journal of when headaches occur, food eaten, stress, etc to better identify triggers. Complementary Alternative Medicine (CAM) for Headaches    The following therapies have been shown to help reduce the frequency and pain intensity associated with migraines:  1) Yoga - reduces anxiety/depression, helps with back pain as well. Therapy: 5 times per week  2) Aerobic exercise - affects cardiopulmonary, inflammatory and neurovascular processes. Additionally, endorphins released during exercise are \"natural\" painkllers  Therapy: individual, based on health and fitness level    Massage of the trapezius and supraspinatus fibers (areas around the neck and shoulders) may help reduce pain intensity during headaches    If headache becomes \"the worst headache of your life\", you have changes in vision, experience weakness, numbness or tingling, or spike a fever, seek immediate medical attention, call 911.      2) Neck Tightness:    Moist heat (using the shower, bath, moist cloth, no dry heat packs) penetrates better - apply to affected area three times a day for 20 minutes to help relax the muscles. Epsom salt soak: you can put 1 cup of epsom salt in 1 quart of warm water and soak a washcloth in solution. Apply to area of pain 2-3x day. Please do stretching and strengthening exercises as you are able. Alternate 800mg Ibuprofen with Tylenol every 4 hours as needed for pain and discomfort. Make sure to take Ibuprofen with food as it can cause stomach upset. Do not exceed 4000 mg Tylenol per day. Consider Physical Therapy and X-Ray studies if not improving.     Return to clinic if these symptoms worsen or fail to improve as anticipated. Headache: Care Instructions  Your Care Instructions    Headaches have many possible causes. Most headaches aren't a sign of a more serious problem, and they will get better on their own. Home treatment may help you feel better faster. The doctor has checked you carefully, but problems can develop later. If you notice any problems or new symptoms, get medical treatment right away. Follow-up care is a key part of your treatment and safety. Be sure to make and go to all appointments, and call your doctor if you are having problems. It's also a good idea to know your test results and keep a list of the medicines you take. How can you care for yourself at home? · Do not drive if you have taken a prescription pain medicine. · Rest in a quiet, dark room until your headache is gone. Close your eyes and try to relax or go to sleep. Don't watch TV or read. · Put a cold, moist cloth or cold pack on the painful area for 10 to 20 minutes at a time. Put a thin cloth between the cold pack and your skin. · Use a warm, moist towel or a heating pad set on low to relax tight shoulder and neck muscles. · Have someone gently massage your neck and shoulders. · Take pain medicines exactly as directed. ? If the doctor gave you a prescription medicine for pain, take it as prescribed. ? If you are not taking a prescription pain medicine, ask your doctor if you can take an over-the-counter medicine. · Be careful not to take pain medicine more often than the instructions allow, because you may get worse or more frequent headaches when the medicine wears off. · Do not ignore new symptoms that occur with a headache, such as a fever, weakness or numbness, vision changes, or confusion. These may be signs of a more serious problem. To prevent headaches  · Keep a headache diary so you can figure out what triggers your headaches. Avoiding triggers may help you prevent headaches.  Record when each headache began, how long it lasted, and what the pain was like (throbbing, aching, stabbing, or dull). Write down any other symptoms you had with the headache, such as nausea, flashing lights or dark spots, or sensitivity to bright light or loud noise. Note if the headache occurred near your period. List anything that might have triggered the headache, such as certain foods (chocolate, cheese, wine) or odors, smoke, bright light, stress, or lack of sleep. · Find healthy ways to deal with stress. Headaches are most common during or right after stressful times. Take time to relax before and after you do something that has caused a headache in the past.  · Try to keep your muscles relaxed by keeping good posture. Check your jaw, face, neck, and shoulder muscles for tension, and try relaxing them. When sitting at a desk, change positions often, and stretch for 30 seconds each hour. · Get plenty of sleep and exercise. · Eat regularly and well. Long periods without food can trigger a headache. · Treat yourself to a massage. Some people find that regular massages are very helpful in relieving tension. · Limit caffeine by not drinking too much coffee, tea, or soda. But don't quit caffeine suddenly, because that can also give you headaches. · Reduce eyestrain from computers by blinking frequently and looking away from the computer screen every so often. Make sure you have proper eyewear and that your monitor is set up properly, about an arm's length away. · Seek help if you have depression or anxiety. Your headaches may be linked to these conditions. Treatment can both prevent headaches and help with symptoms of anxiety or depression. When should you call for help? Call 911 anytime you think you may need emergency care. For example, call if:    · You have signs of a stroke. These may include:  ? Sudden numbness, paralysis, or weakness in your face, arm, or leg, especially on only one side of your body.   ? Sudden vision changes. ? Sudden trouble speaking. ? Sudden confusion or trouble understanding simple statements. ? Sudden problems with walking or balance. ? A sudden, severe headache that is different from past headaches.    Call your doctor now or seek immediate medical care if:    · You have a new or worse headache.     · Your headache gets much worse. Where can you learn more? Go to http://zeina-mary jane.info/. Enter M271 in the search box to learn more about \"Headache: Care Instructions. \"  Current as of: June 4, 2018  Content Version: 11.8  © 8404-3876 [employer]. Care instructions adapted under license by [employer] (which disclaims liability or warranty for this information). If you have questions about a medical condition or this instruction, always ask your healthcare professional. [place] [address] any warranty or liability for your use of this information. Neck: Exercises  Your Care Instructions  Here are some examples of typical rehabilitation exercises for your condition. Start each exercise slowly. Ease off the exercise if you start to have pain. Your doctor or physical therapist will tell you when you can start these exercises and which ones will work best for you. How to do the exercises  Neck stretch    1. This stretch works best if you keep your shoulder down as you lean away from it. To help you remember to do this, start by relaxing your shoulders and lightly holding on to your thighs or your chair. 2. Tilt your head toward your shoulder and hold for 15 to 30 seconds. Let the weight of your head stretch your muscles. 3. If you would like a little added stretch, use your hand to gently and steadily pull your head toward your shoulder. For example, keeping your right shoulder down, lean your head to the left. 4. Repeat 2 to 4 times toward each shoulder. Diagonal neck stretch    1.  Turn your head slightly toward the direction you will be stretching, and tilt your head diagonally toward your chest and hold for 15 to 30 seconds. 2. If you would like a little added stretch, use your hand to gently and steadily pull your head forward on the diagonal.  3. Repeat 2 to 4 times toward each side. Dorsal glide stretch    1. Sit or stand tall and look straight ahead. 2. Slowly tuck your chin as you glide your head backward over your body  3. Hold for a count of 6, and then relax for up to 10 seconds. 4. Repeat 8 to 12 times. Chest and shoulder stretch    1. Sit or stand tall and glide your head backward as in the dorsal glide stretch. 2. Raise both arms so that your hands are next to your ears. 3. Take a deep breath, and as you breathe out, lower your elbows down and behind your back. You will feel your shoulder blades slide down and together, and at the same time you will feel a stretch across your chest and the front of your shoulders. 4. Hold for about 6 seconds, and then relax for up to 10 seconds. 5. Repeat 8 to 12 times. Strengthening: Hands on head    1. Move your head backward, forward, and side to side against gentle pressure from your hands, holding each position for about 6 seconds. 2. Repeat 8 to 12 times. Follow-up care is a key part of your treatment and safety. Be sure to make and go to all appointments, and call your doctor if you are having problems. It's also a good idea to know your test results and keep a list of the medicines you take. Where can you learn more? Go to http://zeina-mary jane.info/. Enter P975 in the search box to learn more about \"Neck: Exercises. \"  Current as of: November 29, 2017  Content Version: 11.8  © 5265-2962 [employer] [facility]. Care instructions adapted under license by [employer] (which disclaims liability or warranty for this information).  If you have questions about a medical condition or this instruction, always ask your healthcare professional. [address] any warranty or liability for your use of this information. Vaccine Information Statement    Influenza (Flu) Vaccine (Inactivated or Recombinant): What you need to know    Many Vaccine Information Statements are available in Bengali and other languages. See www.immunize.org/vis  Hojas de Información Sobre Vacunas están disponibles en Español y en muchos otros idiomas. Visite www.immunize.org/vis    1. Why get vaccinated? Influenza (flu) is a contagious disease that spreads around the [place] every year, usually between October and May. Flu is caused by influenza viruses, and is spread mainly by coughing, sneezing, and close contact. Anyone can get flu. Flu strikes suddenly and can last several days. Symptoms vary by age, but can include:   fever/chills   sore throat   muscle aches   fatigue   cough   headache    runny or stuffy nose    Flu can also lead to pneumonia and blood infections, and cause diarrhea and seizures in children. If you have a medical condition, such as heart or lung disease, flu can make it worse. Flu is more dangerous for some people. Infants and young children, people 72years of age and older, pregnant women, and people with certain health conditions or a weakened immune system are at greatest risk. Each year thousands of people in the [facility] die from flu, and many more are hospitalized. Flu vaccine can:   keep you from getting flu,   make flu less severe if you do get it, and   keep you from spreading flu to your family and other people. 2. Inactivated and recombinant flu vaccines    A dose of flu vaccine is recommended every flu season. Children 6 months through 6years of age may need two doses during the same flu season. Everyone else needs only one dose each flu season.        Some inactivated flu vaccines contain a very small amount of a mercury-based preservative called thimerosal. Studies have not shown thimerosal in vaccines to be harmful, but flu vaccines that do not contain thimerosal are available. There is no live flu virus in flu shots. They cannot cause the flu. There are many flu viruses, and they are always changing. Each year a new flu vaccine is made to protect against three or four viruses that are likely to cause disease in the upcoming flu season. But even when the vaccine doesnt exactly match these viruses, it may still provide some protection    Flu vaccine cannot prevent:   flu that is caused by a virus not covered by the vaccine, or   illnesses that look like flu but are not. It takes about 2 weeks for protection to develop after vaccination, and protection lasts through the flu season. 3. Some people should not get this vaccine    Tell the person who is giving you the vaccine:     If you have any severe, life-threatening allergies. If you ever had a life-threatening allergic reaction after a dose of flu vaccine, or have a severe allergy to any part of this vaccine, you may be advised not to get vaccinated. Most, but not all, types of flu vaccine contain a small amount of egg protein.  If you ever had Guillain-Barré Syndrome (also called GBS). Some people with a history of GBS should not get this vaccine. This should be discussed with your doctor.  If you are not feeling well. It is usually okay to get flu vaccine when you have a mild illness, but you might be asked to come back when you feel better. 4. Risks of a vaccine reaction    With any medicine, including vaccines, there is a chance of reactions. These are usually mild and go away on their own, but serious reactions are also possible. Most people who get a flu shot do not have any problems with it.      Minor problems following a flu shot include:    soreness, redness, or swelling where the shot was given     hoarseness   sore, red or itchy eyes   cough   fever   aches   headache   itching   fatigue  If these problems occur, they usually begin soon after the shot and last 1 or 2 days. More serious problems following a flu shot can include the following:     There may be a small increased risk of Guillain-Barré Syndrome (GBS) after inactivated flu vaccine. This risk has been estimated at 1 or 2 additional cases per million people vaccinated. This is much lower than the risk of severe complications from flu, which can be prevented by flu vaccine.  Young children who get the flu shot along with pneumococcal vaccine (PCV13) and/or DTaP vaccine at the same time might be slightly more likely to have a seizure caused by fever. Ask your doctor for more information. Tell your doctor if a child who is getting flu vaccine has ever had a seizure. Problems that could happen after any injected vaccine:      People sometimes faint after a medical procedure, including vaccination. Sitting or lying down for about 15 minutes can help prevent fainting, and injuries caused by a fall. Tell your doctor if you feel dizzy, or have vision changes or ringing in the ears.  Some people get severe pain in the shoulder and have difficulty moving the arm where a shot was given. This happens very rarely.  Any medication can cause a severe allergic reaction. Such reactions from a vaccine are very rare, estimated at about 1 in a million doses, and would happen within a few minutes to a few hours after the vaccination. As with any medicine, there is a very remote chance of a vaccine causing a serious injury or death. The safety of vaccines is always being monitored. For more information, visit: www.cdc.gov/vaccinesafety/    5. What if there is a serious reaction? What should I look for?  Look for anything that concerns you, such as signs of a severe allergic reaction, very high fever, or unusual behavior.     Signs of a severe allergic reaction can include hives, swelling of the face and throat, difficulty breathing, a fast heartbeat, dizziness, and weakness - usually within a few minutes to a few hours after the vaccination. What should I do?  If you think it is a severe allergic reaction or other emergency that cant wait, call 9-1-1 and get the person to the nearest hospital. Otherwise, call your doctor.  Reactions should be reported to the Vaccine Adverse Event Reporting System (VAERS). Your doctor should file this report, or you can do it yourself through  the VAERS web site at www.vaers. [facility].gov, or by calling 2-242.253.2172. VAERS does not give medical advice. 6. The National Vaccine Injury Compensation Program    The [facility] Vaccine Injury Compensation Program (VICP) is a federal program that was created to compensate people who may have been injured by certain vaccines. Persons who believe they may have been injured by a vaccine can learn about the program and about filing a claim by calling 1-223.614.8154 or visiting the [facility] website at www.[facility].gov/vaccinecompensation. There is a time limit to file a claim for compensation. 7. How can I learn more?  Ask your healthcare provider. He or she can give you the vaccine package insert or suggest other sources of information.  Call your local or state health department.  Contact the Centers for Disease Control and Prevention (CDC):  - Call 0-904.429.9790 (1-800-CDC-INFO) or  - Visit CDCs website at www.cdc.gov/flu    Vaccine Information Statement   Inactivated Influenza Vaccine   8/7/2015  42 ROBERTO Goyal [id number]    Department of Health and Human Services  Centers for Disease Control and Prevention    Office Use Only

## 2018-11-30 NOTE — PROGRESS NOTES
S: Luis Eduardo Infante is a 80 y.o. female who presents for headache     Assessment/Plan:    1. Tension-type headache, not intractable, unspecified chronicity pattern  -supportive care instructions including massage, OTC meds, heat/stretching to neck and shoulder areas    2.  Encounter for immunization  - INFLUENZA VACCINE INACTIVATED (IIV), SUBUNIT, ADJUVANTED, IM  - ADMIN INFLUENZA VIRUS VAC         Not worst HA of life  Pain 8/10 when HA comes - sharp   Pain:   /10; (throbbing, stabbing, dull, pulsating)    Duration: past month - comes and goes   Location: comes on left side of face, shadow on left eye and goes down the back of neck  Sudden or gradual onset  + vision changes - left eye (constantly cleaning glasses to clear it)  Not sensitive to light or sound    No N/V  No fever  Muscular pain (shoulders, neck)  No ear pain  +lightheadedness   Not really Dizziness    Functionality: not affected bc they don't last that long   No hx of HA    No neuro Changes:  Not worse if changing position, coughing or sneezing  No new confusion or memory changes  No seizures  No weakness, numbness and tingling  No problems speaking or swallowing  No unsteady gait    Relieving factors: none - \"doesn't last long enough to take medicines\"  OTC meds taken: not a \"pill person\"      PHQ over the last two weeks 11/30/2018   Little interest or pleasure in doing things Not at all   Feeling down, depressed, irritable, or hopeless Not at all   Total Score PHQ 2 0     Social History:  Occupation: works at [employer] part time    Social History     Tobacco Use   Smoking Status Never Smoker   Smokeless Tobacco Never Used     Social History     Substance and Sexual Activity   Alcohol Use No     Social History     Substance and Sexual Activity   Drug Use No       Review of Systems:  - Constitutional Symptoms: no weight loss  - Cardiovascular: no chest pain or palpitations  - Respiratory: no cough or shortness of breath  - Psychiatric: no depression or anxiety  - Endocrine:  no heat or cold intolerance, polyuria or polydipsia  ROS is negative otherwise. I reviewed the following:  Past Medical History:   Diagnosis Date    Aneurysm of thoracic aorta ([address] 01/2017    saw Dr. Donell Wu, then Dr. Monge All Hypertension     Knee osteoarthritis     managed with diclofenac po prn. sees Dr. Steffany Pagan at [facility] orthopedic. has had arthroscopy and IA injections in past with ortho.  Multiple lung nodules on CT 7/24/2017    Sciatica     manages with diclofenac. has some DDD. Current Outpatient Medications   Medication Sig Dispense Refill    benzonatate (TESSALON) 200 mg capsule Take 1 Cap by mouth DIALYSIS PRN. 0    albuterol (VENTOLIN HFA) 90 mcg/actuation inhaler Ventolin HFA 90 mcg/actuation aerosol inhaler      fluticasone (FLONASE) 50 mcg/actuation nasal spray fluticasone 50 mcg/actuation nasal spray,suspension      montelukast (SINGULAIR) 10 mg tablet montelukast 10 mg tablet      hydroCHLOROthiazide (HYDRODIURIL) 25 mg tablet TAKE 1 TABLET BY MOUTH DAILY 90 Tab 1    diclofenac EC (VOLTAREN) 75 mg EC tablet Take 1 Tab by mouth two (2) times daily as needed. 180 Tab 1    ADVAIR -21 mcg/actuation inhaler INL 2 PFS PO BID IN THE MORNING AND IN THE KODI  11    aspirin delayed-release 81 mg tablet Take  by mouth daily.  cholecalciferol (VITAMIN D3) 1,000 unit tablet Take  by mouth daily. Allergies   Allergen Reactions    Latex Hives    Flowers Shortness of Breath     Fresh cut flowers are worse    Fruit C [Ascorbic Acid] Hives     ALL FRUITS    Pcn [Penicillins] Hives       O: VS:   Visit Vitals  /77   Pulse 92   Temp 97.9 °F (36.6 °C) (Oral)   Resp 18   Ht 5' 4\" (1.626 m)   Wt 259 lb 9.6 oz (117.8 kg)   SpO2 97%   BMI 44.56 kg/m²       GENERAL: [place] is in no acute distress. Non-toxic. Well nourished. Well developed. Appropriately groomed. HEAD:  Normocephalic. Atraumatic.   Non tender sinuses x 4.  EYE: PERRLA. EOMs intact. Sclera anicteric without injection. No drainage or discharge. EARS: Hearing intact bilaterally. External ear canals normal without evidence of blood or swelling. Bilateral TM's intact, pearly grey with landmarks visible. Left TM with serous effusion. NECK: No nuchal rigidity. No cervical adenopathy noted. RESP: Breath sounds are symmetrical bilaterally. Unlabored without SOB. Speaking in full sentences. Clear to auscultation bilaterally anteriorly and posteriorly. No wheezes. No rales or rhonchi. CV: normal rate. Regular rhythm. S1, S2 audible. No murmur noted. No rubs, clicks or gallops noted. No carotid bruits noted. SKIN: Skin is warm and dry. Turgor is normal. No petechiae, no purpura, no rash. No cyanosis. No mottling, jaundice or pallor. NEURO:  awake, alert and oriented to person, place, and time and event. Cranial nerves II through XII intact. Clear speech. Muscle strength is +5/5 x 4 extremities. Sensation is intact to light touch bilaterally. Steady gait. Head Thrust Test: negative  PSYCH: appropriate behavior, dress and thought processes. Good eye contact. Clear and coherent speech. Full affect. Good insight.   _____________________________________________________________  Reviewed warning signs: worst HA of life, changes in vision, weakness, numbness or tingling, or fever and advised to seek immediate medical attention if any of these sx develop    Patient education was done. Advised on nutrition, physical activity, weight management, tobacco, alcohol and safety. Counseling included discussion of diagnosis, differentials, treatment options, prescribed treatment, warning signs and follow up. Medication risks/benefits,interactions and alternatives discussed with patient.      Patient verbalized understanding and agreed to plan of care. Patient was given an after visit summary which included current diagnoses, medications and vital signs.     Follow up in 1-2 weeks as needed or if symptoms progress.

## 2018-12-10 RX ORDER — FLUTICASONE PROPIONATE 50 MCG
2 SPRAY, SUSPENSION (ML) NASAL DAILY
Qty: 1 BOTTLE | Refills: 0 | Status: SHIPPED | OUTPATIENT
Start: 2018-12-10 | End: 2019-05-09 | Stop reason: SDUPTHER

## 2019-01-05 LAB
BASOPHILS # BLD AUTO: 0 X10E3/UL (ref 0–0.2)
BASOPHILS NFR BLD AUTO: 0 %
EOSINOPHIL # BLD AUTO: 0.2 X10E3/UL (ref 0–0.4)
EOSINOPHIL NFR BLD AUTO: 2 %
ERYTHROCYTE [DISTWIDTH] IN BLOOD BY AUTOMATED COUNT: 15.4 % (ref 12.3–15.4)
HCT VFR BLD AUTO: 40.3 % (ref 34–46.6)
HGB BLD-MCNC: 13.4 G/DL (ref 11.1–15.9)
IMM GRANULOCYTES # BLD AUTO: 0 X10E3/UL (ref 0–0.1)
IMM GRANULOCYTES NFR BLD AUTO: 0 %
LYMPHOCYTES # BLD AUTO: 3.6 X10E3/UL (ref 0.7–3.1)
LYMPHOCYTES NFR BLD AUTO: 33 %
MCH RBC QN AUTO: 29.4 PG (ref 26.6–33)
MCHC RBC AUTO-ENTMCNC: 33.3 G/DL (ref 31.5–35.7)
MCV RBC AUTO: 88 FL (ref 79–97)
MONOCYTES # BLD AUTO: 1.1 X10E3/UL (ref 0.1–0.9)
MONOCYTES NFR BLD AUTO: 10 %
MORPHOLOGY BLD-IMP: ABNORMAL
NEUTROPHILS # BLD AUTO: 5.8 X10E3/UL (ref 1.4–7)
NEUTROPHILS NFR BLD AUTO: 55 %
PLATELET # BLD AUTO: 441 X10E3/UL (ref 150–379)
RBC # BLD AUTO: 4.56 X10E6/UL (ref 3.77–5.28)
WBC # BLD AUTO: 10.7 X10E3/UL (ref 3.4–10.8)

## 2019-01-08 ENCOUNTER — TELEPHONE (OUTPATIENT)
Dept: FAMILY MEDICINE CLINIC | Age: 69
End: 2019-01-08

## 2019-01-08 DIAGNOSIS — D75.839 THROMBOCYTOSIS: Primary | ICD-10-CM

## 2019-01-08 NOTE — TELEPHONE ENCOUNTER
Writer called patient, two patient identifiers used for patient verification, name and , Patient was advised to make appointment to review labs, patient was transferred to front office staff and they were advised patient needs appointment to review labs.

## 2019-01-09 NOTE — PROGRESS NOTES
Patient will be coming to office Friday to discuss labs with NP Moe Valente. Advised patient of referral to hematology,  will be making appointment, patient prefers [facility] [address] location. Letter with results mailed.

## 2019-01-24 DIAGNOSIS — R74.01 ELEVATED ALT MEASUREMENT: ICD-10-CM

## 2019-02-06 ENCOUNTER — OFFICE VISIT (OUTPATIENT)
Dept: ONCOLOGY | Age: 69
End: 2019-02-06

## 2019-02-06 VITALS
BODY MASS INDEX: 44.47 KG/M2 | HEART RATE: 77 BPM | DIASTOLIC BLOOD PRESSURE: 84 MMHG | TEMPERATURE: 97.9 F | HEIGHT: 64 IN | RESPIRATION RATE: 16 BRPM | SYSTOLIC BLOOD PRESSURE: 142 MMHG | WEIGHT: 260.5 LBS | OXYGEN SATURATION: 96 %

## 2019-02-06 DIAGNOSIS — E66.01 OBESITY, MORBID (HCC): ICD-10-CM

## 2019-02-06 DIAGNOSIS — I71.20 THORACIC AORTIC ANEURYSM WITHOUT RUPTURE: ICD-10-CM

## 2019-02-06 DIAGNOSIS — D75.839 THROMBOCYTOSIS: Primary | ICD-10-CM

## 2019-02-06 DIAGNOSIS — D72.820 LYMPHOCYTOSIS: ICD-10-CM
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
## 2019-02-07 DIAGNOSIS — D72.820 LYMPHOCYTOSIS: ICD-10-CM

## 2019-02-07 DIAGNOSIS — D75.839 THROMBOCYTOSIS: ICD-10-CM

## 2019-02-11 ENCOUNTER — HOSPITAL ENCOUNTER (OUTPATIENT)
Dept: LAB | Age: 69
Discharge: HOME | End: 2019-02-11
Payer: MEDICARE

## 2019-02-11 PROCEDURE — 81270 JAK2 GENE: CPT

## 2019-02-11 PROCEDURE — 83615 LACTATE (LD) (LDH) ENZYME: CPT

## 2019-02-11 PROCEDURE — 82728 ASSAY OF FERRITIN: CPT

## 2019-02-11 PROCEDURE — 83550 IRON BINDING TEST: CPT

## 2019-02-11 PROCEDURE — 86803 HEPATITIS C AB TEST: CPT

## 2019-02-11 PROCEDURE — 36415 COLL VENOUS BLD VENIPUNCTURE: CPT

## 2019-02-11 PROCEDURE — 87389 HIV-1 AG W/HIV-1&-2 AB AG IA: CPT

## 2019-02-11 PROCEDURE — 80053 COMPREHEN METABOLIC PANEL: CPT

## 2019-02-17 LAB
ALBUMIN SERPL-MCNC: 4.1 G/DL (ref 3.6–4.8)
ALBUMIN/GLOB SERPL: 1.1 {RATIO} (ref 1.2–2.2)
ALP SERPL-CCNC: 102 IU/L (ref 39–117)
ALT SERPL-CCNC: 34 IU/L (ref 0–32)
AST SERPL-CCNC: 22 IU/L (ref 0–40)
BACKGROUND: 480503: NORMAL
BASOPHILS # BLD AUTO: 0 X10E3/UL (ref 0–0.2)
BASOPHILS NFR BLD AUTO: 0 %
BILIRUB SERPL-MCNC: 0.4 MG/DL (ref 0–1.2)
BUN SERPL-MCNC: 13 MG/DL (ref 8–27)
BUN/CREAT SERPL: 15 (ref 12–28)
CALCIUM SERPL-MCNC: 9.7 MG/DL (ref 8.7–10.3)
CHLORIDE SERPL-SCNC: 102 MMOL/L (ref 96–106)
CO2 SERPL-SCNC: 26 MMOL/L (ref 20–29)
CREAT SERPL-MCNC: 0.85 MG/DL (ref 0.57–1)
EOSINOPHIL # BLD AUTO: 0.1 X10E3/UL (ref 0–0.4)
EOSINOPHIL NFR BLD AUTO: 1 %
ERYTHROCYTE [DISTWIDTH] IN BLOOD BY AUTOMATED COUNT: 15.7 % (ref 12.3–15.4)
FERRITIN SERPL-MCNC: 184 NG/ML (ref 15–150)
GLOBULIN SER CALC-MCNC: 3.6 G/DL (ref 1.5–4.5)
GLUCOSE SERPL-MCNC: 103 MG/DL (ref 65–99)
HCT VFR BLD AUTO: 39.4 % (ref 34–46.6)
HCV AB S/CO SERPL IA: <0.1 S/CO RATIO (ref 0–0.9)
HGB BLD-MCNC: 13.3 G/DL (ref 11.1–15.9)
HIV 1+2 AB+HIV1 P24 AG SERPL QL IA: NON REACTIVE
IMM GRANULOCYTES # BLD AUTO: 0 X10E3/UL (ref 0–0.1)
IMM GRANULOCYTES NFR BLD AUTO: 0 %
IRON SATN MFR SERPL: 23 % (ref 15–55)
IRON SERPL-MCNC: 74 UG/DL (ref 27–139)
JAK2 P.V617F BLD/T QL: NORMAL
LAB DIRECTOR NAME PROVIDER: NORMAL
LDH SERPL-CCNC: 191 IU/L (ref 119–226)
LYMPHOCYTES # BLD AUTO: 3 X10E3/UL (ref 0.7–3.1)
LYMPHOCYTES NFR BLD AUTO: 25 %
MCH RBC QN AUTO: 29.1 PG (ref 26.6–33)
MCHC RBC AUTO-ENTMCNC: 33.8 G/DL (ref 31.5–35.7)
MCV RBC AUTO: 86 FL (ref 79–97)
MONOCYTES # BLD AUTO: 1 X10E3/UL (ref 0.1–0.9)
MONOCYTES NFR BLD AUTO: 8 %
NEUTROPHILS # BLD AUTO: 7.6 X10E3/UL (ref 1.4–7)
NEUTROPHILS NFR BLD AUTO: 66 %
PATH INTERP BLD-IMP: ABNORMAL
PATH REV BLD -IMP: ABNORMAL
PATH REV BLD -IMP: ABNORMAL
PATH REV BLD -IMP: NORMAL
PATHOLOGIST NAME: ABNORMAL
PLATELET # BLD AUTO: 450 X10E3/UL (ref 150–379)
POTASSIUM SERPL-SCNC: 4.4 MMOL/L (ref 3.5–5.2)
PROT SERPL-MCNC: 7.7 G/DL (ref 6–8.5)
RBC # BLD AUTO: 4.57 X10E6/UL (ref 3.77–5.28)
SODIUM SERPL-SCNC: 143 MMOL/L (ref 134–144)
TIBC SERPL-MCNC: 318 UG/DL (ref 250–450)
UIBC SERPL-MCNC: 244 UG/DL (ref 118–369)
WBC # BLD AUTO: 11.7 X10E3/UL (ref 3.4–10.8)

## 2019-03-06 ENCOUNTER — OFFICE VISIT (OUTPATIENT)
Dept: ONCOLOGY | Age: 69
End: 2019-03-06

## 2019-03-06 VITALS
OXYGEN SATURATION: 95 % | HEIGHT: 64 IN | RESPIRATION RATE: 20 BRPM | WEIGHT: 258.2 LBS | HEART RATE: 95 BPM | TEMPERATURE: 98.1 F | DIASTOLIC BLOOD PRESSURE: 83 MMHG | BODY MASS INDEX: 44.08 KG/M2 | SYSTOLIC BLOOD PRESSURE: 132 MMHG

## 2019-03-06 DIAGNOSIS — R59.9 ADENOPATHY: Primary | ICD-10-CM

## 2019-03-06 DIAGNOSIS — D75.839 THROMBOCYTOSIS: ICD-10-CM
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
## 2019-03-06 NOTE — PROGRESS NOTES
Lamonte Apgar is a 80 y.o. female here today for follow up, thrombocytosis. 1. Have you been to the ER, urgent care clinic since your last visit? Hospitalized since your last visit? No     2. Have you seen or consulted any other health care providers outside of the [address] [place] [address] since your last visit? Include any pap smears or colon screening.  No

## 2019-04-16 ENCOUNTER — OFFICE VISIT (OUTPATIENT)
Dept: FAMILY MEDICINE CLINIC | Age: 69
End: 2019-04-16

## 2019-04-16 VITALS
SYSTOLIC BLOOD PRESSURE: 138 MMHG | BODY MASS INDEX: 44.06 KG/M2 | RESPIRATION RATE: 20 BRPM | WEIGHT: 258.1 LBS | TEMPERATURE: 97 F | HEIGHT: 64 IN | OXYGEN SATURATION: 98 % | DIASTOLIC BLOOD PRESSURE: 80 MMHG | HEART RATE: 82 BPM

## 2019-04-16 DIAGNOSIS — R21 SKIN RASH: ICD-10-CM

## 2019-04-16 DIAGNOSIS — R74.01 ELEVATED ALT MEASUREMENT: Primary | ICD-10-CM

## 2019-04-16 RX ORDER — DICLOFENAC SODIUM 75 MG/1
75 TABLET, DELAYED RELEASE ORAL
Qty: 180 TAB | Refills: 1 | Status: SHIPPED | OUTPATIENT
Start: 2019-04-16 | End: 2020-01-17 | Stop reason: DRUGHIGH

## 2019-04-16 RX ORDER — NYSTATIN AND TRIAMCINOLONE ACETONIDE 100000; 1 [USP'U]/G; MG/G
OINTMENT TOPICAL 2 TIMES DAILY
Qty: 60 G | Refills: 1 | Status: SHIPPED | OUTPATIENT
Start: 2019-04-16 | End: 2019-12-13 | Stop reason: SDUPTHER

## 2019-04-16 NOTE — PROGRESS NOTES
S: Cecilia Harry is a 80 y.o. female who presents for skin/rash    Assessment/Plan:  1. Skin rash  -Right groin: generalized distribution with erythema and 1-3mm papules, + pruritic  -rx: mycolog bid + InterDry  -advised keep area dry and weight loss will help    RTC if rash persists       HPI:  Gets very sweaty and skin will split  Did use neosprin and alcohol on area (was painful)   Location: groin   No Drainage  +Itching  +Pain  Not worse at night or different times of year - hasn't had this problem since 32yo  No fever/chills  No nausea/vomiting  No change in diet or cleaning/bath products  No new bath products, creams, lotions  No new laundry detergent  No new medications  No chemical use at work/home    Social History:    Social History     Tobacco Use   Smoking Status Never Smoker   Smokeless Tobacco Never Used     Social History     Substance and Sexual Activity   Alcohol Use No     Social History     Substance and Sexual Activity   Drug Use No       Review of Systems:  - Constitutional Symptoms: no fatigue  - Cardiovascular: no chest pain or palpitations  - Respiratory: no cough or shortness of breath  ROS is negative otherwise. 3 most recent PHQ Screens 11/30/2018   Little interest or pleasure in doing things Not at all   Feeling down, depressed, irritable, or hopeless Not at all   Total Score PHQ 2 0       No LMP recorded. Patient has had a hysterectomy. Birth Control:     I reviewed the following:  Past Medical History:   Diagnosis Date    Aneurysm of thoracic aorta ([facility] [address] 01/2017    saw Dr. Vivienne Gardner, then Dr. Valerio Scott Hypertension     Knee osteoarthritis     managed with diclofenac po prn. sees Dr. Holland France at [facility]opedic. has had arthroscopy and IA injections in past with ortho.  Multiple lung nodules on CT 7/24/2017    Sciatica     manages with diclofenac. has some DDD.         Current Outpatient Medications   Medication Sig Dispense Refill    montelukast (SINGULAIR) 10 mg tablet montelukast 10 mg tablet 90 Tab 1    fluticasone (FLONASE) 50 mcg/actuation nasal spray 2 Sprays by Both Nostrils route daily. 1 Bottle 0    albuterol (VENTOLIN HFA) 90 mcg/actuation inhaler Ventolin HFA 90 mcg/actuation aerosol inhaler      hydroCHLOROthiazide (HYDRODIURIL) 25 mg tablet TAKE 1 TABLET BY MOUTH DAILY 90 Tab 1    diclofenac EC (VOLTAREN) 75 mg EC tablet Take 1 Tab by mouth two (2) times daily as needed. 180 Tab 1    ADVAIR -21 mcg/actuation inhaler INL 2 PFS PO BID IN THE MORNING AND IN THE KODI  11    aspirin delayed-release 81 mg tablet Take  by mouth daily.  cholecalciferol (VITAMIN D3) 1,000 unit tablet Take  by mouth daily. Allergies   Allergen Reactions    Latex Hives    Flowers Shortness of Breath     Fresh cut flowers are worse    Fruit C [Ascorbic Acid] Hives     ALL FRUITS    Pcn [Penicillins] Hives         O: VS:   Visit Vitals  /80 (BP 1 Location: Right arm, BP Patient Position: Sitting)   Pulse 82   Temp 97 °F (36.1 °C) (Oral)   Resp 20   Ht 5' 4.37\" (1.635 m)   Wt 258 lb 1.6 oz (117.1 kg)   SpO2 98%   BMI 43.79 kg/m²       GENERAL: Huong Blackmon is in no acute distress. Non-toxic. Well nourished. Well developed. Appropriately groomed. NECK: supple. Midline trachea. No cervical adenopathy noted. RESP: Breath sounds are symmetrical bilaterally. Unlabored without SOB. Speaking in full sentences. Clear to auscultation bilaterally anteriorly and posteriorly. No wheezes. No rales or rhonchi. CV: normal rate. Regular rhythm. S1, S2 audible. No murmur noted. No rubs, clicks or gallops noted. SKIN: Skin is warm and dry. Turgor is normal. No cyanosis. No jaundice or pallor. Rash:   Right groin: generalized distribution with erythema and 1-3mm papules, + pruritic. No discharge noted. (see picture)   PSYCH: appropriate behavior, dress and thought processes. Good eye contact. Clear and coherent speech. Full affect. Good insight. ______________________________________________________________________  Patient education was done. Advised on nutrition, tobacco, and alcohol. Counseling included discussion of diagnosis, differentials, treatment options, prescribed treatment, warning signs and follow up. Medication risks/benefits, interactions and alternatives discussed with patient.      Patient verbalized understanding and agreed to plan of care. Follow up in 1-2 weeks as needed or if symptoms progress.

## 2019-04-16 NOTE — TELEPHONE ENCOUNTER
PCP: Floyd Funes NP    Last appt: 1/4/2019  Future Appointments   Date Time Provider [place]   4/16/2019  4:30 PM REJI Marcelo ASHLEIGH JOHNNA   6/6/2019  8:30 AM Betsey Corona MD [id number]       Requested Prescriptions     Pending Prescriptions Disp Refills    diclofenac EC (VOLTAREN) 75 mg EC tablet 180 Tab 1     Sig: Take 1 Tab by mouth two (2) times daily as needed.        Prior labs and Blood pressures:  BP Readings from Last 3 Encounters:   03/06/19 132/83   02/06/19 142/84   11/30/18 139/77     Lab Results   Component Value Date/Time    Sodium 143 02/11/2019 09:30 AM    Potassium 4.4 02/11/2019 09:30 AM    Chloride 102 02/11/2019 09:30 AM    CO2 26 02/11/2019 09:30 AM    Anion gap 6 03/21/2015 01:56 AM    Glucose 103 (H) 02/11/2019 09:30 AM    BUN 13 02/11/2019 09:30 AM    Creatinine 0.85 02/11/2019 09:30 AM    BUN/Creatinine ratio 15 02/11/2019 09:30 AM    GFR est AA 82 02/11/2019 09:30 AM    GFR est non-AA 71 02/11/2019 09:30 AM    Calcium 9.7 02/11/2019 09:30 AM     No results found for: HBA1C, HGBE8, AIN5GGNY, NYE9VMUQ  Lab Results   Component Value Date/Time    Cholesterol, total 158 10/23/2018 09:15 AM    HDL Cholesterol 47 10/23/2018 09:15 AM    LDL, calculated 86 10/23/2018 09:15 AM    VLDL, calculated 25 10/23/2018 09:15 AM    Triglyceride 127 10/23/2018 09:15 AM    CHOL/HDL Ratio 2.9 03/21/2015 01:56 AM     Lab Results   Component Value Date/Time    VITAMIN D, 25-HYDROXY 73.6 10/23/2018 09:56 AM       Lab Results   Component Value Date/Time    TSH 4.26 (H) 03/21/2015 01:56 AM

## 2019-04-16 NOTE — PATIENT INSTRUCTIONS
1) Mycolog ointment to groin area twice a day for 7 days. Please advise if there is no improvement. Poonam Ringer is a moisture-wicking fabric with antimicrobial silver that greg and translocates moisture. It helps manage the symptoms associated with intertriginous dermatitis such as maceration, erythema, erosion, itching/burning, odor and pain          Yeast Skin Infection: Care Instructions  Your Care Instructions    Yeast normally lives on your skin. Sometimes too much yeast can overgrow in certain areas of the skin and cause an infection. The infection causes red, scaly, moist patches on your skin that may itch. Common areas for skin yeast infections are skin folds under the breasts or belly area. The warm and moist areas in the skin folds can make it easier for yeast to overgrow. Yeast infections also can be found on other parts of the body such as the groin or armpits. You will probably get a cream or ointment that contains an antifungal medicine. Examples of these are miconazole and clotrimazole. You put it on your skin to treat the infection. Your doctor may give you a prescription for the cream or ointment. Or you may be able to buy it without a prescription at most drugstores. If the infection is severe, the doctor will prescribe antifungal pills. A yeast infection usually goes away after about a week of treatment. But it's important to use the medicine for as long as your doctor tells you to. Follow-up care is a key part of your treatment and safety. Be sure to make and go to all appointments, and call your doctor if you are having problems. It's also a good idea to know your test results and keep a list of the medicines you take. How can you care for yourself at home? · Be safe with medicines. Take your medicines exactly as prescribed. Call your doctor if you think you are having a problem with your medicine. · Keep your skin clean and dry.  Your doctor may suggest using powder that contains an antifungal medicine in the skin folds. · Wear loose clothing. When should you call for help? Call your doctor now or seek immediate medical care if:    · You have symptoms of infection, such as:  ? Increased pain, swelling, warmth, or redness. ? Red streaks leading from the area. ? Pus draining from the area. ? A fever.    Watch closely for changes in your health, and be sure to contact your doctor if:    · You do not get better as expected. Where can you learn more? Go to http://zeina-mary jane.info/. Enter G931 in the search box to learn more about \"Yeast Skin Infection: Care Instructions. \"  Current as of: April 17, 2018  Content Version: 11.9  © 3928-1440 [employer]. Care instructions adapted under license by [employer] (which disclaims liability or warranty for this information). If you have questions about a medical condition or this instruction, always ask your healthcare professional. [place] [address] any warranty or liability for your use of this information.

## 2019-04-16 NOTE — TELEPHONE ENCOUNTER
Requested Prescriptions     Pending Prescriptions Disp Refills    diclofenac EC (VOLTAREN) 75 mg EC tablet 180 Tab 1     Sig: Take 1 Tab by mouth two (2) times daily as needed.

## 2019-05-09 ENCOUNTER — OFFICE VISIT (OUTPATIENT)
Dept: FAMILY MEDICINE CLINIC | Age: 69
End: 2019-05-09

## 2019-05-09 VITALS
SYSTOLIC BLOOD PRESSURE: 150 MMHG | HEART RATE: 97 BPM | HEIGHT: 64 IN | WEIGHT: 252.2 LBS | RESPIRATION RATE: 18 BRPM | TEMPERATURE: 99.6 F | BODY MASS INDEX: 43.06 KG/M2 | DIASTOLIC BLOOD PRESSURE: 90 MMHG | OXYGEN SATURATION: 96 %

## 2019-05-09 DIAGNOSIS — J02.9 SORE THROAT: ICD-10-CM

## 2019-05-09 DIAGNOSIS — R50.9 FEVER, UNSPECIFIED FEVER CAUSE: Primary | ICD-10-CM

## 2019-05-09 DIAGNOSIS — R05.9 COUGH: ICD-10-CM

## 2019-05-09 LAB
FLUAV+FLUBV AG NOSE QL IA.RAPID: NEGATIVE POS/NEG
FLUAV+FLUBV AG NOSE QL IA.RAPID: NEGATIVE POS/NEG
S PYO AG THROAT QL: NEGATIVE
VALID INTERNAL CONTROL?: YES
VALID INTERNAL CONTROL?: YES

## 2019-05-09 RX ORDER — AZITHROMYCIN 250 MG/1
TABLET, FILM COATED ORAL
Qty: 6 TAB | Refills: 0 | Status: SHIPPED | OUTPATIENT
Start: 2019-05-09 | End: 2019-07-09

## 2019-05-09 RX ORDER — FLUTICASONE PROPIONATE 50 MCG
2 SPRAY, SUSPENSION (ML) NASAL DAILY
Qty: 1 BOTTLE | Refills: 1 | Status: SHIPPED | OUTPATIENT
Start: 2019-05-09 | End: 2019-12-13 | Stop reason: SDUPTHER

## 2019-05-09 RX ORDER — BENZONATATE 100 MG/1
100 CAPSULE ORAL
Qty: 30 CAP | Refills: 0 | Status: SHIPPED | OUTPATIENT
Start: 2019-05-09 | End: 2019-05-16

## 2019-05-09 NOTE — PATIENT INSTRUCTIONS
1) Please take the azithromycin as directed . This is an antibiotic and you should take all of it as directed until it is complete, even if you are feeling better. This prevents bacterial resistance. (However, if you have any kind of reaction - itching, shortness of breath, etc, please stop the antibiotic immediately and call the office.)  Please throw away your toothbrush (or put it through  cycle) after 24 hours of starting the antibiotics. You should no longer be contagious after taking antibiotics for 24 hours. Eating healthy, drinking enough fluids (especially water) and getting enough sleep (8hrs) are also important to help you heal.     Please use good handwashing technique and make sure you wash hands before and after eating. Use hand  if you are in a public place. An upper respiratory infection (URI) is an infection of the throat and nose. It is also known as \"the common cold\". 90% of these infections are caused by a virus. Viral infections do not respond to antibiotic treatment, only bacteria are killed by antibiotics. Therefore, supportive treatment is recommended to help with symptoms. Symptoms usually subside after 7-10 days (or longer if you smoke). If symptoms worsen or persist after 14 days, or if you have fever over 101.3, fever for 5 days or more, wheezing, or shortness of breath, please contact the office. To prevent URIs, wash hands frequently (epecially before and after eating - use hand  if you are in a public place.) Eat a healthy diet, get regular exercise and sufficient sleep. Reduce your exposure to cigarette smoke. If you need to cough or sneeze, use the crook of your arm to cover your mouth. Supportive Care    1) Alternate 400mg Ibuprofen and 650mg Tylenol every 4 hours as needed for sinus pain and discomfort. Make sure to take Ibuprofen with food as it can cause stomach upset. Do not exceed 3000 mg Tylenol per day.     2) Take a daily antihistamine to reduce symptoms such as sneezing, runny nose and itchy eyes. You can buy these over the counter (OTC) (no prescription needed) such as Claritin, Allegra or Zyrtec. Take this daily as it takes 3-4 weeks for the full therapeutic effect to take place. Follow directions on package. If symptoms of sneezing, coughing and runny nose are severe, you can try taking Benadryl at night before bed. However, Benadryl can cause drowsiness, so please use caution. Elderly people should not use Benadryl due to side effects and increase risk of falling. 3) OTC Robitussin or Delsym for cough relief. Follow directions on package. Do not exceed maximum dose. May cause drowsiness. If you have high blood pressure or kidney problems, avoid cough medicines that contain decongestants -- such as pseudoephedrine, ephedrine, phenylephrine, naphazoline and oxymetazoline. 4) Use an OTC decongestant nasal spray such as Flonase, Nasonex, or Rhinocort. These contain a steroid and will help reduce congestion. You can spray 2x in each nostril two times a day for 3-5 days. Use the opposite hand of the nostril you are spraying and look down at your toes when you administer the nasal spray. This ensures the spray is applied to the nasal tissue properly. If you use Afrin for nasal congestion, DO NOT use for more than 5 days (due to rebound congestion)     Saline nasal spray can be used daily and will help restore moisture to dry nasal passages, wash away allergens and can help prevent inflammation of the mucous membranes. 5) Mucinex (guaifenesin) helps thin mucus and may make it easier to clear it from head, throat and lungs. 6) Increase your fluid consumption, especially water. Eat a well-balanced diet and avoid dairy and sugar as these can increase or thicken congestion.     7) Warm salt water gargle can help alleviate sore throat (1 teaspoon in 8 oz warm water)    8) Honey is a natural cough suppressor - can take a teaspoon of honey for cough or mix with hot tea. Local honey can help inoculate against local pollen that causes allergic reactions. (It has to be local honey from our area. You can usually find local honey at farmers' markets.)     9) Humidify air, especially in bedroom at night, as it may help with nasal and throat dryness. Breathing in steam from a shower may help loosen mucus and soothe an irritated throat. 10) Get plenty of rest!    11) A warm soak in a bath can help ease muscle and body aches. Add 2 cup of epsom salt to water (Dr. Anusha Cano is a good one- at [place] for around $6). Or you can put 1 cup of epsom salt in quart of warm water, soak a wash cloth in solution and apply to sore muscles. 12) Emergen C or [place] - vitamin supplements containing high doses of vitamin C, Vitamin B12 and B6 that can is marketed to help prevent or stop colds (although this has not been confirmed by scientific research)     If symptoms persist for more than 10 days or if you have a fever above 102, please call the office. Complications of URI include an infection of the skin around the eye and other infections. Watch for signs of fever, chills, body aches or any areas of red, warm, swollen and tender skin. Call immediately if you notice these signs. Sinusitis: Care Instructions  Your Care Instructions    Sinusitis is an infection of the lining of the sinus cavities in your head. Sinusitis often follows a cold. It causes pain and pressure in your head and face. In most cases, sinusitis gets better on its own in 1 to 2 weeks. But some mild symptoms may last for several weeks. Sometimes antibiotics are needed. Follow-up care is a key part of your treatment and safety. Be sure to make and go to all appointments, and call your doctor if you are having problems. It's also a good idea to know your test results and keep a list of the medicines you take.   How can you care for yourself at home? · Take an over-the-counter pain medicine, such as acetaminophen (Tylenol), ibuprofen (Advil, Motrin), or naproxen (Aleve). Read and follow all instructions on the label. · If the doctor prescribed antibiotics, take them as directed. Do not stop taking them just because you feel better. You need to take the full course of antibiotics. · Be careful when taking over-the-counter cold or flu medicines and Tylenol at the same time. Many of these medicines have acetaminophen, which is Tylenol. Read the labels to make sure that you are not taking more than the recommended dose. Too much acetaminophen (Tylenol) can be harmful. · Breathe warm, moist air from a steamy shower, a hot bath, or a sink filled with hot water. Avoid cold, dry air. Using a humidifier in your home may help. Follow the directions for cleaning the machine. · Use saline (saltwater) nasal washes to help keep your nasal passages open and wash out mucus and bacteria. You can buy saline nose drops at a grocery store or drugstore. Or you can make your own at home by adding 1 teaspoon of salt and 1 teaspoon of baking soda to 2 cups of distilled water. If you make your own, fill a bulb syringe with the solution, insert the tip into your nostril, and squeeze gently. [employer] your nose. · Put a hot, wet towel or a warm gel pack on your face 3 or 4 times a day for 5 to 10 minutes each time. · Try a decongestant nasal spray like oxymetazoline (Afrin). Do not use it for more than 3 days in a row. Using it for more than 3 days can make your congestion worse. When should you call for help?   Call your doctor now or seek immediate medical care if:    · You have new or worse swelling or redness in your face or around your eyes.     · You have a new or higher fever.    Watch closely for changes in your health, and be sure to contact your doctor if:    · You have new or worse facial pain.     · The mucus from your nose becomes thicker (like pus) or has new blood in it.     · You are not getting better as expected. Where can you learn more? Go to http://zeina-mary jane.info/. Enter H912 in the search box to learn more about \"Sinusitis: Care Instructions. \"  Current as of: March 27, 2018  Content Version: 11.9  © 1450-6262 [employer]. Care instructions adapted under license by [employer] (which disclaims liability or warranty for this information). If you have questions about a medical condition or this instruction, always ask your healthcare professional. [address] any warranty or liability for your use of this information.

## 2019-05-09 NOTE — PROGRESS NOTES
Gregory Dent  Identified pt with two pt identifiers(name and ). Chief Complaint   Patient presents with    Cough     rm 11/fasting/coughing, around eyes hurt, forehead hurts, teeth hurt       1. Have you been to the ER, urgent care clinic since your last visit? no  Hospitalized since your last visit? no    2. Have you seen or consulted any other health care providers outside of the [address] [place] [address] since your last visit? Include any pap smears or colon screening. no      Advance Care Planning    In the event something were to happen to you and you were unable to speak on your behalf, do you have an Advance Directive/ Living Will in place stating your wishes? NO    If yes, do we have a copy on file NO    If no, would you like information NO    Medication reconciliation up to date and corrected with patient at this time. Today's provider has been notified of reason for visit, vitals and flowsheets obtained on patients. Reviewed record in preparation for visit, huddled with provider and have obtained necessary documentation. Health Maintenance Due   Topic    GLAUCOMA SCREENING Q2Y     Pneumococcal 65+ years (2 of 2 - PPSV23)    BREAST CANCER SCRN MAMMOGRAM        Wt Readings from Last 3 Encounters:   19 258 lb 1.6 oz (117.1 kg)   19 258 lb 3.2 oz (117.1 kg)   19 260 lb 8 oz (118.2 kg)     Temp Readings from Last 3 Encounters:   19 97 °F (36.1 °C) (Oral)   19 98.1 °F (36.7 °C) (Oral)   19 97.9 °F (36.6 °C) (Oral)     BP Readings from Last 3 Encounters:   19 138/80   19 132/83   19 142/84     Pulse Readings from Last 3 Encounters:   19 82   19 95   19 77     There were no vitals filed for this visit.       Learning Assessment:  :     Learning Assessment 2019   PRIMARY LEARNER Patient Patient   HIGHEST LEVEL OF EDUCATION - PRIMARY LEARNER  SOME COLLEGE -   BARRIERS PRIMARY LEARNER NONE -   PRIMARY LANGUAGE ENGLISH ENGLISH   LEARNER PREFERENCE PRIMARY DEMONSTRATION DEMONSTRATION     LISTENING -   ANSWERED BY patient patient   RELATIONSHIP SELF SELF       Depression Screening:  :     3 most recent PHQ Screens 11/30/2018   Little interest or pleasure in doing things Not at all   Feeling down, depressed, irritable, or hopeless Not at all   Total Score PHQ 2 0       No flowsheet data found. Fall Risk Assessment:  :     Fall Risk Assessment, last 12 mths 11/30/2018   Able to walk? Yes   Fall in past 12 months? No   Fall with injury? -   Number of falls in past 12 months -   Fall Risk Score -       Abuse Screening:  :     Abuse Screening Questionnaire 11/30/2018 10/5/2017   Do you ever feel afraid of your partner? N N   Are you in a relationship with someone who physically or mentally threatens you? N N   Is it safe for you to go home?  Y Y       ADL Screening:  :     ADL Assessment 11/30/2018   Feeding yourself No Help Needed   Getting from bed to chair No Help Needed   Getting dressed No Help Needed   Bathing or showering No Help Needed   Walk across the room (includes cane/walker) No Help Needed   Using the telphone No Help Needed   Taking your medications No Help Needed   Preparing meals No Help Needed   Managing money (expenses/bills) No Help Needed   Moderately strenuous housework (laundry) No Help Needed   Shopping for personal items (toiletries/medicines) No Help Needed   Shopping for groceries No Help Needed   Driving No Help Needed   Climbing a flight of stairs No Help Needed   Getting to places beyond walking distances No Help Needed           BMI:  Weight Metrics 4/16/2019 3/6/2019 2/6/2019 11/30/2018 10/16/2018 6/12/2018 4/4/2018   Weight 258 lb 1.6 oz 258 lb 3.2 oz 260 lb 8 oz 259 lb 9.6 oz 254 lb 1.6 oz 255 lb 12.8 oz 257 lb   BMI 43.79 kg/m2 44.32 kg/m2 44.71 kg/m2 44.56 kg/m2 43.62 kg/m2 43.91 kg/m2 44.11 kg/m2           Medication reconciliation up to date and corrected with patient at this time.

## 2019-05-09 NOTE — PROGRESS NOTES
S: Gregory Dent is a 80 y.o. female who presents with fever, cough    Assessment/Plan:  1. Fever, Cough  -strep and flu = negative  -rx: zpack  -rx: tesslon perles  -instructions for supportive care given    2. Elevated BP  -advised to monitor BP at home and keep log; goal<140/90 make OV if consistently above goal, once feeling better       HPI:    Sx started: yesterday   +cough   +productive cough  +worse at night  +SOB  +wheezing  +fever/chills  +sore throat  +nasal discharge  +watery eyes    +HA  +sinus pressure  +ear pain/pressure    Social history:   Nutrition: appetite diminished, drinking ok   Occupation: works at [employer] part time               Social History     Tobacco Use   Smoking Status Never Smoker   Smokeless Tobacco Never Used     Social History     Substance and Sexual Activity   Alcohol Use No     Social History     Substance and Sexual Activity   Drug Use No       Review of Systems:  - Constitutional symptoms: + fatigue  - Cardiovascular: no chest pain or palpitations  - Gastrointestinal: no nausea or vomiting or ab pain  ROS is negative otherwise. I reviewed the following:  Past Medical History:   Diagnosis Date    Aneurysm of thoracic aorta ([address] 01/2017    saw Dr. Aubrey Figueredo, then Dr. Narendra Colon Hypertension     Knee osteoarthritis     managed with diclofenac po prn. sees Dr. Sarah Kruse at [facility]. has had arthroscopy and IA injections in past with ortho.  Multiple lung nodules on CT 7/24/2017    Sciatica     manages with diclofenac. has some DDD. Current Outpatient Medications   Medication Sig Dispense Refill    diclofenac EC (VOLTAREN) 75 mg EC tablet Take 1 Tab by mouth two (2) times daily as needed for Pain. 180 Tab 1    nystatin-triamcinolone (MYCOLOG) 100,000-0.1 unit/gram-% ointment Apply  to affected area two (2) times a day. 60 g 1    miscellaneous medical supply misc InterDry sheet applied to affected area daily.  Dx:B37.2 5 Each 1    montelukast (SINGULAIR) 10 mg tablet montelukast 10 mg tablet 90 Tab 1    fluticasone (FLONASE) 50 mcg/actuation nasal spray 2 Sprays by Both Nostrils route daily. 1 Bottle 0    albuterol (VENTOLIN HFA) 90 mcg/actuation inhaler Ventolin HFA 90 mcg/actuation aerosol inhaler      hydroCHLOROthiazide (HYDRODIURIL) 25 mg tablet TAKE 1 TABLET BY MOUTH DAILY 90 Tab 1    ADVAIR -21 mcg/actuation inhaler INL 2 PFS PO BID IN THE MORNING AND IN THE KODI  11    aspirin delayed-release 81 mg tablet Take  by mouth daily.  cholecalciferol (VITAMIN D3) 1,000 unit tablet Take  by mouth daily. Allergies   Allergen Reactions    Latex Hives    Flowers Shortness of Breath     Fresh cut flowers are worse    Fruit C [Ascorbic Acid] Hives     ALL FRUITS    Pcn [Penicillins] Hives       O: VS:   Visit Vitals  /90 (BP 1 Location: Left arm, BP Patient Position: Sitting)   Pulse 97   Temp 99.6 °F (37.6 °C) (Oral)   Resp 18   Ht 5' 4.37\" (1.635 m)   Wt 252 lb 3.2 oz (114.4 kg)   SpO2 96%   BMI 42.79 kg/m²       Data Reviewed:  Rapid Strep: neg  Flu: neg    GENERAL: Christiana Esqueda is in no acute distress. Non-toxic. HEAD:  Normocephalic. Atraumatic. Non tender sinuses x 4. EYE: PERRLA. EOMs intact. Sclera anicteric without injection. Clear discharge  EARS: Hearing intact bilaterally. External ear canals normal without evidence of blood or swelling. Bilateral TM's intact, pearly grey with landmarks visible. Erythema. Left TM with serous fluid. NOSE: Patent. Nasal turbinates pink. No polyps noted. Erythema. No discharge. MOUTH: mucous membranes pink and moist. Posterior pharynx normal with cobblestone appearance. Erythema, no white exudate or obstruction. NECK: supple. Midline trachea. Anterior cervical lymphadenopathy noted. RESP: Breath sounds are symmetrical bilaterally. Unlabored without SOB. Speaking in full sentences. ULL mild wheeze. No rales or rhonchi. CV: normal rate.   Regular rhythm. S1, S2 audible. No murmur noted. No rubs, clicks or gallops noted. ABDOMEN: Soft and nondistended. No tenderness on palpation. SKIN: Skin is warm and dry. Turgor is normal.   ______________________________________________________________________  Patient education was done. Advised on nutrition, tobacco, and good handwashing. Counseling included discussion of diagnosis, differentials, treatment options, prescribed treatment, warning signs and follow up. Medication risks/benefits, costs, interactions and alternatives discussed with patient.      Patient verbalized understanding and agreed to plan of care. If you are not feeling better or you begin to feel worse or develop new symptoms in 3-4 days please call. Follow up in 1-2 weeks if symptoms persist or progress.

## 2019-06-20 LAB
ALBUMIN SERPL-MCNC: 4.1 G/DL (ref 3.6–4.8)
ALP SERPL-CCNC: 89 IU/L (ref 39–117)
ALT SERPL-CCNC: 26 IU/L (ref 0–32)
AST SERPL-CCNC: 16 IU/L (ref 0–40)
BILIRUB DIRECT SERPL-MCNC: 0.11 MG/DL (ref 0–0.4)
BILIRUB SERPL-MCNC: 0.3 MG/DL (ref 0–1.2)
PROT SERPL-MCNC: 7.1 G/DL (ref 6–8.5)

## 2019-07-05 ENCOUNTER — HOSPITAL ENCOUNTER (EMERGENCY)
Age: 69
Discharge: HOME | End: 2019-07-05
Attending: EMERGENCY MEDICINE
Payer: MEDICARE

## 2019-07-05 ENCOUNTER — APPOINTMENT (OUTPATIENT)
Dept: CT IMAGING | Age: 69
End: 2019-07-05
Attending: EMERGENCY MEDICINE
Payer: MEDICARE

## 2019-07-05 VITALS
BODY MASS INDEX: 41.25 KG/M2 | TEMPERATURE: 98.4 F | HEIGHT: 64 IN | WEIGHT: 241.62 LBS | SYSTOLIC BLOOD PRESSURE: 113 MMHG | DIASTOLIC BLOOD PRESSURE: 81 MMHG | HEART RATE: 96 BPM | RESPIRATION RATE: 20 BRPM | OXYGEN SATURATION: 94 %

## 2019-07-05 DIAGNOSIS — K52.9 COLITIS: Primary | ICD-10-CM

## 2019-07-05 LAB
ALBUMIN SERPL-MCNC: 3.6 G/DL (ref 3.5–5)
ALBUMIN/GLOB SERPL: 0.8 {RATIO} (ref 1.1–2.2)
ALP SERPL-CCNC: 112 U/L (ref 45–117)
ALT SERPL-CCNC: 76 U/L (ref 12–78)
ANION GAP SERPL CALC-SCNC: 6 MMOL/L (ref 5–15)
AST SERPL-CCNC: 43 U/L (ref 15–37)
BASOPHILS # BLD: 0 K/UL (ref 0–0.1)
BASOPHILS NFR BLD: 0 % (ref 0–1)
BILIRUB SERPL-MCNC: 0.6 MG/DL (ref 0.2–1)
BUN SERPL-MCNC: 17 MG/DL (ref 6–20)
BUN/CREAT SERPL: 18 (ref 12–20)
CALCIUM SERPL-MCNC: 9.2 MG/DL (ref 8.5–10.1)
CHLORIDE SERPL-SCNC: 106 MMOL/L (ref 97–108)
CO2 SERPL-SCNC: 26 MMOL/L (ref 21–32)
CREAT SERPL-MCNC: 0.96 MG/DL (ref 0.55–1.02)
DIFFERENTIAL METHOD BLD: ABNORMAL
EOSINOPHIL # BLD: 0.1 K/UL (ref 0–0.4)
EOSINOPHIL NFR BLD: 1 % (ref 0–7)
ERYTHROCYTE [DISTWIDTH] IN BLOOD BY AUTOMATED COUNT: 15.8 % (ref 11.5–14.5)
GLOBULIN SER CALC-MCNC: 4.8 G/DL (ref 2–4)
GLUCOSE SERPL-MCNC: 94 MG/DL (ref 65–100)
HCT VFR BLD AUTO: 39.3 % (ref 35–47)
HGB BLD-MCNC: 14.2 G/DL (ref 11.5–16)
IMM GRANULOCYTES # BLD AUTO: 0 K/UL (ref 0–0.04)
IMM GRANULOCYTES NFR BLD AUTO: 0 % (ref 0–0.5)
LACTATE SERPL-SCNC: 0.9 MMOL/L (ref 0.4–2)
LYMPHOCYTES # BLD: 2.5 K/UL (ref 0.8–3.5)
LYMPHOCYTES NFR BLD: 23 % (ref 12–49)
MCH RBC QN AUTO: 28.5 PG (ref 26–34)
MCHC RBC AUTO-ENTMCNC: 36.1 G/DL (ref 30–36.5)
MCV RBC AUTO: 78.9 FL (ref 80–99)
MONOCYTES # BLD: 1.1 K/UL (ref 0–1)
MONOCYTES NFR BLD: 10 % (ref 5–13)
NEUTS SEG # BLD: 6.9 K/UL (ref 1.8–8)
NEUTS SEG NFR BLD: 66 % (ref 32–75)
NRBC # BLD: 0 K/UL (ref 0–0.01)
NRBC BLD-RTO: 0 PER 100 WBC
PLATELET # BLD AUTO: 452 K/UL (ref 150–400)
PMV BLD AUTO: 9.6 FL (ref 8.9–12.9)
POTASSIUM SERPL-SCNC: 3.7 MMOL/L (ref 3.5–5.1)
PROT SERPL-MCNC: 8.4 G/DL (ref 6.4–8.2)
RBC # BLD AUTO: 4.98 M/UL (ref 3.8–5.2)
SODIUM SERPL-SCNC: 138 MMOL/L (ref 136–145)
WBC # BLD AUTO: 10.6 K/UL (ref 3.6–11)

## 2019-07-05 PROCEDURE — 74177 CT ABD & PELVIS W/CONTRAST: CPT

## 2019-07-05 PROCEDURE — 99283 EMERGENCY DEPT VISIT LOW MDM: CPT

## 2019-07-05 PROCEDURE — 85025 COMPLETE CBC W/AUTO DIFF WBC: CPT

## 2019-07-05 PROCEDURE — 74011636320 HC RX REV CODE- 636/320: Performed by: EMERGENCY MEDICINE

## 2019-07-05 PROCEDURE — 83605 ASSAY OF LACTIC ACID: CPT

## 2019-07-05 PROCEDURE — 80053 COMPREHEN METABOLIC PANEL: CPT

## 2019-07-05 PROCEDURE — 87449 NOS EACH ORGANISM AG IA: CPT

## 2019-07-05 PROCEDURE — 36415 COLL VENOUS BLD VENIPUNCTURE: CPT

## 2019-07-05 PROCEDURE — 87077 CULTURE AEROBIC IDENTIFY: CPT

## 2019-07-05 PROCEDURE — 87045 FECES CULTURE AEROBIC BACT: CPT

## 2019-07-05 RX ORDER — CIPROFLOXACIN 500 MG/1
500 TABLET ORAL 2 TIMES DAILY
Qty: 20 TAB | Refills: 0 | Status: SHIPPED | OUTPATIENT
Start: 2019-07-05 | End: 2019-07-15

## 2019-07-05 RX ORDER — SODIUM CHLORIDE 0.9 % (FLUSH) 0.9 %
10 SYRINGE (ML) INJECTION
Status: COMPLETED | OUTPATIENT
Start: 2019-07-05 | End: 2019-07-05

## 2019-07-05 RX ORDER — METRONIDAZOLE 500 MG/1
500 TABLET ORAL 2 TIMES DAILY
Qty: 20 TAB | Refills: 0 | Status: SHIPPED | OUTPATIENT
Start: 2019-07-05 | End: 2019-07-15

## 2019-07-05 RX ADMIN — IOPAMIDOL 100 ML: 755 INJECTION, SOLUTION INTRAVENOUS at 11:12

## 2019-07-05 RX ADMIN — Medication 10 ML: at 11:12

## 2019-07-05 NOTE — ED NOTES
Discharge instructions reviewed with patient, copy given by Dr. Bruna Garces. Pt is accomponied by family, denies use of wheelchair.
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
## 2019-07-05 NOTE — DISCHARGE INSTRUCTIONS
Patient Education     Colitis: Care Instructions  Your Care Instructions  Colitis is the medical term for swelling (inflammation) of the intestine. It can be caused by different things, such as an infection or loss of blood flow in the intestine. Other causes are problems like Crohn's disease or ulcerative colitis. Symptoms may include fever, diarrhea that may be bloody, or belly pain. Sometimes symptoms go away without treatment. But you may need treatment or more tests, such as blood tests or a stool test. Or you may need imaging tests like a CT scan or a colonoscopy. In some cases, the doctor may want to test a sample of tissue from the intestine. This test is called a biopsy. The doctor has checked you carefully, but problems can develop later. If you notice any problems or new symptoms, get medical treatment right away. Follow-up care is a key part of your treatment and safety. Be sure to make and go to all appointments, and call your doctor if you are having problems. It's also a good idea to know your test results and keep a list of the medicines you take. How can you care for yourself at home? · Rest until you feel better. · Your doctor may recommend that you eat bland foods. These include rice, dry toast or crackers, bananas, and applesauce. · To prevent dehydration, drink plenty of fluids. Choose water and other caffeine-free clear liquids until you feel better. If you have kidney, heart, or liver disease and have to limit fluids, talk with your doctor before you increase the amount of fluids you drink. · Be safe with medicines. Take your medicines exactly as prescribed. Call your doctor if you think you are having a problem with your medicine. You will get more details on the specific medicines your doctor prescribes. When should you call for help? Call 911 anytime you think you may need emergency care. For example, call if:  · You passed out (lost consciousness).   · You vomit blood or what looks like coffee grounds. · Your stools are maroon or very bloody. Call your doctor now or seek immediate medical care if:  · You have new or worse pain. · You have a new or higher fever. · You have new or worse symptoms. · You cannot keep fluids or medicines down. Watch closely for changes in your health, and be sure to contact your doctor if:  · You do not get better as expected. Where can you learn more? Go to LearnSprout.be  Enter [id number] in the search box to learn more about \"Colitis: Care Instructions. \"   © 3856-3227 [employer]. Care instructions adapted under license by [facility] [employer] (which disclaims liability or warranty for this information). This care instruction is for use with your licensed healthcare professional. If you have questions about a medical condition or this instruction, always ask your healthcare professional. [address] any warranty or liability for your use of this information.   Content Version: 86.6.123571; Current as of: November 20, 2015

## 2019-07-06 LAB
C DIFF GDH STL QL: NEGATIVE
C DIFF TOX A+B STL QL IA: NEGATIVE
INTERPRETATION: NORMAL

## 2019-07-07 LAB
BACTERIA SPEC CULT: ABNORMAL
C JEJUNI+C COLI AG STL QL: NEGATIVE
E COLI SXT1+2 STL IA: NEGATIVE
SERVICE CMNT-IMP: ABNORMAL

## 2019-07-09 ENCOUNTER — OFFICE VISIT (OUTPATIENT)
Dept: FAMILY MEDICINE CLINIC | Age: 69
End: 2019-07-09

## 2019-07-09 VITALS
HEIGHT: 64 IN | WEIGHT: 237.4 LBS | TEMPERATURE: 97.4 F | RESPIRATION RATE: 18 BRPM | OXYGEN SATURATION: 95 % | HEART RATE: 89 BPM | SYSTOLIC BLOOD PRESSURE: 115 MMHG | DIASTOLIC BLOOD PRESSURE: 81 MMHG | BODY MASS INDEX: 40.53 KG/M2

## 2019-07-09 DIAGNOSIS — R19.7 DIARRHEA, UNSPECIFIED TYPE: Primary | ICD-10-CM

## 2019-07-09 RX ORDER — LOPERAMIDE HYDROCHLORIDE 2 MG/1
CAPSULE ORAL
COMMUNITY
End: 2019-07-09

## 2019-07-09 NOTE — PATIENT INSTRUCTIONS
1) The stool culture performed in the hospital showed pseudomonas aeruginosa. Continue to take your antibiotic until it is complete. The antibiotic can also cause diarrhea, but this should stop once you finish the antibiotics. Good hydration is important, as diarrhea can cause loss of electrolytes leading to dehydration. You can drink a sports drink to help replenish the electrolytes. Drink 1 bottle per day that you are having diarrhea. Be careful getting up as you may be lightheaded or dizzy if dehydrated. Your stomach may be sensitive to foods. Eat a bland diet (see below). (Chicken noodle soup with rice added is a good meal when experiencing diarrhea.)      2) Diarrhea    1) Drink plenty of fluids to prevent dehydration, enough so that your urine is light yellow or clear like water. Choose water and other caffeine-free clear liquids until you feel better. If you have kidney, heart, or liver disease and have to limit fluids, you may not be able to increase your fluid intake. 2) Drink fluids slowly, in frequent, small amounts, because drinking too much too fast can cause vomiting. 3) Loperamide (Imodium)  can be used cautiously if no fever and the stools are not bloody. The dose of loperamide is two tablets (4 mg) initially, then 2 mg after each unformed stool for =2 days, with a maximum of 16 mg/day. 4) Begin eating mild foods, such as dry toast, yogurt, applesauce, bananas, and rice. Avoid spicy, hot, or high-fat foods, and do not drink alcohol or caffeine for a day or two. Do not drink milk or eat ice cream until you are feeling better. Probiotics contain different types of micro-organisms such as yeast (saccharomyces boulardii) and bacteria (such as lactobacillus, bifidobacterium). Micro-organisms (josiah) are naturally found in the stomach/intestines/vagina.  Some conditions (such as antibiotic use, travel) can change the normal balance of bacteria/yeast. Probiotics are used to improve digestion and restore normal josiah. Probiotics have been used to treat bowel problems (such as diarrhea, irritable bowel), eczema, vaginal yeast infections, lactose intolerance, and urinary tract infections. Probiotics are available in foods (such as yogurt, milk, juices, soy beverages) and as dietary supplements (capsules, tablets, powders). Different products have different uses. Check the label for information on uses for your particular product. Sacharomyces boulardii (OTC product) - yeast used as a probiotic to introduce good active cultures into the large and small intestines. Can be used to help prevent and treat of several gastrointestinal diseases. Florastor probiotic, 1 capsule twice daily for 5 days, ask for it at the pharmacy. 3) Abdomen scan in hospital did show mild diverticulosis in sigmoid colon. Eat a high fiber diet, once your diarrhea has resolved to prevent problems. Diverticulosis: Care Instructions  Your Care Instructions  In diverticulosis, pouches called diverticula form in the wall of the large intestine (colon). The pouches do not cause any pain or other symptoms. Most people who have diverticulosis do not know they have it. But the pouches sometimes bleed, and if they become infected, they can cause pain and other symptoms. When this happens, it is called diverticulitis. Diverticula form when pressure pushes the wall of the colon outward at certain weak points. A diet that is too low in fiber can cause diverticula. Follow-up care is a key part of your treatment and safety. Be sure to make and go to all appointments, and call your doctor if you are having problems. It's also a good idea to know your test results and keep a list of the medicines you take. How can you care for yourself at home? · Include fruits, leafy green vegetables, beans, and whole grains in your diet each day. These foods are high in fiber.   · Take a fiber supplement, such as Citrucel or Metamucil, every day if needed. Read and follow all instructions on the label. · Drink plenty of fluids, enough so that your urine is light yellow or clear like water. If you have kidney, heart, or liver disease and have to limit fluids, talk with your doctor before you increase the amount of fluids you drink. · Get at least 30 minutes of exercise on most days of the week. Walking is a good choice. You also may want to do other activities, such as running, swimming, cycling, or playing tennis or team sports. · Cut out foods that cause gas, pain, or other symptoms. When should you call for help? Call your doctor now or seek immediate medical care if:    · You have belly pain.     · You pass maroon or very bloody stools.     · You have a fever.     · You have nausea and vomiting.     · You have unusual changes in your bowel movements or abdominal swelling.     · You have burning pain when you urinate.     · You have abnormal vaginal discharge.     · You have shoulder pain.     · You have cramping pain that does not get better when you have a bowel movement or pass gas.     · You pass gas or stool from your urethra while urinating.    Watch closely for changes in your health, and be sure to contact your doctor if you have any problems. Where can you learn more? Go to http://zeina-mary jane.info/. Enter J564 in the search box to learn more about \"Diverticulosis: Care Instructions. \"  Current as of: March 27, 2018  Content Version: 11.9  © 1329-3150 [employer]. Care instructions adapted under license by [facility] (which disclaims liability or warranty for this information). If you have questions about a medical condition or this instruction, always ask your healthcare professional. [place] [address] any warranty or liability for your use of this information.          Diarrhea: Care Instructions  Your Care Instructions    Diarrhea is loose, watery stools (bowel movements). The exact cause is often hard to find. Sometimes diarrhea is your body's way of getting rid of what caused an upset stomach. Viruses, food poisoning, and many medicines can cause diarrhea. Some people get diarrhea in response to emotional stress, anxiety, or certain foods. Almost everyone has diarrhea now and then. It usually isn't serious, and your stools will return to normal soon. The important thing to do is replace the fluids you have lost, so you can prevent dehydration. The doctor has checked you carefully, but problems can develop later. If you notice any problems or new symptoms, get medical treatment right away. Follow-up care is a key part of your treatment and safety. Be sure to make and go to all appointments, and call your doctor if you are having problems. It's also a good idea to know your test results and keep a list of the medicines you take. How can you care for yourself at home? · Watch for signs of dehydration, which means your body has lost too much water. Dehydration is a serious condition and should be treated right away. Signs of dehydration are:  ? Increasing thirst and dry eyes and mouth. ? Feeling faint or lightheaded. ? Darker urine, and a smaller amount of urine than normal.  · To prevent dehydration, drink plenty of fluids, enough so that your urine is light yellow or clear like water. Choose water and other caffeine-free clear liquids until you feel better. If you have kidney, heart, or liver disease and have to limit fluids, talk with your doctor before you increase the amount of fluids you drink. · Begin eating small amounts of mild foods the next day, if you feel like it. ? Try yogurt that has live cultures of Lactobacillus. (Check the label.)  ? Avoid spicy foods, fruits, alcohol, and caffeine until 48 hours after all symptoms are gone. ? Avoid chewing gum that contains sorbitol. ?  Avoid dairy products (except for yogurt with Lactobacillus) while you have diarrhea and for 3 days after symptoms are gone. · The doctor may recommend that you take over-the-counter medicine, such as loperamide (Imodium), if you still have diarrhea after 6 hours. Read and follow all instructions on the label. Do not use this medicine if you have bloody diarrhea, a high fever, or other signs of serious illness. Call your doctor if you think you are having a problem with your medicine. When should you call for help? Call 911 anytime you think you may need emergency care. For example, call if:    · You passed out (lost consciousness).     · Your stools are maroon or very bloody.    Call your doctor now or seek immediate medical care if:    · You are dizzy or lightheaded, or you feel like you may faint.     · Your stools are black and look like tar, or they have streaks of blood.     · You have new or worse belly pain.     · You have symptoms of dehydration, such as:  ? Dry eyes and a dry mouth. ? Passing only a little dark urine. ? Feeling thirstier than usual.     · You have a new or higher fever.    Watch closely for changes in your health, and be sure to contact your doctor if:    · Your diarrhea is getting worse.     · You see pus in the diarrhea.     · You are not getting better after 2 days (48 hours). Where can you learn more? Go to http://zeina-mary jane.info/. Enter R946 in the search box to learn more about \"Diarrhea: Care Instructions. \"  Current as of: September 23, 2018  Content Version: 11.9  © 6646-4292 [employer]. Care instructions adapted under license by [employer] (which disclaims liability or warranty for this information). If you have questions about a medical condition or this instruction, always ask your healthcare professional. [address] any warranty or liability for your use of this information.

## 2019-07-09 NOTE — PROGRESS NOTES
Radha Barron  Identified pt with two pt identifiers(name and ). Chief Complaint   Patient presents with    Abdominal Pain     rm 9/non fasting       1. Have you been to the ER, urgent care clinic since your last visit? yes 2019 r/t loose stools Hospitalized since your last visit? n o    2. Have you seen or consulted any other health care providers outside of the [address] [place] [address] since your last visit? Include any pap smears or colon screening. no      Advance Care Planning    In the event something were to happen to you and you were unable to speak on your behalf, do you have an Advance Directive/ Living Will in place stating your wishes? NO    If yes, do we have a copy on file NO    If no, would you like information NO    Medication reconciliation up to date and corrected with patient at this time. Today's provider has been notified of reason for visit, vitals and flowsheets obtained on patients. Reviewed record in preparation for visit, huddled with provider and have obtained necessary documentation. Health Maintenance Due   Topic    GLAUCOMA SCREENING Q2Y     Pneumococcal 65+ years (2 of 2 - PPSV23)    BREAST CANCER SCRN MAMMOGRAM        Wt Readings from Last 3 Encounters:   19 241 lb 10 oz (109.6 kg)   19 252 lb 3.2 oz (114.4 kg)   19 258 lb 1.6 oz (117.1 kg)     Temp Readings from Last 3 Encounters:   19 98.4 °F (36.9 °C)   19 99.6 °F (37.6 °C) (Oral)   19 97 °F (36.1 °C) (Oral)     BP Readings from Last 3 Encounters:   19 113/81   19 150/90   19 138/80     Pulse Readings from Last 3 Encounters:   19 96   19 97   19 82     There were no vitals filed for this visit.       Learning Assessment:  :     Learning Assessment 2019   PRIMARY LEARNER Patient Patient   HIGHEST LEVEL OF EDUCATION - PRIMARY LEARNER  SOME COLLEGE -   BARRIERS PRIMARY LEARNER NONE -   PRIMARY LANGUAGE ENGLISH ENGLISH LEARNER PREFERENCE PRIMARY DEMONSTRATION DEMONSTRATION     LISTENING -   ANSWERED BY patient patient   RELATIONSHIP SELF SELF       Depression Screening:  :     3 most recent PHQ Screens 5/9/2019   Little interest or pleasure in doing things Not at all   Feeling down, depressed, irritable, or hopeless Not at all   Total Score PHQ 2 0       No flowsheet data found. Fall Risk Assessment:  :     Fall Risk Assessment, last 12 mths 5/9/2019   Able to walk? Yes   Fall in past 12 months? No   Fall with injury? -   Number of falls in past 12 months -   Fall Risk Score -       Abuse Screening:  :     Abuse Screening Questionnaire 5/9/2019 11/30/2018 10/5/2017   Do you ever feel afraid of your partner? N N N   Are you in a relationship with someone who physically or mentally threatens you? N N N   Is it safe for you to go home? Y Y Y       ADL Screening:  :     ADL Assessment 5/9/2019   Feeding yourself No Help Needed   Getting from bed to chair No Help Needed   Getting dressed No Help Needed   Bathing or showering No Help Needed   Walk across the room (includes cane/walker) No Help Needed   Using the telphone No Help Needed   Taking your medications No Help Needed   Preparing meals No Help Needed   Managing money (expenses/bills) No Help Needed   Moderately strenuous housework (laundry) No Help Needed   Shopping for personal items (toiletries/medicines) No Help Needed   Shopping for groceries No Help Needed   Driving No Help Needed   Climbing a flight of stairs No Help Needed   Getting to places beyond walking distances No Help Needed           BMI:  Weight Metrics 7/5/2019 5/9/2019 4/16/2019 3/6/2019 2/6/2019 11/30/2018 10/16/2018   Weight 241 lb 10 oz 252 lb 3.2 oz 258 lb 1.6 oz 258 lb 3.2 oz 260 lb 8 oz 259 lb 9.6 oz 254 lb 1.6 oz   BMI 41.47 kg/m2 42.79 kg/m2 43.79 kg/m2 44.32 kg/m2 44.71 kg/m2 44.56 kg/m2 43.62 kg/m2           Medication reconciliation up to date and corrected with patient at this time.

## 2019-07-09 NOTE — LETTER
7/9/2019 11:11 AM 
 
Ms. Carlos Ba On license of [facility] [address] [id number] Re: Carlos Ba To Whom It May Concern: 
 
Please excuse Carlos Ba from work July 8 - July 12, 2019. She is under my care for a medical issue. If there are questions or concerns, please have the patient contact our office. Thank you for your assistance in this matter. Sincerely, Frankie Gotti NP

## 2019-07-09 NOTE — PROGRESS NOTES
S: Corine Moon is a 77 y.o. female who presents for diarrhea     Assessment/Plan:  1. Diarrhea  -7/5/19 ED visit - showed stool culture pseudomonas aeruginosa and diverticulosis on CT scan; given flagyl and cipro  -pt c/o fatigue, diarrhea is improving  -supportive care tx given    Troy Lion (Son): [phone] (pt requesting a call to him explaining issues)  Spoke to son @[phone] and explained dx, stool culture and ab scan results and plan     HPI:  7/5/19 - UD - dx with colitis  Given loperamide but still didn't feel good    7/5  ED -  Stool culture =  pseudomonas aeruginosa   Given flagyl and cipro - has 3 more days of abx  CT ab scan: Minimal sigmoid diverticulosis. Possible minimal small bowel thickening proximally    Onset: last Monday 7/1/19  Frequency: within an hour of eating  Color : BM darker now   Consistency - not as watery as previous  No Blood present  No Flatulence  No N/V  No Fever/chills    Social History:    Social History     Tobacco Use   Smoking Status Never Smoker   Smokeless Tobacco Never Used     Social History     Substance and Sexual Activity   Alcohol Use No     Social History     Substance and Sexual Activity   Drug Use No       Review of Systems:  - Cardiovascular:  no palpitations, or pain radiating to arm  - Respiratory: no cough or shortness of breath  - Musculoskeletal: no joint pains or weakness  ROS is negative otherwise. I reviewed the following:  Past Medical History:   Diagnosis Date    Aneurysm of thoracic aorta ([facility] [address] 01/2017    saw Dr. Rio Price, then Dr. Quintin Henderson Hypertension     Knee osteoarthritis     managed with diclofenac po prn. sees Dr. Agnes Sofia at [facility] orthopedic. has had arthroscopy and IA injections in past with ortho.  Multiple lung nodules on CT 7/24/2017    Sciatica     manages with diclofenac. has some DDD.         Current Outpatient Medications   Medication Sig Dispense Refill    ciprofloxacin HCl (CIPRO) 500 mg tablet Take 1 Tab by mouth two (2) times a day for 10 days. 20 Tab 0    metroNIDAZOLE (FLAGYL) 500 mg tablet Take 1 Tab by mouth two (2) times a day for 10 days. 20 Tab 0    fluticasone propionate (FLONASE) 50 mcg/actuation nasal spray 2 Sprays by Both Nostrils route daily. 1 Bottle 1    diclofenac EC (VOLTAREN) 75 mg EC tablet Take 1 Tab by mouth two (2) times daily as needed for Pain. 180 Tab 1    nystatin-triamcinolone (MYCOLOG) 100,000-0.1 unit/gram-% ointment Apply  to affected area two (2) times a day. 60 g 1    miscellaneous medical supply misc InterDry sheet applied to affected area daily. Dx:B37.2 5 Each 1    montelukast (SINGULAIR) 10 mg tablet montelukast 10 mg tablet 90 Tab 1    albuterol (VENTOLIN HFA) 90 mcg/actuation inhaler Ventolin HFA 90 mcg/actuation aerosol inhaler      hydroCHLOROthiazide (HYDRODIURIL) 25 mg tablet TAKE 1 TABLET BY MOUTH DAILY 90 Tab 1    ADVAIR -21 mcg/actuation inhaler INL 2 PFS PO BID IN THE MORNING AND IN THE KODI  11    aspirin delayed-release 81 mg tablet Take  by mouth daily.  cholecalciferol (VITAMIN D3) 1,000 unit tablet Take  by mouth daily.  loperamide (IMODIUM) 2 mg capsule loperamide 2 mg capsule   Take 2 tablets immediately. Take 1 tablet after each loose stool. Do not exceed 8 tablets daily.  azithromycin (ZITHROMAX) 250 mg tablet Take 2 tablets today, then take 1 tablet daily 6 Tab 0       Allergies   Allergen Reactions    Latex Hives    Flowers Shortness of Breath     Fresh cut flowers are worse    Fruit C [Ascorbic Acid] Hives     ALL FRUITS    Pcn [Penicillins] Hives        O: VS:   Visit Vitals  /81 (BP 1 Location: Right arm, BP Patient Position: Sitting)   Pulse 89   Temp 97.4 °F (36.3 °C) (Oral)   Resp 18   Ht 5' 4\" (1.626 m)   Wt 237 lb 6.4 oz (107.7 kg)   SpO2 95%   BMI 40.75 kg/m²       GENERAL: Jeral Dung is in no acute distress. Non-toxic. Well nourished. Well developed. Appropriately groomed.   RESP: Breath sounds are symmetrical bilaterally. Unlabored without SOB. Speaking in full sentences. Clear to auscultation bilaterally anteriorly and posteriorly. No wheezes. No rales or rhonchi. CV: normal rate. Regular rhythm. S1, S2 audible. No murmur noted. No rubs, clicks or gallops noted. ABDOMEN: Flat without bulges or pulsations. Soft and nondistended. No tenderness on palpation. No masses or organomegaly. No rebound, rigidity or guarding. Bowel sounds normal x 4 quadrants. HEME/LYMPH: peripheral pulses palpable 2+ x 4 extremities. No peripheral edema is noted. ____________________________________________________________________  Patient education was done. Counseling included discussion of diagnosis, differentials, treatment options, prescribed treatment, warning signs and follow up. Medication risks/benefits,  interactions and alternatives discussed with patient. Advised on nutrition, physical activity, weight management, tobacco, alcohol and safety     Patient verbalized understanding and agreed to plan of care. Patient was given an after visit summary which included current diagnoses, medications and vital signs. Follow up in 1-2 weeks as needed or if symptoms progress.

## 2019-07-11 ENCOUNTER — OFFICE VISIT (OUTPATIENT)
Dept: FAMILY MEDICINE CLINIC | Age: 69
End: 2019-07-11

## 2019-07-11 ENCOUNTER — TELEPHONE (OUTPATIENT)
Dept: FAMILY MEDICINE CLINIC | Age: 69
End: 2019-07-11

## 2019-07-11 VITALS
TEMPERATURE: 96.5 F | HEART RATE: 94 BPM | WEIGHT: 238.5 LBS | BODY MASS INDEX: 40.72 KG/M2 | OXYGEN SATURATION: 98 % | DIASTOLIC BLOOD PRESSURE: 90 MMHG | SYSTOLIC BLOOD PRESSURE: 128 MMHG | RESPIRATION RATE: 18 BRPM | HEIGHT: 64 IN

## 2019-07-11 DIAGNOSIS — R19.7 DIARRHEA, UNSPECIFIED TYPE: Primary | ICD-10-CM

## 2019-07-11 DIAGNOSIS — R14.0 BLOATING: ICD-10-CM

## 2019-07-11 PROBLEM — K57.30 SIGMOID DIVERTICULOSIS: Status: ACTIVE | Noted: 2019-07-11

## 2019-07-11 NOTE — PROGRESS NOTES
S: Rio Armijo is a 77 y.o. female who presents for follow up     Assessment/Plan:   1. Diarrhea,  Bloating  -previously dx with pseudomonas aeruginosa and given cipro and flagyl; diarrhea improving  -advised to continue with bland diet and good hydration  -supportive tx include probiotics, pepto for bloating         HPI:  Diarrhea is slowing down - 2x today and only 3x yesterday   Had cramping in lower ab - not really painful, just bloated feeling  Didn't get probiotics, hasn't tried pepto     Feels light headed d/t riding in car with windows up and no AC on 90+ day; said friend who drove her didn't have AC and wouldn't let her put windows down    Social History:  Nutrition: eating chicken noodle soup with rice, gatorade, water  Social: lives with  and son   Occupation: works at [employer] part time    Social History     Tobacco Use   Smoking Status Never Smoker   Smokeless Tobacco Never Used     Social History     Substance and Sexual Activity   Alcohol Use No     Social History     Substance and Sexual Activity   Drug Use No        Review of Systems:  - Constitutional Symptoms: no fevers, chills, weight loss  - Eyes: no blurry vision or double vision  - Cardiovascular: no chest pain or palpitations  - Respiratory: no cough or shortness of breath  ROS is negative otherwise. 3 most recent PHQ Screens 7/11/2019   Little interest or pleasure in doing things Not at all   Feeling down, depressed, irritable, or hopeless Not at all   Total Score PHQ 2 0       I reviewed the following:  Past Medical History:   Diagnosis Date    Aneurysm of thoracic aorta ([facility] [address] 01/2017    saw Dr. Carlos Gao, then Dr. Miguel Lora Hypertension     Knee osteoarthritis     managed with diclofenac po prn. sees Dr. Cass Ba at [facility]opedic. has had arthroscopy and IA injections in past with ortho.  Multiple lung nodules on CT 7/24/2017    Sciatica     manages with diclofenac. has some DDD.         Current Outpatient Medications   Medication Sig Dispense Refill    ciprofloxacin HCl (CIPRO) 500 mg tablet Take 1 Tab by mouth two (2) times a day for 10 days. 20 Tab 0    metroNIDAZOLE (FLAGYL) 500 mg tablet Take 1 Tab by mouth two (2) times a day for 10 days. 20 Tab 0    fluticasone propionate (FLONASE) 50 mcg/actuation nasal spray 2 Sprays by Both Nostrils route daily. 1 Bottle 1    diclofenac EC (VOLTAREN) 75 mg EC tablet Take 1 Tab by mouth two (2) times daily as needed for Pain. 180 Tab 1    nystatin-triamcinolone (MYCOLOG) 100,000-0.1 unit/gram-% ointment Apply  to affected area two (2) times a day. 60 g 1    miscellaneous medical supply misc InterDry sheet applied to affected area daily. Dx:B37.2 5 Each 1    montelukast (SINGULAIR) 10 mg tablet montelukast 10 mg tablet 90 Tab 1    albuterol (VENTOLIN HFA) 90 mcg/actuation inhaler Ventolin HFA 90 mcg/actuation aerosol inhaler      hydroCHLOROthiazide (HYDRODIURIL) 25 mg tablet TAKE 1 TABLET BY MOUTH DAILY 90 Tab 1    ADVAIR -21 mcg/actuation inhaler INL 2 PFS PO BID IN THE MORNING AND IN THE KODI  11    aspirin delayed-release 81 mg tablet Take  by mouth daily.  cholecalciferol (VITAMIN D3) 1,000 unit tablet Take  by mouth daily. Allergies   Allergen Reactions    Latex Hives    Flowers Shortness of Breath     Fresh cut flowers are worse    Fruit C [Ascorbic Acid] Hives     ALL FRUITS    Pcn [Penicillins] Hives        O: VS:   Visit Vitals  /90 (BP 1 Location: Right arm, BP Patient Position: Sitting)   Pulse 94   Temp 96.5 °F (35.8 °C) (Oral)   Resp 18   Ht 5' 4\" (1.626 m)   Wt 238 lb 8 oz (108.2 kg)   SpO2 98%   BMI 40.94 kg/m²     GENERAL: Nancy Rebollar is in no acute distress. Non-toxic. Well nourished. Well developed. Appropriately groomed. Yasir Ayon RESP: Breath sounds are symmetrical bilaterally. Unlabored without SOB. Speaking in full sentences. Clear to auscultation bilaterally anteriorly and posteriorly. No wheezes.   No rales or rhonchi. CV: normal rate. Regular rhythm. S1, S2 audible. No murmur noted. No rubs, clicks or gallops noted. ABDOMEN: limited by body habitus. Soft  No tenderness on palpation. No rebound, rigidity or guarding. Bowel sounds normal x 4 quadrants. HEME/LYMPH: peripheral pulses palpable 2+ x 4 extremities. No peripheral edema is noted. PSYCH: appropriate behavior, dress and thought processes. Good eye contact. Clear and coherent speech. Full affect. Good insight.     ___________________________________________________________________  Patient education was done. Advised on nutrition, physical activity, weight management, tobacco, alcohol and safety. Counseling included discussion of diagnosis, differentials, treatment options, prescribed treatment, warning signs and follow up. Medication risks/benefits, interactions and alternatives discussed with patient.      Patient verbalized understanding and agreed to plan of care. Patient was given an after visit summary which included current diagnoses, medications and vital signs. Follow up as directed.

## 2019-07-11 NOTE — PATIENT INSTRUCTIONS
1) Bloating - the CT scan on 7/5/19 showed no abnormalities such as a blockage or other issues with organs. Bloating and diarrhea are probably residual from pseudomonas infection and antibiotic use. Can take pepto bismol to help with bloating. (There is a children's Pepto that tastes like bubble gum - take 2 tabs of these)    Probiotics contain different types of micro-organisms such as yeast (saccharomyces boulardii) and bacteria (such as lactobacillus, bifidobacterium). Micro-organisms (josiah) are naturally found in the stomach/intestines/vagina. Some conditions (such as antibiotic use, travel) can change the normal balance of bacteria/yeast. Probiotics are used to improve digestion and restore normal josiah. Probiotics have been used to treat bowel problems (such as diarrhea, irritable bowel), eczema, vaginal yeast infections, lactose intolerance, and urinary tract infections. Probiotics are available in foods (such as yogurt, milk, juices, soy beverages) and as dietary supplements (capsules, tablets, powders). Different products have different uses. Check the label for information on uses for your particular product. Sacharomyces boulardii (OTC product) - yeast used as a probiotic to introduce good active cultures into the large and small intestines. Can be used to help prevent and treat of several gastrointestinal diseases. Florastor probiotic, 1 capsule twice daily for 5 days, ask for it at the pharmacy. 2) Stop drinking gatorade as your diastolic blood pressure has increased, meaning you are holding onto fluid now. Continue to eat a bland diet. You can start introducing other foods over the weekend. You may still experience some diarrhea due to the antibiotic and repopulation of gut bacteria needed to digest and absorb foods. It can take 2-3 weeks for the gut to go back to normal.  Make sure you are eating a well balanced diet and drinking plenty of fluids (64oz of water daily). Diarrhea: Care Instructions  Your Care Instructions    Diarrhea is loose, watery stools (bowel movements). The exact cause is often hard to find. Sometimes diarrhea is your body's way of getting rid of what caused an upset stomach. Viruses, food poisoning, and many medicines can cause diarrhea. Some people get diarrhea in response to emotional stress, anxiety, or certain foods. Almost everyone has diarrhea now and then. It usually isn't serious, and your stools will return to normal soon. The important thing to do is replace the fluids you have lost, so you can prevent dehydration. The doctor has checked you carefully, but problems can develop later. If you notice any problems or new symptoms, get medical treatment right away. Follow-up care is a key part of your treatment and safety. Be sure to make and go to all appointments, and call your doctor if you are having problems. It's also a good idea to know your test results and keep a list of the medicines you take. How can you care for yourself at home? · Watch for signs of dehydration, which means your body has lost too much water. Dehydration is a serious condition and should be treated right away. Signs of dehydration are:  ? Increasing thirst and dry eyes and mouth. ? Feeling faint or lightheaded. ? Darker urine, and a smaller amount of urine than normal.  · To prevent dehydration, drink plenty of fluids, enough so that your urine is light yellow or clear like water. Choose water and other caffeine-free clear liquids until you feel better. If you have kidney, heart, or liver disease and have to limit fluids, talk with your doctor before you increase the amount of fluids you drink. · Begin eating small amounts of mild foods the next day, if you feel like it. ? Try yogurt that has live cultures of Lactobacillus. (Check the label.)  ? Avoid spicy foods, fruits, alcohol, and caffeine until 48 hours after all symptoms are gone. ?  Avoid chewing gum that contains sorbitol. ? Avoid dairy products (except for yogurt with Lactobacillus) while you have diarrhea and for 3 days after symptoms are gone. · The doctor may recommend that you take over-the-counter medicine, such as loperamide (Imodium), if you still have diarrhea after 6 hours. Read and follow all instructions on the label. Do not use this medicine if you have bloody diarrhea, a high fever, or other signs of serious illness. Call your doctor if you think you are having a problem with your medicine. When should you call for help? Call 911 anytime you think you may need emergency care. For example, call if:    · You passed out (lost consciousness).     · Your stools are maroon or very bloody.    Call your doctor now or seek immediate medical care if:    · You are dizzy or lightheaded, or you feel like you may faint.     · Your stools are black and look like tar, or they have streaks of blood.     · You have new or worse belly pain.     · You have symptoms of dehydration, such as:  ? Dry eyes and a dry mouth. ? Passing only a little dark urine. ? Feeling thirstier than usual.     · You have a new or higher fever.    Watch closely for changes in your health, and be sure to contact your doctor if:    · Your diarrhea is getting worse.     · You see pus in the diarrhea.     · You are not getting better after 2 days (48 hours). Where can you learn more? Go to http://zeina-mary jane.info/. Enter D867 in the search box to learn more about \"Diarrhea: Care Instructions. \"  Current as of: September 23, 2018  Content Version: 11.9  © 3173-2875 [employer]. Care instructions adapted under license by [employer] (which disclaims liability or warranty for this information).  If you have questions about a medical condition or this instruction, always ask your healthcare professional. [address] any warranty or liability for your use of this information.

## 2019-07-11 NOTE — TELEPHONE ENCOUNTER
Writer spoke with patient. Patient stated that she was feeling lightheaded today even after doing all the recommendations Mally Lazo has told her to do and watch out for. Patient stated that she has not been able to have a BM. Has been eating chicken noodle soup and drinking plenty of water and the drinks with electrolytes in them. Patient was scheduled to come in today at 1530 to see Mally Lazo.

## 2019-07-11 NOTE — TELEPHONE ENCOUNTER
Patient called into the office stating that she feels weaker than she did at her appointment on Tuesday and now she is light-headed. Patient was transferred to nurse to triage.

## 2019-07-11 NOTE — PROGRESS NOTES
Emil Mooney  Identified pt with two pt identifiers(name and ). Chief Complaint   Patient presents with    Diarrhea     rm 9/non fasting/dizziness/over all blah feeling with pain in left lower side, pt stated she feels hot inside       1. Have you been to the ER, urgent care clinic since your last visit? no  Hospitalized since your last visit? no    2. Have you seen or consulted any other health care providers outside of the [address] [place] [address] since your last visit? Include any pap smears or colon screening. no      Advance Care Planning    In the event something were to happen to you and you were unable to speak on your behalf, do you have an Advance Directive/ Living Will in place stating your wishes? NO    If yes, do we have a copy on file NO    If no, would you like information NO    Medication reconciliation up to date and corrected with patient at this time. Today's provider has been notified of reason for visit, vitals and flowsheets obtained on patients. Reviewed record in preparation for visit, huddled with provider and have obtained necessary documentation. Health Maintenance Due   Topic    GLAUCOMA SCREENING Q2Y     Pneumococcal 65+ years (2 of 2 - PPSV23)    BREAST CANCER SCRN MAMMOGRAM        Wt Readings from Last 3 Encounters:   19 237 lb 6.4 oz (107.7 kg)   19 241 lb 10 oz (109.6 kg)   19 252 lb 3.2 oz (114.4 kg)     Temp Readings from Last 3 Encounters:   19 97.4 °F (36.3 °C) (Oral)   19 98.4 °F (36.9 °C)   19 99.6 °F (37.6 °C) (Oral)     BP Readings from Last 3 Encounters:   19 115/81   19 113/81   19 150/90     Pulse Readings from Last 3 Encounters:   19 89   19 96   19 97     There were no vitals filed for this visit.       Learning Assessment:  :     Learning Assessment 2019   PRIMARY LEARNER Patient Patient   HIGHEST LEVEL OF EDUCATION - PRIMARY LEARNER  SOME COLLEGE -   BARRIERS PRIMARY LEARNER NONE -   PRIMARY LANGUAGE ENGLISH ENGLISH   LEARNER PREFERENCE PRIMARY DEMONSTRATION DEMONSTRATION     LISTENING -   ANSWERED BY patient patient   RELATIONSHIP SELF SELF       Depression Screening:  :     3 most recent PHQ Screens 7/9/2019   Little interest or pleasure in doing things Not at all   Feeling down, depressed, irritable, or hopeless Not at all   Total Score PHQ 2 0       No flowsheet data found. Fall Risk Assessment:  :     Fall Risk Assessment, last 12 mths 5/9/2019   Able to walk? Yes   Fall in past 12 months? No   Fall with injury? -   Number of falls in past 12 months -   Fall Risk Score -       Abuse Screening:  :     Abuse Screening Questionnaire 7/9/2019 5/9/2019 11/30/2018 10/5/2017   Do you ever feel afraid of your partner? N N N N   Are you in a relationship with someone who physically or mentally threatens you? N N N N   Is it safe for you to go home?  Y Y Y Y       ADL Screening:  :     ADL Assessment 7/9/2019   Feeding yourself No Help Needed   Getting from bed to chair No Help Needed   Getting dressed No Help Needed   Bathing or showering No Help Needed   Walk across the room (includes cane/walker) No Help Needed   Using the telphone No Help Needed   Taking your medications No Help Needed   Preparing meals No Help Needed   Managing money (expenses/bills) No Help Needed   Moderately strenuous housework (laundry) No Help Needed   Shopping for personal items (toiletries/medicines) No Help Needed   Shopping for groceries No Help Needed   Driving No Help Needed   Climbing a flight of stairs No Help Needed   Getting to places beyond walking distances No Help Needed           BMI:  Weight Metrics 7/9/2019 7/5/2019 5/9/2019 4/16/2019 3/6/2019 2/6/2019 11/30/2018   Weight 237 lb 6.4 oz 241 lb 10 oz 252 lb 3.2 oz 258 lb 1.6 oz 258 lb 3.2 oz 260 lb 8 oz 259 lb 9.6 oz   BMI 40.75 kg/m2 41.47 kg/m2 42.79 kg/m2 43.79 kg/m2 44.32 kg/m2 44.71 kg/m2 44.56 kg/m2           Medication reconciliation up to date and corrected with patient at this time.

## 2019-07-16 ENCOUNTER — TELEPHONE (OUTPATIENT)
Dept: FAMILY MEDICINE CLINIC | Age: 69
End: 2019-07-16

## 2019-07-16 DIAGNOSIS — R74.01 ELEVATED ALT MEASUREMENT: ICD-10-CM

## 2019-07-16 NOTE — TELEPHONE ENCOUNTER
Patient is requesting a call back from the nurse in regards to adding food to her diet. Patient stated that she still has diarrhea. Patient's call back number is [phone].

## 2019-07-18 NOTE — TELEPHONE ENCOUNTER
Writer called patient, two patient identifiers used for patient verification, name and , patient stated that the loose stools have slowed down. She is continuing to eat bland foods and adding a few more to her diet ex: salmon baked. Patient stated that she is drinking plenty of water and staying hydrated. She will call office back if she has any problems.

## 2019-07-23 NOTE — TELEPHONE ENCOUNTER
Patient called into office regarding diverticulitis she is being treated for. Writer spoke with patient. Patient verified . Patient stated that she is starting to feel better, and is having normal BMs now; but that she is very \"fatigued\"; works part time and when she comes home after a few hours at work, she is totally exhausted. Patient wants to know what Rivera Bright recommends for the fatigue, and if there is anything she can take for it. Writer verbalized understanding, stated that message would be sent to Rivera Bright. Patient gave verbal permission to leave a detailed VM if she does not answer when nurse calls her back. Writer verbalized understanding and appreciation.

## 2019-07-24 NOTE — TELEPHONE ENCOUNTER
Writer called patient with advice from Shana JAMES. Writer spoke with patient. Patient verified . Writer notified patient of the following: It takes awhile to get back to normal energy level after having diarrhea like that.  I would suggest she eat healthy - protein and high fiber foods and hydrate well, get at least 8 hours of sleep.  Taking the probiotics will help repopulate gut with good bacteria. Karen Duff don' t push activities in this heat. Patient verbalized understanding and appreciation.

## 2019-07-29 DIAGNOSIS — I10 ESSENTIAL HYPERTENSION: ICD-10-CM

## 2019-07-29 NOTE — TELEPHONE ENCOUNTER
PCP: Edil Birch NP    Last appt: 7/11/2019  No future appointments.     Requested Prescriptions     Pending Prescriptions Disp Refills    hydroCHLOROthiazide (HYDRODIURIL) 25 mg tablet 90 Tab 1     Sig: TAKE 1 TABLET BY MOUTH DAILY       Prior labs and Blood pressures:  BP Readings from Last 3 Encounters:   07/11/19 128/90   07/09/19 115/81   07/05/19 113/81     Lab Results   Component Value Date/Time    Sodium 138 07/05/2019 09:56 AM    Potassium 3.7 07/05/2019 09:56 AM    Chloride 106 07/05/2019 09:56 AM    CO2 26 07/05/2019 09:56 AM    Anion gap 6 07/05/2019 09:56 AM    Glucose 94 07/05/2019 09:56 AM    BUN 17 07/05/2019 09:56 AM    Creatinine 0.96 07/05/2019 09:56 AM    BUN/Creatinine ratio 18 07/05/2019 09:56 AM    GFR est AA >60 07/05/2019 09:56 AM    GFR est non-AA 58 (L) 07/05/2019 09:56 AM    Calcium 9.2 07/05/2019 09:56 AM     No results found for: HBA1C, HGBE8, EBO9GTRB, XXX4IXPU  Lab Results   Component Value Date/Time    Cholesterol, total 158 10/23/2018 09:15 AM    HDL Cholesterol 47 10/23/2018 09:15 AM    LDL, calculated 86 10/23/2018 09:15 AM    VLDL, calculated 25 10/23/2018 09:15 AM    Triglyceride 127 10/23/2018 09:15 AM    CHOL/HDL Ratio 2.9 03/21/2015 01:56 AM     Lab Results   Component Value Date/Time    VITAMIN D, 25-HYDROXY 73.6 10/23/2018 09:56 AM       Lab Results   Component Value Date/Time    TSH 4.26 (H) 03/21/2015 01:56 AM

## 2019-07-29 NOTE — TELEPHONE ENCOUNTER
Requested Prescriptions     Pending Prescriptions Disp Refills    hydroCHLOROthiazide (HYDRODIURIL) 25 mg tablet 90 Tab 1     Patient stated she only have a few left

## 2019-07-30 RX ORDER — HYDROCHLOROTHIAZIDE 25 MG/1
TABLET ORAL
Qty: 90 TAB | Refills: 1 | Status: SHIPPED | OUTPATIENT
Start: 2019-07-30 | End: 2019-12-13 | Stop reason: SDUPTHER

## 2019-08-23 ENCOUNTER — TELEPHONE (OUTPATIENT)
Dept: FAMILY MEDICINE CLINIC | Age: 69
End: 2019-08-23

## 2019-08-23 NOTE — TELEPHONE ENCOUNTER
----- Message from Amna Paige sent at 8/21/2019 11:23 AM EDT -----  Regarding: Np.Tiffanie/Telephone   General Message/Vendor Calls    Caller's first and last name:      Reason for call: Pt requesting a Dr darrell for [employer]  stating she no longer can exercise and be in the sun due to her condition. Contact pt when note is ready for .        Callback required yes/no and why:Yes      Best contact number(s): [phone]      Details to clarify the request:      Amna Paige

## 2019-08-23 NOTE — TELEPHONE ENCOUNTER
Patient called in this morning to check the status, sure it's not done. Had to look through [employer] staff messages and she stated she did call. Will let the patient know ill know I will send this message back and the nurse will contact her.  Please leave message if no answer  P: [phone]

## 2019-10-21 RX ORDER — MONTELUKAST SODIUM 10 MG/1
TABLET ORAL
Qty: 90 TAB | Refills: 1 | Status: SHIPPED | OUTPATIENT
Start: 2019-10-21 | End: 2019-12-05

## 2019-12-05 ENCOUNTER — OFFICE VISIT (OUTPATIENT)
Dept: FAMILY MEDICINE CLINIC | Age: 69
End: 2019-12-05

## 2019-12-05 VITALS
WEIGHT: 246 LBS | OXYGEN SATURATION: 92 % | RESPIRATION RATE: 16 BRPM | BODY MASS INDEX: 42 KG/M2 | HEART RATE: 88 BPM | TEMPERATURE: 96.6 F | SYSTOLIC BLOOD PRESSURE: 133 MMHG | DIASTOLIC BLOOD PRESSURE: 79 MMHG | HEIGHT: 64 IN

## 2019-12-05 DIAGNOSIS — J06.9 UPPER RESPIRATORY TRACT INFECTION, UNSPECIFIED TYPE: Primary | ICD-10-CM

## 2019-12-05 RX ORDER — NYSTATIN AND TRIAMCINOLONE ACETONIDE 100000; 1 [USP'U]/G; MG/G
OINTMENT TOPICAL 2 TIMES DAILY
Qty: 60 G | Refills: 1 | Status: CANCELLED | OUTPATIENT
Start: 2019-12-05

## 2019-12-05 RX ORDER — DOXYCYCLINE 100 MG/1
100 TABLET ORAL 2 TIMES DAILY
Qty: 14 TAB | Refills: 0 | Status: SHIPPED | OUTPATIENT
Start: 2019-12-05 | End: 2019-12-12

## 2019-12-05 RX ORDER — ALBUTEROL SULFATE 90 UG/1
AEROSOL, METERED RESPIRATORY (INHALATION)
Qty: 1 INHALER | Status: CANCELLED | OUTPATIENT
Start: 2019-12-05

## 2019-12-05 RX ORDER — FLUTICASONE PROPIONATE 50 MCG
2 SPRAY, SUSPENSION (ML) NASAL DAILY
Qty: 1 BOTTLE | Refills: 1 | Status: CANCELLED | OUTPATIENT
Start: 2019-12-05

## 2019-12-05 NOTE — PROGRESS NOTES
Cough productive past week. Hoarse past 2-3 days. Gets annually. No fevers/chills/sweats. Not using inhaler. Patient denies chest pain, dyspnea, unexpected weight change, unexpected pain, mood or memory changes. Visit Vitals  /79 (BP 1 Location: Right arm, BP Patient Position: Sitting)   Pulse 88   Temp 96.6 °F (35.9 °C) (Oral)   Resp 16   Ht 5' 4\" (1.626 m)   Wt 246 lb (111.6 kg)   SpO2 92%   BMI 42.23 kg/m²     Patient alert and cooperative. Tight cough, hoarse sounding, lungs clear. Assessment:  1. URI with apparent bronchospasm. Plan:  1. Advised to use her inhaler, which she has not been doing. 2. Has cough Perles from prior script left over, still within date. 3. Script for Doxycycline 100 mg b.i.d. for seven days. 4. Schedule follow up for Tan Goyal for wellness and med refills. 5. Recheck here otherwise prn.

## 2019-12-05 NOTE — PROGRESS NOTES
Identified pt with two pt identifiers(name and ). Reviewed record in preparation for visit and have obtained necessary documentation. Chief Complaint   Patient presents with    Cough     sinus drainage, flegm, raspy voice, x 1 week      Visit Vitals  /79 (BP 1 Location: Right arm, BP Patient Position: Sitting)   Pulse 88   Temp 96.6 °F (35.9 °C) (Oral)   Resp 16   Ht 5' 4\" (1.626 m)   Wt 246 lb (111.6 kg)   SpO2 92%   BMI 42.23 kg/m²       Pain Scale: 0 - No pain/10  Pain Location:     Health Maintenance Due   Topic    GLAUCOMA SCREENING Q2Y     Pneumococcal 65+ years (2 of 2 - PPSV23)    BREAST CANCER SCRN MAMMOGRAM     MEDICARE YEARLY EXAM        Coordination of Care Questionnaire:  :   1) Have you been to an emergency room, urgent care, or hospitalized since your last visit? If yes, where when, and reason for visit? no       2. Have seen or consulted any other health care provider since your last visit? If yes, where when, and reason for visit? NO      3) Do you have an Advanced Directive/ Living Will in place? YES  If yes, do we have a copy on file YES  If no, would you like information NO    Patient is accompanied by self I have received verbal consent from Ana Flores to discuss any/all medical information while they are present in the room.

## 2019-12-13 ENCOUNTER — OFFICE VISIT (OUTPATIENT)
Dept: FAMILY MEDICINE CLINIC | Age: 69
End: 2019-12-13

## 2019-12-13 VITALS
SYSTOLIC BLOOD PRESSURE: 134 MMHG | HEIGHT: 64 IN | OXYGEN SATURATION: 97 % | TEMPERATURE: 98 F | DIASTOLIC BLOOD PRESSURE: 87 MMHG | WEIGHT: 245 LBS | RESPIRATION RATE: 16 BRPM | HEART RATE: 89 BPM | BODY MASS INDEX: 41.83 KG/M2

## 2019-12-13 DIAGNOSIS — I10 ESSENTIAL HYPERTENSION: ICD-10-CM

## 2019-12-13 DIAGNOSIS — R73.09 ELEVATED RANDOM BLOOD GLUCOSE LEVEL: ICD-10-CM

## 2019-12-13 DIAGNOSIS — Z00.00 MEDICARE ANNUAL WELLNESS VISIT, SUBSEQUENT: Primary | ICD-10-CM

## 2019-12-13 DIAGNOSIS — E55.9 VITAMIN D DEFICIENCY: ICD-10-CM

## 2019-12-13 DIAGNOSIS — D75.839 THROMBOCYTOSIS: ICD-10-CM

## 2019-12-13 DIAGNOSIS — M81.0 OSTEOPOROSIS WITHOUT CURRENT PATHOLOGICAL FRACTURE, UNSPECIFIED OSTEOPOROSIS TYPE: ICD-10-CM

## 2019-12-13 DIAGNOSIS — R74.8 ELEVATED ALKALINE PHOSPHATASE LEVEL: ICD-10-CM

## 2019-12-13 RX ORDER — NYSTATIN AND TRIAMCINOLONE ACETONIDE 100000; 1 [USP'U]/G; MG/G
OINTMENT TOPICAL 2 TIMES DAILY
Qty: 60 G | Refills: 1 | Status: SHIPPED | OUTPATIENT
Start: 2019-12-13 | End: 2020-01-17 | Stop reason: DRUGHIGH

## 2019-12-13 RX ORDER — FLUTICASONE PROPIONATE 50 MCG
2 SPRAY, SUSPENSION (ML) NASAL DAILY
Qty: 1 BOTTLE | Refills: 1 | Status: SHIPPED | OUTPATIENT
Start: 2019-12-13 | End: 2020-01-16 | Stop reason: SDUPTHER

## 2019-12-13 RX ORDER — HYDROCHLOROTHIAZIDE 25 MG/1
TABLET ORAL
Qty: 90 TAB | Refills: 1 | Status: SHIPPED | OUTPATIENT
Start: 2019-12-13 | End: 2020-06-21

## 2019-12-13 RX ORDER — MELATONIN
1000 DAILY
Qty: 90 TAB | Refills: 1 | Status: SHIPPED | OUTPATIENT
Start: 2019-12-13 | End: 2019-12-27

## 2019-12-13 NOTE — PROGRESS NOTES
Identified pt with two pt identifiers(name and ). Reviewed record in preparation for visit and have obtained necessary documentation. Chief Complaint   Patient presents with    Cough     f/u bronchitis    Annual Wellness Visit     Medicare wellness    Medication Refill        Health Maintenance Due   Topic    GLAUCOMA SCREENING Q2Y     Pneumococcal 65+ years (2 of 2 - PPSV23)    BREAST CANCER SCRN MAMMOGRAM     MEDICARE YEARLY EXAM         Visit Vitals  /87 (BP 1 Location: Right arm, BP Patient Position: Sitting)   Pulse 89   Temp 98 °F (36.7 °C) (Oral)   Resp 16   Ht 5' 4\" (1.626 m)   Wt 245 lb (111.1 kg)   SpO2 97%   BMI 42.05 kg/m²     Pain Scale: /10    Coordination of Care Questionnaire:  :   1. Have you been to the ER, urgent care clinic since your last visit? Hospitalized since your last visit? No    2. Have you seen or consulted any other health care providers outside of the [address] [place] [address] since your last visit? Include any pap smears or colon screening.  No
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
## 2019-12-13 NOTE — PATIENT INSTRUCTIONS
1) I think the Pneumo 23 pneumonia shot was recorded under the Prevnar 13 vaccine bc there are 2 listed under the Prevnar 13 and we only give one. Schedule of Personalized Health Plan The best way to stay healthy is to live a healthy lifestyle. A healthy lifestyle includes regular exercise, eating a well-balanced diet, keeping a healthy weight and not smoking. Regular physical exams and screening tests are another important way to take care of yourself. Preventive exams provided by health care providers can find health problems early when treatment works best and can keep you from getting certain diseases or illnesses. Preventive services include exams, lab tests, screening tests, shots, and learning information to help you take care of your own health. The CDC recommends pneumonia vaccines for anyone 72 years and older. These vaccines are usually only needed once in a lifetime unless your healthcare provider decides differently. The 2 pneumonia shots available presently are PCV 13 (Prevnar 13) and PPSV23 (Pneumovax 23). Adults 72 years or older who have not previously received PCV13, should receive a dose of PCV13 first, followed 1 year later by a dose of PPSV23. All people over 65 should have a yearly flu vaccine. People over 65 are at medium to high risk for Hepatitis B. Hepatitis B is transmitted through body fluids with a common source being sexual activity or IV drug use. Three shots are needed for complete protection. The CDC recommends the herpes zoster (shingles) vaccine for all adults 61 and older, regardless if a prior episode of zoster has been reported. In addition to your physical exam, some screening tests are recommended: 
 
Osteoporosis screening -There are no signs or symptoms of osteoporosis (weakening of bones). You might not know you have the disease until you break a bone.  Thats why its so important to get a bone density test to measure your bone strength. Bone mass measurement is taken with a Dexa scan and is recommended every two years after 72years old or if you have certain risk factors, such as personal history of vertebral fracture or chronic steroid medication use. Diabetes Mellitus screening is recommended every year. This is a blood test, called a hemoglobin A1c, which measures the average blood sugar over a 3 month period. Glaucoma is an eye disease caused by high pressure in the eye. An eye exam is recommended every year. Cardiovascular screening tests that check your cholesterol and other blood fat (lipid) levels are recommended every five years or yearly if you are on medications for cardiovascular disease. Colorectal Cancer screening tests help to find pre-cancerous polyps (growths in the colon) so they can be removed before they turn into cancer. Screening tests are recommended starting at age 48 or earlier if you have a certain risk factors, such as a family history of colon cancer. Breast Cancer screening is done with a mammogram, a low dose x-ray that looks at breast tissue. It is recommended by the [address] [place] [address] Ave that women 54 years and older get a mammogram every 2 years. After the age of 76, recommendations are based on life expectancy. Cervical Cancer screening is done by a PAP smear during a pelvic exam.  The American College of Obstetricians and Gynecologists states that screening can be discontinued after 72years old if the person has had adequate negative prior screening results and no abnormal history (abnormal = CIN2 or higher level). Here is a list of your current Health Maintenance items including a date when each one is due next: 
Health Maintenance Topic Date Due  GLAUCOMA SCREENING Q2Y  05/10/2016  Pneumococcal 65+ years (2 of 2 - PPSV23) 04/04/2019  BREAST CANCER SCRN MAMMOGRAM  08/25/2019  MEDICARE YEARLY EXAM  10/17/2019  COLONOSCOPY  06/03/2024  DTaP/Tdap/Td series (3 - Td) 04/04/2028  Hepatitis C Screening  Completed  Bone Densitometry (Dexa) Screening  Completed  Shingrix Vaccine Age 50>  Completed  Influenza Age 5 to Adult  Completed You do not have an [address] on file, signed 2017 and naming Haider Sutherland as your agent. 2) Labs The following blood work was ordered today. Once the tests are completed, you will receive a letter, email or phone call with the results. If you have not heard from us within 10-14 days, please call the office for the results. Complete Blood Count (CBC) helps evaluate your overall health, including hemoglobin and red blood cells that carry oxygen, white blood cells that fight infection and platelets that help with clotting. Complete Metabolic Panel (CMP) assesses electrolytes, kidney and liver function.  (A Basic Metabolic Panel (BMP) does not include liver function.) Electrolytes include sodium, potassium, calcium, and chloride. These are necessary for cell function and an imbalance can cause serious problems. BUN and creatinine are markers of kidney function. ALT and AST are markers of liver function. Hemoglobin A1c is a 3 month average of your blood sugar and used as a marker of your diabetes control. A normal value is less than 5.7%. Total Cholesterol is the total number of cholesterol particles in your blood, which includes triglycerides, HDL and LDL. A small amount of cholesterol is needed for the body's cells, hormones, and metabolism. Goal is less than 200. Triglycerides are the short term fats in your blood which are used for energy. Goal is less than 150. High Density Lipids (HDL) is the \"good\" cholesterol in your blood. HDL helps remove bad cholesterol from your blood. Goal is more than 40. Low Density Lipids (LDL) is the \"bad\" cholesterol in your blood. LDL can stick to your arteries, raising the risk for heart attack and stroke. Goal is less than 100 Urinalysis - this is a test of your urine to check your overall health. It is recommended as a part of a routine check up and screens for a variety of disorders, such as urinary tract infections, kidney disease and diabetes. Vitamin D is important for absorbing calcium and promoting bone growth. If you are low in Vitamin D, you may experience fatigue, back or bone pain, hair loss, muscle pain, slow wound healing, depression. Your body can make Vitamin D after exposure to sunlight or through foods like fatty fish (tuna, mackerel, salmon), food with Vitamin D added (dairy products, orange juice and cereals) and cheese, egg yolks and liver. Vitamin D decreases with age and in the winter time when the sun is not strong. A deficiency in Vitamin D has been linked to certain cancers (breast, prostate, colon) as well as heart disease, depression and weight gain. 3) Shingles Vaccine - Shingrix Herpes Zoster (Shingles) Herpes zoster (shingles) is a painful rash caused by the same virus that causes chickenpox. After an episode of chickenpox, the virus retreats to cells of the nervous system, where it can reside quietly for decades. However, later in life, the varicella-zoster virus can become active again. When it reactivates, it causes shingles. Shingles can affect people of all ages. It is particularly common in adults over age 48 years. It is also more common in individuals of all ages with conditions that weaken the immune system. Pain is usually the first sign of shingles. Other symptoms include: fever, chills, headache, numbness, tingling and/or burning pain and a skin rash. The rash can appear anywhere on your body, but most commonly on the torso. It is usually on only 1 side of your body, in a band or beltlike pattern. During the first several days, small, painful blisters appear in the area.  Scarring may occur where the blisters appear and there may be continued sensitivity and pain in that patch of skin for months after the infection. Treatment:  
There is no cure for shingles - it is usually self-limiting. However, anti-viral medications can reduce the spread of the rash, speed healing and decrease the risk of complications. Supportive Treatment:  
1)  Tylenol or ibuprofen can be used to reduce pain 2) Colloidal oatmeal bath or wet cool compresses may help with itching. Make a solution of cool water and cornstarch, baking soda, or Aveeno Oatmeal.  Apply the solution as a compress to the area. This will soothe the skin. 3) Wash the skin with soap and water to keep rash free of infection. 4) Reduce stress - exercise, yoga, healthy diet THE BLISTER FLUID IS CONTAGIOUS TO ANYONE WHO HAS NOT ALREADY HAD CHICKEN POX. Stay home from work or school until all blisters have formed a crust (usually 2 weeks after the illness begins) and you are no longer contagious. Prevention: The CDC recommends everyone over the age of 61 receive the shingles vaccine. This is recommended for people who have already had a shingles outbreak as it could prevent future occurrences of the disease. According to the CDC, it is also recommended for people born before [birth year] in the [address] [facility] who have not had varicella (chicken pox) as they are considered immune. [facility] is a vaccine indicated for prevention of herpes zoster (shingles) in adults aged 48 years and older and is the preferred vaccine for preventing shingles and related complications The Center for Disease Control and Prevention recommended Shingrix for the following: 
- healthy adults aged 48 years and older to prevent shingles and related complications 
- adults who previously received the current shingles vaccine (Zostavax®) to prevent shingles and related complications Two doses (0.5 mL each) are needed for full efficacy.   The vaccines are administered intramuscularly, with the second one administered 2-6 months after the initial vaccine. 4) Osteoporosis screening -There are no signs or symptoms of osteoporosis (weakening of bones). You might not know you have the disease until you break a bone. Thats why its so important to get a bone density test to measure your bone strength. Bone mass measurement is taken with a Dexa scan and is recommended every two years after 72years old or if you have certain risk factors, such as personal history of vertebral fracture or chronic steroid medication use. The Dexa Scan compares your bone density to women of the same age (Z score) as well as a healthy 27year old (T score). The lower the score (more negative), the lower your bone density. A T score of -2.5 or lower indicates osteoporosis (bone loss). A T-score between -1.0 to -2.5 indicates osteopenia (bone thinning). A T-score above -1.0 is normal.   
 
 
5)  Healthy Weight Body Mass Index is a noninvasive way to screen for weight and body fat. This is a mathematical calculation based on your height and weight. A healthy BMI is between 20% -24.9%. Your BMI is 42 %. There is a relationship between high BMI and various healthy problems, such as osteoarthritis, muscle pain, increased risk of cancer, diabetes, heart disease, stroke, hypertension, high cholesterol, sleep apnea, breathing problems, depression, which is why a healthy BMI is so important. In terms of weight loss, a 5-10% reduction in body weight over 3-6 months is a reasonable goal.  There would likely be improvements in risk for disease such as diabetes and heart disease, with this amount of weight loss. In order to lose weight, try reducing your daily intake of calories by decreasing portion size of food and increase exercise to help reduce weight. Eat 3-5 small meals per day instead of 3 large meals. A good tip to losing weight : DON'T EAT OR DRINK ANYTHING AFTER DINNER! Choose lean meats for protein source which include chicken, pork, and turkey. The recommended serving size for protein is a 2-3 oz serving (the size of your fist), and 1-1.5 oz of carbohydrate per meal (about 1 cup). Increase servings of fruits and vegetables. Limit processed carbohydrates, (i.e. most breads, crackers, pasta, chips, rice, breaded or battered food, etc). If you choose to eat carbohydrates, whole wheat, (instead of white) is a healthier option for bread, rice, and crackers. Avoid fried foods. Limit sugar. Do your best to avoid all sweetened beverages, such as alcohol, juice, sodas or sweet teas, drink water, unsweet tea, diet soda, or crystal light as options instead. (Don't drink more than 2 of the 12oz artificially sweetened drinks per day as these can increase hunger and make it more difficult to lose weight. The daily goal for water intake should be at least 64 oz/day for most people. Daily exercise will also help with weight loss and overall health. A minimum of 150 minutes a week of moderate exercise is recommended (30 minutes per day). Make exercise a routine part of your day - for example, park in spaces far away from [facility], take stairs instead of elevator, if sitting for long periods, get up from chair and walk every hour. Recruit a friend or relative to exercise with you and keep you on schedule. It is much easier to exercise with a avel who will make sure you work out each day! Home DVDs can be a great way to work out, if you will do them (otherwise, they aren't worth the money!) [employer] is a eight week mixed martial arts (MMA) based workout. It has 5 main workout DVDs, each one is 45 minutes consisting of a 10 minute warm-up, five 5 minute workouts and a 6 minute stretching/cool down. It is easy to follow, with options to modify each move and you only need hand weights and some space to do the work out. Meal planning smart phone applications like [employer] can help plan healthy meals. Get 7-8 hours of sleep each night. For reliable dietary information, go to www. EATRIGHT.org. 
 
You may wish to consider seeing the nutritionist at [facility] [phone]), [facility] ([phone]) or [place] ([phone]). Alternatively, you can dial [employer] at [phone] (Monday - Friday 9am - 5pm) for a complimentary (free) conversation about your diet. Meal plans, grocery list and tips for healthy eating can be sent to you upon request.  
 
Free smart phone application to help manage weight loss: MyFitnessPal = tracks food intake, exercise and weight. Daily nutritional summary. Meal  Learning About the [address] Diet What is the Mediterranean diet? The Mediterranean diet is a style of eating rather than a diet plan. It features foods eaten in [place], [place], [place] and [place], and other countries along the [facility]. It emphasizes eating foods like fish, fruits, vegetables, beans, high-fiber breads and whole grains, nuts, and olive oil. This style of eating includes limited red meat, cheese, and sweets. Why choose the Mediterranean diet? A Mediterranean-style diet may improve heart health. It contains more fat than other heart-healthy diets. But the fats are mainly from nuts, unsaturated oils (such as fish oils and olive oil), and certain nut or seed oils (such as canola, soybean, or flaxseed oil). These fats may help protect the heart and blood vessels. How can you get started on the Mediterranean diet? Here are some things you can do to switch to a more Mediterranean way of eating. What to eat · Eat a variety of fruits and vegetables each day, such as grapes, blueberries, tomatoes, broccoli, peppers, figs, olives, spinach, eggplant, beans, lentils, and chickpeas. · Eat a variety of whole-grain foods each day, such as oats, brown rice, and whole wheat bread, pasta, and couscous. · Eat fish at least 2 times a week. Try tuna, salmon, mackerel, lake trout, herring, or sardines. · Eat moderate amounts of low-fat dairy products, such as milk, cheese, or yogurt. · Eat moderate amounts of poultry and eggs. · Choose healthy (unsaturated) fats, such as nuts, olive oil, and certain nut or seed oils like canola, soybean, and flaxseed. · Limit unhealthy (saturated) fats, such as butter, palm oil, and coconut oil. And limit fats found in animal products, such as meat and dairy products made with whole milk. Try to eat red meat only a few times a month in very small amounts. · Limit sweets and desserts to only a few times a week. This includes sugar-sweetened drinks like soda. The Mediterranean diet may also include red wine with your meal1 glass each day for women and up to 2 glasses a day for men. Tips for eating at home · Use herbs, spices, garlic, lemon zest, and citrus juice instead of salt to add flavor to foods. · Add avocado slices to your sandwich instead of hwang. · Have fish for lunch or dinner instead of red meat. Brush the fish with olive oil, and broil or grill it. · Sprinkle your salad with seeds or nuts instead of cheese. · Cook with olive or canola oil instead of butter or oils that are high in saturated fat. · Switch from 2% milk or whole milk to 1% or fat-free milk. · Dip raw vegetables in a vinaigrette dressing or hummus instead of dips made from mayonnaise or sour cream. 
· Have a piece of fruit for dessert instead of a piece of cake. Try baked apples, or have some dried fruit. Tips for eating out · Try broiled, grilled, baked, or poached fish instead of having it fried or breaded. · Ask your  to have your meals prepared with olive oil instead of butter. · Order dishes made with marinara sauce or sauces made from olive oil. Avoid sauces made from cream or mayonnaise. · Choose whole-grain breads, whole wheat pasta and pizza crust, brown rice, beans, and lentils. · Cut back on butter or margarine on bread. Instead, you can dip your bread in a small amount of olive oil. · Ask for a side salad or grilled vegetables instead of french fries or chips. Where can you learn more? Go to http://zeina-mary jane.info/. Enter 444-526-7603 in the search box to learn more about \"Learning About the Mediterranean Diet. \" Current as of: November 7, 2018 Content Version: 12.2 © 9975-7189 [employer]. Care instructions adapted under license by [employer] (which disclaims liability or warranty for this information). If you have questions about a medical condition or this instruction, always ask your healthcare professional. [address] any warranty or liability for your use of this information. Bronchitis: Care Instructions Your Care Instructions Bronchitis is inflammation of the bronchial tubes, which carry air to the lungs. The tubes swell and produce mucus, or phlegm. The mucus and inflamed bronchial tubes make you cough. You may have trouble breathing. Most cases of bronchitis are caused by viruses like those that cause colds. Antibiotics usually do not help and they may be harmful. Bronchitis usually develops rapidly and lasts about 2 to 3 weeks in otherwise healthy people. Follow-up care is a key part of your treatment and safety. Be sure to make and go to all appointments, and call your doctor if you are having problems. It's also a good idea to know your test results and keep a list of the medicines you take. How can you care for yourself at home? · Take all medicines exactly as prescribed. Call your doctor if you think you are having a problem with your medicine.  
· Get some extra rest. 
· Take an over-the-counter pain medicine, such as acetaminophen (Tylenol), ibuprofen (Advil, Motrin), or naproxen (Aleve) to reduce fever and relieve body aches. Read and follow all instructions on the label. · Do not take two or more pain medicines at the same time unless the doctor told you to. Many pain medicines have acetaminophen, which is Tylenol. Too much acetaminophen (Tylenol) can be harmful. · Take an over-the-counter cough medicine that contains dextromethorphan to help quiet a dry, hacking cough so that you can sleep. Avoid cough medicines that have more than one active ingredient. Read and follow all instructions on the label. · Breathe moist air from a humidifier, hot shower, or sink filled with hot water. The heat and moisture will thin mucus so you can cough it out. · Do not smoke. Smoking can make bronchitis worse. If you need help quitting, talk to your doctor about stop-smoking programs and medicines. These can increase your chances of quitting for good. When should you call for help? Call 911 anytime you think you may need emergency care. For example, call if: 
  · You have severe trouble breathing.  
 Call your doctor now or seek immediate medical care if: 
  · You have new or worse trouble breathing.  
  · You cough up dark brown or bloody mucus (sputum).  
  · You have a new or higher fever.  
  · You have a new rash.  
 Watch closely for changes in your health, and be sure to contact your doctor if: 
  · You cough more deeply or more often, especially if you notice more mucus or a change in the color of your mucus.  
  · You are not getting better as expected. Where can you learn more? Go to http://zeina-mary jane.info/. Enter H333 in the search box to learn more about \"Bronchitis: Care Instructions. \" Current as of: June 9, 2019 Content Version: 12.2 © 4826-0813 [employer].  Care instructions adapted under license by [employer] (which disclaims liability or warranty for this information). If you have questions about a medical condition or this instruction, always ask your healthcare professional. [place] [address] any warranty or liability for your use of this information.

## 2019-12-19 LAB
25(OH)D3+25(OH)D2 SERPL-MCNC: 63.8 NG/ML (ref 30–100)
ALBUMIN SERPL-MCNC: 4.3 G/DL (ref 3.6–4.8)
ALBUMIN/GLOB SERPL: 1.3 {RATIO} (ref 1.2–2.2)
ALP SERPL-CCNC: 96 IU/L (ref 39–117)
ALT SERPL-CCNC: 19 IU/L (ref 0–32)
APPEARANCE UR: ABNORMAL
AST SERPL-CCNC: 19 IU/L (ref 0–40)
BACTERIA #/AREA URNS HPF: ABNORMAL /[HPF]
BACTERIA UR CULT: NORMAL
BASOPHILS # BLD AUTO: 0.1 X10E3/UL (ref 0–0.2)
BASOPHILS NFR BLD AUTO: 1 %
BILIRUB SERPL-MCNC: 0.2 MG/DL (ref 0–1.2)
BILIRUB UR QL STRIP: NEGATIVE
BUN SERPL-MCNC: 20 MG/DL (ref 8–27)
BUN/CREAT SERPL: 22 (ref 12–28)
CALCIUM SERPL-MCNC: 10 MG/DL (ref 8.7–10.3)
CASTS URNS QL MICRO: ABNORMAL /LPF
CHLORIDE SERPL-SCNC: 98 MMOL/L (ref 96–106)
CHOLEST SERPL-MCNC: 170 MG/DL (ref 100–199)
CO2 SERPL-SCNC: 25 MMOL/L (ref 20–29)
COLOR UR: YELLOW
CREAT SERPL-MCNC: 0.92 MG/DL (ref 0.57–1)
EOSINOPHIL # BLD AUTO: 0.2 X10E3/UL (ref 0–0.4)
EOSINOPHIL NFR BLD AUTO: 2 %
EPI CELLS #/AREA URNS HPF: >10 /HPF (ref 0–10)
ERYTHROCYTE [DISTWIDTH] IN BLOOD BY AUTOMATED COUNT: 14.4 % (ref 12.3–15.4)
EST. AVERAGE GLUCOSE BLD GHB EST-MCNC: 117 MG/DL
GLOBULIN SER CALC-MCNC: 3.2 G/DL (ref 1.5–4.5)
GLUCOSE SERPL-MCNC: 98 MG/DL (ref 65–99)
GLUCOSE UR QL: NEGATIVE
HBA1C MFR BLD: 5.7 % (ref 4.8–5.6)
HCT VFR BLD AUTO: 38.9 % (ref 34–46.6)
HDLC SERPL-MCNC: 56 MG/DL
HGB BLD-MCNC: 13.5 G/DL (ref 11.1–15.9)
HGB UR QL STRIP: NEGATIVE
IMM GRANULOCYTES # BLD AUTO: 0 X10E3/UL (ref 0–0.1)
IMM GRANULOCYTES NFR BLD AUTO: 0 %
INTERPRETATION, 910389: NORMAL
KETONES UR QL STRIP: ABNORMAL
LDLC SERPL CALC-MCNC: 96 MG/DL (ref 0–99)
LEUKOCYTE ESTERASE UR QL STRIP: ABNORMAL
LYMPHOCYTES # BLD AUTO: 3.3 X10E3/UL (ref 0.7–3.1)
LYMPHOCYTES NFR BLD AUTO: 35 %
MCH RBC QN AUTO: 30.1 PG (ref 26.6–33)
MCHC RBC AUTO-ENTMCNC: 34.7 G/DL (ref 31.5–35.7)
MCV RBC AUTO: 87 FL (ref 79–97)
MICRO URNS: ABNORMAL
MONOCYTES # BLD AUTO: 1.1 X10E3/UL (ref 0.1–0.9)
MONOCYTES NFR BLD AUTO: 12 %
MUCOUS THREADS URNS QL MICRO: PRESENT
NEUTROPHILS # BLD AUTO: 4.8 X10E3/UL (ref 1.4–7)
NEUTROPHILS NFR BLD AUTO: 50 %
NITRITE UR QL STRIP: NEGATIVE
PH UR STRIP: 5.5 [PH] (ref 5–7.5)
PLATELET # BLD AUTO: 429 X10E3/UL (ref 150–450)
POTASSIUM SERPL-SCNC: 4.7 MMOL/L (ref 3.5–5.2)
PROT SERPL-MCNC: 7.5 G/DL (ref 6–8.5)
PROT UR QL STRIP: ABNORMAL
RBC # BLD AUTO: 4.49 X10E6/UL (ref 3.77–5.28)
RBC #/AREA URNS HPF: ABNORMAL /HPF (ref 0–2)
SODIUM SERPL-SCNC: 137 MMOL/L (ref 134–144)
SP GR UR: >=1.03 (ref 1–1.03)
TRIGL SERPL-MCNC: 90 MG/DL (ref 0–149)
URINALYSIS REFLEX, 377202: ABNORMAL
UROBILINOGEN UR STRIP-MCNC: 1 MG/DL (ref 0.2–1)
VLDLC SERPL CALC-MCNC: 18 MG/DL (ref 5–40)
WBC # BLD AUTO: 9.5 X10E3/UL (ref 3.4–10.8)
WBC #/AREA URNS HPF: ABNORMAL /HPF (ref 0–5)

## 2019-12-23 ENCOUNTER — HOSPITAL ENCOUNTER (OUTPATIENT)
Dept: BONE DENSITY | Age: 69
Discharge: HOME | End: 2019-12-23
Attending: NURSE PRACTITIONER
Payer: MEDICARE

## 2019-12-23 DIAGNOSIS — M81.0 OSTEOPOROSIS WITHOUT CURRENT PATHOLOGICAL FRACTURE, UNSPECIFIED OSTEOPOROSIS TYPE: ICD-10-CM

## 2019-12-23 PROCEDURE — 77080 DXA BONE DENSITY AXIAL: CPT

## 2019-12-27 ENCOUNTER — TELEPHONE (OUTPATIENT)
Dept: FAMILY MEDICINE CLINIC | Age: 69
End: 2019-12-27

## 2019-12-27 RX ORDER — CHOLECALCIFEROL (VITAMIN D3) 125 MCG
2000 CAPSULE ORAL DAILY
Qty: 90 TAB | Refills: 1 | Status: SHIPPED | OUTPATIENT
Start: 2019-12-27 | End: 2020-06-04 | Stop reason: SDUPTHER

## 2019-12-27 NOTE — TELEPHONE ENCOUNTER
----- Message from Alyssa Rodriguez sent at 12/26/2019  1:39 PM EST -----  Regarding: Tiffanie/Telephone  Contact: [phone]  Caller's first and last name and relationship (if not the patient): pt  Best contact number(s): [phone]  Whose call is being returned: Unknown  Details to clarify the request: Pt returning someone's call from the office.

## 2019-12-27 NOTE — TELEPHONE ENCOUNTER
Placed call to pt. Two pt identifiers confirmed. Pt informed of Bone density scan results per NP Tiffanie, \"Osteopenia - increase Vit D to 2kunits daily. Result ltr mailed. \" Pt was also informed that Vit D3 2,000 unit prescription was ordered. Pt verbalized understanding of information discussed w/ no further questions at this time.

## 2020-01-04 ENCOUNTER — OFFICE VISIT (OUTPATIENT)
Dept: URGENT CARE | Age: 70
End: 2020-01-04

## 2020-01-04 VITALS
RESPIRATION RATE: 18 BRPM | OXYGEN SATURATION: 97 % | SYSTOLIC BLOOD PRESSURE: 134 MMHG | BODY MASS INDEX: 42.68 KG/M2 | TEMPERATURE: 98.7 F | HEART RATE: 100 BPM | DIASTOLIC BLOOD PRESSURE: 78 MMHG | WEIGHT: 250 LBS | HEIGHT: 64 IN

## 2020-01-04 DIAGNOSIS — J02.9 SORE THROAT: ICD-10-CM

## 2020-01-04 DIAGNOSIS — J06.9 VIRAL UPPER RESPIRATORY ILLNESS: Primary | ICD-10-CM

## 2020-01-04 RX ORDER — PREDNISONE 10 MG/1
TABLET ORAL
Qty: 12 TAB | Refills: 0 | Status: SHIPPED | OUTPATIENT
Start: 2020-01-04 | End: 2020-01-16 | Stop reason: ALTCHOICE

## 2020-01-04 NOTE — LETTER
NOTIFICATION RETURN TO WORK / SCHOOL 
 
1/4/2020 9:11 AM 
 
Ms. Vini Garcia [facility] [address] [phone] To Whom It May Concern: 
 
Vini Garcia is currently under the care of [address] [place] [address]. She will return to work/school on: 01/06 OR 01/07/2020 If there are questions or concerns please have the patient contact our office. Sincerely, GHE PROVIDER

## 2020-01-04 NOTE — PATIENT INSTRUCTIONS
Follow up with PCP without improvement over next 5-7 days sooner/immediately for new or worsening       Viral Respiratory Illness: Care Instructions  Your Care Instructions    Viruses are very small organisms. They grow in number after they enter your body. There are many types that cause different illnesses, such as colds and the mumps. The symptoms of a viral respiratory infection often start quickly. They include a fever, sore throat, and runny nose. You may also just not feel well. Or you may not want to eat much. Most viral respiratory infections are not serious. They usually get better with time and self-care. Antibiotics are not used to treat a viral infection. That's because antibiotics will not help cure a viral illness. In some cases, antiviral medicine can help your body fight a serious viral infection. Follow-up care is a key part of your treatment and safety. Be sure to make and go to all appointments, and call your doctor if you are having problems. It's also a good idea to know your test results and keep a list of the medicines you take. How can you care for yourself at home? · Rest as much as possible until you feel better. · Be safe with medicines. Take your medicine exactly as prescribed. Call your doctor if you think you are having a problem with your medicine. You will get more details on the specific medicine your doctor prescribes. · Take an over-the-counter pain medicine, such as acetaminophen (Tylenol), ibuprofen (Advil, Motrin), or naproxen (Aleve), as needed for pain and fever. Read and follow all instructions on the label. Do not give aspirin to anyone younger than 20. It has been linked to Reye syndrome, a serious illness. · Drink plenty of fluids, enough so that your urine is light yellow or clear like water. Hot fluids, such as tea or soup, may help relieve congestion in your nose and throat.  If you have kidney, heart, or liver disease and have to limit fluids, talk with your doctor before you increase the amount of fluids you drink. · Try to clear mucus from your lungs by breathing deeply and coughing. · Gargle with warm salt water once an hour. This can help reduce swelling and throat pain. Use 1 teaspoon of salt mixed in 1 cup of warm water. · Do not smoke or allow others to smoke around you. If you need help quitting, talk to your doctor about stop-smoking programs and medicines. These can increase your chances of quitting for good. To avoid spreading the virus  · Cough or sneeze into a tissue. Then throw the tissue away. · If you don't have a tissue, use your hand to cover your cough or sneeze. Then clean your hand. You can also cough into your sleeve. · Wash your hands often. Use soap and warm water. Wash for 15 to 20 seconds each time. · If you don't have soap and water near you, you can clean your hands with alcohol wipes or gel. When should you call for help? Call your doctor now or seek immediate medical care if:    · You have a new or higher fever.     · Your fever lasts more than 48 hours.     · You have trouble breathing.     · You have a fever with a stiff neck or a severe headache.     · You are sensitive to light.     · You feel very sleepy or confused.    Watch closely for changes in your health, and be sure to contact your doctor if:    · You do not get better as expected. Where can you learn more? Go to http://zeina-mary jane.info/. Enter D979 in the search box to learn more about \"Viral Respiratory Infection: Care Instructions. \"  Current as of: June 9, 2019  Content Version: 12.2  © 2536-5303 [employer]. Care instructions adapted under license by [employer] (which disclaims liability or warranty for this information).  If you have questions about a medical condition or this instruction, always ask your healthcare professional. [address] any warranty or liability for your use of this information.
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
## 2020-01-07 ENCOUNTER — OFFICE VISIT (OUTPATIENT)
Dept: FAMILY MEDICINE CLINIC | Age: 70
End: 2020-01-07

## 2020-01-07 VITALS
OXYGEN SATURATION: 96 % | RESPIRATION RATE: 18 BRPM | DIASTOLIC BLOOD PRESSURE: 73 MMHG | SYSTOLIC BLOOD PRESSURE: 132 MMHG | HEIGHT: 64 IN | HEART RATE: 81 BPM | WEIGHT: 247.4 LBS | BODY MASS INDEX: 42.24 KG/M2 | TEMPERATURE: 96.5 F

## 2020-01-07 DIAGNOSIS — I10 ESSENTIAL HYPERTENSION: Primary | ICD-10-CM

## 2020-01-07 NOTE — PROGRESS NOTES
S: Huong Blackmon is a 77 y.o. BLACK OR  female who presents for HTN/blood pressure follow up. Assessment/Plan:  1. Essential hypertension  -current therapy: HCTZ 25mg  -BP today: 132/73  -discussed healthy diet and increasing daily exercise    RTC 6 months for HTN/med check      Huong Blackmon has gained 2lbs since 12/13/19  Current therapy: HCTZ 25mg  Taking as prescribed    BP today: 132/73  No chest pain or discomfort, elevated heart rate,  palpitations or edema in extremities  No SOB  No Fatigue  No Dizziness    [facility] 1/4/20 - given pred, feeling better, lymphs feeling better     Review of Systems:  - Constitutional symptoms: no fevers/chills  - Eyes: no blurry vision or double vision   - Respiratory: no SOB, difficulty or pain with breathing, wheezes, or cough. - Gastrointestinal: no dysphagia or abdominal pain  ROS is negative otherwise. Social History:  Nutrition: needs improvement   Physical: will start in the new year   Social: lives with  and adult son   Occupation:  Work in [employer] part time and [employer] part time ()     Social History     Tobacco Use   Smoking Status Never Smoker   Smokeless Tobacco Never Used     Social History     Substance and Sexual Activity   Alcohol Use No     Social History     Substance and Sexual Activity   Drug Use No       3 most recent PHQ Screens 12/13/2019   Little interest or pleasure in doing things Not at all   Feeling down, depressed, irritable, or hopeless Not at all   Total Score PHQ 2 0       I reviewed the following:  Past Medical History:   Diagnosis Date    Aneurysm of thoracic aorta ([facility] [address] 01/2017    saw Dr. Alberto Aviles, then Dr. Alessia Lopez Hypertension     Knee osteoarthritis     managed with diclofenac po prn. sees Dr. Navya Gudino at [facility]. has had arthroscopy and IA injections in past with ortho.     Multiple lung nodules on CT 7/24/2017    Sciatica     manages with diclofenac. has some DDD.        Current Outpatient Medications   Medication Sig Dispense Refill    predniSONE (DELTASONE) 10 mg tablet Take 4 tabs by mouth once daily x 3 days. Take with food. 12 Tab 0    cholecalciferol, vitamin D3, 2,000 unit tab Take 1 Tab by mouth daily. 90 Tab 1    fluticasone propionate (FLONASE) 50 mcg/actuation nasal spray 2 Sprays by Both Nostrils route daily. 1 Bottle 1    hydroCHLOROthiazide (HYDRODIURIL) 25 mg tablet TAKE 1 TABLET BY MOUTH DAILY 90 Tab 1    nystatin-triamcinolone (MYCOLOG) 100,000-0.1 unit/gram-% ointment Apply  to affected area two (2) times a day. 60 g 1    diclofenac EC (VOLTAREN) 75 mg EC tablet Take 1 Tab by mouth two (2) times daily as needed for Pain. 180 Tab 1    miscellaneous medical supply misc InterDry sheet applied to affected area daily. Dx:B37.2 5 Each 1    albuterol (VENTOLIN HFA) 90 mcg/actuation inhaler Ventolin HFA 90 mcg/actuation aerosol inhaler      aspirin delayed-release 81 mg tablet Take  by mouth daily. Allergies   Allergen Reactions    Latex Hives    Flowers Shortness of Breath     Fresh cut flowers are worse    Fruit C [Ascorbic Acid] Hives     ALL FRUITS    Pcn [Penicillins] Hives       O: VS:   Visit Vitals  /73 (BP 1 Location: Left arm, BP Patient Position: Sitting)   Pulse 81   Temp 96.5 °F (35.8 °C) (Oral)   Resp 18   Ht 5' 4\" (1.626 m)   Wt 247 lb 6.4 oz (112.2 kg)   SpO2 96%   BMI 42.47 kg/m²     Data Reviewed:   · A1C:      Lab Results   Component Value Date/Time    Hemoglobin A1c 5.7 (H) 12/16/2019 08:35 AM     · Lipids:  Lab Results   Component Value Date/Time    Cholesterol, total 170 12/16/2019 08:35 AM    HDL Cholesterol 56 12/16/2019 08:35 AM    LDL, calculated 96 12/16/2019 08:35 AM    VLDL, calculated 18 12/16/2019 08:35 AM    Triglyceride 90 12/16/2019 08:35 AM    CHOL/HDL Ratio 2.9 03/21/2015 01:56 AM       GENERAL: Climmiezra Stern is in no acute distress. EYE: PERRLA. EOMs intact. Sclera anicteric without injection. RESP: Unlabored without SOB. Speaking in full sentences. Breath sounds are symmetrical bilaterally. Clear to auscultation to all fields. No wheezes. No rales or rhonchi. CV: Normal rate. Regular rhythm. S1, S2 audible. No murmur noted. No rubs, clicks or gallops noted. NEURO:  awake, alert and oriented to person, place, and time and event. Clear speech. Steady gait. HEME/LYMPH: Pedal pulses palpable 2+ x 4 extremities. No peripheral edema is noted. SKIN: Skin is warm and dry. Turgor is normal. No petechiae, no purpura, no rash. No cyanosis. No mottling, jaundice or pallor. ____________________________________________________________________  Reviewed medications and side effects. Reviewed warning signs of hypertension, stroke and heart attack. Advised on nutrition, physical activity, weight management, tobacco, and alcohol.     Patient verbalized understanding and agreed to plan of care. Follow up in 6 months as directed.

## 2020-01-07 NOTE — PROGRESS NOTES
Huong Blackmon is a 77 y.o. female  Chief Complaint   Patient presents with    Follow-up     4 weeks f/u    Other     Respiratory virus , given prednisone. Health Maintenance Due   Topic Date Due    GLAUCOMA SCREENING Q2Y  05/10/2016    Pneumococcal 65+ years (2 of 2 - PPSV23) 04/04/2019    BREAST CANCER SCRN MAMMOGRAM  08/25/2019     Visit Vitals  /73 (BP 1 Location: Left arm, BP Patient Position: Sitting)   Pulse 81   Temp 96.5 °F (35.8 °C) (Oral)   Resp 18   Ht 5' 4\" (1.626 m)   Wt 247 lb 6.4 oz (112.2 kg)   SpO2 96%   BMI 42.47 kg/m²     1. Have you been to the ER, urgent care clinic since your last visit? Hospitalized since your last visit? Yes, Urgent care on [address] And went for Respiratory virus. On Saturday morning at 8:05 am. (1/4/2019)    2. Have you seen or consulted any other health care providers outside of the [address] [place] [address] since your last visit? Include any pap smears or colon screening.  No.

## 2020-01-07 NOTE — PATIENT INSTRUCTIONS
1) Blood pressure looks good today - 132/73. Continue   check your blood pressure through the week at staggered times in the day. (If you check in the morning, it should be at least one hour after your morning blood pressure medications.)      Arm monitors are most accurate. If you use a wrist monitor, make sure your wrist is at heart level. You can bring your home monitor to your next visit and have it calibrated with the machine in the office to gauge your readings. Sit  with your feet uncrossed and relax for 5 minutes before taking your BP. Keep a written record of your blood pressure readings and bring it to each appointment. If your systolic (top) blood pressure is consistently greater than 140mmHg or less than [id number] of the diastolic (bottom) number is consistently greater than 90mmHg or less than 60mmHg then please schedule an office appointment. Work on healthy eating - no salt diet, more potassium (helps flush out sodium,making healthier heart and arteries) - and incorporating daily exercise into your routine. Cardiac symptoms that would need immediate attention include: uncomfortable pressure, squeezing, fullness or pain in the center of your chest. Pain or discomfort in one or both arms, the back, neck, jaw or stomach. Shortness of breath with or without chest discomfort, breaking out in a cold sweat, nausea or lightheadedness. Learning About the [address] Diet  What is the Mediterranean diet? The Mediterranean diet is a style of eating rather than a diet plan. It features foods eaten in [place], [place], [place] and [place], and other countries along the [facility]. It emphasizes eating foods like fish, fruits, vegetables, beans, high-fiber breads and whole grains, nuts, and olive oil. This style of eating includes limited red meat, cheese, and sweets. Why choose the Mediterranean diet? A Mediterranean-style diet may improve heart health.  It contains more fat than other heart-healthy diets. But the fats are mainly from nuts, unsaturated oils (such as fish oils and olive oil), and certain nut or seed oils (such as canola, soybean, or flaxseed oil). These fats may help protect the heart and blood vessels. How can you get started on the Mediterranean diet? Here are some things you can do to switch to a more Mediterranean way of eating. What to eat  · Eat a variety of fruits and vegetables each day, such as grapes, blueberries, tomatoes, broccoli, peppers, figs, olives, spinach, eggplant, beans, lentils, and chickpeas. · Eat a variety of whole-grain foods each day, such as oats, brown rice, and whole wheat bread, pasta, and couscous. · Eat fish at least 2 times a week. Try tuna, salmon, mackerel, lake trout, herring, or sardines. · Eat moderate amounts of low-fat dairy products, such as milk, cheese, or yogurt. · Eat moderate amounts of poultry and eggs. · Choose healthy (unsaturated) fats, such as nuts, olive oil, and certain nut or seed oils like canola, soybean, and flaxseed. · Limit unhealthy (saturated) fats, such as butter, palm oil, and coconut oil. And limit fats found in animal products, such as meat and dairy products made with whole milk. Try to eat red meat only a few times a month in very small amounts. · Limit sweets and desserts to only a few times a week. This includes sugar-sweetened drinks like soda. The Mediterranean diet may also include red wine with your meal1 glass each day for women and up to 2 glasses a day for men. Tips for eating at home  · Use herbs, spices, garlic, lemon zest, and citrus juice instead of salt to add flavor to foods. · Add avocado slices to your sandwich instead of hwang. · Have fish for lunch or dinner instead of red meat. Brush the fish with olive oil, and broil or grill it. · Sprinkle your salad with seeds or nuts instead of cheese.   · Cook with olive or canola oil instead of butter or oils that are high in saturated fat. · Switch from 2% milk or whole milk to 1% or fat-free milk. · Dip raw vegetables in a vinaigrette dressing or hummus instead of dips made from mayonnaise or sour cream.  · Have a piece of fruit for dessert instead of a piece of cake. Try baked apples, or have some dried fruit. Tips for eating out  · Try broiled, grilled, baked, or poached fish instead of having it fried or breaded. · Ask your  to have your meals prepared with olive oil instead of butter. · Order dishes made with marinara sauce or sauces made from olive oil. Avoid sauces made from cream or mayonnaise. · Choose whole-grain breads, whole wheat pasta and pizza crust, brown rice, beans, and lentils. · Cut back on butter or margarine on bread. Instead, you can dip your bread in a small amount of olive oil. · Ask for a side salad or grilled vegetables instead of french fries or chips. Where can you learn more? Go to http://zeina-mary jane.info/. Enter 996-202-6588 in the search box to learn more about \"Learning About the Mediterranean Diet. \"  Current as of: November 7, 2018  Content Version: 12.2  © 1851-1569 [employer]. Care instructions adapted under license by [employer] (which disclaims liability or warranty for this information). If you have questions about a medical condition or this instruction, always ask your healthcare professional. [place] [address] any warranty or liability for your use of this information.

## 2020-01-10 ENCOUNTER — HOSPITAL ENCOUNTER (OUTPATIENT)
Dept: MAMMOGRAPHY | Age: 70
Discharge: HOME | End: 2020-01-10
Payer: MEDICARE

## 2020-01-10 DIAGNOSIS — Z12.31 VISIT FOR SCREENING MAMMOGRAM: ICD-10-CM

## 2020-01-10 PROCEDURE — 77067 SCR MAMMO BI INCL CAD: CPT

## 2020-01-14 NOTE — TELEPHONE ENCOUNTER
Requested Prescriptions     Pending Prescriptions Disp Refills    fluticasone propionate (FLONASE) 50 mcg/actuation nasal spray 1 Bottle 1     Si Sprays by Both Nostrils route daily.

## 2020-01-15 NOTE — TELEPHONE ENCOUNTER
PCP: Sue Crum NP    Last appt: 2020  Future Appointments   Date Time Provider Van Serra   3/10/2020  8:00 AM LAB [facility]       Requested Prescriptions     Pending Prescriptions Disp Refills    fluticasone propionate (FLONASE) 50 mcg/actuation nasal spray 1 Bottle 1     Si Sprays by Both Nostrils route daily.

## 2020-01-16 ENCOUNTER — OFFICE VISIT (OUTPATIENT)
Dept: FAMILY MEDICINE CLINIC | Age: 70
End: 2020-01-16

## 2020-01-16 VITALS
DIASTOLIC BLOOD PRESSURE: 62 MMHG | HEART RATE: 114 BPM | OXYGEN SATURATION: 96 % | TEMPERATURE: 98.9 F | RESPIRATION RATE: 18 BRPM | SYSTOLIC BLOOD PRESSURE: 110 MMHG

## 2020-01-16 DIAGNOSIS — J02.9 SORE THROAT: ICD-10-CM

## 2020-01-16 DIAGNOSIS — J30.89 NON-SEASONAL ALLERGIC RHINITIS, UNSPECIFIED TRIGGER: ICD-10-CM

## 2020-01-16 DIAGNOSIS — J06.9 UPPER RESPIRATORY TRACT INFECTION, UNSPECIFIED TYPE: Primary | ICD-10-CM

## 2020-01-16 LAB
S PYO AG THROAT QL: NEGATIVE
VALID INTERNAL CONTROL?: YES

## 2020-01-16 RX ORDER — FLUTICASONE PROPIONATE 50 MCG
2 SPRAY, SUSPENSION (ML) NASAL DAILY
Qty: 1 BOTTLE | Refills: 1 | OUTPATIENT
Start: 2020-01-16

## 2020-01-16 RX ORDER — FLUTICASONE PROPIONATE 50 MCG
2 SPRAY, SUSPENSION (ML) NASAL DAILY
Qty: 1 BOTTLE | Refills: 1 | Status: SHIPPED | OUTPATIENT
Start: 2020-01-16 | End: 2020-01-17 | Stop reason: DRUGHIGH

## 2020-01-16 NOTE — PROGRESS NOTES
Anika Cronin is a 77 y.o. female  HIPAA verified by two patient identifiers. Health Maintenance Due   Topic    GLAUCOMA SCREENING Q2Y     Pneumococcal 65+ years (2 of 2 - PPSV23)    BREAST CANCER SCRN MAMMOGRAM      Chief Complaint   Patient presents with    Sinus Infection     9/10 facial,right arm and neck [place]     Visit Vitals  /62 (BP 1 Location: Left arm, BP Patient Position: Sitting)   Pulse (!) 114   Temp 98.9 °F (37.2 °C) (Oral)   Resp 18   Ht (P) 5' 4\" (1.626 m)   Wt (P) 250 lb (113.4 kg)   SpO2 96%   BMI (P) 42.91 kg/m²       Pain Scale: /10  Pain Location:   1. Have you been to the ER, urgent care clinic since your last visit? Hospitalized since your last visit? No    2. Have you seen or consulted any other health care providers outside of the [address] [place] [address] since your last visit? Include any pap smears or colon screening.  No

## 2020-01-16 NOTE — PROGRESS NOTES
ST last night. Left side clogged. Slt yellow. Cough. Little better this AM.  Hoarse. No known exposures. Grandson had flu over [place]. Patient denies chest pain, dyspnea, unexpected weight change, unexpected pain, mood or memory changes. Visit Vitals  /62 (BP 1 Location: Left arm, BP Patient Position: Sitting)   Pulse (!) 114   Temp 98.9 °F (37.2 °C) (Oral)   Resp 18   Ht (P) 5' 4\" (1.626 m)   Wt (P) 250 lb (113.4 kg)   SpO2 96%   BMI (P) 42.91 kg/m²     Patient alert and cooperative. Reviewed above. Lungs clear. Mouth - posterior pharynx negative. Assessment:  1. Probable viral syndrome. Plan:  1. Call if persistent purulence, fever for antibiotic. 2. Use Zicam lozenges OTC. 3. Can use topical decongestant with a steroid nasal spray or saline spray for congestion. 4. Follow otherwise here prn.

## 2020-01-17 ENCOUNTER — APPOINTMENT (OUTPATIENT)
Dept: GENERAL RADIOLOGY | Age: 70
DRG: 872 | End: 2020-01-17
Attending: EMERGENCY MEDICINE
Payer: MEDICARE

## 2020-01-17 ENCOUNTER — APPOINTMENT (OUTPATIENT)
Dept: CT IMAGING | Age: 70
DRG: 872 | End: 2020-01-17
Attending: EMERGENCY MEDICINE
Payer: MEDICARE

## 2020-01-17 ENCOUNTER — HOSPITAL ENCOUNTER (INPATIENT)
Age: 70
LOS: 3 days | Discharge: HOME | DRG: 872 | End: 2020-01-20
Attending: EMERGENCY MEDICINE | Admitting: INTERNAL MEDICINE
Payer: MEDICARE

## 2020-01-17 DIAGNOSIS — R65.10 SIRS (SYSTEMIC INFLAMMATORY RESPONSE SYNDROME) (HCC): Primary | ICD-10-CM

## 2020-01-17 DIAGNOSIS — G44.209 TENSION HEADACHE: ICD-10-CM

## 2020-01-17 DIAGNOSIS — J03.90 TONSILLITIS: ICD-10-CM

## 2020-01-17 DIAGNOSIS — R09.81 NASAL CONGESTION: ICD-10-CM

## 2020-01-17 DIAGNOSIS — R06.2 WHEEZING: ICD-10-CM

## 2020-01-17 PROBLEM — A41.9 SEPSIS (HCC): Status: ACTIVE | Noted: 2020-01-17

## 2020-01-17 LAB
ALBUMIN SERPL-MCNC: 3.6 G/DL (ref 3.5–5)
ALBUMIN/GLOB SERPL: 0.7 {RATIO} (ref 1.1–2.2)
ALP SERPL-CCNC: 112 U/L (ref 45–117)
ALT SERPL-CCNC: 29 U/L (ref 12–78)
ANION GAP SERPL CALC-SCNC: 7 MMOL/L (ref 5–15)
APPEARANCE UR: CLEAR
AST SERPL-CCNC: 14 U/L (ref 15–37)
ATRIAL RATE: 120 BPM
B PERT DNA SPEC QL NAA+PROBE: NOT DETECTED
BACTERIA URNS QL MICRO: ABNORMAL /HPF
BASOPHILS # BLD: 0 K/UL (ref 0–0.1)
BASOPHILS NFR BLD: 0 % (ref 0–1)
BILIRUB SERPL-MCNC: 0.5 MG/DL (ref 0.2–1)
BILIRUB UR QL: NEGATIVE
BORDETELLA PARAPERTUSSIS PCR, BORPAR: NOT DETECTED
BUN SERPL-MCNC: 16 MG/DL (ref 6–20)
BUN/CREAT SERPL: 16 (ref 12–20)
C PNEUM DNA SPEC QL NAA+PROBE: NOT DETECTED
CALCIUM SERPL-MCNC: 9.5 MG/DL (ref 8.5–10.1)
CALCULATED P AXIS, ECG09: 53 DEGREES
CALCULATED R AXIS, ECG10: 30 DEGREES
CALCULATED T AXIS, ECG11: 41 DEGREES
CHLORIDE SERPL-SCNC: 103 MMOL/L (ref 97–108)
CO2 SERPL-SCNC: 29 MMOL/L (ref 21–32)
COLOR UR: ABNORMAL
CREAT SERPL-MCNC: 0.99 MG/DL (ref 0.55–1.02)
DIAGNOSIS, 93000: NORMAL
DIFFERENTIAL METHOD BLD: ABNORMAL
EOSINOPHIL # BLD: 0 K/UL (ref 0–0.4)
EOSINOPHIL NFR BLD: 0 % (ref 0–7)
EPITH CASTS URNS QL MICRO: ABNORMAL /LPF
ERYTHROCYTE [DISTWIDTH] IN BLOOD BY AUTOMATED COUNT: 15.1 % (ref 11.5–14.5)
FLUAV AG NPH QL IA: NEGATIVE
FLUAV H1 2009 PAND RNA SPEC QL NAA+PROBE: NOT DETECTED
FLUAV H1 RNA SPEC QL NAA+PROBE: NOT DETECTED
FLUAV H3 RNA SPEC QL NAA+PROBE: NOT DETECTED
FLUAV SUBTYP SPEC NAA+PROBE: NOT DETECTED
FLUBV AG NOSE QL IA: NEGATIVE
FLUBV RNA SPEC QL NAA+PROBE: NOT DETECTED
GLOBULIN SER CALC-MCNC: 5 G/DL (ref 2–4)
GLUCOSE SERPL-MCNC: 113 MG/DL (ref 65–100)
GLUCOSE UR STRIP.AUTO-MCNC: NEGATIVE MG/DL
HADV DNA SPEC QL NAA+PROBE: NOT DETECTED
HCOV 229E RNA SPEC QL NAA+PROBE: NOT DETECTED
HCOV HKU1 RNA SPEC QL NAA+PROBE: NOT DETECTED
HCOV NL63 RNA SPEC QL NAA+PROBE: NOT DETECTED
HCOV OC43 RNA SPEC QL NAA+PROBE: NOT DETECTED
HCT VFR BLD AUTO: 36.5 % (ref 35–47)
HGB BLD-MCNC: 13.5 G/DL (ref 11.5–16)
HGB UR QL STRIP: NEGATIVE
HMPV RNA SPEC QL NAA+PROBE: NOT DETECTED
HPIV1 RNA SPEC QL NAA+PROBE: NOT DETECTED
HPIV2 RNA SPEC QL NAA+PROBE: NOT DETECTED
HPIV3 RNA SPEC QL NAA+PROBE: NOT DETECTED
HPIV4 RNA SPEC QL NAA+PROBE: NOT DETECTED
HYALINE CASTS URNS QL MICRO: ABNORMAL /LPF (ref 0–5)
IMM GRANULOCYTES # BLD AUTO: 0 K/UL (ref 0–0.04)
IMM GRANULOCYTES NFR BLD AUTO: 0 % (ref 0–0.5)
KETONES UR QL STRIP.AUTO: NEGATIVE MG/DL
LACTATE BLD-SCNC: 1.04 MMOL/L (ref 0.4–2)
LEUKOCYTE ESTERASE UR QL STRIP.AUTO: ABNORMAL
LYMPHOCYTES # BLD: 3.8 K/UL (ref 0.8–3.5)
LYMPHOCYTES NFR BLD: 14 % (ref 12–49)
M PNEUMO DNA SPEC QL NAA+PROBE: NOT DETECTED
MCH RBC QN AUTO: 29.7 PG (ref 26–34)
MCHC RBC AUTO-ENTMCNC: 37 G/DL (ref 30–36.5)
MCV RBC AUTO: 80.4 FL (ref 80–99)
MONOCYTES # BLD: 1.3 K/UL (ref 0–1)
MONOCYTES NFR BLD: 5 % (ref 5–13)
NEUTS BAND NFR BLD MANUAL: 4 %
NEUTS SEG # BLD: 21.8 K/UL (ref 1.8–8)
NEUTS SEG NFR BLD: 77 % (ref 32–75)
NITRITE UR QL STRIP.AUTO: NEGATIVE
NRBC # BLD: 0 K/UL (ref 0–0.01)
NRBC BLD-RTO: 0 PER 100 WBC
P-R INTERVAL, ECG05: 168 MS
PH UR STRIP: 6.5 [PH] (ref 5–8)
PLATELET # BLD AUTO: 424 K/UL (ref 150–400)
PMV BLD AUTO: 9.8 FL (ref 8.9–12.9)
POTASSIUM SERPL-SCNC: 3.6 MMOL/L (ref 3.5–5.1)
PROT SERPL-MCNC: 8.6 G/DL (ref 6.4–8.2)
PROT UR STRIP-MCNC: NEGATIVE MG/DL
Q-T INTERVAL, ECG07: 306 MS
QRS DURATION, ECG06: 84 MS
QTC CALCULATION (BEZET), ECG08: 432 MS
RBC # BLD AUTO: 4.54 M/UL (ref 3.8–5.2)
RBC #/AREA URNS HPF: ABNORMAL /HPF (ref 0–5)
RBC MORPH BLD: ABNORMAL
RSV RNA SPEC QL NAA+PROBE: NOT DETECTED
RV+EV RNA SPEC QL NAA+PROBE: NOT DETECTED
SODIUM SERPL-SCNC: 139 MMOL/L (ref 136–145)
SP GR UR REFRACTOMETRY: 1.02 (ref 1–1.03)
UA: UC IF INDICATED,UAUC: ABNORMAL
UROBILINOGEN UR QL STRIP.AUTO: 0.2 EU/DL (ref 0.2–1)
VENTRICULAR RATE, ECG03: 120 BPM
WBC # BLD AUTO: 26.9 K/UL (ref 3.6–11)
WBC URNS QL MICRO: ABNORMAL /HPF (ref 0–4)

## 2020-01-17 PROCEDURE — 70491 CT SOFT TISSUE NECK W/DYE: CPT

## 2020-01-17 PROCEDURE — 74011250636 HC RX REV CODE- 250/636: Performed by: EMERGENCY MEDICINE

## 2020-01-17 PROCEDURE — 80053 COMPREHEN METABOLIC PANEL: CPT

## 2020-01-17 PROCEDURE — 87086 URINE CULTURE/COLONY COUNT: CPT

## 2020-01-17 PROCEDURE — 99285 EMERGENCY DEPT VISIT HI MDM: CPT

## 2020-01-17 PROCEDURE — 74011000258 HC RX REV CODE- 258: Performed by: EMERGENCY MEDICINE

## 2020-01-17 PROCEDURE — 0100U RESPIRATORY PANEL,PCR,NASOPHARYNGEAL: CPT

## 2020-01-17 PROCEDURE — 94640 AIRWAY INHALATION TREATMENT: CPT

## 2020-01-17 PROCEDURE — 74011000250 HC RX REV CODE- 250: Performed by: EMERGENCY MEDICINE

## 2020-01-17 PROCEDURE — 94760 N-INVAS EAR/PLS OXIMETRY 1: CPT

## 2020-01-17 PROCEDURE — 81001 URINALYSIS AUTO W/SCOPE: CPT

## 2020-01-17 PROCEDURE — 87040 BLOOD CULTURE FOR BACTERIA: CPT

## 2020-01-17 PROCEDURE — 96365 THER/PROPH/DIAG IV INF INIT: CPT

## 2020-01-17 PROCEDURE — 65270000029 HC RM PRIVATE

## 2020-01-17 PROCEDURE — 77030029684 HC NEB SM VOL KT MONA -A

## 2020-01-17 PROCEDURE — 71045 X-RAY EXAM CHEST 1 VIEW: CPT

## 2020-01-17 PROCEDURE — 93005 ELECTROCARDIOGRAM TRACING: CPT

## 2020-01-17 PROCEDURE — 96375 TX/PRO/DX INJ NEW DRUG ADDON: CPT

## 2020-01-17 PROCEDURE — 74011250636 HC RX REV CODE- 250/636: Performed by: INTERNAL MEDICINE

## 2020-01-17 PROCEDURE — 87804 INFLUENZA ASSAY W/OPTIC: CPT

## 2020-01-17 PROCEDURE — 36415 COLL VENOUS BLD VENIPUNCTURE: CPT

## 2020-01-17 PROCEDURE — 74011250637 HC RX REV CODE- 250/637: Performed by: EMERGENCY MEDICINE

## 2020-01-17 PROCEDURE — 70450 CT HEAD/BRAIN W/O DYE: CPT

## 2020-01-17 PROCEDURE — 83605 ASSAY OF LACTIC ACID: CPT

## 2020-01-17 PROCEDURE — 85025 COMPLETE CBC W/AUTO DIFF WBC: CPT

## 2020-01-17 PROCEDURE — 74011636320 HC RX REV CODE- 636/320: Performed by: EMERGENCY MEDICINE

## 2020-01-17 PROCEDURE — 74011250637 HC RX REV CODE- 250/637: Performed by: INTERNAL MEDICINE

## 2020-01-17 RX ORDER — SODIUM CHLORIDE 0.9 % (FLUSH) 0.9 %
5-40 SYRINGE (ML) INJECTION AS NEEDED
Status: DISCONTINUED | OUTPATIENT
Start: 2020-01-17 | End: 2020-01-20 | Stop reason: HOSPADM

## 2020-01-17 RX ORDER — MELATONIN
2000 DAILY
Status: DISCONTINUED | OUTPATIENT
Start: 2020-01-17 | End: 2020-01-20 | Stop reason: HOSPADM

## 2020-01-17 RX ORDER — HEPARIN SODIUM 5000 [USP'U]/ML
5000 INJECTION, SOLUTION INTRAVENOUS; SUBCUTANEOUS EVERY 8 HOURS
Status: DISCONTINUED | OUTPATIENT
Start: 2020-01-17 | End: 2020-01-18

## 2020-01-17 RX ORDER — SODIUM CHLORIDE 0.9 % (FLUSH) 0.9 %
10 SYRINGE (ML) INJECTION
Status: COMPLETED | OUTPATIENT
Start: 2020-01-17 | End: 2020-01-17

## 2020-01-17 RX ORDER — FLUTICASONE PROPIONATE 50 MCG
2 SPRAY, SUSPENSION (ML) NASAL DAILY
Status: DISCONTINUED | OUTPATIENT
Start: 2020-01-17 | End: 2020-01-20 | Stop reason: HOSPADM

## 2020-01-17 RX ORDER — MORPHINE SULFATE 2 MG/ML
1 INJECTION, SOLUTION INTRAMUSCULAR; INTRAVENOUS
Status: DISCONTINUED | OUTPATIENT
Start: 2020-01-17 | End: 2020-01-20 | Stop reason: HOSPADM

## 2020-01-17 RX ORDER — SODIUM CHLORIDE 0.9 % (FLUSH) 0.9 %
5-40 SYRINGE (ML) INJECTION EVERY 8 HOURS
Status: DISCONTINUED | OUTPATIENT
Start: 2020-01-17 | End: 2020-01-20 | Stop reason: HOSPADM

## 2020-01-17 RX ORDER — LIDOCAINE HYDROCHLORIDE 20 MG/ML
10 SOLUTION OROPHARYNGEAL
Status: COMPLETED | OUTPATIENT
Start: 2020-01-17 | End: 2020-01-17

## 2020-01-17 RX ORDER — BISACODYL 5 MG
5 TABLET, DELAYED RELEASE (ENTERIC COATED) ORAL DAILY PRN
Status: DISCONTINUED | OUTPATIENT
Start: 2020-01-17 | End: 2020-01-20 | Stop reason: HOSPADM

## 2020-01-17 RX ORDER — ACETAMINOPHEN 500 MG
1000 TABLET ORAL ONCE
Status: COMPLETED | OUTPATIENT
Start: 2020-01-17 | End: 2020-01-17

## 2020-01-17 RX ORDER — DICLOFENAC SODIUM 75 MG/1
75 TABLET, DELAYED RELEASE ORAL DAILY
COMMUNITY
End: 2020-01-24

## 2020-01-17 RX ORDER — ONDANSETRON 2 MG/ML
4 INJECTION INTRAMUSCULAR; INTRAVENOUS
Status: DISCONTINUED | OUTPATIENT
Start: 2020-01-17 | End: 2020-01-20 | Stop reason: HOSPADM

## 2020-01-17 RX ORDER — OXYMETAZOLINE HCL 0.05 %
2 SPRAY, NON-AEROSOL (ML) NASAL
Status: COMPLETED | OUTPATIENT
Start: 2020-01-17 | End: 2020-01-17

## 2020-01-17 RX ORDER — ALBUTEROL SULFATE 0.83 MG/ML
2.5 SOLUTION RESPIRATORY (INHALATION)
Status: DISCONTINUED | OUTPATIENT
Start: 2020-01-17 | End: 2020-01-20 | Stop reason: HOSPADM

## 2020-01-17 RX ORDER — CLINDAMYCIN PHOSPHATE 600 MG/50ML
600 INJECTION INTRAVENOUS EVERY 8 HOURS
Status: DISCONTINUED | OUTPATIENT
Start: 2020-01-17 | End: 2020-01-20

## 2020-01-17 RX ORDER — HYDROCODONE BITARTRATE AND ACETAMINOPHEN 5; 325 MG/1; MG/1
1 TABLET ORAL
Status: DISCONTINUED | OUTPATIENT
Start: 2020-01-17 | End: 2020-01-20 | Stop reason: HOSPADM

## 2020-01-17 RX ORDER — NYSTATIN AND TRIAMCINOLONE ACETONIDE 100000; 1 [USP'U]/G; MG/G
OINTMENT TOPICAL DAILY
COMMUNITY
End: 2021-01-11 | Stop reason: SDUPTHER

## 2020-01-17 RX ORDER — OXYMETAZOLINE HCL 0.05 %
2 SPRAY, NON-AEROSOL (ML) NASAL
Status: DISCONTINUED | OUTPATIENT
Start: 2020-01-17 | End: 2020-01-20 | Stop reason: HOSPADM

## 2020-01-17 RX ORDER — CLINDAMYCIN PHOSPHATE 600 MG/50ML
600 INJECTION INTRAVENOUS
Status: COMPLETED | OUTPATIENT
Start: 2020-01-17 | End: 2020-01-17

## 2020-01-17 RX ORDER — GUAIFENESIN 100 MG/5ML
100 SOLUTION ORAL
Status: DISCONTINUED | OUTPATIENT
Start: 2020-01-17 | End: 2020-01-20 | Stop reason: HOSPADM

## 2020-01-17 RX ORDER — IBUPROFEN 600 MG/1
600 TABLET ORAL
Status: DISCONTINUED | OUTPATIENT
Start: 2020-01-17 | End: 2020-01-20 | Stop reason: HOSPADM

## 2020-01-17 RX ORDER — FLUTICASONE PROPIONATE 50 MCG
2 SPRAY, SUSPENSION (ML) NASAL
COMMUNITY
End: 2020-02-10

## 2020-01-17 RX ORDER — ACETAMINOPHEN 325 MG/1
650 TABLET ORAL
Status: DISCONTINUED | OUTPATIENT
Start: 2020-01-17 | End: 2020-01-20 | Stop reason: HOSPADM

## 2020-01-17 RX ORDER — IPRATROPIUM BROMIDE AND ALBUTEROL SULFATE 2.5; .5 MG/3ML; MG/3ML
3 SOLUTION RESPIRATORY (INHALATION)
Status: COMPLETED | OUTPATIENT
Start: 2020-01-17 | End: 2020-01-17

## 2020-01-17 RX ORDER — ASPIRIN 81 MG/1
81 TABLET ORAL DAILY
Status: DISCONTINUED | OUTPATIENT
Start: 2020-01-17 | End: 2020-01-20 | Stop reason: HOSPADM

## 2020-01-17 RX ORDER — HYDRALAZINE HYDROCHLORIDE 20 MG/ML
10 INJECTION INTRAMUSCULAR; INTRAVENOUS
Status: DISCONTINUED | OUTPATIENT
Start: 2020-01-17 | End: 2020-01-20 | Stop reason: HOSPADM

## 2020-01-17 RX ORDER — NALOXONE HYDROCHLORIDE 0.4 MG/ML
0.4 INJECTION, SOLUTION INTRAMUSCULAR; INTRAVENOUS; SUBCUTANEOUS AS NEEDED
Status: DISCONTINUED | OUTPATIENT
Start: 2020-01-17 | End: 2020-01-20 | Stop reason: HOSPADM

## 2020-01-17 RX ORDER — ACETAMINOPHEN 325 MG/1
650 TABLET ORAL
Status: DISCONTINUED | OUTPATIENT
Start: 2020-01-17 | End: 2020-01-17

## 2020-01-17 RX ORDER — SODIUM CHLORIDE 9 MG/ML
75 INJECTION, SOLUTION INTRAVENOUS CONTINUOUS
Status: DISCONTINUED | OUTPATIENT
Start: 2020-01-17 | End: 2020-01-20

## 2020-01-17 RX ADMIN — Medication 10 ML: at 11:42

## 2020-01-17 RX ADMIN — HEPARIN SODIUM 5000 UNITS: 5000 INJECTION INTRAVENOUS; SUBCUTANEOUS at 09:47

## 2020-01-17 RX ADMIN — Medication 10 ML: at 08:24

## 2020-01-17 RX ADMIN — Medication 10 ML: at 09:49

## 2020-01-17 RX ADMIN — SODIUM CHLORIDE 500 ML: 900 INJECTION, SOLUTION INTRAVENOUS at 06:51

## 2020-01-17 RX ADMIN — Medication 10 ML: at 22:00

## 2020-01-17 RX ADMIN — FLUTICASONE PROPIONATE 2 SPRAY: 50 SPRAY, METERED NASAL at 11:41

## 2020-01-17 RX ADMIN — ACETAMINOPHEN 1000 MG: 500 TABLET ORAL at 07:02

## 2020-01-17 RX ADMIN — SODIUM CHLORIDE 150 ML/HR: 900 INJECTION, SOLUTION INTRAVENOUS at 17:17

## 2020-01-17 RX ADMIN — CLINDAMYCIN PHOSPHATE 600 MG: 600 INJECTION, SOLUTION INTRAVENOUS at 17:26

## 2020-01-17 RX ADMIN — LIDOCAINE HYDROCHLORIDE 10 ML: 20 SOLUTION ORAL; TOPICAL at 07:02

## 2020-01-17 RX ADMIN — ASPIRIN 81 MG: 81 TABLET ORAL at 09:47

## 2020-01-17 RX ADMIN — CEFTRIAXONE 1 G: 1 INJECTION, POWDER, FOR SOLUTION INTRAMUSCULAR; INTRAVENOUS at 07:11

## 2020-01-17 RX ADMIN — CLINDAMYCIN PHOSPHATE 600 MG: 600 INJECTION, SOLUTION INTRAVENOUS at 09:24

## 2020-01-17 RX ADMIN — SODIUM CHLORIDE 150 ML/HR: 900 INJECTION, SOLUTION INTRAVENOUS at 09:48

## 2020-01-17 RX ADMIN — METHYLPREDNISOLONE SODIUM SUCCINATE 125 MG: 125 INJECTION, POWDER, FOR SOLUTION INTRAMUSCULAR; INTRAVENOUS at 07:01

## 2020-01-17 RX ADMIN — VITAMIN D, TAB 1000IU (100/BT) 2 TABLET: 25 TAB at 11:41

## 2020-01-17 RX ADMIN — IPRATROPIUM BROMIDE AND ALBUTEROL SULFATE 3 ML: .5; 3 SOLUTION RESPIRATORY (INHALATION) at 07:02

## 2020-01-17 RX ADMIN — IOPAMIDOL 100 ML: 755 INJECTION, SOLUTION INTRAVENOUS at 08:24

## 2020-01-17 RX ADMIN — OXYMETAZOLINE HCL 2 SPRAY: 0.05 SPRAY NASAL at 07:01

## 2020-01-17 RX ADMIN — HEPARIN SODIUM 5000 UNITS: 5000 INJECTION INTRAVENOUS; SUBCUTANEOUS at 17:17

## 2020-01-17 RX ADMIN — SODIUM CHLORIDE 1000 ML: 900 INJECTION, SOLUTION INTRAVENOUS at 06:50

## 2020-01-17 NOTE — PROGRESS NOTES
TRANSFER - IN REPORT:    Verbal report received from [address] Oneil RN(name) on Nani Lawrence  being received from ED(unit) for routine progression of care      Report consisted of patients Situation, Background, Assessment and   Recommendations(SBAR). Information from the following report(s) SBAR, Kardex, ED Summary, Intake/Output, MAR, Recent Results and Cardiac Rhythm sinus tach was reviewed with the receiving nurse. Opportunity for questions and clarification was provided. Assessment completed upon patients arrival to unit and care assumed.
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
## 2020-01-17 NOTE — PROGRESS NOTES
Problem: Falls - Risk of  Goal: *Absence of Falls  Description  Document Dharmesh Urenascal Fall Risk and appropriate interventions in the flowsheet. Outcome: Progressing Towards Goal  Note: Fall Risk Interventions:            Medication Interventions: Assess postural VS orthostatic hypotension, Evaluate medications/consider consulting pharmacy, Patient to call before getting OOB, Utilize gait belt for transfers/ambulation, Teach patient to arise slowly                   Problem: Falls - Risk of  Goal: *Absence of Falls  Description  Document Dharmesh Carito Fall Risk and appropriate interventions in the flowsheet.   Outcome: Progressing Towards Goal  Note: Fall Risk Interventions:            Medication Interventions: Assess postural VS orthostatic hypotension, Evaluate medications/consider consulting pharmacy, Patient to call before getting OOB, Utilize gait belt for transfers/ambulation, Teach patient to arise slowly                   Problem: Patient Education: Go to Patient Education Activity  Goal: Patient/Family Education  Outcome: Progressing Towards Goal

## 2020-01-17 NOTE — PROGRESS NOTES
Pharmacy Clarification of the Prior to Admission Medication Regimen Retrospective to the Admission Medication Reconciliation    The patient was interviewed regarding clarification of the prior to admission medication regimen and was questioned regarding use of any other inhalers, topical products, over the counter medications, herbal medications, vitamin products or ophthalmic/nasal/otic medication use. Information Obtained From: Patient, prescription bottles, RX Query    Recommendations/Findings: The following amendments were made to the patient's active medication list on file at [address]:     1) Additions:   OTHER, grapefruit seed nasal spray  OTHER, Zicam Redimelts  dextromethorphan-guaiFENesin (ROBITUSSIN-DM)  mg/5 mL syrup    2) Removals: NONE    3) Changes:  albuterol (VENTOLIN HFA) 90 mcg/actuation inhaler (Old regimen: no sig /New regimen: 2 puffs Q4H PRN)  fluticasone propionate (FLONASE) 50 mcg/actuation nasal spray (Old regimen: 2 sprays daily /New regimen: 2 sprays daily PRN)  diclofenac EC (VOLTAREN) 75 mg EC tablet (Old regimen: 75 mg BID /New regimen: 75 mg daily)  nystatin-triamcinolone (MYCOLOG) 100,000-0.1 unit/gram-% ointment (Old regimen: apply BID /New regimen: apply daily)    4) Pertinent Pharmacy Findings: NONE     PTA medication list was corrected to the following:     Prior to Admission Medications   Prescriptions Last Dose Informant Taking? OTHER 2020 at Unknown time Self Yes   Si Sprays by Both Nostrils route daily as needed (congestion). Patient uses a grapefruit seed extract nasal spray   OTHER 2020 at Unknown time Self Yes   Sig: Take 1 Tab by mouth two (2) times daily as needed (cold like symptoms). Patient takes Zicam Redimelts   albuterol (VENTOLIN HFA) 90 mcg/actuation inhaler 2020 at Unknown time Self Yes   Sig: Take 2 Puffs by inhalation every four (4) hours as needed.    aspirin delayed-release 81 mg tablet 2020 at Unknown time Self Yes   Sig: Take 81 mg by mouth daily. cholecalciferol, vitamin D3, 2,000 unit tab 2020 at Unknown time Self Yes   Sig: Take 1 Tab by mouth daily. dextromethorphan-guaiFENesin (ROBITUSSIN-DM)  mg/5 mL syrup 1/15/2020 at Unknown time Self Yes   Sig: Take 20 mL by mouth nightly as needed for Cough. diclofenac EC (VOLTAREN) 75 mg EC tablet 2020 at Unknown time Self Yes   Sig: Take 75 mg by mouth daily. fluticasone propionate (FLONASE) 50 mcg/actuation nasal spray 2020 at Unknown time Self Yes   Si Sprays by Both Nostrils route daily as needed for Rhinitis. hydroCHLOROthiazide (HYDRODIURIL) 25 mg tablet 2020 at Unknown time Self Yes   Sig: TAKE 1 TABLET BY MOUTH DAILY   nystatin-triamcinolone (MYCOLOG) 100,000-0.1 unit/gram-% ointment 2020 at Unknown time Self Yes   Sig: Apply  to affected area daily.       Facility-Administered Medications: None          Thank you,  Sanford Nazario, [facility]  Medication History Pharmacy Technician

## 2020-01-17 NOTE — ED NOTES
TRANSFER - OUT REPORT:    Verbal report given to Ilir Mireles RN (name) on Antoinette Hayes  being transferred to Ortho (unit) for routine progression of care       Report consisted of patients Situation, Background, Assessment and   Recommendations(SBAR). Information from the following report(s) SBAR was reviewed with the receiving nurse. Lines:   Peripheral IV 01/17/20 Left Antecubital (Active)   Site Assessment Clean, dry, & intact 1/17/2020  6:49 AM   Phlebitis Assessment 0 1/17/2020  6:49 AM   Infiltration Assessment 0 1/17/2020  6:49 AM   Dressing Status Clean, dry, & intact 1/17/2020  6:49 AM   Dressing Type Tape;Transparent 1/17/2020  6:49 AM   Hub Color/Line Status Pink 1/17/2020  6:49 AM       Peripheral IV 01/17/20 Right Hand (Active)   Site Assessment Clean, dry, & intact 1/17/2020  7:00 AM   Phlebitis Assessment 0 1/17/2020  7:00 AM   Infiltration Assessment 0 1/17/2020  7:00 AM   Dressing Status Clean, dry, & intact 1/17/2020  7:00 AM   Dressing Type Tape;Transparent 1/17/2020  7:00 AM   Hub Color/Line Status Patent; Flushed;Pink 1/17/2020  7:00 AM   Action Taken Blood drawn 1/17/2020  7:00 AM        Opportunity for questions and clarification was provided. Patient transported with:   Personal belongings, and medications from central pharmacy.
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
## 2020-01-17 NOTE — ED NOTES
7875: Patient presents to the ED ambulatory complaining of nasal congestion, sore throat and cough. Patient reports her symptoms began on  Thursday, January 9, 2020. Patient was seen at her PCP's office on yesterday and not prescribed any medication. Patient reports being diagnosis with a sinus infections. Patient reports using OTC medication with no relief. Patient reports fevers at home, chills and shortness of breath. Patient denies any nausea, vomiting, or chest pain. Patient acknowledges headache & sinus pressure. Patient placed on the monitor x3 and provided with her call bell.       0642: Km Al MD at bedside. 1250: Bedside and Verbal shift change report given to Natalie Levi (oncoming nurse) by Stacey Cuvler (offgoing nurse). Report included the following information SBAR, Kardex, ED Summary, Recent Results and Cardiac Rhythm Sinus Tach .

## 2020-01-18 LAB
ANION GAP SERPL CALC-SCNC: 6 MMOL/L (ref 5–15)
BACTERIA SPEC CULT: NORMAL
BASOPHILS # BLD: 0 K/UL (ref 0–0.1)
BASOPHILS NFR BLD: 0 % (ref 0–1)
BUN SERPL-MCNC: 17 MG/DL (ref 6–20)
BUN/CREAT SERPL: 21 (ref 12–20)
CALCIUM SERPL-MCNC: 9 MG/DL (ref 8.5–10.1)
CC UR VC: NORMAL
CHLORIDE SERPL-SCNC: 110 MMOL/L (ref 97–108)
CO2 SERPL-SCNC: 26 MMOL/L (ref 21–32)
CREAT SERPL-MCNC: 0.81 MG/DL (ref 0.55–1.02)
DIFFERENTIAL METHOD BLD: ABNORMAL
EOSINOPHIL # BLD: 0 K/UL (ref 0–0.4)
EOSINOPHIL NFR BLD: 0 % (ref 0–7)
ERYTHROCYTE [DISTWIDTH] IN BLOOD BY AUTOMATED COUNT: 14.9 % (ref 11.5–14.5)
GLUCOSE SERPL-MCNC: 125 MG/DL (ref 65–100)
HCT VFR BLD AUTO: 33.4 % (ref 35–47)
HGB BLD-MCNC: 12.1 G/DL (ref 11.5–16)
IMM GRANULOCYTES # BLD AUTO: 0.3 K/UL (ref 0–0.04)
IMM GRANULOCYTES NFR BLD AUTO: 1 % (ref 0–0.5)
LYMPHOCYTES # BLD: 2.5 K/UL (ref 0.8–3.5)
LYMPHOCYTES NFR BLD: 9 % (ref 12–49)
MCH RBC QN AUTO: 29.5 PG (ref 26–34)
MCHC RBC AUTO-ENTMCNC: 36.2 G/DL (ref 30–36.5)
MCV RBC AUTO: 81.5 FL (ref 80–99)
MONOCYTES # BLD: 2.2 K/UL (ref 0–1)
MONOCYTES NFR BLD: 8 % (ref 5–13)
NEUTS SEG # BLD: 23 K/UL (ref 1.8–8)
NEUTS SEG NFR BLD: 82 % (ref 32–75)
NRBC # BLD: 0 K/UL (ref 0–0.01)
NRBC BLD-RTO: 0 PER 100 WBC
PLATELET # BLD AUTO: 358 K/UL (ref 150–400)
PMV BLD AUTO: 10.3 FL (ref 8.9–12.9)
POTASSIUM SERPL-SCNC: 3.7 MMOL/L (ref 3.5–5.1)
RBC # BLD AUTO: 4.1 M/UL (ref 3.8–5.2)
RBC MORPH BLD: ABNORMAL
SERVICE CMNT-IMP: NORMAL
SODIUM SERPL-SCNC: 142 MMOL/L (ref 136–145)
WBC # BLD AUTO: 28 K/UL (ref 3.6–11)

## 2020-01-18 PROCEDURE — 74011636637 HC RX REV CODE- 636/637: Performed by: INTERNAL MEDICINE

## 2020-01-18 PROCEDURE — 74011250636 HC RX REV CODE- 250/636: Performed by: INTERNAL MEDICINE

## 2020-01-18 PROCEDURE — 94760 N-INVAS EAR/PLS OXIMETRY 1: CPT

## 2020-01-18 PROCEDURE — 74011250637 HC RX REV CODE- 250/637: Performed by: INTERNAL MEDICINE

## 2020-01-18 PROCEDURE — 85025 COMPLETE CBC W/AUTO DIFF WBC: CPT

## 2020-01-18 PROCEDURE — 80048 BASIC METABOLIC PNL TOTAL CA: CPT

## 2020-01-18 PROCEDURE — 74011000258 HC RX REV CODE- 258: Performed by: INTERNAL MEDICINE

## 2020-01-18 PROCEDURE — 65270000029 HC RM PRIVATE

## 2020-01-18 PROCEDURE — L3650 SO 8 ABD RESTRAINT PRE OTS: HCPCS

## 2020-01-18 PROCEDURE — 36415 COLL VENOUS BLD VENIPUNCTURE: CPT

## 2020-01-18 RX ORDER — HEPARIN SODIUM 5000 [USP'U]/ML
7500 INJECTION, SOLUTION INTRAVENOUS; SUBCUTANEOUS EVERY 8 HOURS
Status: DISCONTINUED | OUTPATIENT
Start: 2020-01-19 | End: 2020-01-20 | Stop reason: HOSPADM

## 2020-01-18 RX ORDER — HYDROCODONE POLISTIREX AND CHLORPHENIRAMINE POLISTIREX 10; 8 MG/5ML; MG/5ML
5 SUSPENSION, EXTENDED RELEASE ORAL EVERY 12 HOURS
Status: DISCONTINUED | OUTPATIENT
Start: 2020-01-18 | End: 2020-01-20 | Stop reason: HOSPADM

## 2020-01-18 RX ADMIN — CLINDAMYCIN PHOSPHATE 600 MG: 600 INJECTION, SOLUTION INTRAVENOUS at 08:51

## 2020-01-18 RX ADMIN — CEFTRIAXONE 1 G: 1 INJECTION, POWDER, FOR SOLUTION INTRAMUSCULAR; INTRAVENOUS at 06:01

## 2020-01-18 RX ADMIN — HUMAN INSULIN 5 UNITS: 100 INJECTION, SUSPENSION SUBCUTANEOUS at 20:40

## 2020-01-18 RX ADMIN — METHYLPREDNISOLONE SODIUM SUCCINATE 40 MG: 40 INJECTION, POWDER, FOR SOLUTION INTRAMUSCULAR; INTRAVENOUS at 20:39

## 2020-01-18 RX ADMIN — HYDROCODONE POLISTIREX AND CHLORPHENIRAMINE POLISTIREX 5 ML: 10; 8 SUSPENSION, EXTENDED RELEASE ORAL at 13:16

## 2020-01-18 RX ADMIN — CLINDAMYCIN PHOSPHATE 600 MG: 600 INJECTION, SOLUTION INTRAVENOUS at 17:06

## 2020-01-18 RX ADMIN — SODIUM CHLORIDE 150 ML/HR: 900 INJECTION, SOLUTION INTRAVENOUS at 08:41

## 2020-01-18 RX ADMIN — METHYLPREDNISOLONE SODIUM SUCCINATE 40 MG: 40 INJECTION, POWDER, FOR SOLUTION INTRAMUSCULAR; INTRAVENOUS at 12:13

## 2020-01-18 RX ADMIN — PHENOL 1 SPRAY: 1.5 LIQUID ORAL at 12:12

## 2020-01-18 RX ADMIN — HEPARIN SODIUM 5000 UNITS: 5000 INJECTION INTRAVENOUS; SUBCUTANEOUS at 17:06

## 2020-01-18 RX ADMIN — Medication 10 ML: at 06:01

## 2020-01-18 RX ADMIN — FLUTICASONE PROPIONATE 2 SPRAY: 50 SPRAY, METERED NASAL at 08:46

## 2020-01-18 RX ADMIN — ASPIRIN 81 MG: 81 TABLET ORAL at 08:40

## 2020-01-18 RX ADMIN — HUMAN INSULIN 5 UNITS: 100 INJECTION, SUSPENSION SUBCUTANEOUS at 12:14

## 2020-01-18 RX ADMIN — CLINDAMYCIN PHOSPHATE 600 MG: 600 INJECTION, SOLUTION INTRAVENOUS at 00:22

## 2020-01-18 RX ADMIN — SODIUM CHLORIDE 150 ML/HR: 900 INJECTION, SOLUTION INTRAVENOUS at 00:14

## 2020-01-18 RX ADMIN — Medication 10 ML: at 22:00

## 2020-01-18 RX ADMIN — HEPARIN SODIUM 5000 UNITS: 5000 INJECTION INTRAVENOUS; SUBCUTANEOUS at 00:30

## 2020-01-18 RX ADMIN — VITAMIN D, TAB 1000IU (100/BT) 2 TABLET: 25 TAB at 08:40

## 2020-01-18 RX ADMIN — HYDROCODONE POLISTIREX AND CHLORPHENIRAMINE POLISTIREX 5 ML: 10; 8 SUSPENSION, EXTENDED RELEASE ORAL at 20:40

## 2020-01-18 NOTE — PROGRESS NOTES
Hospitalist Progress Note    NAME: Isabel Chin   :  1950   MRN:  [id number]     Interim Hospital Summary: 77 y.o. female whom presented on 2020 with      Assessment / Plan:    Sepsis (fever, tachycardia, leukocytosis)  [place] tonsillitis  -No tonsillar abscess on CT  -change to mechanical soft diet  -Continue clindamycin as started in ED (PCN allergy)  -Chloraseptic spray PRN     Urinary tract infection  -Patient does endorse some urinary frequency lately, says that has been ongoing for a few weeks  -Will continue with ceftriaxone as started in ED; follow up urine culture.     Asthma  Intractable coughing  -start back some steroids  -Albuterol nebs PRN     Enlarging GRACIELA nodule  -patient has a prior history of lung nodules that are felt to represent scarring from prior trauma. Notified patient that one nodule has enlarged on current CT scan -- advised her to pursue CT with contrast as an outpatient. Will provide her with a copy of the report at discharge     Osteoarthritis  -nsaids prn     Essential hypertension  -Continue holding HCTZ   -Hydralazine PRN     Code Status: Full  Surrogate Decision Maker: SonTrell     DVT Prophylaxis: heparin SQ  GI Prophylaxis: not indicated     Baseline: independent, working    40 or above Morbid obesity / Body mass index is 42.57 kg/m². Subjective:     Chief Complaint / Reason for Physician Visit  Follow up of sepsis, tonsilitis, UTI, asthma  Chart reviewed in detail. Discussed with RN events overnight.    Still painful to swallow  Coughing a lot    Review of Systems:  Symptom Y/N Comments  Symptom Y/N Comments   Fever/Chills y   Chest Pain     Poor Appetite    Edema     Cough y   Abdominal Pain     Sputum    Joint Pain     SOB/PATEL    Pruritis/Rash     Nausea/vomit    Tolerating PT/OT     Diarrhea    Tolerating Diet     Constipation    Other       Could NOT obtain due to:      PO intake:   Patient Vitals for the past 72 hrs:   % Diet Eaten   01/18/20 0906 100 %     Objective:     VITALS:   Last 24hrs VS reviewed since prior progress note. Most recent are:  Patient Vitals for the past 24 hrs:   Temp Pulse Resp BP SpO2   01/18/20 1144 97.7 °F (36.5 °C) 88 18 144/84 98 %   01/18/20 0840 98 °F (36.7 °C) 100 18 159/82 98 %   01/18/20 0255 97.9 °F (36.6 °C) 82 18 169/81 94 %   01/18/20 0000 97.8 °F (36.6 °C) 88 20 153/80 95 %   01/17/20 1931 96.5 °F (35.8 °C) (!) 106 16 164/78 95 %   01/17/20 1723 97.7 °F (36.5 °C) (!) 105 18 144/79 95 %   01/17/20 1301 98.1 °F (36.7 °C) (!) 108 18 (!) 148/91 95 %       Intake/Output Summary (Last 24 hours) at 1/18/2020 1213  Last data filed at 1/18/2020 0906  Gross per 24 hour   Intake 932.5 ml   Output    Net 932.5 ml        PHYSICAL EXAM:  General: WD, WN. Alert, cooperative, no acute distress    EENT:  EOMI. Anicteric sclerae. MMM  Resp:  CTA bilaterally, no wheezing or rales. No accessory muscle use  CV:  Regular  rhythm,  No edema  GI:  Soft, Non distended, Non tender.  +Bowel sounds  Neurologic:  Alert and oriented X 3, normal speech,   Psych:   Good insight. Not anxious nor agitated  Skin:  No rashes. No jaundice    Reviewed most current lab test results and cultures  YES  Reviewed most current radiology test results   YES  Review and summation of old records today    NO  Reviewed patient's current orders and MAR    YES  PMH/SH reviewed - no change compared to H&P  ________________________________________________________________________  Care Plan discussed with:    Comments   Patient x    Family      RN     Care Manager     Consultant                        Multidiciplinary team rounds were held today with , nursing, pharmacist and clinical coordinator. Patient's plan of care was discussed; medications were reviewed and discharge planning was addressed.      ________________________________________________________________________  Total NON critical care TIME:   25   Minutes    Total CRITICAL CARE TIME Spent:   Minutes non procedure based      Comments   >50% of visit spent in counseling and coordination of care x     This includes time during multidisciplinary rounds if indicated above   ________________________________________________________________________  Coreen Sood MD     Procedures: see electronic medical records for all procedures/Xrays and details which were not copied into this note but were reviewed prior to creation of Plan. LABS:  I reviewed today's most current labs and imaging studies.   Pertinent labs include:  Recent Labs     01/18/20  0401 01/17/20  0656   WBC 28.0* 26.9*   HGB 12.1 13.5   HCT 33.4* 36.5    424*     Recent Labs     01/18/20  0401 01/17/20  0656    139   K 3.7 3.6   * 103   CO2 26 29   * 113*   BUN 17 16   CREA 0.81 0.99   CA 9.0 9.5   ALB  --  3.6   TBILI  --  0.5   SGOT  --  14*   ALT  --  29

## 2020-01-18 NOTE — PROGRESS NOTES
Problem: Sepsis: Day 3  Goal: Off Pathway (Use only if patient is Off Pathway)  Outcome: Progressing Towards Goal  Goal: *Central Venous Pressure maintained at 8-12 mm Hg  Outcome: Progressing Towards Goal  Goal: *Oxygen saturation within defined limits  Outcome: Progressing Towards Goal  Goal: *Vital sign stability  Outcome: Progressing Towards Goal  Goal: *Tolerating diet  Outcome: Progressing Towards Goal  Goal: *Demonstrates progressive activity  Outcome: Progressing Towards Goal  Goal: Activity/Safety  Outcome: Progressing Towards Goal  Goal: Consults, if ordered  Outcome: Progressing Towards Goal  Goal: Diagnostic Test/Procedures  Outcome: Progressing Towards Goal  Goal: Nutrition/Diet  Outcome: Progressing Towards Goal  Goal: Discharge Planning  Outcome: Progressing Towards Goal  Goal: Medications  Outcome: Progressing Towards Goal  Goal: Respiratory  Outcome: Progressing Towards Goal  Goal: Treatments/Interventions/Procedures  Outcome: Progressing Towards Goal  Goal: Psychosocial  Outcome: Progressing Towards Goal     Problem: Sepsis: Day 4  Goal: Off Pathway (Use only if patient is Off Pathway)  Outcome: Progressing Towards Goal  Goal: Activity/Safety  Outcome: Progressing Towards Goal  Goal: Consults, if ordered  Outcome: Progressing Towards Goal  Goal: Diagnostic Test/Procedures  Outcome: Progressing Towards Goal  Goal: Nutrition/Diet  Outcome: Progressing Towards Goal  Goal: Discharge Planning  Outcome: Progressing Towards Goal  Goal: Medications  Outcome: Progressing Towards Goal  Goal: Respiratory  Outcome: Progressing Towards Goal  Goal: Treatments/Interventions/Procedures  Outcome: Progressing Towards Goal  Goal: Psychosocial  Outcome: Progressing Towards Goal  Goal: *Oxygen saturation within defined limits  Outcome: Progressing Towards Goal  Goal: *Hemodynamically stable  Outcome: Progressing Towards Goal  Goal: *Vital signs within defined limits  Outcome: Progressing Towards Goal  Goal: *Tolerating diet  Outcome: Progressing Towards Goal  Goal: *Demonstrates progressive activity  Outcome: Progressing Towards Goal  Goal: *Fluid volume maintenance  Outcome: Progressing Towards Goal     Problem: Sepsis: Day 5  Goal: Off Pathway (Use only if patient is Off Pathway)  Outcome: Progressing Towards Goal  Goal: *Oxygen saturation within defined limits  Outcome: Progressing Towards Goal  Goal: *Vital signs within defined limits  Outcome: Progressing Towards Goal  Goal: *Tolerating diet  Outcome: Progressing Towards Goal  Goal: *Demonstrates progressive activity  Outcome: Progressing Towards Goal  Goal: *Discharge plan identified  Outcome: Progressing Towards Goal  Goal: Activity/Safety  Outcome: Progressing Towards Goal  Goal: Consults, if ordered  Outcome: Progressing Towards Goal  Goal: Diagnostic Test/Procedures  Outcome: Progressing Towards Goal  Goal: Nutrition/Diet  Outcome: Progressing Towards Goal  Goal: Discharge Planning  Outcome: Progressing Towards Goal  Goal: Medications  Outcome: Progressing Towards Goal  Goal: Respiratory  Outcome: Progressing Towards Goal  Goal: Treatments/Interventions/Procedures  Outcome: Progressing Towards Goal  Goal: Psychosocial  Outcome: Progressing Towards Goal     Problem: Sepsis: Day 6  Goal: Off Pathway (Use only if patient is Off Pathway)  Outcome: Progressing Towards Goal  Goal: *Oxygen saturation within defined limits  Outcome: Progressing Towards Goal  Goal: *Vital signs within defined limits  Outcome: Progressing Towards Goal  Goal: *Tolerating diet  Outcome: Progressing Towards Goal  Goal: *Demonstrates progressive activity  Outcome: Progressing Towards Goal  Goal: Influenza immunization  Outcome: Progressing Towards Goal  Goal: *Pneumococcal immunization  Outcome: Progressing Towards Goal  Goal: Activity/Safety  Outcome: Progressing Towards Goal  Goal: Diagnostic Test/Procedures  Outcome: Progressing Towards Goal  Goal: Nutrition/Diet  Outcome: Progressing Towards Goal  Goal: Discharge Planning  Outcome: Progressing Towards Goal  Goal: Medications  Outcome: Progressing Towards Goal  Goal: Respiratory  Outcome: Progressing Towards Goal  Goal: Treatments/Interventions/Procedures  Outcome: Progressing Towards Goal  Goal: Psychosocial  Outcome: Progressing Towards Goal     Problem: Sepsis: Discharge Outcomes  Goal: *Vital signs within defined limits  Outcome: Progressing Towards Goal  Goal: *Tolerating diet  Outcome: Progressing Towards Goal  Goal: *Verbalizes understanding and describes prescribed diet  Outcome: Progressing Towards Goal  Goal: *Demonstrates progressive activity  Outcome: Progressing Towards Goal  Goal: *Describes follow-up/return visits to physicians  Outcome: Progressing Towards Goal  Goal: *Verbalizes name, dosage, time, side effects, and number of days to continue medications  Outcome: Progressing Towards Goal  Goal: *Influenza immunization (Oct-Mar only)  Outcome: Progressing Towards Goal  Goal: *Pneumococcal immunization  Outcome: Progressing Towards Goal  Goal: *Lungs clear or at baseline  Outcome: Progressing Towards Goal  Goal: *Oxygen saturation returns to baseline or 90% or better on room air  Outcome: Progressing Towards Goal  Goal: *Glycemic control  Outcome: Progressing Towards Goal  Goal: *Absence of deep venous thrombosis signs and symptoms(Stroke Metric)  Outcome: Progressing Towards Goal  Goal: *Describes available resources and support systems  Outcome: Progressing Towards Goal  Goal: *Optimal pain control at patient's stated goal  Outcome: Progressing Towards Goal

## 2020-01-18 NOTE — PROGRESS NOTES
Bedside shift change report given to Agnes Marin (oncoming nurse) by Keli Donahue (offgoing nurse). Report included the following information SBAR, Kardex, Procedure Summary, Intake/Output and MAR.

## 2020-01-18 NOTE — PROGRESS NOTES
Problem: Sepsis: Day 2  Goal: Off Pathway (Use only if patient is Off Pathway)  Outcome: Progressing Towards Goal

## 2020-01-18 NOTE — ROUTINE PROCESS
Bedside and Verbal shift change report given to Lexus Treviño (oncoming nurse) by Alejandrina Epstein RN (offgoing nurse). Report included the following information SBAR, Kardex, Intake/Output, MAR and Recent Results.

## 2020-01-18 NOTE — PROGRESS NOTES
Anticoagulation Dosing    Indication:  DVT ppx    Estimated Creatinine Clearance: 81.6 mL/min (based on SCr of 0.81 mg/dL). Estimated Creatinine Clearance (using IBW):57.4 mL/min    Recent Labs     01/18/20  0401 01/17/20  0656   CREA 0.81 0.99   ALB  --  3.6   HGB 12.1 13.5   HCT 33.4* 36.5    424*         Wt Readings from Last 1 Encounters:   01/17/20 112.5 kg (248 lb 0.3 oz)     Ht Readings from Last 1 Encounters:   01/17/20 162.6 cm (64\")     Body mass index is 42.57 kg/m². Plan:  Heparin 5000 units SQ q12h was adjusted to 7500 units SQ q8h due to patient's renal function and BMI > 40.      Thank you,  Gavin Stearns, [facility]

## 2020-01-19 LAB
ANION GAP SERPL CALC-SCNC: 6 MMOL/L (ref 5–15)
BASOPHILS # BLD: 0 K/UL (ref 0–0.1)
BASOPHILS NFR BLD: 0 % (ref 0–1)
BUN SERPL-MCNC: 23 MG/DL (ref 6–20)
BUN/CREAT SERPL: 29 (ref 12–20)
CALCIUM SERPL-MCNC: 8.9 MG/DL (ref 8.5–10.1)
CHLORIDE SERPL-SCNC: 112 MMOL/L (ref 97–108)
CO2 SERPL-SCNC: 24 MMOL/L (ref 21–32)
CREAT SERPL-MCNC: 0.8 MG/DL (ref 0.55–1.02)
DIFFERENTIAL METHOD BLD: ABNORMAL
EOSINOPHIL # BLD: 0 K/UL (ref 0–0.4)
EOSINOPHIL NFR BLD: 0 % (ref 0–7)
ERYTHROCYTE [DISTWIDTH] IN BLOOD BY AUTOMATED COUNT: 15.2 % (ref 11.5–14.5)
GLUCOSE SERPL-MCNC: 140 MG/DL (ref 65–100)
HCT VFR BLD AUTO: 33.9 % (ref 35–47)
HGB BLD-MCNC: 12.2 G/DL (ref 11.5–16)
IMM GRANULOCYTES # BLD AUTO: 0.2 K/UL (ref 0–0.04)
IMM GRANULOCYTES NFR BLD AUTO: 1 % (ref 0–0.5)
LYMPHOCYTES # BLD: 1.7 K/UL (ref 0.8–3.5)
LYMPHOCYTES NFR BLD: 9 % (ref 12–49)
MCH RBC QN AUTO: 29.2 PG (ref 26–34)
MCHC RBC AUTO-ENTMCNC: 36 G/DL (ref 30–36.5)
MCV RBC AUTO: 81.1 FL (ref 80–99)
MONOCYTES # BLD: 0.6 K/UL (ref 0–1)
MONOCYTES NFR BLD: 3 % (ref 5–13)
NEUTS SEG # BLD: 17.2 K/UL (ref 1.8–8)
NEUTS SEG NFR BLD: 87 % (ref 32–75)
NRBC # BLD: 0 K/UL (ref 0–0.01)
NRBC BLD-RTO: 0 PER 100 WBC
PLATELET # BLD AUTO: 414 K/UL (ref 150–400)
PMV BLD AUTO: 9.8 FL (ref 8.9–12.9)
POTASSIUM SERPL-SCNC: 5.1 MMOL/L (ref 3.5–5.1)
RBC # BLD AUTO: 4.18 M/UL (ref 3.8–5.2)
SODIUM SERPL-SCNC: 142 MMOL/L (ref 136–145)
WBC # BLD AUTO: 19.6 K/UL (ref 3.6–11)

## 2020-01-19 PROCEDURE — 74011250637 HC RX REV CODE- 250/637: Performed by: INTERNAL MEDICINE

## 2020-01-19 PROCEDURE — 36415 COLL VENOUS BLD VENIPUNCTURE: CPT

## 2020-01-19 PROCEDURE — 74011000258 HC RX REV CODE- 258: Performed by: INTERNAL MEDICINE

## 2020-01-19 PROCEDURE — 74011000250 HC RX REV CODE- 250: Performed by: INTERNAL MEDICINE

## 2020-01-19 PROCEDURE — 80048 BASIC METABOLIC PNL TOTAL CA: CPT

## 2020-01-19 PROCEDURE — 74011250636 HC RX REV CODE- 250/636: Performed by: INTERNAL MEDICINE

## 2020-01-19 PROCEDURE — 74011636637 HC RX REV CODE- 636/637: Performed by: INTERNAL MEDICINE

## 2020-01-19 PROCEDURE — 85025 COMPLETE CBC W/AUTO DIFF WBC: CPT

## 2020-01-19 PROCEDURE — 94760 N-INVAS EAR/PLS OXIMETRY 1: CPT

## 2020-01-19 PROCEDURE — 65270000029 HC RM PRIVATE

## 2020-01-19 PROCEDURE — 94640 AIRWAY INHALATION TREATMENT: CPT

## 2020-01-19 RX ADMIN — HEPARIN SODIUM 7500 UNITS: 5000 INJECTION INTRAVENOUS; SUBCUTANEOUS at 02:46

## 2020-01-19 RX ADMIN — FLUTICASONE PROPIONATE 2 SPRAY: 50 SPRAY, METERED NASAL at 08:57

## 2020-01-19 RX ADMIN — ALBUTEROL SULFATE 2.5 MG: 2.5 SOLUTION RESPIRATORY (INHALATION) at 15:51

## 2020-01-19 RX ADMIN — HUMAN INSULIN 5 UNITS: 100 INJECTION, SUSPENSION SUBCUTANEOUS at 20:22

## 2020-01-19 RX ADMIN — HUMAN INSULIN 5 UNITS: 100 INJECTION, SUSPENSION SUBCUTANEOUS at 08:54

## 2020-01-19 RX ADMIN — CLINDAMYCIN PHOSPHATE 600 MG: 600 INJECTION, SOLUTION INTRAVENOUS at 00:57

## 2020-01-19 RX ADMIN — Medication 10 ML: at 15:18

## 2020-01-19 RX ADMIN — HYDRALAZINE HYDROCHLORIDE 10 MG: 20 INJECTION INTRAMUSCULAR; INTRAVENOUS at 12:26

## 2020-01-19 RX ADMIN — CEFTRIAXONE 1 G: 1 INJECTION, POWDER, FOR SOLUTION INTRAMUSCULAR; INTRAVENOUS at 06:15

## 2020-01-19 RX ADMIN — CLINDAMYCIN PHOSPHATE 600 MG: 600 INJECTION, SOLUTION INTRAVENOUS at 17:09

## 2020-01-19 RX ADMIN — HEPARIN SODIUM 7500 UNITS: 5000 INJECTION INTRAVENOUS; SUBCUTANEOUS at 17:09

## 2020-01-19 RX ADMIN — CLINDAMYCIN PHOSPHATE 600 MG: 600 INJECTION, SOLUTION INTRAVENOUS at 08:54

## 2020-01-19 RX ADMIN — SODIUM CHLORIDE 75 ML/HR: 900 INJECTION, SOLUTION INTRAVENOUS at 17:13

## 2020-01-19 RX ADMIN — METHYLPREDNISOLONE SODIUM SUCCINATE 40 MG: 40 INJECTION, POWDER, FOR SOLUTION INTRAMUSCULAR; INTRAVENOUS at 20:22

## 2020-01-19 RX ADMIN — HYDROCODONE POLISTIREX AND CHLORPHENIRAMINE POLISTIREX 5 ML: 10; 8 SUSPENSION, EXTENDED RELEASE ORAL at 08:52

## 2020-01-19 RX ADMIN — METHYLPREDNISOLONE SODIUM SUCCINATE 40 MG: 40 INJECTION, POWDER, FOR SOLUTION INTRAMUSCULAR; INTRAVENOUS at 08:56

## 2020-01-19 RX ADMIN — HEPARIN SODIUM 7500 UNITS: 5000 INJECTION INTRAVENOUS; SUBCUTANEOUS at 11:24

## 2020-01-19 RX ADMIN — VITAMIN D, TAB 1000IU (100/BT) 2 TABLET: 25 TAB at 08:52

## 2020-01-19 RX ADMIN — ASPIRIN 81 MG: 81 TABLET ORAL at 08:52

## 2020-01-19 RX ADMIN — OXYMETAZOLINE HCL 2 SPRAY: 0.05 SPRAY NASAL at 08:57

## 2020-01-19 RX ADMIN — SODIUM CHLORIDE 100 ML/HR: 900 INJECTION, SOLUTION INTRAVENOUS at 06:15

## 2020-01-19 RX ADMIN — HYDROCODONE POLISTIREX AND CHLORPHENIRAMINE POLISTIREX 5 ML: 10; 8 SUSPENSION, EXTENDED RELEASE ORAL at 20:22

## 2020-01-19 NOTE — PROGRESS NOTES
Bedside shift change report given to Castro Live (oncoming nurse) by Rebeca Christopher (offgoing nurse). Report included the following information SBAR, Kardex, Intake/Output and MAR.

## 2020-01-19 NOTE — PROGRESS NOTES
Bedside and Verbal shift change report given to Rebeca Christopher RN (oncoming nurse) by Castro Live RN (offgoing nurse). Report included the following information SBAR, Kardex, Intake/Output, MAR, Recent Results and Med Rec Status.

## 2020-01-19 NOTE — PROGRESS NOTES
Hospitalist Progress Note    NAME: Sebastian Zambrano   :  1950   MRN:  [id number]     Interim Hospital Summary: 77 y.o. female whom presented on 2020 with      Assessment / Plan:    Sepsis (fever, tachycardia, leukocytosis) from [place] tonsillitis  -No tonsillar abscess on CT  -Continue clindamycin as started in ED (PCN allergy)  -tolerating mechanical soft diet. Throat pain and swallowing better.   -Chloraseptic spray PRN     Urinary tract infection  -completed course of rocephin      Asthma exacerbation  Intractable coughing  -less wheezing. Still coughing a lot. Continue IV steroids  -tussionex prn  -Albuterol nebs PRN     Enlarging GRACIELA nodule  -patient has a prior history of lung nodules that are felt to represent scarring from prior trauma. Notified patient that one nodule has enlarged on current CT scan -- advised her to pursue CT with contrast as an outpatient. Will provide her with a copy of the report at discharge     Osteoarthritis  -nsaids prn     Essential hypertension  -Continue holding HCTZ   -Hydralazine PRN     Code Status: Full  Surrogate Decision Maker: SonMoy     DVT Prophylaxis: heparin SQ  GI Prophylaxis: not indicated     Baseline: independent, working    40 or above Morbid obesity / Body mass index is 42.57 kg/m². Subjective:     Chief Complaint / Reason for Physician Visit  Follow up of sepsis, tonsilitis, UTI, asthma  Chart reviewed in detail. Discussed with RN events overnight.    Still painful to swallow  Coughing a lot    Review of Systems:  Symptom Y/N Comments  Symptom Y/N Comments   Fever/Chills y   Chest Pain     Poor Appetite    Edema     Cough y   Abdominal Pain     Sputum    Joint Pain     SOB/PATEL    Pruritis/Rash     Nausea/vomit    Tolerating PT/OT     Diarrhea    Tolerating Diet     Constipation    Other       Could NOT obtain due to:      PO intake:   Patient Vitals for the past 72 hrs: % Diet Eaten   01/18/20 1325 100 %   01/18/20 0906 100 %     Objective:     VITALS:   Last 24hrs VS reviewed since prior progress note. Most recent are:  Patient Vitals for the past 24 hrs:   Temp Pulse Resp BP SpO2   01/19/20 0849 97.6 °F (36.4 °C) 96 18 164/83 96 %   01/19/20 0417    (!) 168/92    01/19/20 0410 97.9 °F (36.6 °C) 77 18 171/89 98 %   01/18/20 2220    162/84    01/18/20 2206  76 18 (!) 166/103 94 %   01/18/20 2202 98.1 °F (36.7 °C) 77 18 (!) 150/100 94 %   01/18/20 1523 98.5 °F (36.9 °C) 97 18 (!) 167/92 96 %   01/18/20 1144 97.7 °F (36.5 °C) 88 18 144/84 98 %       Intake/Output Summary (Last 24 hours) at 1/19/2020 0921  Last data filed at 1/18/2020 1658  Gross per 24 hour   Intake 3805 ml   Output    Net 3805 ml        PHYSICAL EXAM:  General: WD, WN. Alert, cooperative, no acute distress    EENT:  EOMI. Anicteric sclerae. MMM  Resp:  CTA bilaterally, no wheezing or rales. No accessory muscle use  CV:  Regular  rhythm,  No edema  GI:  Soft, Non distended, Non tender.  +Bowel sounds  Neurologic:  Alert and oriented X 3, normal speech,   Psych:   Good insight. Not anxious nor agitated  Skin:  No rashes. No jaundice    Reviewed most current lab test results and cultures  YES  Reviewed most current radiology test results   YES  Review and summation of old records today    NO  Reviewed patient's current orders and MAR    YES  PMH/SH reviewed - no change compared to H&P  ________________________________________________________________________  Care Plan discussed with:    Comments   Patient x    Family      RN     Care Manager     Consultant                        Multidiciplinary team rounds were held today with , nursing, pharmacist and clinical coordinator. Patient's plan of care was discussed; medications were reviewed and discharge planning was addressed.      ________________________________________________________________________  Total NON critical care TIME:   25 Minutes    Total CRITICAL CARE TIME Spent:   Minutes non procedure based      Comments   >50% of visit spent in counseling and coordination of care x     This includes time during multidisciplinary rounds if indicated above   ________________________________________________________________________  Loetta Fothergill, MD     Procedures: see electronic medical records for all procedures/Xrays and details which were not copied into this note but were reviewed prior to creation of Plan. LABS:  I reviewed today's most current labs and imaging studies.   Pertinent labs include:  Recent Labs     01/19/20  0418 01/18/20  0401 01/17/20  0656   WBC 19.6* 28.0* 26.9*   HGB 12.2 12.1 13.5   HCT 33.9* 33.4* 36.5   * 358 424*     Recent Labs     01/19/20  0304 01/18/20  0401 01/17/20  0656    142 139   K 5.1 3.7 3.6   * 110* 103   CO2 24 26 29   * 125* 113*   BUN 23* 17 16   CREA 0.80 0.81 0.99   CA 8.9 9.0 9.5   ALB  --   --  3.6   TBILI  --   --  0.5   SGOT  --   --  14*   ALT  --   --  29

## 2020-01-19 NOTE — PROGRESS NOTES
Bedside and Verbal shift change report given to Lexus Treviño (oncoming nurse) by Chau De Santiago RN (offgoing nurse). Report included the following information SBAR, Kardex, Intake/Output, MAR and Recent Results.

## 2020-01-20 ENCOUNTER — HOME HEALTH ADMISSION (OUTPATIENT)
Dept: HOME HEALTH SERVICES | Facility: HOME HEALTH | Age: 70
End: 2020-01-20

## 2020-01-20 VITALS
HEIGHT: 64 IN | BODY MASS INDEX: 42.34 KG/M2 | WEIGHT: 248.02 LBS | OXYGEN SATURATION: 98 % | TEMPERATURE: 97.9 F | SYSTOLIC BLOOD PRESSURE: 146 MMHG | RESPIRATION RATE: 18 BRPM | HEART RATE: 87 BPM | DIASTOLIC BLOOD PRESSURE: 79 MMHG

## 2020-01-20 LAB
ANION GAP SERPL CALC-SCNC: 6 MMOL/L (ref 5–15)
BASOPHILS # BLD: 0 K/UL (ref 0–0.1)
BASOPHILS NFR BLD: 0 % (ref 0–1)
BUN SERPL-MCNC: 17 MG/DL (ref 6–20)
BUN/CREAT SERPL: 22 (ref 12–20)
CALCIUM SERPL-MCNC: 8.8 MG/DL (ref 8.5–10.1)
CHLORIDE SERPL-SCNC: 112 MMOL/L (ref 97–108)
CO2 SERPL-SCNC: 23 MMOL/L (ref 21–32)
CREAT SERPL-MCNC: 0.76 MG/DL (ref 0.55–1.02)
DIFFERENTIAL METHOD BLD: ABNORMAL
EOSINOPHIL # BLD: 0 K/UL (ref 0–0.4)
EOSINOPHIL NFR BLD: 0 % (ref 0–7)
ERYTHROCYTE [DISTWIDTH] IN BLOOD BY AUTOMATED COUNT: 14.8 % (ref 11.5–14.5)
GLUCOSE SERPL-MCNC: 141 MG/DL (ref 65–100)
HCT VFR BLD AUTO: 31.9 % (ref 35–47)
HGB BLD-MCNC: 11.6 G/DL (ref 11.5–16)
IMM GRANULOCYTES # BLD AUTO: 0.1 K/UL (ref 0–0.04)
IMM GRANULOCYTES NFR BLD AUTO: 1 % (ref 0–0.5)
LYMPHOCYTES # BLD: 2.1 K/UL (ref 0.8–3.5)
LYMPHOCYTES NFR BLD: 13 % (ref 12–49)
MCH RBC QN AUTO: 29.1 PG (ref 26–34)
MCHC RBC AUTO-ENTMCNC: 36.4 G/DL (ref 30–36.5)
MCV RBC AUTO: 79.9 FL (ref 80–99)
MONOCYTES # BLD: 1 K/UL (ref 0–1)
MONOCYTES NFR BLD: 6 % (ref 5–13)
NEUTS SEG # BLD: 12.5 K/UL (ref 1.8–8)
NEUTS SEG NFR BLD: 80 % (ref 32–75)
NRBC # BLD: 0 K/UL (ref 0–0.01)
NRBC BLD-RTO: 0 PER 100 WBC
PLATELET # BLD AUTO: 428 K/UL (ref 150–400)
PMV BLD AUTO: 9.7 FL (ref 8.9–12.9)
POTASSIUM SERPL-SCNC: 3.8 MMOL/L (ref 3.5–5.1)
RBC # BLD AUTO: 3.99 M/UL (ref 3.8–5.2)
SODIUM SERPL-SCNC: 141 MMOL/L (ref 136–145)
WBC # BLD AUTO: 15.8 K/UL (ref 3.6–11)

## 2020-01-20 PROCEDURE — 36415 COLL VENOUS BLD VENIPUNCTURE: CPT

## 2020-01-20 PROCEDURE — 74011250637 HC RX REV CODE- 250/637: Performed by: INTERNAL MEDICINE

## 2020-01-20 PROCEDURE — 74011250636 HC RX REV CODE- 250/636: Performed by: INTERNAL MEDICINE

## 2020-01-20 PROCEDURE — 80048 BASIC METABOLIC PNL TOTAL CA: CPT

## 2020-01-20 PROCEDURE — 94760 N-INVAS EAR/PLS OXIMETRY 1: CPT

## 2020-01-20 PROCEDURE — 74011636637 HC RX REV CODE- 636/637: Performed by: INTERNAL MEDICINE

## 2020-01-20 PROCEDURE — 85025 COMPLETE CBC W/AUTO DIFF WBC: CPT

## 2020-01-20 RX ORDER — HYDROCODONE POLISTIREX AND CHLORPHENIRAMINE POLISTIREX 10; 8 MG/5ML; MG/5ML
5 SUSPENSION, EXTENDED RELEASE ORAL
Qty: 70 ML | Refills: 0 | Status: SHIPPED | OUTPATIENT
Start: 2020-01-20 | End: 2020-01-23

## 2020-01-20 RX ORDER — PREDNISONE 10 MG/1
TABLET ORAL
Qty: 20 TAB | Refills: 0 | Status: SHIPPED | OUTPATIENT
Start: 2020-01-20 | End: 2020-01-31

## 2020-01-20 RX ORDER — PREDNISONE 20 MG/1
40 TABLET ORAL
Status: DISCONTINUED | OUTPATIENT
Start: 2020-01-21 | End: 2020-01-20 | Stop reason: HOSPADM

## 2020-01-20 RX ORDER — CLINDAMYCIN HYDROCHLORIDE 150 MG/1
300 CAPSULE ORAL 4 TIMES DAILY
Status: DISCONTINUED | OUTPATIENT
Start: 2020-01-20 | End: 2020-01-20 | Stop reason: HOSPADM

## 2020-01-20 RX ORDER — CLINDAMYCIN HYDROCHLORIDE 300 MG/1
300 CAPSULE ORAL 4 TIMES DAILY
Qty: 28 CAP | Refills: 0 | Status: SHIPPED | OUTPATIENT
Start: 2020-01-20 | End: 2020-01-27

## 2020-01-20 RX ADMIN — METHYLPREDNISOLONE SODIUM SUCCINATE 40 MG: 40 INJECTION, POWDER, FOR SOLUTION INTRAMUSCULAR; INTRAVENOUS at 08:33

## 2020-01-20 RX ADMIN — HEPARIN SODIUM 7500 UNITS: 5000 INJECTION INTRAVENOUS; SUBCUTANEOUS at 01:32

## 2020-01-20 RX ADMIN — FLUTICASONE PROPIONATE 2 SPRAY: 50 SPRAY, METERED NASAL at 08:34

## 2020-01-20 RX ADMIN — HYDROCODONE POLISTIREX AND CHLORPHENIRAMINE POLISTIREX 5 ML: 10; 8 SUSPENSION, EXTENDED RELEASE ORAL at 08:32

## 2020-01-20 RX ADMIN — VITAMIN D, TAB 1000IU (100/BT) 2 TABLET: 25 TAB at 08:33

## 2020-01-20 RX ADMIN — HEPARIN SODIUM 7500 UNITS: 5000 INJECTION INTRAVENOUS; SUBCUTANEOUS at 10:51

## 2020-01-20 RX ADMIN — SODIUM CHLORIDE 75 ML/HR: 900 INJECTION, SOLUTION INTRAVENOUS at 07:52

## 2020-01-20 RX ADMIN — ASPIRIN 81 MG: 81 TABLET ORAL at 08:33

## 2020-01-20 RX ADMIN — HUMAN INSULIN 5 UNITS: 100 INJECTION, SUSPENSION SUBCUTANEOUS at 08:32

## 2020-01-20 RX ADMIN — CLINDAMYCIN PHOSPHATE 600 MG: 600 INJECTION, SOLUTION INTRAVENOUS at 01:33

## 2020-01-20 NOTE — PROGRESS NOTES
Reason for Admission:   Sepsis                    RRAT Score:     11 low                Plan for utilizing home health:     Pt would benefit from [facility] for sepsis. Current Advanced Directive/Advance Care Plan: On file                            Transition of Care Plan:   Home with OP follow-up. Pt is a 77year old female admitted for sepsis on 1/17/2020. Pt was alert and oriented x4 during initial assessment. CM met with Pt to discuss discharge plan for [facility] for sepsis. Pt was receptive to recommendation.        Care Management Interventions  PCP Verified by CM: Camille Palomino )  Mode of Transport at Discharge: Self  Transition of Care Consult (CM Consult): Discharge Planning  Discharge Durable Medical Equipment: No  Physical Therapy Consult: No  Occupational Therapy Consult: No  Speech Therapy Consult: No  Current Support Network: Own Home  Confirm Follow Up Transport: Self  Discharge Location  Discharge Placement: [address], [address] [facility] [place]

## 2020-01-20 NOTE — DISCHARGE INSTRUCTIONS
HOSPITALIST DISCHARGE INSTRUCTIONS    NAME: Michelle Cisneros   :  1950   MRN:  [id number]     Date/Time:  2020 9:35 AM    ADMIT DATE: 2020   DISCHARGE DATE: 2020         · It is important that you take the medication exactly as they are prescribed. · Keep your medication in the bottles provided by the pharmacist and keep a list of the medication names, dosages, and times to be taken in your wallet. · Do not take other medications without consulting your doctor. What to do at Home    Recommended diet:  Regular Diet    Recommended activity: Activity as tolerated      If you have questions regarding the hospital related prescriptions or hospital related issues please call [facility] at [phone]. You can always direct your questions to your primary care doctor if you are unable to reach your hospital physician; your PCP works as an extension of your hospital doctor just like your hospital doctor is an extension of your PCP for your time at the hospital [facility], [facility])    If you experience any of the following symptoms then please call your primary care physician or return to the emergency room if you cannot get hold of your doctor:    Fever, chills, nausea, vomiting, or persistent diarrhea  Worsening weakness or new problems with your speech or balance  Dark stools or visible blood in your stools  New Leg swelling or shortness of breath as these could be signs of a clot    Additional Instructions:      Bring these papers with you to your follow up appointments. The papers will help your doctors be sure to continue the care plan from the hospital.              Information obtained by :  I understand that if any problems occur once I am at home I am to contact my physician. I understand and acknowledge receipt of the instructions indicated above. Physician's or R.N.'s Signature                                                                  Date/Time                                                                                                                                              Patient or Representative Signature

## 2020-01-20 NOTE — PROGRESS NOTES
[address] [facility].            1/20/2020      RE: Carlee Shah (YOB: 1950)    To Whom it May Concern:    Please excuse Carlee Shah from work from 1/17/2020 to 1/20/2020 as she was admitted to [facility] for a medical condition and has been under my care. She is advised to rest for a few more days and may return to work on 1/27/2020 with no new restrictions. Please feel free to contact my office if you have any questions or concerns. Thank you for your assistance in this matter.         Melanie Rodriguez MD  [phone] office

## 2020-01-20 NOTE — PROGRESS NOTES
Orthopedic End of Shift Note    Bedside shift change report given to [place] (oncoming nurse) by Monique Huber (offgoing nurse). Report included the following information SBAR, Kardex, Intake/Output and MAR.      POD#     Significant issues during shift: none  Issues for Physician to address: Patient would like to know approximately how long she will remain in the hospital.    Activity This Shift  (check all that apply) [x] chair  [] dangle   [x] bathroom  [] bedside commode [] hallway  [] bedrest   Nausea/Vomiting [] yes [x] no     Voiding Status [x] void [] Ordaz [] I&O Cath   Bowel Movements [] yes [x] no     Foot Pumps or SCD [] yes [x] no    Ice Pack [] yes    [x] no    Incentive Spirometer [] yes [x] no Volume:    Telemetry Monitoring   [] yes [x] no Rhythm:   Supplemental O2 [] yes [x] no Sat off O2: %

## 2020-01-20 NOTE — ROUTINE PROCESS
The following appointments have been successfully scheduled: 
 
Date/time Friday, January 24, 2020 10:00 AM 
Patient  Hosea Braun 1950 [phone] Department [facility]-MAIN OFFICE-PCP Appointment type Transitional Care Provider Chetan Horne

## 2020-01-20 NOTE — DISCHARGE SUMMARY
Hospitalist Discharge Summary     Patient ID:  Darling Henry  [id number]  18 y.o.  1950    PCP on record: Eusebio Dumont NP    Admit date: 1/17/2020  Discharge date and time: 1/20/2020      DISCHARGE DIAGNOSIS:    Sepsis (fever, tachycardia, leukocytosis) from [place] tonsillitis  Urinary tract infection  Asthma exacerbation  Enlarging GRACIELA nodule  Osteoarthritis  Essential hypertension      CONSULTATIONS:  None    Excerpted HPI from H&P of Miller Beyer MD:  CHIEF COMPLAINT: Fever, nasal congestion, sore throat     HISTORY OF PRESENT ILLNESS:     Ning Oropeza is a 77 y.o.  female with history of hypertension, asthma, osteoarthritis, who presents with fever, nasal congestion, and sore throat. She says that her symptoms started abruptly yesterday morning around 3 AM. She was unable to sleep due to symptoms, especially because she can't breathe through her nose at all. She saw a physician at her PCP's office yesterday, was found negative for streptococcal pharyngitis, and was advised to treat herself symptomatically. She stayed home from work. Overnight she still hasn't been able to sleep and has been having worsening wheezing, prompting her to come to the emergency department. Here, she was found to be febrile to 101F, tachycardic to the 130s. Labs were remarkable for leukocytosis of 26.9. Rapid flu testing negative. Imaging was significant for a normal head CT, palatine tonsillitis with no abscess and reactive nodes on CT neck, and clear CXR. She was wheezing, which improved after a nebulized bronchodilator treatment. In light of the above, we were asked to admit for work up and evaluation of the above problems. ______________________________________________________________________  DISCHARGE SUMMARY/HOSPITAL COURSE:  for full details see H&P, daily progress notes, labs, consult notes.        Sepsis (fever, tachycardia, leukocytosis) from [place] tonsillitis  -No tonsillar abscess on CT  -clinically improved with IV clindamycin. Throat pain improved. Swallowing better. Tonsils smaller, improved from grade 2 to grade 1 at discharge. Leukocytosis resolving. Will transition to oral clindamycin to complete 10 days.    -Chloraseptic spray PRN  -follow up with PCP in a week     Urinary tract infection  -completed course of rocephin 1/19. Culture no growth     Asthma exacerbation  Intractable coughing, better  -wheezing and cough improved. Switch to prednisone taper  -tussionex prn  -Albuterol nebs PRN     Enlarging GRACIELA nodule  -patient has a prior history of lung nodules that are felt to represent scarring from prior trauma. Notified patient that one nodule has enlarged on current CT scan -- advised her to pursue CT with contrast as an outpatient. Will provide her with a copy of the report at discharge     Osteoarthritis  -nsaids prn     Essential hypertension  -can restart HCTZ   _______________________________________________________________________  Patient seen and examined by me on discharge day. Pertinent Findings:  Gen:    Not in distress  . Tonsils much smaller, grade 1 at discharge. Less white exudates. (+) cervical LN, less tender  Chest: No more wheezing. CVS:   Regular rhythm. No edema  Abd:  Soft, not distended, not tender  Neuro:  Alert, Oriented x 4, grossly non focal exam  _______________________________________________________________________  DISCHARGE MEDICATIONS:   Current Discharge Medication List      START taking these medications    Details   chlorpheniramine-HYDROcodone (TUSSIONEX) 10-8 mg/5 mL suspension Take 5 mL by mouth every twelve (12) hours as needed for Cough for up to 3 days. Max Daily Amount: 10 mL. Qty: 70 mL, Refills: 0    Associated Diagnoses: Tonsillitis; Wheezing      clindamycin (CLEOCIN) 300 mg capsule Take 1 Cap by mouth four (4) times daily for 7 days.   Qty: 28 Cap, Refills: 0      predniSONE (DELTASONE) 10 mg tablet 4 tabs daily for 2 days   3 tabs daily for 2 days   2 tabs daily for 2 days   1 tab   daily for 2 days  Qty: 20 Tab, Refills: 0         CONTINUE these medications which have NOT CHANGED    Details   diclofenac EC (VOLTAREN) 75 mg EC tablet Take 75 mg by mouth daily. fluticasone propionate (FLONASE) 50 mcg/actuation nasal spray 2 Sprays by Both Nostrils route daily as needed for Rhinitis. nystatin-triamcinolone (MYCOLOG) 100,000-0.1 unit/gram-% ointment Apply  to affected area daily. !! OTHER 2 Sprays by Both Nostrils route daily as needed (congestion). Patient uses a grapefruit seed extract nasal spray      !! OTHER Take 1 Tab by mouth two (2) times daily as needed (cold like symptoms). Patient takes Zicam Redimelts      cholecalciferol, vitamin D3, 2,000 unit tab Take 1 Tab by mouth daily. Qty: 90 Tab, Refills: 1      hydroCHLOROthiazide (HYDRODIURIL) 25 mg tablet TAKE 1 TABLET BY MOUTH DAILY  Qty: 90 Tab, Refills: 1    Associated Diagnoses: Essential hypertension      albuterol (VENTOLIN HFA) 90 mcg/actuation inhaler Take 2 Puffs by inhalation every four (4) hours as needed. aspirin delayed-release 81 mg tablet Take 81 mg by mouth daily. !! - Potential duplicate medications found. Please discuss with provider. STOP taking these medications       dextromethorphan-guaiFENesin (ROBITUSSIN-DM)  mg/5 mL syrup Comments:   Reason for Stopping:               My Recommended Diet, Activity, Wound Care, and follow-up labs are listed in the patient's Discharge Insturctions which I have personally completed and reviewed.   Risk of deterioration: Low    Condition at Discharge:  Stable  _____________________________________________________________________    Disposition  Home with family, no needs  ____________________________________________________________________    Care Plan discussed with:   Patient, Family, RN, Care Manager    ____________________________________________________________________    Code Status: Full Code  ____________________________________________________________________      Condition at Discharge:  Stable  _____________________________________________________________________  Follow up with:   PCP : Gonsalo Box NP  Follow-up Information     Follow up With Specialties Details Why Contact Info    Gonsalo Box NP Nurse Practitioner In 1 week  [address] [address]  [phone]                Total time in minutes spent coordinating this discharge (includes going over instructions, follow-up, prescriptions, and preparing report for sign off to her PCP) :  35 minutes    Signed:  Brad Engel MD

## 2020-01-21 ENCOUNTER — PATIENT OUTREACH (OUTPATIENT)
Dept: FAMILY MEDICINE CLINIC | Age: 70
End: 2020-01-21

## 2020-01-21 NOTE — PROGRESS NOTES
Hospital Discharge Follow-Up      Date/Time:  1/21/2020 11:57 AM    ARTURO Ferrell, RN, [facility]    Care Transitions Nurse  [phone]      Patient was admitted to [facility] (Sepsis Bundle end 4/20/20) on 1/17/20 and discharged on 1/20/20 for Sepsis (fever, tachycardia, leukocytosis) from [place] tonsillitis. The physician discharge summary was available at the time of outreach. Patient was contacted within 1 business days of discharge. Top Challenges reviewed with the provider   Results for Ainsley Hull (MRN [id number]) as of 1/21/2020 12:13   1/17/2020 06:56 1/18/2020 04:01 1/19/2020 03:04 1/19/2020 04:18 1/20/2020 03:31   WBC 26.9 (H) 28.0 (H)  19.6 (H) 15.8 (H)   MCV 80.4 81.5  81.1 79.9 (L)   PLATELET 573 (H) 732  414 (H) 428 (H)   MPV 9.8 10.3  9.8 9.7   LYMPHOCYTES 14 9 (L)  9 (L) 13   ABS. NEUTROPHILS 21.8 (H) 23.0 (H)  17.2 (H) 12.5 (H)   Chloride 103 110 (H) 112 (H)  112 (H)   Glucose 113 (H) 125 (H) 140 (H)  141 (H)   BUN/Creatinine ratio 16 21 (H) 29 (H)  22 (H)   Albumin 3.6         Sepsis (fever, tachycardia, leukocytosis) from [place] tonsillitis  - No tonsillar abscess on CT  - Clinically improved with IV clindamycin. Throat pain improved. Swallowing better. Tonsils smaller, improved from grade 2 to grade 1 at discharge. Leukocytosis resolving. Transition to oral Clindamycin to complete 10 days. - Chloraseptic spray PRN  - PCP f/u in a week     Urinary tract infection  -completed course of rocephin 1/19. Culture no growth     Asthma exacerbation, Intractable coughing  - wheezing and cough improved. Switch to prednisone taper  -Tussionex prn  - Albuterol nebs PRN     Enlarging GRACIELA nodule  - hx of lung nodules that are felt to represent scarring from prior trauma.  Notified patient that one nodule has enlarged on current CT scan -- advised to pursue CT with contrast as an outpatient.       Osteoarthritis  - NSAIDs prn     Essential hypertension  - restart HCTZ Advance Care Planning:   Does patient have an Advance Directive:  reviewed and current        Method of communication with provider :chart routing    Inpatient RRAT score: 6  Was this a readmission? no     Care Transition Nurse (CTN) contacted the patient by telephone to perform post hospital discharge assessment. Verified name and  with patient as identifiers. Provided introduction to self, and explanation of the CTN role. Patient received hospital discharge instructions. CTN reviewed discharge instructions and red flags with patient who verbalized understanding. Patient given an opportunity to ask questions and does not have any further questions or concerns at this time. The patient agrees to contact the PCP office for questions related to their healthcare. CTN provided contact information for future reference. Disease Specific:  Sepsis     Sepsis Note     Most recent vital signs: 146/79-87-18. T-97.9. O2 Sat 98%    Source of Infection:  Other source of infection - [place] Tonsillitis    Antibiotic prescribed at discharge: Clindamycin Length of remaining treatment: 7 days  Are you taking your antibiotic as prescribed? yes     Infectious Disease Consult during admission: no     In the last 24 hour have you experienced; Fever no    Low body temperature no    Chills or shaking no    Sweating no    Fast heart rate no    Fast breathing no    Dizziness/lightheadedness no    Confusion or unusual change in mental status no    Diarrhea loose stools d/t ABX    Nausea no    Vomiting no    Shortness of breath or difficulty breathing no    Less urine output no    Cold, clammy, and pale skin no     Skin rash or skin color changes no     If the patients has two or more symptoms present notify the primary care provider of the concern for sepsis.   Patients top risk factors for readmission:  none identified @ present time    Home Health orders at discharge: [address] [facility]: [facility]  Date of initial visit: per EMR 1/24/20    Durable Medical Equipment ordered at discharge: none    Medication(s):   New Medications at Discharge:   chlorpheniramine-HYDROcodone (TUSSIONEX) 10-8 mg/5 mL suspension Take 5 mL by mouth every twelve (12) hours as needed for Cough for up to 3 days. Max Daily Amount: 10 mL. Qty: 70 mL, Refills: 0     Associated Diagnoses: Tonsillitis; Wheezing       clindamycin (CLEOCIN) 300 mg capsule Take 1 Cap by mouth four (4) times daily for 7 days. Qty: 28 Cap, Refills: 0       predniSONE (DELTASONE) 10 mg tablet 4 tabs daily for 2 days   3 tabs daily for 2 days   2 tabs daily for 2 days   1 tab   daily for 2 days  Qty: 20 Tab, Refills:      Changed Medications at Discharge: none    Discontinued Medications at Discharge: dextromethorphan-guaiFENesin (ROBITUSSIN-DM)  mg/5 mL syrup    Medication reconciliation was performed with patient, who verbalizes understanding of administration of home medications. There were no barriers to obtaining medications identified at this time. Pt reported taking Florastor Probiotic while taking ABX    Referral to Pharm D needed: no     Current Outpatient Medications   Medication Sig    chlorpheniramine-HYDROcodone (TUSSIONEX) 10-8 mg/5 mL suspension Take 5 mL by mouth every twelve (12) hours as needed for Cough for up to 3 days. Max Daily Amount: 10 mL.  clindamycin (CLEOCIN) 300 mg capsule Take 1 Cap by mouth four (4) times daily for 7 days.  predniSONE (DELTASONE) 10 mg tablet 4 tabs daily for 2 days   3 tabs daily for 2 days   2 tabs daily for 2 days   1 tab   daily for 2 days    fluticasone propionate (FLONASE) 50 mcg/actuation nasal spray 2 Sprays by Both Nostrils route daily as needed for Rhinitis.  nystatin-triamcinolone (MYCOLOG) 100,000-0.1 unit/gram-% ointment Apply  to affected area daily.  OTHER Take 1 Tab by mouth two (2) times daily as needed (cold like symptoms).  Patient takes Zicam Redimelts    cholecalciferol, vitamin D3, 2,000 unit tab Take 1 Tab by mouth daily.  hydroCHLOROthiazide (HYDRODIURIL) 25 mg tablet TAKE 1 TABLET BY MOUTH DAILY    albuterol (VENTOLIN HFA) 90 mcg/actuation inhaler Take 2 Puffs by inhalation every four (4) hours as needed.  aspirin delayed-release 81 mg tablet Take 81 mg by mouth daily.  diclofenac EC (VOLTAREN) 75 mg EC tablet Take 75 mg by mouth daily.  OTHER 2 Sprays by Both Nostrils route daily as needed (congestion). Patient uses a grapefruit seed extract nasal spray     No current facility-administered medications for this visit. There are no discontinued medications. [facility] follow up appointment(s):   Future Appointments   Date Time Provider [place]   1/24/2020 [facility] Determined [facility] [address] [facility]   1/24/2020 10:00 AM REJI Somers SCHED   3/10/2020  8:00 AM LAB [facility] EVERETTE ASHLEIGH SCHED      Non-[facility] follow up appointment(s): needs outpt CT Scan    [facility]:  information provided as a resource       Goals      Transitions of Care- prevent complications post hospitalization. 1/21/20- pt reported feeling better. Throat fine. No difficulty swallowing, No SOB, wheezing. Cough improved. No chills. Hasn't checked temp. No thermometer in home. Taking ABX & Prednisone taper as prescribed. Taking Probiotic w/ ABX. Holding Voltaren while on Prednisone. Appetite good. Adequate PO fluid intake. Bladder function- no burning/pain when urinating. Bowel function- loose BMs (told by Hospitalist to be expected). Red flags & action plan reviewed. Pt reported being in \"green\" zone today. Patient advised providers on call 24 hours a day / 7 days a week (M-F 5 PM to 8 AM, and from Friday 5 PM until Monday 8 AM for the weekend) should the patient have questions or concerns. [facility] information provided. PCP f/u 1/24/20. BS [address] visit on 1/24/20 as per EMR. Plan- pt to continue w/ ABX & Prednisone taper till completed.  Pt to attend scheduled appt. PCP to give order for outpt CT re f/u lung nodule. BS H2H visit as scheduled. CTN to collaborate w/ pt, PCP, & other Care Team Members for coordination of care. Pt agreeable w/ CTN f/u ~ 7-10 days-ID.

## 2020-01-22 LAB
BACTERIA SPEC CULT: NORMAL
SERVICE CMNT-IMP: NORMAL

## 2020-01-23 ENCOUNTER — HOME CARE VISIT (OUTPATIENT)
Dept: SCHEDULING | Facility: HOME HEALTH | Age: 70
End: 2020-01-23

## 2020-01-23 PROCEDURE — G0299 HHS/HOSPICE OF RN EA 15 MIN: HCPCS

## 2020-01-24 ENCOUNTER — OFFICE VISIT (OUTPATIENT)
Dept: FAMILY MEDICINE CLINIC | Age: 70
End: 2020-01-24

## 2020-01-24 VITALS
HEART RATE: 72 BPM | SYSTOLIC BLOOD PRESSURE: 136 MMHG | DIASTOLIC BLOOD PRESSURE: 73 MMHG | HEIGHT: 64 IN | RESPIRATION RATE: 18 BRPM | BODY MASS INDEX: 42.51 KG/M2 | OXYGEN SATURATION: 95 % | WEIGHT: 249 LBS | TEMPERATURE: 97.6 F

## 2020-01-24 DIAGNOSIS — R91.8 MULTIPLE LUNG NODULES ON CT: ICD-10-CM

## 2020-01-24 DIAGNOSIS — R91.1 LUNG NODULE SEEN ON IMAGING STUDY: ICD-10-CM

## 2020-01-24 RX ORDER — ALBUTEROL SULFATE 90 UG/1
2 AEROSOL, METERED RESPIRATORY (INHALATION)
Qty: 1 INHALER | Refills: 1 | Status: SHIPPED | OUTPATIENT
Start: 2020-01-24 | End: 2020-04-21 | Stop reason: SDUPTHER

## 2020-01-24 NOTE — PATIENT INSTRUCTIONS
1) Make an appt on Friday 1/30 at 10pm for a reassessment of lungs and PNA 23 vaccine 2) Eat healthy, drink plenty of fluids and rest.  You need to recuperate from your hospital visit Will give you a work note for 1 week - avoid germs!! 
 
3) CT scan order to reassess lung nodule Learning About the [address] [facility] Diet What is the Mediterranean diet? The Mediterranean diet is a style of eating rather than a diet plan. It features foods eaten in [place], [place], [place] and [place], and other countries along the [facility]. It emphasizes eating foods like fish, fruits, vegetables, beans, high-fiber breads and whole grains, nuts, and olive oil. This style of eating includes limited red meat, cheese, and sweets. Why choose the Mediterranean diet? A Mediterranean-style diet may improve heart health. It contains more fat than other heart-healthy diets. But the fats are mainly from nuts, unsaturated oils (such as fish oils and olive oil), and certain nut or seed oils (such as canola, soybean, or flaxseed oil). These fats may help protect the heart and blood vessels. How can you get started on the Mediterranean diet? Here are some things you can do to switch to a more Mediterranean way of eating. What to eat · Eat a variety of fruits and vegetables each day, such as grapes, blueberries, tomatoes, broccoli, peppers, figs, olives, spinach, eggplant, beans, lentils, and chickpeas. · Eat a variety of whole-grain foods each day, such as oats, brown rice, and whole wheat bread, pasta, and couscous. · Eat fish at least 2 times a week. Try tuna, salmon, mackerel, lake trout, herring, or sardines. · Eat moderate amounts of low-fat dairy products, such as milk, cheese, or yogurt. · Eat moderate amounts of poultry and eggs. · Choose healthy (unsaturated) fats, such as nuts, olive oil, and certain nut or seed oils like canola, soybean, and flaxseed. · Limit unhealthy (saturated) fats, such as butter, palm oil, and coconut oil. And limit fats found in animal products, such as meat and dairy products made with whole milk. Try to eat red meat only a few times a month in very small amounts. · Limit sweets and desserts to only a few times a week. This includes sugar-sweetened drinks like soda. The Mediterranean diet may also include red wine with your meal1 glass each day for women and up to 2 glasses a day for men. Tips for eating at home · Use herbs, spices, garlic, lemon zest, and citrus juice instead of salt to add flavor to foods. · Add avocado slices to your sandwich instead of hwang. · Have fish for lunch or dinner instead of red meat. Brush the fish with olive oil, and broil or grill it. · Sprinkle your salad with seeds or nuts instead of cheese. · Cook with olive or canola oil instead of butter or oils that are high in saturated fat. · Switch from 2% milk or whole milk to 1% or fat-free milk. · Dip raw vegetables in a vinaigrette dressing or hummus instead of dips made from mayonnaise or sour cream. 
· Have a piece of fruit for dessert instead of a piece of cake. Try baked apples, or have some dried fruit. Tips for eating out · Try broiled, grilled, baked, or poached fish instead of having it fried or breaded. · Ask your  to have your meals prepared with olive oil instead of butter. · Order dishes made with marinara sauce or sauces made from olive oil. Avoid sauces made from cream or mayonnaise. · Choose whole-grain breads, whole wheat pasta and pizza crust, brown rice, beans, and lentils. · Cut back on butter or margarine on bread. Instead, you can dip your bread in a small amount of olive oil. · Ask for a side salad or grilled vegetables instead of french fries or chips. Where can you learn more? Go to http://jose f.info/.  
Enter O407 in the search box to learn more about \"Learning About the Mediterranean Diet. \" Current as of: November 7, 2018 Content Version: 12.2 © 2623-8511 [employer], [employer]. Care instructions adapted under license by [employer] (which disclaims liability or warranty for this information). If you have questions about a medical condition or this instruction, always ask your healthcare professional. [address] any warranty or liability for your use of this information.
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
## 2020-01-24 NOTE — LETTER
1/24/2020 10:28 AM 
 
Ms. Trung Elias [phone] Re: Trung Staff To Whom It May Concern: 
 
Please excuse Trung Staff from work January 24 - January 31, 2020. She was seen in our clinic today and is under my care for a medical issue. If there are questions or concerns, please have the patient contact our office. Thank you for your assistance in this matter. Sincerely, Tamanna Lopez NP

## 2020-01-24 NOTE — PROGRESS NOTES
Chief Complaint   Patient presents with   [facility] Follow Up     In ED [facility] x 4 days for UTI, sepisis, tonsilitis         1. Have you been to the ER, urgent care clinic since your last visit? Hospitalized since your last visit? Yes [facility] x 4 days. 2. Have you seen or consulted any other health care providers outside of the [address] [place] [address] since your last visit? Include any pap smears or colon screening.   no

## 2020-01-31 ENCOUNTER — OFFICE VISIT (OUTPATIENT)
Dept: FAMILY MEDICINE CLINIC | Age: 70
End: 2020-01-31

## 2020-01-31 ENCOUNTER — HOSPITAL ENCOUNTER (OUTPATIENT)
Dept: CT IMAGING | Age: 70
Discharge: HOME | End: 2020-01-31
Attending: NURSE PRACTITIONER
Payer: MEDICARE

## 2020-01-31 VITALS
WEIGHT: 241.6 LBS | OXYGEN SATURATION: 95 % | BODY MASS INDEX: 41.25 KG/M2 | HEIGHT: 64 IN | RESPIRATION RATE: 18 BRPM | SYSTOLIC BLOOD PRESSURE: 127 MMHG | DIASTOLIC BLOOD PRESSURE: 78 MMHG | TEMPERATURE: 96.6 F | HEART RATE: 111 BPM

## 2020-01-31 DIAGNOSIS — Z23 ENCOUNTER FOR IMMUNIZATION: ICD-10-CM

## 2020-01-31 DIAGNOSIS — R05.9 COUGH: Primary | ICD-10-CM

## 2020-01-31 DIAGNOSIS — R91.1 LUNG NODULE SEEN ON IMAGING STUDY: ICD-10-CM

## 2020-01-31 DIAGNOSIS — R91.8 MULTIPLE LUNG NODULES ON CT: ICD-10-CM

## 2020-01-31 PROCEDURE — 71250 CT THORAX DX C-: CPT

## 2020-01-31 NOTE — PROGRESS NOTES
After obtaining consent, and per verbal order from NP [facility], patient received pneumovax 23 vaccine given by Ana Villalobos LPN in left Deltoid. Pneumonia Vaccine 0.5 mL IM now. Patient was observed for 10 minutes post injection. Patient tolerated injection well. VIS given.

## 2020-01-31 NOTE — PATIENT INSTRUCTIONS
1) Lungs are clear, but you seem to have  a possible viral infection. Will give you a letter excusing you from work next week so you can fully recover before going back to general public/germ environment. An upper respiratory infection (URI) is an infection of the throat and nose. It is also known as \"the common cold\". 90% of these infections are caused by a virus. Viral infections do not respond to antibiotic treatment, only bacteria are killed by antibiotics. Therefore, supportive treatment is recommended to help with symptoms. Symptoms usually subside after 7-10 days (or longer if you smoke). If symptoms worsen or persist after 14 days, or if you have fever over 101.3, fever for 5 days or more, wheezing, or shortness of breath, please contact the office. To prevent URIs, wash hands frequently (epecially before and after eating - use hand  if you are in a public place.) Eat a healthy diet, get regular exercise and sufficient sleep. Reduce your exposure to cigarette smoke. If you need to cough or sneeze, use the crook of your arm to cover your mouth. Supportive Care 1) Alternate 400mg Ibuprofen and 650mg Tylenol every 4 hours as needed for sinus pain and discomfort. Make sure to take Ibuprofen with food as it can cause stomach upset. Do not exceed 3000 mg Tylenol per day. 2) Take a daily antihistamine to reduce symptoms such as sneezing, runny nose and itchy eyes. You can buy these over the counter (OTC) (no prescription needed) such as Claritin, Allegra or Zyrtec. Take this daily as it takes 3-4 weeks for the full therapeutic effect to take place. Follow directions on package. If symptoms of sneezing, coughing and runny nose are severe, you can try taking Benadryl at night before bed. However, Benadryl can cause drowsiness, so please use caution. Elderly people should not use Benadryl due to side effects and increase risk of falling. 3) OTC Robitussin or Delsym for cough relief. Follow directions on package. Do not exceed maximum dose. May cause drowsiness. If you have high blood pressure or kidney problems, avoid cough medicines that contain decongestants  such as pseudoephedrine, ephedrine, phenylephrine, naphazoline and oxymetazoline. 4) Use an OTC decongestant nasal spray such as Flonase, Nasonex, or Rhinocort. These contain a steroid and will help reduce congestion. You can spray 2x in each nostril two times a day for 3-5 days. Use the opposite hand of the nostril you are spraying and look down at your toes when you administer the nasal spray. This ensures the spray is applied to the nasal tissue properly. If you use Afrin for nasal congestion, DO NOT use for more than 5 days (due to rebound congestion) Saline nasal spray can be used daily and will help restore moisture to dry nasal passages, wash away allergens and can help prevent inflammation of the mucous membranes. 5) Mucinex (guaifenesin) helps thin mucus and may make it easier to clear it from head, throat and lungs. 6) Increase your fluid consumption, especially water. Eat a well-balanced diet and avoid dairy and sugar as these can increase or thicken congestion. 7) Warm salt water gargle can help alleviate sore throat (1 teaspoon in 8 oz warm water) 8) Honey is a natural cough suppressor - can take a teaspoon of honey for cough or mix with hot tea. Local honey can help inoculate against local pollen that causes allergic reactions. (It has to be local honey from our area. You can usually find local honey at farmers' markets.) 9) Humidify air, especially in bedroom at night, as it may help with nasal and throat dryness. Breathing in steam from a shower may help loosen mucus and soothe an irritated throat. 10) Get plenty of rest! 11) A warm soak in a bath can help ease muscle and body aches.  Add 2 cup of epsom salt to water (Dr. Jeffrey You is a good one- at [facility] for around $6). Or you can put 1 cup of epsom salt in quart of warm water, soak a wash cloth in solution and apply to sore muscles. 12) Emergen C or [place] - vitamin supplements containing high doses of vitamin C, Vitamin B12 and B6 that can is marketed to help prevent or stop colds (although this has not been confirmed by scientific research) If symptoms persist for more than 10 days or if you have a fever above 102, please call the office. Complications of URI include an infection of the skin around the eye and other infections. Watch for signs of fever, chills, body aches or any areas of red, warm, swollen and tender skin. Call immediately if you notice these signs. Cough: Care Instructions Your Care Instructions A cough is your body's response to something that bothers your throat or airways. Many things can cause a cough. You might cough because of a cold or the flu, bronchitis, or asthma. Smoking, postnasal drip, allergies, and stomach acid that backs up into your throat also can cause coughs. A cough is a symptom, not a disease. Most coughs stop when the cause, such as a cold, goes away. You can take a few steps at home to cough less and feel better. Follow-up care is a key part of your treatment and safety. Be sure to make and go to all appointments, and call your doctor if you are having problems. It's also a good idea to know your test results and keep a list of the medicines you take. How can you care for yourself at home? · Drink lots of water and other fluids. This helps thin the mucus and soothes a dry or sore throat. Honey or lemon juice in hot water or tea may ease a dry cough. · Take cough medicine as directed by your doctor. · Prop up your head on pillows to help you breathe and ease a dry cough. · Try cough drops to soothe a dry or sore throat.  Cough drops don't stop a cough. Medicine-flavored cough drops are no better than candy-flavored drops or hard candy. · Do not smoke. Avoid secondhand smoke. If you need help quitting, talk to your doctor about stop-smoking programs and medicines. These can increase your chances of quitting for good. When should you call for help? Call 911 anytime you think you may need emergency care. For example, call if: 
  · You have severe trouble breathing.  
 Call your doctor now or seek immediate medical care if: 
  · You cough up blood.  
  · You have new or worse trouble breathing.  
  · You have a new or higher fever.  
  · You have a new rash.  
 Watch closely for changes in your health, and be sure to contact your doctor if: 
  · You cough more deeply or more often, especially if you notice more mucus or a change in the color of your mucus.  
  · You have new symptoms, such as a sore throat, an earache, or sinus pain.  
  · You do not get better as expected. Where can you learn more? Go to http://zeina-mary jane.info/. Enter D279 in the search box to learn more about \"Cough: Care Instructions. \" Current as of: June 9, 2019 Content Version: 12.2 © 3889-9950 [employer]. Care instructions adapted under license by [employer] (which disclaims liability or warranty for this information). If you have questions about a medical condition or this instruction, always ask your healthcare professional. [place] [address] any warranty or liability for your use of this information. Vaccine Information Statement Pneumococcal Polysaccharide Vaccine (PPSV23): What You Need to Know Many Vaccine Information Statements are available in Hebrew and other languages. See www.immunize.org/vis Hojas de información sobre vacunas están disponibles en español y en muchos otros idiomas. Visite www.immunize.org/vis 1. Why get vaccinated? Pneumococcal polysaccharide vaccine (PPSV23) can prevent pneumococcal disease. Pneumococcal disease refers to any illness caused by pneumococcal bacteria. These bacteria can cause many types of illnesses, including pneumonia, which is an infection of the lungs. Pneumococcal bacteria are one of the most common causes of pneumonia. Besides pneumonia, pneumococcal bacteria can also cause: 
 Ear infections  Sinus infections  Meningitis (infection of the tissue covering the brain and spinal cord)  Bacteremia (bloodstream infection) Anyone can get pneumococcal disease, but children under 3years of age, people with certain medical conditions, adults 72 years or older, and cigarette smokers are at the highest risk. Most pneumococcal infections are mild. However, some can result in long-term problems, such as brain damage or hearing loss. Meningitis, bacteremia, and pneumonia caused by pneumococcal disease can be fatal.  
 
2. PPSV23  
 
PPSV23 protects against 23 types of bacteria that cause pneumococcal disease. PPSV23 is recommended for:  All adults 72 years or older,  Anyone 2 years or older with certain medical conditions that can lead to an increased risk for pneumococcal disease. Most people need only one dose of PPSV23. A second dose of PPSV23, and another type of pneumococcal vaccine called PCV13, are recommended for certain high-risk groups. Your health care provider can give you more information. People 65 years or older should get a dose of PPSV23 even if they have already gotten one or more doses of the vaccine before they turned 72. 
 
3. Talk with your health care provider Tell your vaccine provider if the person getting the vaccine: 
 Has had an allergic reaction after a previous dose of PPSV23, or has any severe, life-threatening allergies. In some cases, your health care provider may decide to postpone PPSV23 vaccination to a future visit. People with minor illnesses, such as a cold, may be vaccinated. People who are moderately or severely ill should usually wait until they recover before getting PPSV23. Your health care provider can give you more information. 4. Risks of a vaccine reaction  Redness or pain where the shot is given, feeling tired, fever, or muscle aches can happen after PPSV23. People sometimes faint after medical procedures, including vaccination. Tell your provider if you feel dizzy or have vision changes or ringing in the ears. As with any medicine, there is a very remote chance of a vaccine causing a severe allergic reaction, other serious injury, or death. 5. What if there is a serious problem? An allergic reaction could occur after the vaccinated person leaves the clinic. If you see signs of a severe allergic reaction (hives, swelling of the face and throat, difficulty breathing, a fast heartbeat, dizziness, or weakness), call 9-1-1 and get the person to the nearest hospital. 
 
For other signs that concern you, call your health care provider. Adverse reactions should be reported to the Vaccine Adverse Event Reporting System (VAERS). Your health care provider will usually file this report, or you can do it yourself. Visit the VAERS website at www.vaers. hhs.gov or call 8-231.245.6163. VAERS is only for reporting reactions, and VAERS staff do not give medical advice. 6. How can I learn more?  Ask your health care provider.  Call your local or state health department.  Contact the Centers for Disease Control and Prevention (CDC): 
- Call 4-941.605.6452 (1-800-CDC-INFO) or 
- Visit CDCs website at www.cdc.gov/vaccines Vaccine Information Statement PPSV23  
10/30/2019 Department of [facility] and [employer] Centers for Disease Control and Prevention Office Use Only

## 2020-01-31 NOTE — LETTER
1/31/2020 10:01 AM 
 
Ms. Richy Sesay [facility] [address] [phone] Re: Richy Sesay To Whom It May Concern: 
 
Please excuse Richy Sesay from work 1/31/20-2/7/20. She was seen in our clinic and is under my care for a medical issue. If there are questions or concerns, please have the patient contact our office. Thank you for your assistance in this matter. Sincerely, Kathy Logan NP

## 2020-01-31 NOTE — PROGRESS NOTES
Room 11    Identified pt with two pt identifiers(name and ). Reviewed record in preparation for visit and have obtained necessary documentation. All patient medications has been reviewed. Chief Complaint   Patient presents with   [address] 232     2020 - 2020 (3 days) dx: sepsis    Sore Throat     pt states, still sore throat when swallows.  Cough     pt states, more often then usual.       Health Maintenance Due   Topic    GLAUCOMA SCREENING Q2Y     Pneumococcal 65+ years (2 of 2 - PPSV23)       Vitals:    20 0929   BP: 127/78   Pulse: (!) 111   Resp: 18   Temp: 96.6 °F (35.9 °C)   TempSrc: Oral   SpO2: 95%   Weight: 241 lb 9.6 oz (109.6 kg)   Height: 5' 3.62\" (1.616 m)   PainSc:   5   PainLoc: Throat       Wt Readings from Last 3 Encounters:   20 241 lb 9.6 oz (109.6 kg)   20 249 lb (112.9 kg)   20 248 lb 0.3 oz (112.5 kg)     Temp Readings from Last 3 Encounters:   20 96.6 °F (35.9 °C) (Oral)   20 97.6 °F (36.4 °C) (Oral)   20 97.9 °F (36.6 °C)     BP Readings from Last 3 Encounters:   20 127/78   20 136/73   20 146/79     Pulse Readings from Last 3 Encounters:   20 (!) 111   20 72   20 87       Lab Results   Component Value Date/Time    Hemoglobin A1c 5.7 (H) 2019 08:35 AM       Coordination of Care Questionnaire:   1) Have you been to an emergency room, urgent care, or hospitalized since your last visit? yes     2020 - 2020 (3 days)  [facility]  Dx: sepsis  2. Have seen or consulted any other health care provider since your last visit? NO    3) Do you have an Advanced Directive/ Living Will in place? YES  If yes, do we have a copy on file YES  If no, would you like information NO    Patient is accompanied by self I have received verbal consent from Robina Banks to discuss any/all medical information while they are present in the room.

## 2020-01-31 NOTE — PROGRESS NOTES
S: Lisa Kincaid is a 77 y.o. female who presents for cough    Assessment/Plan:  1. Cough  -lungs CTA  -URI supportive instructions given; advised to monitor closely    -CT lung scan scheduled for today     2. Encounter for immunization  - PNEUMOCOCCAL POLYSACCHARIDE VACCINE, 23-VALENT, ADULT OR IMMUNOSUPPRESSED PT DOSE,  - MD IMMUNIZ ADMIN,1 SINGLE/COMB VAC/TOXOID         HPI:  1/17-20/20 hospitalized - Sepsis d/t palatine tonsilitis, UTI  Had expiratory wheezes on BABAK - OV 1/24/20    Not really Productive cough - comes up to the throat   Not worse at night  No nasal discharge  + sinus pressure  No HA  No SOB  No Wheezing  Relieving factors:     Social History:  Nutrition: appetite ok    Social History     Tobacco Use   Smoking Status Never Smoker   Smokeless Tobacco Never Used     Social History     Substance and Sexual Activity   Alcohol Use No     Social History     Substance and Sexual Activity   Drug Use No       Review of Systems:  - Constitutional Symptoms: no fevers, chills, weight loss  - Cardiovascular:  No palpitations or chest pain   - Gastrointestinal: no dysphagia or abdominal pain  - Musculoskeletal: no muscle pains or weakness  - Neurological: no numbness, tingling, or headaches  ROS is negative otherwise. I reviewed the following:  Past Medical History:   Diagnosis Date    Aneurysm of thoracic aorta ([address] 01/2017    saw Dr. Emani Mcelroy, then Dr. Harvey Espinosa Hypertension     Knee osteoarthritis     managed with diclofenac po prn. sees Dr. Davonte Pinzon [facility]opedic. has had arthroscopy and IA injections in past with ortho.  Multiple lung nodules on CT 7/24/2017    Sciatica     manages with diclofenac. has some DDD. Current Outpatient Medications   Medication Sig Dispense Refill    albuterol (VENTOLIN HFA) 90 mcg/actuation inhaler Take 2 Puffs by inhalation every four (4) hours as needed for Wheezing.  1 Inhaler 1    fluticasone propionate (FLONASE) 50 mcg/actuation nasal spray 2 Sprays by Both Nostrils route daily as needed for Rhinitis.  nystatin-triamcinolone (MYCOLOG) 100,000-0.1 unit/gram-% ointment Apply  to affected area daily.  cholecalciferol, vitamin D3, 2,000 unit tab Take 1 Tab by mouth daily. 90 Tab 1    hydroCHLOROthiazide (HYDRODIURIL) 25 mg tablet TAKE 1 TABLET BY MOUTH DAILY 90 Tab 1    aspirin delayed-release 81 mg tablet Take 81 mg by mouth daily.  predniSONE (DELTASONE) 10 mg tablet 4 tabs daily for 2 days   3 tabs daily for 2 days   2 tabs daily for 2 days   1 tab   daily for 2 days 20 Tab 0       Allergies   Allergen Reactions    Latex Hives    Flowers Shortness of Breath     Fresh cut flowers are worse    Fruit C [Ascorbic Acid] Hives     ALL FRUITS    Pcn [Penicillins] Hives        O: VS:   Visit Vitals  /78 (BP 1 Location: Left arm, BP Patient Position: Sitting)   Pulse (!) 111   Temp 96.6 °F (35.9 °C) (Oral)   Resp 18   Ht 5' 3.62\" (1.616 m)   Wt 241 lb 9.6 oz (109.6 kg)   SpO2 95%   BMI 41.96 kg/m²       GENERAL: Esther Tai is in no acute distress. Non-toxic. Well nourished. Well developed. Appropriately groomed. HEAD:  Normocephalic. Atraumatic. Non tender sinuses x 4. EYE: PERRLA. EOMs intact. Sclera anicteric without injection. No drainage or discharge. EARS: Hearing intact bilaterally. External ear canals normal without evidence of blood or swelling. Bilateral TM's intact, pearly grey with landmarks visible. No erythema. Mild serous effusion left TM   NOSE: Patent. Nasal turbinates pink. No polyps noted. No erythema. Clear discharge. MOUTH: mucous membranes pink and moist. Posterior pharynx normal with cobblestone appearance. No erythema, white exudate or obstruction. NECK: supple. Midline trachea. No anterior cervical lymphadenopathy noted. RESP: Breath sounds are symmetrical bilaterally. Unlabored without SOB. Speaking in full sentences.  Clear to auscultation bilaterally anteriorly and posteriorly. No wheezes. No rales or rhonchi. CV: normal rate. Regular rhythm. S1, S2 audible. No murmur noted. No rubs, clicks or gallops noted. SKIN: Skin is warm and dry. Turgor is normal. No petechiae, no purpura, no rash. No cyanosis. No jaundice or pallor. PSYCH: appropriate behavior, dress and thought processes. Good eye contact. Clear and coherent speech. Full affect. Good insight.  ____________________________________________________________________  Patient education was done. Counseling included discussion of diagnosis, differentials, treatment options, prescribed treatment, warning signs and follow up. Medication risks/benefits, costs, interactions, and alternatives discussed with patient. Advised on nutrition, physical activity, tobacco, and alcohol.     Patient verbalized understanding and agreed to plan of care. Patient was given an after visit summary which included current diagnoses, medications and vital signs. Nabor Murphy

## 2020-02-10 RX ORDER — FLUTICASONE PROPIONATE 50 MCG
SPRAY, SUSPENSION (ML) NASAL
Qty: 1 BOTTLE | Refills: 1 | Status: SHIPPED | OUTPATIENT
Start: 2020-02-10 | End: 2020-03-16 | Stop reason: SDUPTHER

## 2020-02-19 ENCOUNTER — PATIENT OUTREACH (OUTPATIENT)
Dept: FAMILY MEDICINE CLINIC | Age: 70
End: 2020-02-19

## 2020-02-19 NOTE — PROGRESS NOTES
CTN BABAK F/U Progress Note    Goals Addressed                 This Visit's Progress     Transitions of Care- prevent complications post hospitalization. 2/19/20- per EMR review H2H visit done 1/23/20. Pt has had PCP f/u 1/24/20 & 1/31/20. Next PCP f/u 3/10/20. Chest CT done 1/31/20-stable. Today pt reported is doing ok. Intermittent nasal stuffiness/drainage. Plan- pt to attend scheduled PCP f/u on 3/10. Next CTN f/u ~ 3 weeks-ID.    1/21/20- pt reported feeling better. Throat fine. No difficulty swallowing, No SOB, wheezing. Cough improved. No chills. Hasn't checked temp. No thermometer in home. Taking ABX & Prednisone taper as prescribed. Taking Probiotic w/ ABX. Holding Voltaren while on Prednisone. Appetite good. Adequate PO fluid intake. Bladder function- no burning/pain when urinating. Bowel function- loose BMs (told by Hospitalist to be expected). Red flags & action plan reviewed. Pt reported being in \"green\" zone today. Patient advised providers on call 24 hours a day / 7 days a week (M-F 5 PM to 8 AM, and from Friday 5 PM until Monday 8 AM for the weekend) should the patient have questions or concerns. [facility] information provided. PCP f/u 1/24/20. [facility] H2 visit on 1/24/20 as per EMR. Plan- pt to continue w/ ABX & Prednisone taper till completed. Pt to attend scheduled appt. PCP to give order for outpt CT re f/u lung nodule. [facility] visit as scheduled. CTN to collaborate w/ pt, PCP, & other Care Team Members for coordination of care. Pt agreeable w/ CTN f/u ~ 7-10 days-ID.

## 2020-03-10 DIAGNOSIS — R71.8 ABNORMAL RED BLOOD CELLS: Primary | ICD-10-CM

## 2020-03-10 DIAGNOSIS — N28.9 ABNORMAL KIDNEY FUNCTION: ICD-10-CM

## 2020-03-11 LAB
ALBUMIN SERPL-MCNC: 4 G/DL (ref 3.8–4.8)
ALBUMIN/GLOB SERPL: 1.3 {RATIO} (ref 1.2–2.2)
ALP SERPL-CCNC: 100 IU/L (ref 39–117)
ALT SERPL-CCNC: 22 IU/L (ref 0–32)
AST SERPL-CCNC: 14 IU/L (ref 0–40)
BASOPHILS # BLD AUTO: 0.1 X10E3/UL (ref 0–0.2)
BASOPHILS NFR BLD AUTO: 1 %
BILIRUB SERPL-MCNC: 0.3 MG/DL (ref 0–1.2)
BUN SERPL-MCNC: 21 MG/DL (ref 8–27)
BUN/CREAT SERPL: 21 (ref 12–28)
CALCIUM SERPL-MCNC: 10.3 MG/DL (ref 8.7–10.3)
CHLORIDE SERPL-SCNC: 103 MMOL/L (ref 96–106)
CO2 SERPL-SCNC: 28 MMOL/L (ref 20–29)
CREAT SERPL-MCNC: 0.99 MG/DL (ref 0.57–1)
EOSINOPHIL # BLD AUTO: 0.3 X10E3/UL (ref 0–0.4)
EOSINOPHIL NFR BLD AUTO: 3 %
ERYTHROCYTE [DISTWIDTH] IN BLOOD BY AUTOMATED COUNT: 15.7 % (ref 11.7–15.4)
GLOBULIN SER CALC-MCNC: 3.2 G/DL (ref 1.5–4.5)
GLUCOSE SERPL-MCNC: 102 MG/DL (ref 65–99)
HCT VFR BLD AUTO: 41.5 % (ref 34–46.6)
HGB BLD-MCNC: 13.7 G/DL (ref 11.1–15.9)
IMM GRANULOCYTES # BLD AUTO: 0 X10E3/UL (ref 0–0.1)
IMM GRANULOCYTES NFR BLD AUTO: 0 %
INTERPRETATION: NORMAL
LYMPHOCYTES # BLD AUTO: 3.6 X10E3/UL (ref 0.7–3.1)
LYMPHOCYTES NFR BLD AUTO: 36 %
MCH RBC QN AUTO: 28.7 PG (ref 26.6–33)
MCHC RBC AUTO-ENTMCNC: 33 G/DL (ref 31.5–35.7)
MCV RBC AUTO: 87 FL (ref 79–97)
MONOCYTES # BLD AUTO: 1.2 X10E3/UL (ref 0.1–0.9)
MONOCYTES NFR BLD AUTO: 12 %
NEUTROPHILS # BLD AUTO: 4.8 X10E3/UL (ref 1.4–7)
NEUTROPHILS NFR BLD AUTO: 48 %
PLATELET # BLD AUTO: 468 X10E3/UL (ref 150–450)
POTASSIUM SERPL-SCNC: 4.6 MMOL/L (ref 3.5–5.2)
PROT SERPL-MCNC: 7.2 G/DL (ref 6–8.5)
RBC # BLD AUTO: 4.78 X10E6/UL (ref 3.77–5.28)
SODIUM SERPL-SCNC: 143 MMOL/L (ref 134–144)
WBC # BLD AUTO: 10 X10E3/UL (ref 3.4–10.8)

## 2020-03-12 NOTE — PROGRESS NOTES
Plt = 468, lymp = 3.6, hx of thrombocytosis and lymphocytosis. Advised if sx, make appt or refer to hematology. Result ltr mailed.

## 2020-03-16 NOTE — TELEPHONE ENCOUNTER
PCP: Carmen Busch NP    Last appt: 3/10/2020  No future appointments. Requested Prescriptions     Pending Prescriptions Disp Refills    fluticasone propionate (FLONASE) 50 mcg/actuation nasal spray 1 Bottle 1       Ph[facility]acy [facility]    Patient has ? days' supply of medication available.     Prior labs and Blood pressures:  BP Readings from Last 3 Encounters:   01/31/20 127/78   01/24/20 136/73   01/20/20 146/79     Lab Results   Component Value Date/Time    Sodium 143 03/10/2020 08:15 AM    Potassium 4.6 03/10/2020 08:15 AM    Chloride 103 03/10/2020 08:15 AM    CO2 28 03/10/2020 08:15 AM    Anion gap 6 01/20/2020 03:31 AM    Glucose 102 (H) 03/10/2020 08:15 AM    BUN 21 03/10/2020 08:15 AM    Creatinine 0.99 03/10/2020 08:15 AM    BUN/Creatinine ratio 21 03/10/2020 08:15 AM    GFR est AA 68 03/10/2020 08:15 AM    GFR est non-AA 59 (L) 03/10/2020 08:15 AM    Calcium 10.3 03/10/2020 08:15 AM     Lab Results   Component Value Date/Time    Hemoglobin A1c 5.7 (H) 12/16/2019 08:35 AM     Lab Results   Component Value Date/Time    Cholesterol, total 170 12/16/2019 08:35 AM    HDL Cholesterol 56 12/16/2019 08:35 AM    LDL, calculated 96 12/16/2019 08:35 AM    VLDL, calculated 18 12/16/2019 08:35 AM    Triglyceride 90 12/16/2019 08:35 AM    CHOL/HDL Ratio 2.9 03/21/2015 01:56 AM     Lab Results   Component Value Date/Time    VITAMIN D, 25-HYDROXY 63.8 12/16/2019 08:35 AM       Lab Results   Component Value Date/Time    TSH 4.26 (H) 03/21/2015 01:56 AM

## 2020-03-17 RX ORDER — FLUTICASONE PROPIONATE 50 MCG
SPRAY, SUSPENSION (ML) NASAL
Qty: 1 BOTTLE | Refills: 1 | Status: SHIPPED | OUTPATIENT
Start: 2020-03-17 | End: 2020-04-13

## 2020-04-07 ENCOUNTER — TELEPHONE (OUTPATIENT)
Dept: FAMILY MEDICINE CLINIC | Age: 70
End: 2020-04-07

## 2020-04-09 NOTE — TELEPHONE ENCOUNTER
Called, left vm in regards to letter for pt to return call to office.
Patient returned your phone call.  Please call [phone]
The patient needed a work note stating that she was sick prior to the COVID 19 because her immune system. The note need to be from her hospitalization on 1/17 to 3 weeks later for HR at her job to cover her employment status because she is 77years old. The patient will like her note emailed to [email or web address] [email or web address] or [email or web address].  Please call [phone]
Two pt identifiers confirmed. Informed pt per REJI Arcos down below. Informed pt we would not be able to write a letter clearing her to go back to work. Pt states, she does not need a letter to clear her to go back to work. She needs a letter stating she needs to be off from work due to her age and weak immune system. Informed pt again the only thing we can write for as stated by REJI Arcos that she was seen in office back in January for cough. Pt states, it was more than a cough. Informed pt will see what I can do but it is no guranteed she will receive letter stating to stay home due to age and weak immune system. The only way she can stay home is if she is having symptoms of COVID-19, flu, strep and was advised by provider to self-quarantine. Pt was not in agree ance. Pt verbalized understanding of information discussed w/ no further questions at this time. Message   Received: Today   Message Contents   REJI Jo   Caller: Unspecified (2 days ago, 11:29 AM)             P/c pt and find out what she is requesting. I'm not sure - I can write a letter stating she was seen here for a cough in January, but her lung CT was unchanged, so there was no indication of a lung infection or COVID 19.  Thanks
Abnormal uterine bleeding  08/17/2018    Active  June Connelly

## 2020-04-13 RX ORDER — FLUTICASONE PROPIONATE 50 MCG
SPRAY, SUSPENSION (ML) NASAL
Qty: 1 BOTTLE | Refills: 1 | Status: SHIPPED | OUTPATIENT
Start: 2020-04-13 | End: 2020-05-14

## 2020-04-14 ENCOUNTER — PATIENT OUTREACH (OUTPATIENT)
Dept: FAMILY MEDICINE CLINIC | Age: 70
End: 2020-04-14

## 2020-04-14 NOTE — PROGRESS NOTES
CTN Bundle/BABAK F/U Progress Note    Goals Addressed                 This Visit's Progress     Transitions of Care- prevent complications post hospitalization. 4/14/20- pt reported is doing fine. No PCP f/u on 3/10/20. Had labs drawn on 3/10/20. Results \"ok\" . Discussed CDC Guidelines re COVID-19 Pandemic. Pt acknowledged following guidelines. No sxs. Working from home. End of Bundle period on 4/20/20. Pt agreeable if no change in status & no calls to CTN Episode to be resolved. Plan- resolve Bundle Episode on 4/20/20 -ID. 2/19/20- per EMR review H2H visit done 1/23/20. Pt has had PCP f/u 1/24/20 & 1/31/20. Next PCP f/u 3/10/20. Chest CT done 1/31/20-stable. Today pt reported is doing ok. Intermittent nasal stuffiness/drainage. Plan- pt to attend scheduled PCP f/u on 3/10. Next CTN f/u ~ 3 weeks-ID.    1/21/20- pt reported feeling better. Throat fine. No difficulty swallowing, No SOB, wheezing. Cough improved. No chills. Hasn't checked temp. No thermometer in home. Taking ABX & Prednisone taper as prescribed. Taking Probiotic w/ ABX. Holding Voltaren while on Prednisone. Appetite good. Adequate PO fluid intake. Bladder function- no burning/pain when urinating. Bowel function- loose BMs (told by Hospitalist to be expected). Red flags & action plan reviewed. Pt reported being in \"green\" zone today. Patient advised providers on call 24 hours a day / 7 days a week (M-F 5 PM to 8 AM, and from Friday 5 PM until Monday 8 AM for the weekend) should the patient have questions or concerns. [facility] information provided. PCP f/u 1/24/20. ARTURO Keenan H2H visit on 1/24/20 as per EMR. Plan- pt to continue w/ ABX & Prednisone taper till completed. Pt to attend scheduled appt. PCP to give order for outpt CT re f/u lung nodule. BS H2H visit as scheduled. CTN to collaborate w/ pt, PCP, & other Care Team Members for coordination of care. Pt agreeable w/ CTN f/u ~ 7-10 days-ID.

## 2020-04-20 ENCOUNTER — TELEPHONE (OUTPATIENT)
Dept: FAMILY MEDICINE CLINIC | Age: 70
End: 2020-04-20

## 2020-04-20 ENCOUNTER — PATIENT OUTREACH (OUTPATIENT)
Dept: FAMILY MEDICINE CLINIC | Age: 70
End: 2020-04-20

## 2020-04-20 NOTE — PROGRESS NOTES
Patient has graduated from the Transitions of Care Coordination  program on 4/20/20. Patient/family has the ability to self-manage at this time Care management goals have been completed. Patient was not referred to the [place] team for further management. Goals Addressed                 This Visit's Progress     COMPLETED: Transitions of Care- prevent complications post hospitalization. 4/20/20- pt reported doing fine. No change since 4/14/20 call. Plan- CTN BABAK Episode resolved-ID. 4/14/20- pt reported is doing fine. No PCP f/u on 3/10/20. Had labs drawn on 3/10/20. Results \"ok\" . Discussed CDC Guidelines re COVID-19 Pandemic. Pt acknowledged following guidelines. No sxs. Working from home. End of Bundle period on 4/20/20. Pt agreeable if no change in status & no calls to CTN Episode to be resolved. Plan- resolve Bundle Episode on 4/20/20 -ID. 2/19/20- per EMR review H2H visit done 1/23/20. Pt has had PCP f/u 1/24/20 & 1/31/20. Next PCP f/u 3/10/20. Chest CT done 1/31/20-stable. Today pt reported is doing ok. Intermittent nasal stuffiness/drainage. Plan- pt to attend scheduled PCP f/u on 3/10. Next CTN f/u ~ 3 weeks-ID.    1/21/20- pt reported feeling better. Throat fine. No difficulty swallowing, No SOB, wheezing. Cough improved. No chills. Hasn't checked temp. No thermometer in home. Taking ABX & Prednisone taper as prescribed. Taking Probiotic w/ ABX. Holding Voltaren while on Prednisone. Appetite good. Adequate PO fluid intake. Bladder function- no burning/pain when urinating. Bowel function- loose BMs (told by Hospitalist to be expected). Red flags & action plan reviewed. Pt reported being in \"green\" zone today. Patient advised providers on call 24 hours a day / 7 days a week (M-F 5 PM to 8 AM, and from Friday 5 PM until Monday 8 AM for the weekend) should the patient have questions or concerns. [facility] information provided. PCP f/u 1/24/20. [facility] H2H visit on 1/24/20 as per EMR.  Plan- pt to continue w/ ABX & Prednisone taper till completed. Pt to attend scheduled appt. PCP to give order for outpt CT re f/u lung nodule. BS H2H visit as scheduled. CTN to collaborate w/ pt, PCP, & other Care Team Members for coordination of care. Pt agreeable w/ CTN f/u ~ 7-10 days-ID. Patient has Care Transition Nurse's contact information for any further questions, concerns, or needs. Patients upcoming visits:  No future appointments.

## 2020-04-21 RX ORDER — ALBUTEROL SULFATE 90 UG/1
2 AEROSOL, METERED RESPIRATORY (INHALATION)
Qty: 1 INHALER | Refills: 1 | Status: SHIPPED | OUTPATIENT
Start: 2020-04-21

## 2020-05-14 RX ORDER — FLUTICASONE PROPIONATE 50 MCG
SPRAY, SUSPENSION (ML) NASAL
Qty: 1 BOTTLE | Refills: 1 | Status: SHIPPED | OUTPATIENT
Start: 2020-05-14 | End: 2020-06-18

## 2020-07-10 RX ORDER — FLUTICASONE PROPIONATE 50 MCG
SPRAY, SUSPENSION (ML) NASAL
Qty: 1 BOTTLE | Refills: 1 | Status: SHIPPED | OUTPATIENT
Start: 2020-07-10 | End: 2020-10-21 | Stop reason: SDUPTHER

## 2020-07-14 ENCOUNTER — VIRTUAL VISIT (OUTPATIENT)
Dept: FAMILY MEDICINE CLINIC | Age: 70
End: 2020-07-14

## 2020-07-14 DIAGNOSIS — M19.90 OSTEOARTHRITIS, UNSPECIFIED OSTEOARTHRITIS TYPE, UNSPECIFIED SITE: ICD-10-CM

## 2020-07-14 DIAGNOSIS — R82.90 ABNORMAL URINE ODOR: ICD-10-CM

## 2020-07-14 DIAGNOSIS — R91.1 PULMONARY NODULE: ICD-10-CM

## 2020-07-14 DIAGNOSIS — I10 ESSENTIAL HYPERTENSION: Primary | ICD-10-CM

## 2020-07-14 RX ORDER — DICLOFENAC SODIUM 75 MG/1
75 TABLET, DELAYED RELEASE ORAL
Qty: 90 TAB | Refills: 1 | Status: SHIPPED | OUTPATIENT
Start: 2020-07-14 | End: 2020-10-07

## 2020-07-14 RX ORDER — SULFAMETHOXAZOLE AND TRIMETHOPRIM 800; 160 MG/1; MG/1
1 TABLET ORAL 2 TIMES DAILY
Qty: 6 TAB | Refills: 0 | Status: SHIPPED | OUTPATIENT
Start: 2020-07-14 | End: 2020-07-17

## 2020-07-14 RX ORDER — DICLOFENAC SODIUM 75 MG/1
TABLET, DELAYED RELEASE ORAL
COMMUNITY
End: 2020-07-14 | Stop reason: SDUPTHER

## 2020-07-14 NOTE — PROGRESS NOTES
Melanie Donahue is a 72 y.o. female    HIPAA verified by two patient identifiers. Chief Complaint   Patient presents with    Medication Refill     Diclofenac     Bladder Infection     Odor, upper thigh ache      1. Have you been to the ER, urgent care clinic since your last visit? Hospitalized since your last visit? No    2. Have you seen or consulted any other health care providers outside of the [address] [place] [address] since your last visit? Include any pap smears or colon screening.  No     DOXY: [phone]

## 2020-07-14 NOTE — PROGRESS NOTES
S: Ramses Colvin is a 72 y.o. female who presents for medication check and refill. Assessment/Plan: Telephone Visit    1. Essential hypertension  -current therapy: HCTZ 25mg   -BP at home: 145/87 this am, usuallly 130's  -advised to monitor BP at home and keep log; goal<140/90 make OV if consistently above goal or <110/60    2. Urinary odor/lower ab pain  -hx of UTI  -rx: bactrim bid x3 days; if sx persist, pt to come to office for UC    11-20 minutes were spent on the digital evaluation and management of this patient. RTC 4 months for MWV/HTN check        HTN   current therapy: HCTZ 25mg   BP at home: 145/87    OA   Current therapy: diclofenac 75mg    No issues with asthma - inside now so allergies not as bad    UTI   +lower ab pain  +odor   No discharge    Social history:   Nutrition: discussed increasing water intake  Social: lives with   Occupation: Working from home for [employer] part time (5hr/day)    Social History     Tobacco Use   Smoking Status Never Smoker   Smokeless Tobacco Never Used     Social History     Substance and Sexual Activity   Alcohol Use No     Social History     Substance and Sexual Activity   Drug Use No       Review of Systems:  - Constitutional Symptoms: no fevers, chills, weight loss  - Cardiovascular: no chest pain or palpitations  - Respiratory: no cough or shortness of breath  - Gastrointestinal: no dysphagia + abdominal pain    3 most recent PHQ Screens 1/31/2020   Little interest or pleasure in doing things Not at all   Feeling down, depressed, irritable, or hopeless Not at all   Total Score PHQ 2 0       I reviewed the following:  Current Outpatient Medications   Medication Sig Dispense Refill    diclofenac EC (VOLTAREN) 75 mg EC tablet Take  by mouth.       fluticasone propionate (FLONASE) 50 mcg/actuation nasal spray USE 2 SPRAYS IN EACH NOSTRIL EVERY DAY 1 Bottle 1    hydroCHLOROthiazide (HYDRODIURIL) 25 mg tablet TAKE 1 TABLET BY MOUTH EVERY DAY 90 Tab 1    cholecalciferol (Vitamin D3) (1000 Units /25 mcg) tablet Take 2 Tabs by mouth daily. 180 Tab 1    albuterol (Ventolin HFA) 90 mcg/actuation inhaler Take 2 Puffs by inhalation every four (4) hours as needed for Wheezing. 1 Inhaler 1    nystatin-triamcinolone (MYCOLOG) 100,000-0.1 unit/gram-% ointment Apply  to affected area daily.  aspirin delayed-release 81 mg tablet Take 81 mg by mouth daily. Past Medical History:   Diagnosis Date    Aneurysm of thoracic aorta ([address] 01/2017    saw Dr. Cruzito Mercedes, then Dr. Nneka Boland Hypertension     Knee osteoarthritis     managed with diclofenac po prn. sees Dr. Lawyer Guerra at [facility]. has had arthroscopy and IA injections in past with ortho.  Multiple lung nodules on CT 7/24/2017    Sciatica     manages with diclofenac. has some DDD. Allergies   Allergen Reactions    Latex Hives    Flowers Shortness of Breath     Fresh cut flowers are worse    Fruit C [Ascorbic Acid] Hives     ALL FRUITS    Pcn [Penicillins] Hives       O: VS: There were no vitals taken for this visit.     _______________________________________________________________  Patient education was done. Advised on nutrition, physical activity, and safety). Counseling included discussion of diagnosis, differentials, treatment options, prescribed treatment, warning signs and follow up. Medication risks/benefits,interactions and alternatives discussed with patient.      Patient verbalized understanding and agreed to plan of care. Patient was given an after visit summary which included current diagnoses, medications and vital signs. Luis Goodson, who was evaluated through a patient-initiated, synchronous (real-time) audio only encounter, and/or her healthcare decision maker, is aware that it is a billable service, with coverage as determined by her insurance carrier. She provided verbal consent to proceed: Yes.  She has not had a related appointment within my department in the past 7 days or scheduled within the next 24 hours.       Total Time: minutes: 11-20 minutes    Humera Le NP

## 2020-07-14 NOTE — PATIENT INSTRUCTIONS
1) Please take the bactrim twice a day for 3 days . This is an antibiotic and you should take all of it as directed until it is complete. If you have any kind of reaction - itching, shortness of breath, etc, please stop the antibiotic immediately and call the office. Please use good handwashing technique and make sure you wash hands before and after eating. Use hand  if you are in a public place. If urinary symptoms persist, then please make an appt to come to office and give a urine sample so it can be cultured. To prevent UTIs   · Drink extra water and other fluids for the next day or two. This may help wash out the bacteria that are causing the infection. (If you have kidney, heart, or liver disease and have to limit fluids, talk with your doctor before you increase your fluid intake.)  You should be drinking at least 64 oz of water daily, on average. · Avoid drinks that are carbonated or have caffeine. They can irritate the bladder. · Urinate often. Try to empty your bladder each time. · To relieve pain, take a hot bath or lay a heating pad set on low over your lower belly or genital area. Never go to sleep with a heating pad in place. · Urinate when you need to. · Urinate right after you have sex. · Change sanitary pads often. · Avoid douches, bubble baths, feminine hygiene sprays, and other feminine hygiene products that have deodorants. · After going to the bathroom, wipe from front to back    2) Please check your blood pressure through the week at staggered times in the day. (If you check in the morning, it should be at least one hour after your morning blood pressure medications.)      Arm monitors are most accurate. If you use a wrist monitor, make sure your wrist is at heart level. You can bring your home monitor to your next visit and have it calibrated with the machine in the office to gauge your readings.  Sit  with your feet uncrossed and relax for 5 minutes before taking your BP. Keep a written record of your blood pressure readings and bring it to each appointment. If your systolic (top) blood pressure is consistently greater than 140mmHg or less than 441IIMP, OR the diastolic (bottom) number is consistently greater than 90mmHg or less than 60mmHg, then please schedule an office appointment. Work on healthy eating - no salt diet, more potassium (helps flush out sodium,making healthier heart and arteries) - and incorporating daily exercise into your routine. Losing weight can help reduce your blood pressure! Cardiac symptoms that would need immediate attention include: uncomfortable pressure, squeezing, fullness or pain in the center of your chest. Pain or discomfort in one or both arms, the back, neck, jaw or stomach. Shortness of breath with or without chest discomfort, breaking out in a cold sweat, nausea or lightheadedness. 3) Please schedule  CT of lungs   before October       Learning About the Mediterranean Diet  What is the Mediterranean diet? The Mediterranean diet is a style of eating rather than a diet plan. It features foods eaten in [place], [place], [place] and [place], and other countries along the [facility]. It emphasizes eating foods like fish, fruits, vegetables, beans, high-fiber breads and whole grains, nuts, and olive oil. This style of eating includes limited red meat, cheese, and sweets. Why choose the Mediterranean diet? A Mediterranean-style diet may improve heart health. It contains more fat than other heart-healthy diets. But the fats are mainly from nuts, unsaturated oils (such as fish oils and olive oil), and certain nut or seed oils (such as canola, soybean, or flaxseed oil). These fats may help protect the heart and blood vessels. How can you get started on the Mediterranean diet? Here are some things you can do to switch to a more Mediterranean way of eating.   What to eat  · Eat a variety of fruits and vegetables each day, such as grapes, blueberries, tomatoes, broccoli, peppers, figs, olives, spinach, eggplant, beans, lentils, and chickpeas. · Eat a variety of whole-grain foods each day, such as oats, brown rice, and whole wheat bread, pasta, and couscous. · Eat fish at least 2 times a week. Try tuna, salmon, mackerel, lake trout, herring, or sardines. · Eat moderate amounts of low-fat dairy products, such as milk, cheese, or yogurt. · Eat moderate amounts of poultry and eggs. · Choose healthy (unsaturated) fats, such as nuts, olive oil, and certain nut or seed oils like canola, soybean, and flaxseed. · Limit unhealthy (saturated) fats, such as butter, palm oil, and coconut oil. And limit fats found in animal products, such as meat and dairy products made with whole milk. Try to eat red meat only a few times a month in very small amounts. · Limit sweets and desserts to only a few times a week. This includes sugar-sweetened drinks like soda. The Mediterranean diet may also include red wine with your meal1 glass each day for women and up to 2 glasses a day for men. Tips for eating at home  · Use herbs, spices, garlic, lemon zest, and citrus juice instead of salt to add flavor to foods. · Add avocado slices to your sandwich instead of hwang. · Have fish for lunch or dinner instead of red meat. Brush the fish with olive oil, and broil or grill it. · Sprinkle your salad with seeds or nuts instead of cheese. · Cook with olive or canola oil instead of butter or oils that are high in saturated fat. · Switch from 2% milk or whole milk to 1% or fat-free milk. · Dip raw vegetables in a vinaigrette dressing or hummus instead of dips made from mayonnaise or sour cream.  · Have a piece of fruit for dessert instead of a piece of cake. Try baked apples, or have some dried fruit. Tips for eating out  · Try broiled, grilled, baked, or poached fish instead of having it fried or breaded.   · Ask your  to have your meals prepared with olive oil instead of butter. · Order dishes made with marinara sauce or sauces made from olive oil. Avoid sauces made from cream or mayonnaise. · Choose whole-grain breads, whole wheat pasta and pizza crust, brown rice, beans, and lentils. · Cut back on butter or margarine on bread. Instead, you can dip your bread in a small amount of olive oil. · Ask for a side salad or grilled vegetables instead of french fries or chips. Where can you learn more? Go to http://zeinaGo Vocabmary jane.info/  Enter O407 in the search box to learn more about \"Learning About the Mediterranean Diet. \"  Current as of: August 22, 2019               Content Version: 12.5  © 0271-2375 [employer]wise, [employer]. Care instructions adapted under license by [employer] (which disclaims liability or warranty for this information). If you have questions about a medical condition or this instruction, always ask your healthcare professional. [address] any warranty or liability for your use of this information.

## 2020-07-19 ENCOUNTER — HOSPITAL ENCOUNTER (OUTPATIENT)
Dept: CT IMAGING | Age: 70
Discharge: HOME | End: 2020-07-19
Attending: NURSE PRACTITIONER
Payer: MEDICARE

## 2020-07-19 DIAGNOSIS — R91.1 PULMONARY NODULE: ICD-10-CM

## 2020-07-19 PROCEDURE — 71250 CT THORAX DX C-: CPT

## 2020-08-03 NOTE — TELEPHONE ENCOUNTER
PCP: Elin Davis NP    Last appt: 11/30/2018  Future Appointments   Date Time Provider [place]   1/4/2019  8:00 AM LAB [facility]       Requested Prescriptions     Pending Prescriptions Disp Refills    fluticasone (FLONASE) 50 mcg/actuation nasal spray 1 Bottle        Prior labs and Blood pressures:  BP Readings from Last 3 Encounters:   11/30/18 139/77   10/16/18 127/88   06/12/18 132/83     Lab Results   Component Value Date/Time    Sodium 142 10/23/2018 09:56 AM    Potassium 4.0 10/23/2018 09:56 AM    Chloride 99 10/23/2018 09:56 AM    CO2 24 10/23/2018 09:56 AM    Anion gap 6 03/21/2015 01:56 AM    Glucose 96 10/23/2018 09:56 AM    BUN 18 10/23/2018 09:56 AM    Creatinine 0.84 10/23/2018 09:56 AM    BUN/Creatinine ratio 21 10/23/2018 09:56 AM    GFR est AA 83 10/23/2018 09:56 AM    GFR est non-AA 72 10/23/2018 09:56 AM    Calcium 10.0 10/23/2018 09:56 AM     No results found for: HBA1C, HGBE8, [id number], [id number]  Lab Results   Component Value Date/Time    Cholesterol, total 158 10/23/2018 09:15 AM    HDL Cholesterol 47 10/23/2018 09:15 AM    LDL, calculated 86 10/23/2018 09:15 AM    VLDL, calculated 25 10/23/2018 09:15 AM    Triglyceride 127 10/23/2018 09:15 AM    CHOL/HDL Ratio 2.9 03/21/2015 01:56 AM     Lab Results   Component Value Date/Time    VITAMIN D, 25-HYDROXY 73.6 10/23/2018 09:56 AM       Lab Results   Component Value Date/Time    TSH 4.26 (H) 03/21/2015 01:56 AM
Requested Prescriptions      No prescriptions requested or ordered in this encounter
Requested Prescriptions     Pending Prescriptions Disp Refills    fluticasone (FLONASE) 50 mcg/actuation nasal spray 1 Bottle
normal...

## 2020-10-22 RX ORDER — FLUTICASONE PROPIONATE 50 MCG
SPRAY, SUSPENSION (ML) NASAL
Qty: 1 BOTTLE | Refills: 1 | Status: SHIPPED | OUTPATIENT
Start: 2020-10-22 | End: 2020-11-26

## 2020-11-26 RX ORDER — FLUTICASONE PROPIONATE 50 MCG
SPRAY, SUSPENSION (ML) NASAL
Qty: 1 BOTTLE | Refills: 1 | Status: SHIPPED | OUTPATIENT
Start: 2020-11-26 | End: 2020-12-21

## 2020-12-03 ENCOUNTER — OFFICE VISIT (OUTPATIENT)
Dept: FAMILY MEDICINE CLINIC | Age: 70
End: 2020-12-03
Payer: MEDICARE

## 2020-12-03 VITALS
DIASTOLIC BLOOD PRESSURE: 76 MMHG | HEIGHT: 64 IN | RESPIRATION RATE: 14 BRPM | WEIGHT: 207 LBS | SYSTOLIC BLOOD PRESSURE: 111 MMHG | TEMPERATURE: 98.1 F | HEART RATE: 90 BPM | OXYGEN SATURATION: 95 % | BODY MASS INDEX: 35.34 KG/M2

## 2020-12-03 DIAGNOSIS — R79.89 ELEVATED TSH: ICD-10-CM

## 2020-12-03 DIAGNOSIS — Z00.00 LABORATORY EXAM ORDERED AS PART OF ROUTINE GENERAL MEDICAL EXAMINATION: ICD-10-CM

## 2020-12-03 DIAGNOSIS — R51.9 NONINTRACTABLE HEADACHE, UNSPECIFIED CHRONICITY PATTERN, UNSPECIFIED HEADACHE TYPE: ICD-10-CM

## 2020-12-03 DIAGNOSIS — E55.9 VITAMIN D DEFICIENCY: ICD-10-CM

## 2020-12-03 DIAGNOSIS — Z84.89 FAMILY HISTORY OF BRAIN TUMOR: Primary | ICD-10-CM

## 2020-12-03 DIAGNOSIS — D75.839 THROMBOCYTOSIS: ICD-10-CM

## 2020-12-03 DIAGNOSIS — E66.09 CLASS 2 OBESITY DUE TO EXCESS CALORIES WITHOUT SERIOUS COMORBIDITY WITH BODY MASS INDEX (BMI) OF 35.0 TO 35.9 IN ADULT: ICD-10-CM

## 2020-12-03 DIAGNOSIS — R73.09 ELEVATED RANDOM BLOOD GLUCOSE LEVEL: ICD-10-CM

## 2020-12-03 DIAGNOSIS — Z00.00 MEDICARE ANNUAL WELLNESS VISIT, SUBSEQUENT: ICD-10-CM

## 2020-12-03 DIAGNOSIS — R73.03 PREDIABETES: ICD-10-CM

## 2020-12-03 DIAGNOSIS — R74.01 ELEVATED ALT MEASUREMENT: ICD-10-CM

## 2020-12-03 DIAGNOSIS — I10 ESSENTIAL HYPERTENSION: ICD-10-CM

## 2020-12-03 LAB
25(OH)D3 SERPL-MCNC: 58.2 NG/ML (ref 30–100)
ALBUMIN SERPL-MCNC: 3.9 G/DL (ref 3.5–5)
ALBUMIN/GLOB SERPL: 1 {RATIO} (ref 1.1–2.2)
ALP SERPL-CCNC: 106 U/L (ref 45–117)
ALT SERPL-CCNC: 30 U/L (ref 12–78)
ANION GAP SERPL CALC-SCNC: 6 MMOL/L (ref 5–15)
APPEARANCE UR: CLEAR
AST SERPL-CCNC: 17 U/L (ref 15–37)
BACTERIA URNS QL MICRO: NEGATIVE /HPF
BASOPHILS # BLD: 0 K/UL (ref 0–0.1)
BASOPHILS NFR BLD: 0 % (ref 0–1)
BILIRUB SERPL-MCNC: 0.5 MG/DL (ref 0.2–1)
BILIRUB UR QL: NEGATIVE
BUN SERPL-MCNC: 16 MG/DL (ref 6–20)
BUN/CREAT SERPL: 18 (ref 12–20)
CALCIUM SERPL-MCNC: 10 MG/DL (ref 8.5–10.1)
CHLORIDE SERPL-SCNC: 103 MMOL/L (ref 97–108)
CHOLEST SERPL-MCNC: 193 MG/DL
CO2 SERPL-SCNC: 30 MMOL/L (ref 21–32)
COLOR UR: ABNORMAL
CREAT SERPL-MCNC: 0.89 MG/DL (ref 0.55–1.02)
DIFFERENTIAL METHOD BLD: ABNORMAL
EOSINOPHIL # BLD: 0.1 K/UL (ref 0–0.4)
EOSINOPHIL NFR BLD: 1 % (ref 0–7)
EPITH CASTS URNS QL MICRO: ABNORMAL /LPF
ERYTHROCYTE [DISTWIDTH] IN BLOOD BY AUTOMATED COUNT: 15.6 % (ref 11.5–14.5)
EST. AVERAGE GLUCOSE BLD GHB EST-MCNC: 105 MG/DL
GLOBULIN SER CALC-MCNC: 4 G/DL (ref 2–4)
GLUCOSE SERPL-MCNC: 92 MG/DL (ref 65–100)
GLUCOSE UR STRIP.AUTO-MCNC: NEGATIVE MG/DL
HBA1C MFR BLD: 5.3 % (ref 4–5.6)
HCT VFR BLD AUTO: 38.5 % (ref 35–47)
HDLC SERPL-MCNC: 67 MG/DL
HDLC SERPL: 2.9 {RATIO} (ref 0–5)
HGB BLD-MCNC: 13.7 G/DL (ref 11.5–16)
HGB UR QL STRIP: NEGATIVE
HYALINE CASTS URNS QL MICRO: ABNORMAL /LPF (ref 0–5)
IMM GRANULOCYTES # BLD AUTO: 0 K/UL (ref 0–0.04)
IMM GRANULOCYTES NFR BLD AUTO: 0 % (ref 0–0.5)
KETONES UR QL STRIP.AUTO: NEGATIVE MG/DL
LDLC SERPL CALC-MCNC: 105.2 MG/DL (ref 0–100)
LEUKOCYTE ESTERASE UR QL STRIP.AUTO: ABNORMAL
LIPID PROFILE,FLP: ABNORMAL
LYMPHOCYTES # BLD: 2.4 K/UL (ref 0.8–3.5)
LYMPHOCYTES NFR BLD: 25 % (ref 12–49)
MCH RBC QN AUTO: 29.8 PG (ref 26–34)
MCHC RBC AUTO-ENTMCNC: 35.6 G/DL (ref 30–36.5)
MCV RBC AUTO: 83.9 FL (ref 80–99)
MONOCYTES # BLD: 1.1 K/UL (ref 0–1)
MONOCYTES NFR BLD: 11 % (ref 5–13)
NEUTS SEG # BLD: 6.1 K/UL (ref 1.8–8)
NEUTS SEG NFR BLD: 63 % (ref 32–75)
NITRITE UR QL STRIP.AUTO: NEGATIVE
NRBC # BLD: 0 K/UL (ref 0–0.01)
NRBC BLD-RTO: 0 PER 100 WBC
PH UR STRIP: 6 [PH] (ref 5–8)
PLATELET # BLD AUTO: 435 K/UL (ref 150–400)
PMV BLD AUTO: 10.3 FL (ref 8.9–12.9)
POTASSIUM SERPL-SCNC: 3.9 MMOL/L (ref 3.5–5.1)
PROT SERPL-MCNC: 7.9 G/DL (ref 6.4–8.2)
PROT UR STRIP-MCNC: NEGATIVE MG/DL
RBC # BLD AUTO: 4.59 M/UL (ref 3.8–5.2)
RBC #/AREA URNS HPF: ABNORMAL /HPF (ref 0–5)
SODIUM SERPL-SCNC: 139 MMOL/L (ref 136–145)
SP GR UR REFRACTOMETRY: 1.01 (ref 1–1.03)
TRIGL SERPL-MCNC: 104 MG/DL (ref ?–150)
TSH SERPL DL<=0.05 MIU/L-ACNC: 2.65 UIU/ML (ref 0.36–3.74)
UA: UC IF INDICATED,UAUC: ABNORMAL
UROBILINOGEN UR QL STRIP.AUTO: 0.2 EU/DL (ref 0.2–1)
VLDLC SERPL CALC-MCNC: 20.8 MG/DL
WBC # BLD AUTO: 9.7 K/UL (ref 3.6–11)
WBC URNS QL MICRO: ABNORMAL /HPF (ref 0–4)

## 2020-12-03 PROCEDURE — G0439 PPPS, SUBSEQ VISIT: HCPCS | Performed by: NURSE PRACTITIONER

## 2020-12-03 PROCEDURE — G8427 DOCREV CUR MEDS BY ELIG CLIN: HCPCS | Performed by: NURSE PRACTITIONER

## 2020-12-03 PROCEDURE — G8754 DIAS BP LESS 90: HCPCS | Performed by: NURSE PRACTITIONER

## 2020-12-03 PROCEDURE — G8752 SYS BP LESS 140: HCPCS | Performed by: NURSE PRACTITIONER

## 2020-12-03 PROCEDURE — G9899 SCRN MAM PERF RSLTS DOC: HCPCS | Performed by: NURSE PRACTITIONER

## 2020-12-03 PROCEDURE — G8432 DEP SCR NOT DOC, RNG: HCPCS | Performed by: NURSE PRACTITIONER

## 2020-12-03 PROCEDURE — 3017F COLORECTAL CA SCREEN DOC REV: CPT | Performed by: NURSE PRACTITIONER

## 2020-12-03 PROCEDURE — 1101F PT FALLS ASSESS-DOCD LE1/YR: CPT | Performed by: NURSE PRACTITIONER

## 2020-12-03 RX ORDER — AMOXICILLIN 250 MG
1 CAPSULE ORAL DAILY
Qty: 30 TAB | Refills: 0 | Status: SHIPPED | OUTPATIENT
Start: 2020-12-03 | End: 2021-02-08 | Stop reason: SDUPTHER

## 2020-12-03 RX ORDER — MONTELUKAST SODIUM 10 MG/1
10 TABLET ORAL DAILY
COMMUNITY
Start: 2020-07-29 | End: 2021-01-11 | Stop reason: SDUPTHER
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
## 2020-12-03 NOTE — PATIENT INSTRUCTIONS
1)   Schedule of Personalized Health Plan    The best way to stay healthy is to live a healthy lifestyle. A healthy lifestyle includes regular exercise, eating a well-balanced diet, keeping a healthy weight and not smoking. Regular physical exams and screening tests are another important way to take care of yourself. Preventive exams provided by health care providers can find health problems early when treatment works best and can keep you from getting certain diseases or illnesses. Preventive services include exams, lab tests, screening tests, shots, and learning information to help you take care of your own health. The CDC recommends pneumonia vaccines for anyone 72 years and older. These vaccines are usually only needed once in a lifetime unless your healthcare provider decides differently. The 2 pneumonia shots available presently are PCV 13 (Prevnar 13) and PPSV23 (Pneumovax 23). Adults 72 years or older who have not previously received PCV13, should receive a dose of PCV13 first, followed 1 year later by a dose of PPSV23. All people over 65 should have a yearly flu vaccine. People over 65 are at medium to high risk for Hepatitis B. Hepatitis B is transmitted through body fluids with a common source being sexual activity or IV drug use. Three shots are needed for complete protection. The CDC recommends the herpes zoster (shingles) vaccine for all adults 61 and older, regardless if a prior episode of zoster has been reported. In addition to your physical exam, some screening tests are recommended:    Osteoporosis screening -There are no signs or symptoms of osteoporosis (weakening of bones). You might not know you have the disease until you break a bone. Thats why its so important to get a bone density test to measure your bone strength.  Bone mass measurement is taken with a Dexa scan and is recommended every two years after 72years old or if you have certain risk factors, such as personal history of vertebral fracture or chronic steroid medication use. Diabetes Mellitus screening is recommended every year. This is a blood test, called a hemoglobin A1c, which measures the average blood sugar over a 3 month period. Glaucoma is an eye disease caused by high pressure in the eye. An eye exam is recommended every year. Cardiovascular screening tests that check your cholesterol and other blood fat (lipid) levels are recommended every five years or yearly if you are on medications for cardiovascular disease. Colorectal Cancer screening tests help to find pre-cancerous polyps (growths in the colon) so they can be removed before they turn into cancer. Screening tests are recommended starting at age 48 or earlier if you have a certain risk factors, such as a family history of colon cancer. Screenings continue until you are 75. Breast Cancer screening is done with a mammogram, a low dose x-ray that looks at breast tissue. It is recommended by the [address] [place] [address] Ave that women 54 years and older get a mammogram every 2 years. After the age of 76, recommendations are based on life expectancy. Cervical Cancer screening is done by a PAP smear during a pelvic exam.  The American College of Obstetricians and Gynecologists states that screening can be discontinued after 72years old if the person has had adequate negative prior screening results and no abnormal history (abnormal = CIN2 or higher level).     Here is a list of your current Health Maintenance items including a date when each one is due next:  Health Maintenance   Topic Date Due    GLAUCOMA SCREENING Q2Y  05/10/2016    Medicare Yearly Exam  12/13/2020    A1C test (Diabetic or Prediabetic)  12/16/2020    Breast Cancer Screen Mammogram  01/10/2022    Colorectal Cancer Screening Combo  06/03/2024    Lipid Screen  12/16/2024    DTaP/Tdap/Td series (3 - Td) 04/04/2028    Hepatitis C Screening  Completed    Bone Densitometry (Dexa) Screening  Completed    Shingrix Vaccine Age 49>  Completed    Flu Vaccine  Completed    Pneumococcal 65+ years  Completed    Pneumococcal 65+ yrs at Risk Vaccine  Completed       You have an [address] on file,      2. Labs  The following blood work was ordered today. Once the tests are completed, you will receive a letter, email or phone call with the results. If you have not heard from us within 10-14 days, please call the office for the results. Complete Blood Count (CBC) helps evaluate your overall health, including hemoglobin and red blood cells that carry oxygen, white blood cells that fight infection and platelets that help with clotting. Complete Metabolic Panel (CMP) assesses electrolytes, kidney and liver function.  (A Basic Metabolic Panel (BMP) does not include liver function.)  Electrolytes include sodium, potassium, calcium, and chloride. These are necessary for cell function and an imbalance can cause serious problems. BUN and creatinine are markers of kidney function. ALT and AST are markers of liver function. Hemoglobin A1c is a 3 month average of your blood sugar and used as a marker of your diabetes control. A normal value is less than 5.7%. Total Cholesterol is the total number of cholesterol particles in your blood, which includes triglycerides, HDL and LDL. A small amount of cholesterol is needed for the body's cells, hormones, and metabolism. Goal is less than 200. Triglycerides are the short term fats in your blood which are used for energy. Goal is less than 150. High Density Lipids (HDL) is the \"good\" cholesterol in your blood. HDL helps remove bad cholesterol from your blood. Goal is more than 40. Low Density Lipids (LDL) is the \"bad\" cholesterol in your blood. LDL can stick to your arteries, raising the risk for heart attack and stroke. Goal is less than 100    Urinalysis - this is a test of your urine to check your overall health. It is recommended as a part of a routine check up and screens for a variety of disorders, such as urinary tract infections, kidney disease and diabetes. Your thyroid is responsible for secreting hormones and regulating your metabolism. Thyroid Stimulating Hormone (TSH) is a test for thyroid function. TSH is a hormone made by the pituitary gland in the brain and tells your thyroid gland to make hormones and release them into your blood. If your thyroid isn't producing enough hormone, you may have symptoms such as unexplained weight gain, fatigue, hair loss. If your thyroid is producing too much hormone, you may have symptoms of diarrhea, heart palpitations, fatigue, unexplained weight loss. A normal TSH value is between 0.45 - 4.5. Vitamin D is important for absorbing calcium and promoting bone growth. If you are low in Vitamin D, you may experience fatigue, back or bone pain, hair loss, muscle pain, slow wound healing, depression. Your body can make Vitamin D after exposure to sunlight or through foods like fatty fish (tuna, mackerel, salmon), food with Vitamin D added (dairy products, orange juice and cereals) and cheese, egg yolks and liver. Vitamin D decreases with age and in the winter time when the sun is not strong. A deficiency in Vitamin D has been linked to certain cancers (breast, prostate, colon) as well as heart disease, depression and weight gain. 3) Constipation is a common problem that is caused by different factors. Constipation means it is hard to have a bowel movement. Your stool could be too hard, too small, hard to get out, or happening fewer than 3 times per week. Diet and exercise play important roles in intestinal health. Medicines, poor diet and some diseases can cause constipation. For constipation  Try the following medications in the order outlined. MUSH = softener; PUSH = laxative    Step 1) miralax (PUSH) 1 capful daily.  Can increase to two capfuls a day    If no improvement, add... Step 2) colace/docusate (MUSH) 200 mg once a day    If no improvement, add... Step 3) senna (PUSH) (senokot) 8.5mg tablets. Take 1-2 every night. GOAL: one soft bowel movement daily    Some things you can do to help prevent constipation: To help maintain regular bowel movements try ONE of these suggestions:  Acai berry tablet daily  Or   1/2 cup pear or prune juice daily  Or   2 tablespoons Milk of Magnesia daily Or  2 tablespoons Miralax daily    Drink at least 8-10 glasses (64 oz) of water per day. Avoid caffeine as this can cause  dehydration which can contribute to constipation. Walking after meals helps move the GI system and prevent constipation. Increase fiber in your diet, making sure you are getting the recommended fluid intake. It is recommended to have 20-35 mg of fiber per day. Fruits and vegetables are good sources of fiber. Some examples include: dates (13.5mg) apple (3.5mg); banana (2.5mg), pear (4.5mg); broccoli (5mg), carrots (4.5mg), peas (7mg). Bulk-forming laxatives, such as Metamucil, FiberCon, Citrucel, can be used to help increase water absorption in intestines and fecal bulk. Follow directions on package for dosing instructions. A combination of senna (laxative) and docusate (stool softener) can help if you have not had a bowel movement in a few days. This can be found over the counter and is taken at night. Exercise, especially after meals, helps increase gastrointestinal movements and prevent constipation. When you feel the need to go to the bathroom, go, don't hold it. Your colon is most motile after a meal, so try and defecate after meals. Signs to watch for include: blood in toilet or toilet paper after a bowel movement, fever, weight loss, feeling weak. If you experience any of these symptoms, please make an appointment with your healthcare provider.       4) Please check your blood pressure through the week at staggered times in the day. (If you check in the morning, it should be at least one hour after your morning blood pressure medications.)      Arm monitors are most accurate. If you use a wrist monitor, make sure your wrist is at heart level. You can bring your home monitor to your next visit and have it calibrated with the machine in the office to gauge your readings. Sit  with your feet uncrossed and relax for 5 minutes before taking your BP. Keep a written record of your blood pressure readings and bring it to each appointment. If your systolic (top) blood pressure is consistently greater than 140mmHg or less than [id number], OR the diastolic (bottom) number is consistently greater than 90mmHg or less than 60mmHg, then please schedule an office appointment. Work on healthy eating - no salt diet, more potassium (helps flush out sodium,making healthier heart and arteries) - and incorporating daily exercise into your routine. Losing weight can help reduce your blood pressure! Cardiac symptoms that would need immediate attention include: uncomfortable pressure, squeezing, fullness or pain in the center of your chest. Pain or discomfort in one or both arms, the back, neck, jaw or stomach. Shortness of breath with or without chest discomfort, breaking out in a cold sweat, nausea or lightheadedness. 5) Referral to neurology for further evaluation of head and headaches    Headaches - Headaches can be quite debilitating, although most headaches are not caused by life-threatening disorders.  Most headaches are caused by one of four syndromes: Tension-type headache, Migraine headache, Chronic daily headache, Cluster headache      TENSION TYPE HEADACHE:   Symptoms of tension type headaches  Include: pressure or tightness around both sides of the head or neck; mild to moderate pain that is steady and does not throb, pain is not worsened by activity, pain can increase or decrease in severity over the course of the headache, there may be tenderness in the muscles of the head, neck, or shoulders  People with TTH often feel stress or tension before their headache. Unlike migraine, tension headaches occur without other symptoms such as nausea, vomiting, sensitivity to lights and sounds, or an aura. However, some people have symptoms of both tension and migraine headache. MIGRAINE HEADACHES  Migraine headaches are a type of headache that causes moderate to severe pain that is worsened by light, noise, and motion. Some people also experience nausea and vomiting. Migraine headaches typically last for a few hours, but may last for as long as three days. CLUSTER HEADACHE   are severe, debilitating headaches that occur repeatedly for weeks to months at a time, followed by periods with no headache. Cluster headaches are relatively uncommon, affecting less than one percent of people. Men are affected more commonly than women, with a peak age of onset of 22 to 48 years. Symptoms can include: begin quickly without any warning and reach a peak within a few minutes; headache is usually deep, excruciating, continuous, and explosive in quality, although occasionally it may be pulsatile and throbbing; the attack may occur up to eight times per day but is usually short in duration (between 15 minutes and three hours); pain typically begins in or around the eye or temple; less commonly it starts in the face, neck, ear, or side of the head; pain is always on one side; most people with cluster headache are restless and may pace or rock back and forth when an attack is in progress; associated with eye redness and tear production on the side of the pain, a stuffy and runny nose, sweating, and pale skin; can be light sensitive in the eye on the affected side. Cluster headaches can begin at any age. People with cluster headaches are more likely to have family members who also have cluster headaches.  Drinking alcohol can bring on a cluster headache. CHRONIC DAILY HEADACHE  Some people develop very frequent headaches, as frequent as every day in some cases. When a headache is present for more than 15 days per month for at least three months, it is described as a chronic daily headache. Chronic daily headache is not a type of headache but a category that includes frequent headaches of various kinds. Most people with chronic daily headache have migraine or tension-type headache as the underlying type of headache. They often start out having an occasional migraine or tension-type headache, but the headaches became more frequent over months or years. Some people with frequent headache use headache medications too often, which can lead to \"medication-overuse headaches\"     Medication-overuse headache  Medication-overuse headache [facility]) may occur in people who have frequent migraine, cluster, or tension-type headaches, which leads them to overuse pain medications. A vicious cycle occurs whereby frequent headaches cause the person to take medication frequently (often non-prescription medication), which then causes a rebound headache as the medication wears off, causing more medication use, and so on. Overuse of any number of pain medications can increase the risk of developing medication-overuse headaches. To avoid medication-overuse headaches, we recommend the following:  ? If possible, avoid butalbital combinations (Fiorinal®, Fioricet®, Esgic®) and narcotics completely. ?Do not use triptans (Imitrex® and others) or aspirin/acetaminophen/caffeine combinations (Excedrin®) more than nine days per month. ?Do not use NSAIDS (eg, ibuprofen, Advil, Motrin, aspirin) more than 15 days per month. ?Do not take acetaminophen (eg, Tylenol®) more than two times per week. If you have frequent headaches, you may need a preventive medication.     Lifestyle modifications that should be started to prevent headaches include eating a healthy diet, daily exercise, getting a minimum of 7-8 hours sleep at night, and stop smoking. Additionally, keep a journal of when headaches occur, food eaten, stress, etc to better identify triggers. If headache becomes \"the worst headache of your life\", you have changes in vision, experience weakness, numbness or tingling, or spike a fever, seek immediate medical attention, call 911. Well Visit, Over 72: Care Instructions  Your Care Instructions     Physical exams can help you stay healthy. Your doctor has checked your overall health and may have suggested ways to take good care of yourself. He or she also may have recommended tests. At home, you can help prevent illness with healthy eating, regular exercise, and other steps. Follow-up care is a key part of your treatment and safety. Be sure to make and go to all appointments, and call your doctor if you are having problems. It's also a good idea to know your test results and keep a list of the medicines you take. How can you care for yourself at home? · Reach and stay at a healthy weight. This will lower your risk for many problems, such as obesity, diabetes, heart disease, and high blood pressure. · Get at least 30 minutes of exercise on most days of the week. Walking is a good choice. You also may want to do other activities, such as running, swimming, cycling, or playing tennis or team sports. · Do not smoke. Smoking can make health problems worse. If you need help quitting, talk to your doctor about stop-smoking programs and medicines. These can increase your chances of quitting for good. · Protect your skin from too much sun. When you're outdoors from 10 a.m. to 4 p.m., stay in the shade or cover up with clothing and a hat with a wide brim. Wear sunglasses that block UV rays. Even when it's cloudy, put broad-spectrum sunscreen (SPF 30 or higher) on any exposed skin. · See a dentist one or two times a year for checkups and to have your teeth cleaned.   · Wear a seat belt in the car. Follow your doctor's advice about when to have certain tests. These tests can spot problems early. For men and women  · Cholesterol. Your doctor will tell you how often to have this done based on your overall health and other things that can increase your risk for heart attack and stroke. · Blood pressure. Have your blood pressure checked during a routine doctor visit. Your doctor will tell you how often to check your blood pressure based on your age, your blood pressure results, and other factors. · Diabetes. Ask your doctor whether you should have tests for diabetes. · Vision. Experts recommend that you have yearly exams for glaucoma and other age-related eye problems. · Hearing. Tell your doctor if you notice any change in your hearing. You can have tests to find out how well you hear. · Colon cancer tests. Keep having colon cancer tests as your doctor recommends. You can have one of several types of tests. · Heart attack and stroke risk. At least every 4 to 6 years, you should have your risk for heart attack and stroke assessed. Your doctor uses factors such as your age, blood pressure, cholesterol, and whether you smoke or have diabetes to show what your risk for a heart attack or stroke is over the next 10 years. · Osteoporosis. Talk to your doctor about whether you should have a bone density test to find out whether you have thinning bones. Ask your doctor if you need to take a calcium plus vitamin D supplement. You may be able to get enough calcium and vitamin D through your diet. For women  · Pap test and pelvic exam. You may no longer need a Pap test. Talk with your doctor about whether to stop or continue to have Pap tests. · Breast exam and mammogram. Ask how often you should have a mammogram, which is an X-ray of your breasts. A mammogram can spot breast cancer before it can be felt and when it is easiest to treat. · Thyroid disease.  Talk to your doctor about whether to have your thyroid checked as part of a regular physical exam. Women have an increased chance of a thyroid problem. For men  · Prostate exam. Talk to your doctor about whether you should have a blood test (called a PSA test) for prostate cancer. Experts recommend that you discuss the benefits and risks of the test with your doctor before you decide whether to have this test. Some experts say that men ages 79 and older no longer need testing. · Abdominal aortic aneurysm. Ask your doctor whether you should have a test to check for an aneurysm. You may need a test if you ever smoked or if your parent, brother, sister, or child has had an aneurysm. When should you call for help? Watch closely for changes in your health, and be sure to contact your doctor if you have any problems or symptoms that concern you. Where can you learn more? Go to http://www.gray.com/  Enter [id number] in the search box to learn more about \"Well Visit, Over 65: Care Instructions. \"  Current as of: May 27, 2020               Content Version: 12.6  © 3654-9256 [employer]. Care instructions adapted under license by [employer] (which disclaims liability or warranty for this information). If you have questions about a medical condition or this instruction, always ask your healthcare professional. [address] any warranty or liability for your use of this information.

## 2020-12-03 NOTE — PROGRESS NOTES
Room: 9    Identified pt with two pt identifiers(name and ). Reviewed record in preparation for visit and have obtained necessary documentation. All patient medications has been reviewed. Chief Complaint   Patient presents with    Annual Wellness Visit     Pt states her  notice x 2 mo ago that she indent on the back of her scalp     Blood Pressure Check    Headache     frontal        Health Maintenance Due   Topic    GLAUCOMA SCREENING Q2Y     Medicare Yearly Exam     A1C test (Diabetic or Prediabetic)      [facility] exam: 2020 [facility]     Abuse Screening Questionnaire 12/3/2020   Do you ever feel afraid of your partner? N   Are you in a relationship with someone who physically or mentally threatens you? N   Is it safe for you to go home? Y     3 most recent PHQ Screens 12/3/2020   Little interest or pleasure in doing things Not at all   Feeling down, depressed, irritable, or hopeless Not at all   Total Score PHQ 2 0     Fall Risk Assessment, last 12 mths 12/3/2020   Able to walk? Yes   Fall in past 12 months?  No   Fall with injury? -   Number of falls in past 12 months -   Fall Risk Score -     ADL Assessment 12/3/2020   Feeding yourself No Help Needed   Getting from bed to chair No Help Needed   Getting dressed No Help Needed   Bathing or showering No Help Needed   Walk across the room (includes cane/walker) No Help Needed   Using the telphone No Help Needed   Taking your medications No Help Needed   Preparing meals No Help Needed   Managing money (expenses/bills) No Help Needed   Moderately strenuous housework (laundry) No Help Needed   Shopping for personal items (toiletries/medicines) No Help Needed   Shopping for groceries No Help Needed   Driving No Help Needed   Climbing a flight of stairs No Help Needed   Getting to places beyond walking distances No Help Needed       Vitals:    20 0833   BP: 111/76   Pulse: 90   Resp: 14   Temp: 98.1 °F (36.7 °C) TempSrc: Oral   SpO2: 95%   Weight: 207 lb (93.9 kg)   Height: 5' 4\" (1.626 m)   PainSc:   0 - No pain       4. Have you been to the ER, urgent care clinic since your last visit? Hospitalized since your last visit? No    5. Have you seen or consulted any other health care providers outside of the [address] [place] [address] since your last visit? Include any pap smears or colon screening. No    }      Patient is accompanied by patient I have received verbal consent from Salud Akhtar to discuss any/all medical information while they are present in the room.

## 2020-12-05 LAB
BACTERIA SPEC CULT: ABNORMAL
CC UR VC: ABNORMAL
SERVICE CMNT-IMP: ABNORMAL

## 2020-12-06 NOTE — PROGRESS NOTES
P/c and find out if pt having sx of UTI - UC = gram neg cale; if she is, will send in abx. Result ltr mailed.

## 2020-12-08 ENCOUNTER — TELEPHONE (OUTPATIENT)
Dept: FAMILY MEDICINE CLINIC | Age: 70
End: 2020-12-08

## 2020-12-08 NOTE — TELEPHONE ENCOUNTER
----- Message from Kobi Brown sent at 12/4/2020  9:48 AM EST -----  Regarding: REJI Moreno/Telephone  Caller's first and last name: Pt  Reason for call: Ref  appt question  Callback required yes/no and why: Yes, please advise  Best contact number(s): [phone]  Details to clarify the request: RE her ref - the soonest appt is Jan 11, 2021 - could she see another phys that may have an earlier appt?

## 2020-12-08 NOTE — TELEPHONE ENCOUNTER
The patient said the appointment was with the neurosurgeon. She will like a sooner appointment with anyone else in the neurosurgeon office that she could see sooner than JAN 2021.  Please advise

## 2020-12-17 DIAGNOSIS — I10 ESSENTIAL HYPERTENSION: ICD-10-CM

## 2020-12-17 RX ORDER — HYDROCHLOROTHIAZIDE 25 MG/1
TABLET ORAL
Qty: 90 TAB | Refills: 1 | Status: SHIPPED | OUTPATIENT
Start: 2020-12-17 | End: 2021-08-09 | Stop reason: SDUPTHER

## 2020-12-18 RX ORDER — MELATONIN
Qty: 180 TAB | Refills: 1 | Status: SHIPPED | OUTPATIENT
Start: 2020-12-18 | End: 2021-08-11

## 2020-12-21 RX ORDER — FLUTICASONE PROPIONATE 50 MCG
SPRAY, SUSPENSION (ML) NASAL
Qty: 1 BOTTLE | Refills: 1 | Status: SHIPPED | OUTPATIENT
Start: 2020-12-21 | End: 2021-01-29

## 2020-12-29 ENCOUNTER — TRANSCRIBE ORDER (OUTPATIENT)
Dept: SCHEDULING | Age: 70
End: 2020-12-29

## 2020-12-29 DIAGNOSIS — Z12.31 SCREENING MAMMOGRAM FOR HIGH-RISK PATIENT: Primary | ICD-10-CM

## 2021-01-11 ENCOUNTER — OFFICE VISIT (OUTPATIENT)
Dept: NEUROLOGY | Age: 71
End: 2021-01-11

## 2021-01-11 ENCOUNTER — OFFICE VISIT (OUTPATIENT)
Dept: INTERNAL MEDICINE CLINIC | Age: 71
End: 2021-01-11
Payer: MEDICARE

## 2021-01-11 VITALS
BODY MASS INDEX: 34.02 KG/M2 | TEMPERATURE: 98.1 F | HEART RATE: 94 BPM | HEIGHT: 65 IN | WEIGHT: 204.2 LBS | SYSTOLIC BLOOD PRESSURE: 111 MMHG | OXYGEN SATURATION: 97 % | RESPIRATION RATE: 16 BRPM | DIASTOLIC BLOOD PRESSURE: 68 MMHG

## 2021-01-11 VITALS
RESPIRATION RATE: 18 BRPM | DIASTOLIC BLOOD PRESSURE: 84 MMHG | OXYGEN SATURATION: 98 % | HEART RATE: 104 BPM | SYSTOLIC BLOOD PRESSURE: 124 MMHG

## 2021-01-11 DIAGNOSIS — M17.0 PRIMARY OSTEOARTHRITIS OF BOTH KNEES: ICD-10-CM

## 2021-01-11 DIAGNOSIS — I10 ESSENTIAL HYPERTENSION: ICD-10-CM

## 2021-01-11 DIAGNOSIS — R51.9 NEW ONSET HEADACHE: ICD-10-CM

## 2021-01-11 DIAGNOSIS — L98.9 SCALP LESION: ICD-10-CM

## 2021-01-11 DIAGNOSIS — B37.2 CANDIDIASIS OF SKIN: ICD-10-CM

## 2021-01-11 DIAGNOSIS — J45.20 MILD INTERMITTENT ASTHMA WITHOUT COMPLICATION: Primary | ICD-10-CM

## 2021-01-11 DIAGNOSIS — E66.9 OBESITY (BMI 30.0-34.9): ICD-10-CM

## 2021-01-11 DIAGNOSIS — M85.2: ICD-10-CM

## 2021-01-11 DIAGNOSIS — R68.89 ABNORMAL FINDINGS ON EXAMINATION OF SKULL AND HEAD: Primary | ICD-10-CM

## 2021-01-11 DIAGNOSIS — K59.04 CHRONIC IDIOPATHIC CONSTIPATION: ICD-10-CM

## 2021-01-11 PROBLEM — E66.01 OBESITY, MORBID (HCC): Status: RESOLVED | Noted: 2018-01-03 | Resolved: 2021-01-11

## 2021-01-11 PROCEDURE — G9899 SCRN MAM PERF RSLTS DOC: HCPCS | Performed by: NURSE PRACTITIONER

## 2021-01-11 PROCEDURE — G9899 SCRN MAM PERF RSLTS DOC: HCPCS | Performed by: PSYCHIATRY & NEUROLOGY

## 2021-01-11 PROCEDURE — G8536 NO DOC ELDER MAL SCRN: HCPCS | Performed by: NURSE PRACTITIONER

## 2021-01-11 PROCEDURE — G8417 CALC BMI ABV UP PARAM F/U: HCPCS | Performed by: PSYCHIATRY & NEUROLOGY

## 2021-01-11 PROCEDURE — G8752 SYS BP LESS 140: HCPCS | Performed by: PSYCHIATRY & NEUROLOGY

## 2021-01-11 PROCEDURE — 1090F PRES/ABSN URINE INCON ASSESS: CPT | Performed by: PSYCHIATRY & NEUROLOGY

## 2021-01-11 PROCEDURE — G8417 CALC BMI ABV UP PARAM F/U: HCPCS | Performed by: NURSE PRACTITIONER

## 2021-01-11 PROCEDURE — 99205 OFFICE O/P NEW HI 60 MIN: CPT | Performed by: PSYCHIATRY & NEUROLOGY

## 2021-01-11 PROCEDURE — G8432 DEP SCR NOT DOC, RNG: HCPCS | Performed by: NURSE PRACTITIONER

## 2021-01-11 PROCEDURE — G8427 DOCREV CUR MEDS BY ELIG CLIN: HCPCS | Performed by: PSYCHIATRY & NEUROLOGY

## 2021-01-11 PROCEDURE — 1101F PT FALLS ASSESS-DOCD LE1/YR: CPT | Performed by: NURSE PRACTITIONER

## 2021-01-11 PROCEDURE — G8432 DEP SCR NOT DOC, RNG: HCPCS | Performed by: PSYCHIATRY & NEUROLOGY

## 2021-01-11 PROCEDURE — 1090F PRES/ABSN URINE INCON ASSESS: CPT | Performed by: NURSE PRACTITIONER

## 2021-01-11 PROCEDURE — G8754 DIAS BP LESS 90: HCPCS | Performed by: PSYCHIATRY & NEUROLOGY

## 2021-01-11 PROCEDURE — 1101F PT FALLS ASSESS-DOCD LE1/YR: CPT | Performed by: PSYCHIATRY & NEUROLOGY

## 2021-01-11 PROCEDURE — 99213 OFFICE O/P EST LOW 20 MIN: CPT | Performed by: NURSE PRACTITIONER

## 2021-01-11 PROCEDURE — G8427 DOCREV CUR MEDS BY ELIG CLIN: HCPCS | Performed by: NURSE PRACTITIONER

## 2021-01-11 PROCEDURE — G8754 DIAS BP LESS 90: HCPCS | Performed by: NURSE PRACTITIONER

## 2021-01-11 PROCEDURE — 3017F COLORECTAL CA SCREEN DOC REV: CPT | Performed by: PSYCHIATRY & NEUROLOGY

## 2021-01-11 PROCEDURE — 3017F COLORECTAL CA SCREEN DOC REV: CPT | Performed by: NURSE PRACTITIONER

## 2021-01-11 PROCEDURE — G8536 NO DOC ELDER MAL SCRN: HCPCS | Performed by: PSYCHIATRY & NEUROLOGY

## 2021-01-11 PROCEDURE — G8752 SYS BP LESS 140: HCPCS | Performed by: NURSE PRACTITIONER

## 2021-01-11 RX ORDER — NYSTATIN AND TRIAMCINOLONE ACETONIDE 100000; 1 [USP'U]/G; MG/G
OINTMENT TOPICAL 2 TIMES DAILY
Qty: 30 G | Refills: 5 | Status: SHIPPED | OUTPATIENT
Start: 2021-01-11

## 2021-01-11 RX ORDER — MONTELUKAST SODIUM 10 MG/1
10 TABLET ORAL DAILY
Qty: 30 TAB | Refills: 5 | Status: SHIPPED | OUTPATIENT
Start: 2021-01-11 | End: 2021-07-06

## 2021-01-11 RX ORDER — ZOSTER VACCINE RECOMBINANT, ADJUVANTED 50 MCG/0.5
KIT INTRAMUSCULAR
COMMUNITY
End: 2022-06-06

## 2021-01-11 NOTE — PROGRESS NOTES
Subjective:     Chief Complaint   Patient presents with   [address] [facility] patient        History of present illness:  Lawrence Sharp is a 72 y.o. female who is here to establish as a new patient, and to have refills of current medication. The patient states she is otherwise healthy, and feeling well. However, she does have a history of asthma, seasonal allergies, osteoarthritis, and chronic idiopathic constipation. She states she has been traditionally using senna and docusate for management of her bowels, as well as increasing fiber and fluids in her diet. She has lost a fairly significant amount of weight over time, and has now developed an overlapping pannus that she states will occasionally get a \"yeast infection. \"  For this, she has been using Mycolog ointment/cream and applying twice a day if there is an outbreak. She is requesting a refill of this today. The patient also suffers from bilateral osteoarthritis to her knees. She is wearing a knee brace to her left knee, but is ambulatory without assist.  She states she has \"bone-on-bone arthritis. \"  She has not determined yet whether she wants to get knee replacement surgery done. She does have an orthopedist that she follows with as needed.     Patient Active Problem List   Diagnosis Code    Thoracic aortic aneurysm without rupture ([facility] [address] I71.2    Essential hypertension I10    Sciatica M54.30    Asthma J45.909    Knee osteoarthritis M17.10    Multiple lung nodules on CT R91.8    Obesity, morbid ([address] E66.01    Osteoporosis without current pathological fracture M81.0    Vitamin D deficiency E55.9    Thrombocytosis ([address] D47.3    Lymphocytosis D72.820    Adenopathy R59.1    Sigmoid diverticulosis K57.30    Tonsillitis J03.90    Sepsis (HCC) A41.9      Past Medical History:   Diagnosis Date    Aneurysm of thoracic aorta ([facility] [address] 01/2017    saw Dr. Letitia Watson, then Dr. Sebastián Rivera Hypertension     Knee osteoarthritis managed with diclofenac po prn. sees Dr. Yasmin Kaur at [facility] orthopedic. has had arthroscopy and IA injections in past with ortho.  Multiple lung nodules on CT 7/24/2017    Sciatica     manages with diclofenac. has some DDD.        Allergies   Allergen Reactions    Latex Hives    Flowers Shortness of Breath     Fresh cut flowers are worse    Fruit C [Ascorbic Acid] Hives     ALL FRUITS    Pcn [Penicillins] Hives      Family History   Problem Relation Age of Onset    Diabetes Mother     Hypertension Mother    [facility] Dementia Mother     Hypertension Sister     Hypertension Sister    [facility] Other Father 35        brain tumor    No Known Problems Brother     No Known Problems Maternal Grandmother     No Known Problems Maternal Grandfather     No Known Problems Paternal Grandmother     No Known Problems Paternal Grandfather     No Known Problems Son     Heart Disease Neg Hx       Social History     Socioeconomic History    Marital status:      Spouse name: Not on file    Number of children: 1    Years of education: Not on file    Highest education level: Not on file   Occupational History    Occupation: former    Social Needs    Financial resource strain: Not on file    Food insecurity     Worry: Not on file     Inability: Not on file    Transportation needs     Medical: Not on file     Non-medical: Not on file   Tobacco Use    Smoking status: Never Smoker    Smokeless tobacco: Never Used   Substance and Sexual Activity    Alcohol use: No    Drug use: No    Sexual activity: Not Currently   Lifestyle    Physical activity     Days per week: Not on file     Minutes per session: Not on file    Stress: Not on file   Relationships    Social connections     Talks on phone: Not on file     Gets together: Not on file     Attends Islam service: Not on file     Active member of club or organization: Not on file     Attends meetings of clubs or organizations: Not on file Relationship status: Not on file    Intimate partner violence     Fear of current or ex partner: Not on file     Emotionally abused: Not on file     Physically abused: Not on file     Forced sexual activity: Not on file   Other Topics Concern    Not on file   Social History Narrative    One adult son. . Retired 7/7/2017. Prior to Admission medications    Medication Sig Start Date End Date Taking? Authorizing Provider   fluticasone propionate (FLONASE) 50 mcg/actuation nasal spray SPRAY 2 SPRAYS INTO EACH NOSTRIL EVERY DAY 12/21/20  Yes Kelvin Nice NP   cholecalciferol (VITAMIN D3) (1000 Units /25 mcg) tablet TAKE 2 TABLETS BY MOUTH EVERY DAY 12/18/20  Yes Violet GANNON NP   hydroCHLOROthiazide (HYDRODIURIL) 25 mg tablet TAKE 1 TABLET BY MOUTH EVERY DAY 12/17/20  Yes Kelvin Nice NP   montelukast (SINGULAIR) 10 mg tablet Take 10 mg by mouth daily. 7/29/20  Yes Provider, Historical   senna-docusate (PERICOLACE) 8.6-50 mg per tablet Take 1 Tab by mouth daily. 12/3/20  Yes Kelvin Nice NP   diclofenac EC (VOLTAREN) 75 mg EC tablet TAKE 1 TABLET BY MOUTH TWICE A DAY AS NEEDED FOR PAIN 11/2/20  Yes Kelvin Nice NP   albuterol (Ventolin HFA) 90 mcg/actuation inhaler Take 2 Puffs by inhalation every four (4) hours as needed for Wheezing. 4/21/20  Yes Kelvin Nice NP   nystatin-triamcinolone (MYCOLOG) 100,000-0.1 unit/gram-% ointment Apply  to affected area daily. Yes Provider, Historical   aspirin delayed-release 81 mg tablet Take 81 mg by mouth daily. Yes Provider, Historical        Review of Systems              Constitutional:  She denies fever, weight loss, sweats or fatigue. EYES: No visual disturbance or changes. Respiratory:  No cough, wheezing or shortness of breath. No sputum production. Cardiac:  Denies chest pain, palpitations, unexplained indigestion, syncope, edema, PND or orthopnea.     GI: History of chronic idiopathic constipation. No acute changes in bowel movements, abdominal pain, bloating, anorexia, nausea, vomiting or heartburn. No tarry stools or blood in stools. :  No frequency or dysuria. Denies incontinence or sense or urgency. Extremities: See HPI  Back:. No acute pain or soreness  Skin:  No recent rashes or unexplained bruising. Hematologic:  No unexplained bruising or purpura. Lymphatic: No lymph node enlargement    Objective:     Vitals:    01/11/21 1552   BP: 111/68   Pulse: 94   Resp: 16   Temp: 98.1 °F (36.7 °C)   SpO2: 97%   Weight: 204 lb 3.2 oz (92.6 kg)   Height: 5' 4.5\" (1.638 m)   PainSc:   0 - No pain       Body mass index is 34.51 kg/m². Physical Examination:              General Appearance:  Well-developed, well-nourished, no acute distress. Neck:  Supple without thyromegaly or adenopathy. Lungs:  Clear to auscultation and percussion X5. No abnormal breath sounds. Cardiac:  Regular rate and rhythm without murmur. PMI not displaced. No gallop, rub or click. Skin:  No rash or unusual mole changes noted. Lymph Nodes: No significant lymphadenopathy. Neurological: . Alert and oriented x3. Hematologic:   No purpura or petechiae        Assessment/Plan:           ICD-10-CM ICD-9-CM    1. Mild intermittent asthma without complication  Z30.35 503.91 montelukast (SINGULAIR) 10 mg tablet   2. Candidiasis of skin  B37.2 112.3 nystatin-triamcinolone (MYCOLOG) 100,000-0.1 unit/gram-% ointment   3. Chronic idiopathic constipation  K59.04 564.00 linaCLOtide (Linzess) 145 mcg cap capsule         Impressions/Plan:    1: We will refill Singulair 10 mg to be taken daily. We will also refill nystatin ointment to be applied twice a day in the event of breakouts to pannus area. 2: We will send prescription for Linzess 145 mcg daily. Use as directed. 3: Continue lifestyle management with weight loss, increase fiber and fluids in diet.   4: Patient to follow-up with me in approximately 4 weeks or sooner as needed. Patient states understanding and agrees with plan. No orders of the defined types were placed in this encounter. No results found for any visits on 01/11/21. Vladimir Schreiber NP    The patient was given after the visit summary the patient verbalized an understanding of the plans and problems as explained.

## 2021-01-11 NOTE — PROGRESS NOTES
Cuco Cyr is a 72 y.o. female    Chief Complaint   Patient presents with    [facility]     New patient       Visit Vitals  /68 (BP 1 Location: Left arm, BP Patient Position: Sitting)   Pulse 94   Temp 98.1 °F (36.7 °C)   Resp 16   Ht 5' 4.5\" (1.638 m)   Wt 204 lb 3.2 oz (92.6 kg)   SpO2 97%   BMI 34.51 kg/m²           1. Have you been to the ER, urgent care clinic since your last visit? Hospitalized since your last visit? No     2. Have you seen or consulted any other health care providers outside of the [address] [place] [address] since your last visit? Include any pap smears or colon screening.  No

## 2021-01-11 NOTE — PROGRESS NOTES
Chief Complaint   Patient presents with    Headache       Referred by: REJI Chauhan      HPI    61-year-old woman here because of an abnormal finding on her scalp. In recent months her hairdresser has noted that the left side of her scalp posterior has an enlargement. Sometimes is uncomfortable. She feels an indentation. No clear severe headaches with some mild discomfort. No vision or speech changes. Father had a history of brain tumor when she was a child unknown what type. She has hypertension and vitamin D deficiency. She has severe arthritis in the knees. I reviewed previous imaging to include a head CT in the past which was unrevealing. She has lost about 40 pounds of weight. Review of Systems   Eyes: Negative for double vision. Gastrointestinal: Negative for nausea. Neurological: Positive for headaches. All other systems reviewed and are negative. Past Medical History:   Diagnosis Date    Aneurysm of thoracic aorta ([facility] [address] [address] 01/2017    saw Dr. Fritz Ruiz, then Dr. Wiley Carvalho Hypertension     Knee osteoarthritis     managed with diclofenac po prn. sees Dr. Karie Landa at [facility] orthopedic. has had arthroscopy and IA injections in past with ortho.  Multiple lung nodules on CT 7/24/2017    Sciatica     manages with diclofenac. has some DDD.       Family History   Problem Relation Age of Onset    Diabetes Mother     Hypertension Mother    [facility] Dementia Mother     Hypertension Sister     Hypertension Sister    [facility] Other Father 35        brain tumor    No Known Problems Brother     No Known Problems Maternal Grandmother     No Known Problems Maternal Grandfather     No Known Problems Paternal Grandmother     No Known Problems Paternal Grandfather     No Known Problems Son     Heart Disease Neg Hx      Social History     Socioeconomic History    Marital status:      Spouse name: Not on file    Number of children: 1    Years of education: Not on file    Highest education level: Not on file   Occupational History    Occupation: former    Social Needs    Financial resource strain: Not on file    Food insecurity     Worry: Not on file     Inability: Not on file   [employer] needs     Medical: Not on file     Non-medical: Not on file   Tobacco Use    Smoking status: Never Smoker    Smokeless tobacco: Never Used   Substance and Sexual Activity    Alcohol use: No    Drug use: No    Sexual activity: Not Currently   Lifestyle    Physical activity     Days per week: Not on file     Minutes per session: Not on file    Stress: Not on file   Relationships    Social connections     Talks on phone: Not on file     Gets together: Not on file     Attends Sabianist service: Not on file     Active member of club or organization: Not on file     Attends meetings of clubs or organizations: Not on file     Relationship status: Not on file    Intimate partner violence     Fear of current or ex partner: Not on file     Emotionally abused: Not on file     Physically abused: Not on file     Forced sexual activity: Not on file   Other Topics Concern    Not on file   Social History Narrative    One adult son. . Retired 7/7/2017. Current Outpatient Medications   Medication Sig    fluticasone propionate (FLONASE) 50 mcg/actuation nasal spray SPRAY 2 SPRAYS INTO EACH NOSTRIL EVERY DAY    cholecalciferol (VITAMIN D3) (1000 Units /25 mcg) tablet TAKE 2 TABLETS BY MOUTH EVERY DAY    hydroCHLOROthiazide (HYDRODIURIL) 25 mg tablet TAKE 1 TABLET BY MOUTH EVERY DAY    montelukast (SINGULAIR) 10 mg tablet Take 10 mg by mouth daily.  senna-docusate (PERICOLACE) 8.6-50 mg per tablet Take 1 Tab by mouth daily.  diclofenac EC (VOLTAREN) 75 mg EC tablet TAKE 1 TABLET BY MOUTH TWICE A DAY AS NEEDED FOR PAIN    albuterol (Ventolin HFA) 90 mcg/actuation inhaler Take 2 Puffs by inhalation every four (4) hours as needed for Wheezing.     nystatin-triamcinolone (MYCOLOG) 100,000-0.1 unit/gram-% ointment Apply  to affected area daily.  aspirin delayed-release 81 mg tablet Take 81 mg by mouth daily. No current facility-administered medications for this visit. Allergies   Allergen Reactions    Latex Hives    Flowers Shortness of Breath     Fresh cut flowers are worse    Fruit C [Ascorbic Acid] Hives     ALL FRUITS    Pcn [Penicillins] Hives         Neurologic Exam     Mental Status   Oriented to person, place, and time. Cranial Nerves   Cranial nerves II through XII intact. Motor Exam   Muscle bulk: normal    Strength   Strength 5/5 throughout. Left leg not tested due to brace in place     Sensory Exam   Light touch normal.     Gait, Coordination, and Reflexes     Gait  Gait: (A little abnormal with knee brace but otherwise independent and steady)    Physical Exam   Constitutional: She is oriented to person, place, and time. She appears well-developed and well-nourished. HENT:   Left posterior parietal area scalp does have some bony predominance a little bit tender. Skin appears intact. Slightly asymmetric compared to the right. Cardiovascular: Normal rate. Pulmonary/Chest: Effort normal.   Neurological: She is oriented to person, place, and time. She has normal strength. Skin: Skin is warm and dry. Psychiatric: Her behavior is normal.   Vitals reviewed.     Visit Vitals  /84   Pulse (!) 104   Resp 18   SpO2 98%       Lab Results   Component Value Date/Time    WBC 9.7 12/03/2020 09:31 AM    HGB 13.7 12/03/2020 09:31 AM    Hemoglobin (POC) 14.6 12/16/2016 11:59 AM    HCT 38.5 12/03/2020 09:31 AM    Hematocrit (POC) 43 12/16/2016 11:59 AM    PLATELET 594 (H) 77/48/1059 09:31 AM    MCV 83.9 12/03/2020 09:31 AM     Lab Results   Component Value Date/Time    Hemoglobin A1c 5.3 12/03/2020 09:31 AM    Hemoglobin A1c 5.7 (H) 12/16/2019 08:35 AM    Glucose 92 12/03/2020 09:31 AM    Glucose (POC) 95 12/16/2016 11:59 AM LDL, calculated 105.2 (H) 12/03/2020 09:31 AM    Creatinine (POC) 0.7 12/16/2016 11:59 AM    Creatinine 0.89 12/03/2020 09:31 AM      Lab Results   Component Value Date/Time    Cholesterol, total 193 12/03/2020 09:31 AM    HDL Cholesterol 67 12/03/2020 09:31 AM    LDL, calculated 105.2 (H) 12/03/2020 09:31 AM    Triglyceride 104 12/03/2020 09:31 AM    CHOL/HDL Ratio 2.9 12/03/2020 09:31 AM     Lab Results   Component Value Date/Time    ALT (SGPT) 30 12/03/2020 09:31 AM    Alk. phosphatase 106 12/03/2020 09:31 AM    Bilirubin, direct 0.11 06/19/2019 08:28 AM    Bilirubin, total 0.5 12/03/2020 09:31 AM    Albumin 3.9 12/03/2020 09:31 AM    Protein, total 7.9 12/03/2020 09:31 AM    INR 1.0 03/20/2015 03:49 PM    Prothrombin time 10.4 03/20/2015 03:49 PM    PLATELET 143 (H) 97/17/6387 09:31 AM        CT Results (maximum last 3): Results from [place] encounter on 07/19/20   CT CHEST WO CONT    Narrative EXAM:  CT CHEST WO CONT  INDICATION:  saccular aneurysm, pulmonary nodules  COMPARISON: 1/31/2020. Parag Alvarez TECHNIQUE: Unenhanced multislice helical CT was performed from the thoracic  inlet to the adrenal glands without intravenous contrast administration. Contiguous 5 mm axial images were reconstructed and lung and soft tissue windows  were generated. Coronal and sagittal reformations were generated. CT dose  reduction was achieved through use of a standardized protocol tailored for this  examination and automatic exposure control for dose modulation. Parag Alvarez FINDINGS:  CHEST:  Chest wall/thoracic inlet: Within normal limits. Thyroid: Within normal limits. Mediastinum/savannah: There are no pathologically enlarged mediastinal, hilar or  axillary nodes. Heart/vessels: Cardiac silhouette is not enlarged. Calcified saccular aneurysm  off of the ascending aorta just distal to the arch again noted. It measures 2.3  cm at its base, not significantly changed no pericardial effusion. Small hiatal  hernia.   Lungs/Pleura: Multiple pulmonary nodules some which are pleural-based are again  noted. These have not significantly changed in the interval. Representative  nodule left upper lobe image 53 measures 1.5 cm. Previously measured 1.6 cm. Left upper lobe nodule adjacent to the descending aorta image 42 measures 1.1  cm. Previously measured 1.1 cm. No effusion. .  ABDOMEN:  Multiple bilateral left renal lesions. Incompletely evaluated. The largest in  the left upper lobe measures fluid density. The adrenal glands are not enlarged. .  MSK: Within normal limits. .    Impression IMPRESSION:    1. Unchanged bilateral pulmonary nodules some which are pleural-based. 2. 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 TECHNIQUE:  5 mm axial images were obtained through the chest. Coronal and  sagittal reconstructions were generated. CT dose reduction was achieved through  use of a standardized protocol tailored for this examination and automatic  exposure control for dose modulation. The absence of intravenous contrast reduces the sensitivity for evaluation of  the mediastinum and upper abdominal organs. FINDINGS:    THYROID: No nodule. MEDIASTINUM: No mass or lymphadenopathy. OSVALDO: No mass or lymphadenopathy. THORACIC AORTA: Calcified saccular aneurysm projecting off of the aortic arch is  unchanged. MAIN PULMONARY ARTERY: Normal in caliber. TRACHEA/BRONCHI: Patent. ESOPHAGUS: No wall thickening or dilatation. HEART: Normal in size. PLEURA: No effusion or pneumothorax. LUNGS: Pleural based lesions in the left lung are stable as is the 11 mm  pulmonary nodule in the left upper lobe and tiny subpulmonary nodule right upper  lobe. INCIDENTALLY IMAGED UPPER ABDOMEN: No focal abnormality.   BONES: Degenerative changes are seen in the thoracic spine. Impression IMPRESSION:  Left sided pleural-based nodules and bilateral pulmonary nodules are unchanged  dating back to 12/16/2016. 5 saccular aneurysm projecting off of the aortic arch  is stable as well. MRI Results (maximum last 3): Results from [facility] encounter on 03/20/15   MRA BRAIN WO CONT    Narrative **Final Report**       ICD Codes / Adm. Diagnosis: 435.9  599.0 / Unspecified transient cerebral    Urinary tract infection, sit  Examination:  MR HEAD MRA WO CON  - [id number] - Mar 21 2015 12:20PM  Accession No:  [id number]  Reason:  TIA      REPORT:  INDICATION:   TIA    COMPARISON:  None    TECHNIQUE:    3-D time-of-flight MRA of the brain was performed. Multiplanar   reconstructions were obtained. FINDINGS:  The vertebral arteries are codominant. The basilar artery and its branches   are normal. The internal carotid, anterior cerebral, and middle cerebral   arteries are patent. There is no flow-limiting intracranial stenosis. There   is no aneurysm. There are no sizable posterior communicating arteries. IMPRESSION:  Normal intracranial MRA. Signing/Reading Doctor: Param Reza ([id number])    Approved: Param Reza ([id number])  Mar 21 2015 12:38PM                             MRI BRAIN W WO CONT    Narrative **Final Report**       ICD Codes / Adm. Diagnosis: 435.9  599.0 / Unspecified transient cerebral    Urinary tract infection, sit  Examination:  MR BRAIN W AND WO CON  - [id number] - Mar 21 2015 12:20PM  Accession No:  [id number]  Reason:  TIA      REPORT:  EXAM:  MR BRAIN W AND WO CON    INDICATION:    TIA    COMPARISON:  None. CONTRAST: 11 ml Gadavist CT yesterday    TECHNIQUE:    MR imaging of the brain was performed with sagittal T1, axial T1, T2, FLAIR,   GRE, DWI/ADC; multiplanar T1 images prior to and following uneventful   intravenous contrast administration.     FINDINGS: The ventricular size and configuration are normal. There are foci   of hyperintensity in the periventricular white matter of both cerebral   hemispheres. These are more numerous in the region adjacent to the basal   ganglia on the left as well as involving the subcortical insula. This is   consistent with small vessel ischemic change. . There is no evidence of mass,   hemorrhage, acute infarct or abnormal extra-axial fluid collection. Normal   appearing flow-voids are present in the vertebral, basilar and carotid   artery systems. The craniocervical junction is normal.  Paranasal sinuses   are clear. There is a 10 x 8 mm nodule in the anterior left parotid gland. .   There is no abnormal parenchymal or meningeal enhancement. IMPRESSION:    1. No evidence of acute ischemic change or hemorrhage  2. White matter findings consistent with chronic ischemic change and small   vessel disease in the cerebral hemispheres bilaterally left greater than   right  3. Left parotid nodule. Signing/Reading Doctor: Param Reza ([id number])    Approved: Param Reza ([id number])  Mar 21 2015 12:38PM                                 PET Results (maximum last 3): No results found for this or any previous visit. Assessment and Plan   Diagnoses and all orders for this visit:    1. Abnormal findings on examination of skull and head  -     CT HEAD W WO CONT; Future    2. Hyperostosis of calvaria  -     CT HEAD W WO CONT; Future    3. Scalp lesion    4. New onset headache  -     CT HEAD W WO CONT; Future      32-year-old woman who has what seems to be a bony predominance in the left posterior skull. It is a little tender on examination. I think this may be a benign issue potentially from a lot of weight loss but I will check a head CT to make sure there is not any type of underlying pathologic features to suggest additional investigation.   She has a family history for brain tumor so we will also evaluate that with imaging with contrast.  I do not see any red flags today. No change to her current activity level. We will let her know the results once we have them. I reviewed and decided to continue the current medications. This clinical note was dictated with an electronic dictation software that can make unintentional errors. If there are any questions, please contact me directly for clarification. A notice of this visit/encounter being completed has been sent electronically to the patient's PCP and/or referring provider.      [address] [place] [address], [facility] [address]  Diplomate ABPN

## 2021-01-14 ENCOUNTER — HOSPITAL ENCOUNTER (OUTPATIENT)
Dept: CT IMAGING | Age: 71
Discharge: HOME | End: 2021-01-14
Attending: PSYCHIATRY & NEUROLOGY
Payer: MEDICARE

## 2021-01-14 DIAGNOSIS — R68.89 ABNORMAL FINDINGS ON EXAMINATION OF SKULL AND HEAD: ICD-10-CM

## 2021-01-14 DIAGNOSIS — R51.9 NEW ONSET HEADACHE: ICD-10-CM

## 2021-01-14 DIAGNOSIS — M85.2: ICD-10-CM

## 2021-01-14 PROCEDURE — 74011000636 HC RX REV CODE- 636: Performed by: PSYCHIATRY & NEUROLOGY

## 2021-01-14 PROCEDURE — 70470 CT HEAD/BRAIN W/O & W/DYE: CPT

## 2021-01-14 RX ADMIN — IOPAMIDOL 100 ML: 755 INJECTION, SOLUTION INTRAVENOUS at 18:32

## 2021-01-15 DIAGNOSIS — K59.04 CHRONIC IDIOPATHIC CONSTIPATION: Primary | ICD-10-CM

## 2021-01-15 RX ORDER — LUBIPROSTONE 8 UG/1
8 CAPSULE, GELATIN COATED ORAL
Qty: 30 CAP | Refills: 5 | Status: SHIPPED | OUTPATIENT
Start: 2021-01-15 | End: 2021-02-08

## 2021-01-18 ENCOUNTER — OFFICE VISIT (OUTPATIENT)
Dept: URGENT CARE | Age: 71
End: 2021-01-18
Payer: MEDICARE

## 2021-01-18 VITALS — OXYGEN SATURATION: 97 % | TEMPERATURE: 98 F | HEART RATE: 91 BPM | RESPIRATION RATE: 16 BRPM

## 2021-01-18 DIAGNOSIS — Z20.822 EXPOSURE TO COVID-19 VIRUS: Primary | ICD-10-CM

## 2021-01-18 PROCEDURE — 3017F COLORECTAL CA SCREEN DOC REV: CPT | Performed by: FAMILY MEDICINE

## 2021-01-18 PROCEDURE — 1101F PT FALLS ASSESS-DOCD LE1/YR: CPT | Performed by: FAMILY MEDICINE

## 2021-01-18 PROCEDURE — G8432 DEP SCR NOT DOC, RNG: HCPCS | Performed by: FAMILY MEDICINE

## 2021-01-18 PROCEDURE — G8427 DOCREV CUR MEDS BY ELIG CLIN: HCPCS | Performed by: FAMILY MEDICINE

## 2021-01-18 PROCEDURE — G8417 CALC BMI ABV UP PARAM F/U: HCPCS | Performed by: FAMILY MEDICINE

## 2021-01-18 PROCEDURE — G8756 NO BP MEASURE DOC: HCPCS | Performed by: FAMILY MEDICINE

## 2021-01-18 PROCEDURE — G9899 SCRN MAM PERF RSLTS DOC: HCPCS | Performed by: FAMILY MEDICINE

## 2021-01-18 PROCEDURE — 99212 OFFICE O/P EST SF 10 MIN: CPT | Performed by: FAMILY MEDICINE

## 2021-01-18 PROCEDURE — G8536 NO DOC ELDER MAL SCRN: HCPCS | Performed by: FAMILY MEDICINE

## 2021-01-18 PROCEDURE — 1090F PRES/ABSN URINE INCON ASSESS: CPT | Performed by: FAMILY MEDICINE
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
## 2021-01-19 ENCOUNTER — TELEPHONE (OUTPATIENT)
Dept: NEUROLOGY | Age: 71
End: 2021-01-19

## 2021-01-19 NOTE — PROGRESS NOTES
Brain scan looks great. The abnormal finding on the left side of the head is something bony and nothing to be worried about according to this report.   Brain is completely normal.

## 2021-01-19 NOTE — PROGRESS NOTES
Patient called, verified and given results she states that the bony area on the left side of her head is painful to sleep on and seems to be getting bigger, she asked if you would advise her seeing Ortho to be evaluated.  If so she would like a referral.

## 2021-01-20 ENCOUNTER — TELEPHONE (OUTPATIENT)
Dept: NEUROLOGY | Age: 71
End: 2021-01-20

## 2021-01-20 DIAGNOSIS — M85.2: Primary | ICD-10-CM

## 2021-01-20 LAB — SARS-COV-2, NAA: NOT DETECTED

## 2021-01-20 NOTE — TELEPHONE ENCOUNTER
Okay, I will refer her to Dr. Yumiko Christie in ENT who is also in Dr. Feliciano Miller office. I checked with their office and he can take a look at this lesion for her closer that is on her scalp.

## 2021-01-20 NOTE — TELEPHONE ENCOUNTER
----- Message from Letty Enciso sent at 1/20/2021 12:20 PM EST -----  Regarding: Dr Harrison Lobato Message/Vendor Calls    Caller's first and last name:      Reason for call:pt said someone called her from the  practice after reading her tests results from her CT scan, and advised her to see an Orthopedic MD and they would send the referral, and when she contacted the Orthopedic MD and explained her condition and she was told  by the nurse they do not why the Neurologist  referred her to them  without making an appt for her,said it was more of a neurologist issue       Callback required yes/no and why:yes for reason given above      Best contact number(s):[phone]      Details to clarify the request:pt said she is very confused and does not know where to turn and what specialist she should see.  She would like to know if Dr Dacia Gallegos can call and speak with someone at  [place] [address] [facility]  at [phone]  to discuss her case and explain why she is being referred to them based on her CT scan results,thanks       Letty Enciso

## 2021-01-20 NOTE — TELEPHONE ENCOUNTER
----- Message from Abdi Valdez sent at 1/19/2021  4:27 PM EST -----  Patient called, verified and given results she states that the bony area on the left side of her head is painful to sleep on and seems to be getting bigger, she asked if you would advise her seeing Ortho to be evaluated.  If so she would like a referral.

## 2021-01-20 NOTE — TELEPHONE ENCOUNTER
----- Message from Gabriella Han sent at 1/20/2021  8:32 AM EST -----  Regarding: DO Emerado/Telephone  General Message/Vendor Calls    Caller's first and last name: N/A      Reason for call: Pt wants to confirm referral has been sent in and to get the type of doctor she will be seeing for this referral      Callback required yes/no and why: yes, to confirm referral has been sent in and to get the type of doctor she will be seeing for this referral      Best contact number(s): [phone]      Details to clarify the request: Pt wants to confirm her referral has been sent over and also, pt would like to know what type of doctor is that she will be seeing for this referral      Gabriella Han

## 2021-01-21 ENCOUNTER — TELEPHONE (OUTPATIENT)
Dept: NEUROLOGY | Age: 71
End: 2021-01-21

## 2021-01-21 NOTE — TELEPHONE ENCOUNTER
----- Message from Harvinder Birch sent at 1/21/2021 11:19 AM EST -----  Regarding: Dr. Kothari  General Message/Vendor Calls    Caller's first and last name: Misti / [facility]      Reason for call: Referral to ENT      Callback required yes/no and why: yes      Best contact number(s): [phone]      Details to clarify the request: Nurse would like to know how to help pt set up referral that was given by Dr. Kothari. A voicemail can left if no answer.      Harvinder Birch

## 2021-01-29 RX ORDER — FLUTICASONE PROPIONATE 50 MCG
SPRAY, SUSPENSION (ML) NASAL
Qty: 1 BOTTLE | Refills: 1 | Status: SHIPPED | OUTPATIENT
Start: 2021-01-29

## 2021-02-08 ENCOUNTER — OFFICE VISIT (OUTPATIENT)
Dept: INTERNAL MEDICINE CLINIC | Age: 71
End: 2021-02-08
Payer: MEDICARE

## 2021-02-08 VITALS
SYSTOLIC BLOOD PRESSURE: 112 MMHG | BODY MASS INDEX: 34.16 KG/M2 | HEIGHT: 65 IN | WEIGHT: 205 LBS | RESPIRATION RATE: 16 BRPM | DIASTOLIC BLOOD PRESSURE: 75 MMHG | OXYGEN SATURATION: 97 % | HEART RATE: 84 BPM | TEMPERATURE: 98.4 F

## 2021-02-08 DIAGNOSIS — B37.2 CANDIDIASIS OF SKIN: Primary | ICD-10-CM

## 2021-02-08 DIAGNOSIS — K59.04 CHRONIC IDIOPATHIC CONSTIPATION: ICD-10-CM

## 2021-02-08 DIAGNOSIS — Z79.1 NSAID LONG-TERM USE: ICD-10-CM

## 2021-02-08 LAB
A-G RATIO,AGRAT: 1.5 RATIO
ALBUMIN SERPL-MCNC: 4.2 G/DL (ref 3.9–5.4)
ALP SERPL-CCNC: 93 U/L (ref 38–126)
ALT SERPL-CCNC: 21 U/L (ref 0–35)
ANION GAP SERPL CALC-SCNC: 9 MMOL/L
AST SERPL W P-5'-P-CCNC: 23 U/L (ref 14–36)
BILIRUB SERPL-MCNC: 0.6 MG/DL (ref 0.2–1.3)
BUN SERPL-MCNC: 18 MG/DL (ref 7–17)
BUN/CREATININE RATIO,BUCR: 23 RATIO
CALCIUM SERPL-MCNC: 10 MG/DL (ref 8.4–10.2)
CHLORIDE SERPL-SCNC: 105 MMOL/L (ref 98–107)
CO2 SERPL-SCNC: 30 MMOL/L (ref 22–32)
CREAT SERPL-MCNC: 0.8 MG/DL (ref 0.7–1.2)
GLOBULIN,GLOB: 2.8
GLUCOSE SERPL-MCNC: 93 MG/DL (ref 65–105)
POTASSIUM SERPL-SCNC: 4.6 MMOL/L (ref 3.6–5)
PROT SERPL-MCNC: 7 G/DL (ref 6.3–8.2)
SODIUM SERPL-SCNC: 144 MMOL/L (ref 137–145)

## 2021-02-08 PROCEDURE — G8427 DOCREV CUR MEDS BY ELIG CLIN: HCPCS | Performed by: NURSE PRACTITIONER

## 2021-02-08 PROCEDURE — 3017F COLORECTAL CA SCREEN DOC REV: CPT | Performed by: NURSE PRACTITIONER

## 2021-02-08 PROCEDURE — 1090F PRES/ABSN URINE INCON ASSESS: CPT | Performed by: NURSE PRACTITIONER

## 2021-02-08 PROCEDURE — 1101F PT FALLS ASSESS-DOCD LE1/YR: CPT | Performed by: NURSE PRACTITIONER

## 2021-02-08 PROCEDURE — G8754 DIAS BP LESS 90: HCPCS | Performed by: NURSE PRACTITIONER

## 2021-02-08 PROCEDURE — 80053 COMPREHEN METABOLIC PANEL: CPT | Performed by: NURSE PRACTITIONER

## 2021-02-08 PROCEDURE — G8752 SYS BP LESS 140: HCPCS | Performed by: NURSE PRACTITIONER

## 2021-02-08 PROCEDURE — G8536 NO DOC ELDER MAL SCRN: HCPCS | Performed by: NURSE PRACTITIONER

## 2021-02-08 PROCEDURE — 99213 OFFICE O/P EST LOW 20 MIN: CPT | Performed by: NURSE PRACTITIONER

## 2021-02-08 PROCEDURE — G9899 SCRN MAM PERF RSLTS DOC: HCPCS | Performed by: NURSE PRACTITIONER

## 2021-02-08 PROCEDURE — G8432 DEP SCR NOT DOC, RNG: HCPCS | Performed by: NURSE PRACTITIONER

## 2021-02-08 PROCEDURE — G8417 CALC BMI ABV UP PARAM F/U: HCPCS | Performed by: NURSE PRACTITIONER

## 2021-02-08 RX ORDER — CLOTRIMAZOLE AND BETAMETHASONE DIPROPIONATE 10; .64 MG/G; MG/G
CREAM TOPICAL
COMMUNITY
Start: 2021-01-12 | End: 2021-04-29 | Stop reason: SDUPTHER

## 2021-02-08 RX ORDER — AMOXICILLIN 250 MG
2 CAPSULE ORAL
Qty: 180 TAB | Refills: 5 | Status: SHIPPED | OUTPATIENT
Start: 2021-02-08 | End: 2021-08-09 | Stop reason: SDUPTHER

## 2021-02-08 RX ORDER — NYSTATIN 100000 [USP'U]/G
POWDER TOPICAL 3 TIMES DAILY
Qty: 60 G | Refills: 5 | Status: SHIPPED | OUTPATIENT
Start: 2021-02-08

## 2021-02-08 NOTE — PROGRESS NOTES
Bessy Ford is a 72 y.o. female    Chief Complaint   Patient presents with    Follow-up     4 wk follow up       Visit Vitals  /75 (BP 1 Location: Left upper arm, BP Patient Position: Sitting, BP Cuff Size: Large adult)   Pulse 84   Temp 98.4 °F (36.9 °C)   Resp 16   Ht 5' 4.5\" (1.638 m)   Wt 205 lb (93 kg)   SpO2 97%   BMI 34.64 kg/m²           1. Have you been to the ER, urgent care clinic since your last visit? Hospitalized since your last visit? No     2. Have you seen or consulted any other health care providers outside of the [address] [place] [address] since your last visit? Include any pap smears or colon screening.  No
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
## 2021-03-03 ENCOUNTER — APPOINTMENT (OUTPATIENT)
Dept: GENERAL RADIOLOGY | Age: 71
End: 2021-03-03
Payer: MEDICARE

## 2021-03-03 ENCOUNTER — APPOINTMENT (OUTPATIENT)
Dept: GENERAL RADIOLOGY | Age: 71
End: 2021-03-03
Attending: EMERGENCY MEDICINE
Payer: MEDICARE

## 2021-03-03 ENCOUNTER — HOSPITAL ENCOUNTER (EMERGENCY)
Dept: GENERAL RADIOLOGY | Age: 71
Discharge: HOME | End: 2021-03-03
Attending: EMERGENCY MEDICINE
Payer: MEDICARE

## 2021-03-03 ENCOUNTER — HOSPITAL ENCOUNTER (EMERGENCY)
Age: 71
Discharge: HOME | End: 2021-03-03
Attending: EMERGENCY MEDICINE
Payer: MEDICARE

## 2021-03-03 ENCOUNTER — HOSPITAL ENCOUNTER (EMERGENCY)
Age: 71
Discharge: HOME | End: 2021-03-03
Attending: EMERGENCY MEDICINE | Admitting: EMERGENCY MEDICINE
Payer: MEDICARE

## 2021-03-03 VITALS
SYSTOLIC BLOOD PRESSURE: 158 MMHG | OXYGEN SATURATION: 100 % | TEMPERATURE: 98.4 F | RESPIRATION RATE: 15 BRPM | DIASTOLIC BLOOD PRESSURE: 97 MMHG | HEART RATE: 86 BPM

## 2021-03-03 VITALS
RESPIRATION RATE: 14 BRPM | TEMPERATURE: 98.4 F | SYSTOLIC BLOOD PRESSURE: 158 MMHG | OXYGEN SATURATION: 100 % | DIASTOLIC BLOOD PRESSURE: 89 MMHG | HEART RATE: 78 BPM

## 2021-03-03 DIAGNOSIS — S16.1XXA STRAIN OF NECK MUSCLE, INITIAL ENCOUNTER: ICD-10-CM

## 2021-03-03 DIAGNOSIS — S63.259A CLOSED DISLOCATION OF FINGER OF LEFT HAND, INITIAL ENCOUNTER: Primary | ICD-10-CM
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
## 2021-03-03 DIAGNOSIS — S60.419A ABRASION, FINGER W/O INFECTION: ICD-10-CM

## 2021-03-03 DIAGNOSIS — S50.02XA TRAUMATIC HEMATOMA OF LEFT ELBOW, INITIAL ENCOUNTER: Primary | ICD-10-CM

## 2021-03-03 PROCEDURE — 73130 X-RAY EXAM OF HAND: CPT

## 2021-03-03 PROCEDURE — 73080 X-RAY EXAM OF ELBOW: CPT

## 2021-03-03 PROCEDURE — 99281 EMR DPT VST MAYX REQ PHY/QHP: CPT

## 2021-03-03 PROCEDURE — 99282 EMERGENCY DEPT VISIT SF MDM: CPT

## 2021-03-03 PROCEDURE — 75810000301 HC ER LEVEL 1 CLOSED TREATMNT FRACTURE/DISLOCATION

## 2021-03-03 RX ORDER — BUPIVACAINE HYDROCHLORIDE 5 MG/ML
5 INJECTION, SOLUTION EPIDURAL; INTRACAUDAL
Status: DISCONTINUED | OUTPATIENT
Start: 2021-03-03 | End: 2021-03-03 | Stop reason: HOSPADM

## 2021-03-03 RX ORDER — LIDOCAINE HYDROCHLORIDE 10 MG/ML
5 INJECTION, SOLUTION EPIDURAL; INFILTRATION; INTRACAUDAL; PERINEURAL ONCE
Status: DISCONTINUED | OUTPATIENT
Start: 2021-03-03 | End: 2021-03-03 | Stop reason: HOSPADM
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
## 2021-03-03 NOTE — DISCHARGE INSTRUCTIONS
It was a pleasure taking care of you in our Emergency Department today. We know that when you come to [facility], you are entrusting us with your health, comfort, and safety. Our physicians and nurses honor that trust, and truly appreciate the opportunity to care for you and your loved ones. We also value your feedback. If you receive a survey about your Emergency Department experience today, please fill it out. We care about our patients' feedback, and we listen to what you have to say. Please read over your discharge instructions as these contain pertinent information to help you in the healing process. These instructions include a list of prescriptions you were given today. Follow-up information is also noted on your discharge papers. There are attached instructions and information pertaining to the reason why you were seen in the emergency department today. These discharge instructions may not be for exactly why you were here, but may be the closest available instructions that we have. These include important advice for things that you can do at home to feel better, and reasons to return to the emergency department. The evaluation and treatment you received in the emergency department is not always definitive care. If follow-up with your primary care doctor or specialist was recommended, it is important that you make these appointments for follow-up care. You may need further testing, procedures, and/or medications to help you feel better. Further tests may be required that are not available in the emergency department. Failure to make these follow-up appointments may jeopardize your health. The emergency department is here for emergent stabilization and evaluation of life and limb threatening illness and/or injuries.   Further care through a specialist or primary care doctor may be required to assist in your healing and complete your treatment and/or evaluation. We may not always be able to make a diagnosis in the emergency department, or things may change that will alter your diagnosis. Our primary goal is to ensure that nothing serious is occurring and that you are stable to continue your treatment and evaluation at home as an outpatient. Of course, if things change, and you feel worse, you are always encouraged to return to the emergency department for re-evaluation. Lab Results Today:  No results found for this or any previous visit (from the past 8 hour(s)). Radiology Results Today:  Xr Elbow Lt Min 3 V    Result Date: 3/3/2021  Soft tissue swelling posterior to the elbow. Xr Hand Lt Min 3 V    Result Date: 3/3/2021  Good alignment. Xr Hand Lt Min 3 V    Result Date: 3/3/2021  Dorsal dislocation proximal interphalangeal joint of fourth finger. Louis Chery
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
## 2021-03-11 ENCOUNTER — OFFICE VISIT (OUTPATIENT)
Dept: INTERNAL MEDICINE CLINIC | Age: 71
End: 2021-03-11
Payer: MEDICARE

## 2021-03-11 VITALS
TEMPERATURE: 97.8 F | HEIGHT: 65 IN | BODY MASS INDEX: 35.59 KG/M2 | OXYGEN SATURATION: 98 % | RESPIRATION RATE: 16 BRPM | HEART RATE: 79 BPM | WEIGHT: 213.6 LBS | DIASTOLIC BLOOD PRESSURE: 83 MMHG | SYSTOLIC BLOOD PRESSURE: 127 MMHG

## 2021-03-11 DIAGNOSIS — S50.02XD CONTUSION OF LEFT ELBOW, SUBSEQUENT ENCOUNTER: ICD-10-CM
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
## 2021-03-11 DIAGNOSIS — S60.00XD CONTUSION OF FINGER OF LEFT HAND, UNSPECIFIED FINGER, SUBSEQUENT ENCOUNTER: ICD-10-CM

## 2021-03-11 PROCEDURE — 3017F COLORECTAL CA SCREEN DOC REV: CPT | Performed by: NURSE PRACTITIONER

## 2021-03-11 PROCEDURE — G8536 NO DOC ELDER MAL SCRN: HCPCS | Performed by: NURSE PRACTITIONER

## 2021-03-11 PROCEDURE — G8754 DIAS BP LESS 90: HCPCS | Performed by: NURSE PRACTITIONER

## 2021-03-11 PROCEDURE — 99213 OFFICE O/P EST LOW 20 MIN: CPT | Performed by: NURSE PRACTITIONER

## 2021-03-11 PROCEDURE — 1101F PT FALLS ASSESS-DOCD LE1/YR: CPT | Performed by: NURSE PRACTITIONER

## 2021-03-11 PROCEDURE — 1090F PRES/ABSN URINE INCON ASSESS: CPT | Performed by: NURSE PRACTITIONER

## 2021-03-11 PROCEDURE — G8427 DOCREV CUR MEDS BY ELIG CLIN: HCPCS | Performed by: NURSE PRACTITIONER

## 2021-03-11 PROCEDURE — G8417 CALC BMI ABV UP PARAM F/U: HCPCS | Performed by: NURSE PRACTITIONER

## 2021-03-11 PROCEDURE — G8752 SYS BP LESS 140: HCPCS | Performed by: NURSE PRACTITIONER

## 2021-03-11 PROCEDURE — G9899 SCRN MAM PERF RSLTS DOC: HCPCS | Performed by: NURSE PRACTITIONER

## 2021-03-11 PROCEDURE — G8432 DEP SCR NOT DOC, RNG: HCPCS | Performed by: NURSE PRACTITIONER

## 2021-03-11 NOTE — PROGRESS NOTES
Martha Hudson is a 72 y.o. female    Chief Complaint   Patient presents with    Fall     3 wk follow up after fall       Visit Vitals  /83 (BP 1 Location: Right arm, BP Patient Position: Sitting, BP Cuff Size: Large adult)   Pulse 79   Temp 97.8 °F (36.6 °C)   Resp 16   Ht 5' 4.5\" (1.638 m)   Wt 213 lb 9.6 oz (96.9 kg)   SpO2 98%   BMI 36.10 kg/m²           1. Have you been to the ER, urgent care clinic since your last visit? Hospitalized since your last visit? No     2. Have you seen or consulted any other health care providers outside of the [address] [place] [address] since your last visit? Include any pap smears or colon screening.  No

## 2021-03-11 NOTE — PROGRESS NOTES
Subjective:     Chief Complaint   Patient presents with    Fall     3 wk follow up after fall        History of present illness:  Cuco Cyr is a 72 y.o. female who fell approximately 8 days ago, hitting her left elbow and hyperflexing her left second third and fourth fingers. She was seen and released from the emergency room, and had a single follow-up with a hand specialist who has placed her in some finger splints. She has been doing range of motion exercises, alternating moist heat and ice periodically, and recovering slowly but well. She denies any significant issues at this time. She feels that she is making good progress. Patient Active Problem List   Diagnosis Code    Thoracic aortic aneurysm without rupture ([facility] I71.2    Essential hypertension I10    Sciatica M54.30    Asthma J45.909    Knee osteoarthritis M17.10    Multiple lung nodules on CT R91.8    Osteoporosis without current pathological fracture M81.0    Vitamin D deficiency E55.9    Thrombocytosis ([facility]) D47.3    Lymphocytosis D72.820    Adenopathy R59.1    Sigmoid diverticulosis K57.30    Tonsillitis J03.90    Sepsis ([facility]) A41.9      Past Medical History:   Diagnosis Date    Aneurysm of thoracic aorta ([facility] 01/2017    saw Dr. Mannie Manning, then Dr. Balbuena [place] Hypertension     Knee osteoarthritis     managed with diclofenac po prn. sees Dr. Angel Richards [facility] orth[facility]. has had arthroscopy and IA injections in past with ortho.  Multiple lung nodules on CT 7/24/2017    Sciatica     manages with diclofenac. has some DDD.        Allergies   Allergen Reactions    Latex Hives    Flowers Shortness of Breath     Fresh cut flowers are worse    Fruit C [Ascorbic Acid] Hives     ALL FRUITS    Pcn [Penicillins] Hives      Family History   Problem Relation Age of Onset    Diabetes Mother     Hypertension Mother     Dementia Mother     Hypertension Sister     Hypertension Sister    [facility] Other Father 35 brain tumor    No Known Problems Brother     No Known Problems Maternal Grandmother     No Known Problems Maternal Grandfather     No Known Problems Paternal Grandmother     No Known Problems Paternal Grandfather     No Known Problems Son     Heart Disease Neg Hx       Social History     Socioeconomic History    Marital status:      Spouse name: Not on file    Number of children: 1    Years of education: Not on file    Highest education level: Not on file   Occupational History    Occupation: former    Social Needs    Financial resource strain: Not on file    Food insecurity     Worry: Not on file     Inability: Not on file   [employer] needs     Medical: Not on file     Non-medical: Not on file   Tobacco Use    Smoking status: Never Smoker    Smokeless tobacco: Never Used   Substance and Sexual Activity    Alcohol use: No    Drug use: No    Sexual activity: Not Currently   Lifestyle    Physical activity     Days per week: Not on file     Minutes per session: Not on file    Stress: Not on file   Relationships    Social connections     Talks on phone: Not on file     Gets together: Not on file     Attends Scientologist service: Not on file     Active member of club or organization: Not on file     Attends meetings of clubs or organizations: Not on file     Relationship status: Not on file    Intimate partner violence     Fear of current or ex partner: Not on file     Emotionally abused: Not on file     Physically abused: Not on file     Forced sexual activity: Not on file   Other Topics Concern    Not on file   Social History Narrative    One adult son. . Retired 7/7/2017. Prior to Admission medications    Medication Sig Start Date End Date Taking? Authorizing Provider   nystatin (MYCOSTATIN) powder Apply  to affected area three (3) times daily.  2/8/21  Yes Alexandr Covington D, NP   senna-docusate (PERICOLACE) 8.6-50 mg per tablet Take 2 Tabs by mouth three (3) times daily as needed for Constipation. 2/8/21  Yes Gela VOSS NP   clotrimazole-betamethasone (LOTRISONE) topical cream APPLY TO RASH TWICE A DAY 1/12/21  Yes Provider, Historical   fluticasone propionate (FLONASE) 50 mcg/actuation nasal spray SPRAY 2 SPRAYS INTO EACH NOSTRIL EVERY DAY 1/29/21  Yes Jelly Cruz PA-C   nystatin-triamcinolone (MYCOLOG) 100,000-0.1 unit/gram-% ointment Apply  to affected area two (2) times a day. 1/11/21  Yes Gela VOSS NP   montelukast (SINGULAIR) 10 mg tablet Take 1 Tab by mouth daily. 1/11/21  Yes Gela VOSS NP   varicella-zoster recombinant, PF, (Shingrix, PF,) 50 mcg/0.5 mL susr injection Shingrix (PF) 50 mcg/0.5 mL intramuscular suspension, kit   Yes Provider, Historical   cholecalciferol (VITAMIN D3) (1000 Units /25 mcg) tablet TAKE 2 TABLETS BY MOUTH EVERY DAY 12/18/20  Yes Marilyn Nayak NP   hydroCHLOROthiazide (HYDRODIURIL) 25 mg tablet TAKE 1 TABLET BY MOUTH EVERY DAY 12/17/20  Yes Binu GANNON NP   diclofenac EC (VOLTAREN) 75 mg EC tablet TAKE 1 TABLET BY MOUTH TWICE A DAY AS NEEDED FOR PAIN 11/2/20  Yes Marilyn Nayak NP   albuterol (Ventolin HFA) 90 mcg/actuation inhaler Take 2 Puffs by inhalation every four (4) hours as needed for Wheezing. 4/21/20  Yes Binu GANNON NP   aspirin delayed-release 81 mg tablet Take 81 mg by mouth daily. Yes Provider, Historical        Review of Systems              Constitutional:  She denies fever, weight loss, sweats or fatigue. Respiratory:  No cough, wheezing or shortness of breath. No sputum production. Cardiac:  Denies chest pain, palpitations, unexplained indigestion, syncope, edema, PND or orthopnea. Extremities: See HPI. Skin: Bruising to left elbow and left third finger. Neurological:  No numbness, tingling, burning paresthesias. No syncope, dizziness, frequent headaches or memory loss. Hematologic:  No unexplained bruising or purpura.   Lymphatic: No lymph node enlargement    Objective:     Vitals:    03/11/21 1445   BP: 127/83   Pulse: 79   Resp: 16   Temp: 97.8 °F (36.6 °C)   SpO2: 98%   Weight: 213 lb 9.6 oz (96.9 kg)   Height: 5' 4.5\" (1.638 m)   PainSc:   8   PainLoc: Finger       Body mass index is 36.1 kg/m². Physical Examination:              General Appearance:  Well-developed, well-nourished, no acute distress. Neck:  Supple without thyromegaly or adenopathy. No JVD noted. No bruits. Lungs:  Clear to auscultation and percussion X5. No abnormal breath sounds. Cardiac:  Regular rate and rhythm without murmur. PMI not displaced. No gallop, rub or click. Skin: Bruising to left elbow and left third finger. Lymph Nodes: No significant lymphadenopathy. Neurological: . Alert and oriented x3. Station and gait normal.   Hematologic:   No purpura or petechiae        Assessment/Plan:         1. Fall, subsequent encounter    2. Contusion of finger of left hand, unspecified finger, subsequent encounter    3. Contusion of left elbow, subsequent encounter        Impressions/Plan:    1: Patient to continue wearing splints as needed for support of left fingers. 2: Continue home exercise program with range of motion of fingers as prescribed by hand specialist.  3: Continue use of moist heat alternating with ice packs for comfort. 4: Follow-up with me as needed. Patient states understanding and agrees with plan. Follow-up and Dispositions    · Return if symptoms worsen or fail to improve. Signed,  Jessica Hayes.  Tuyet Otero, MSN APRN FNP-BC

## 2021-04-07 ENCOUNTER — TELEPHONE (OUTPATIENT)
Dept: INTERNAL MEDICINE CLINIC | Age: 71
End: 2021-04-07

## 2021-04-07 DIAGNOSIS — M79.642 LEFT HAND PAIN: ICD-10-CM

## 2021-04-07 DIAGNOSIS — S60.00XD CONTUSION OF FINGER OF LEFT HAND, UNSPECIFIED FINGER, SUBSEQUENT ENCOUNTER: Primary | ICD-10-CM

## 2021-04-07 NOTE — TELEPHONE ENCOUNTER
Pt states that her fingers on her left hand are still swollen and they hurt. The middle finger is more swollen than the ring finger. States that it is very difficult to hold anything in her left hand. Pt wanted to know if stretching and exercising her fingers are making it worse or not. Wanted to know if you like for her to come in to see it or should she see a specialist in regards to it. Please advise. Thank you.

## 2021-04-26 ENCOUNTER — HOSPITAL ENCOUNTER (OUTPATIENT)
Dept: MAMMOGRAPHY | Age: 71
Discharge: HOME | End: 2021-04-26
Attending: NURSE PRACTITIONER
Payer: MEDICARE

## 2021-04-26 DIAGNOSIS — Z12.31 SCREENING MAMMOGRAM FOR HIGH-RISK PATIENT: ICD-10-CM

## 2021-04-26 PROCEDURE — 77067 SCR MAMMO BI INCL CAD: CPT

## 2021-04-29 RX ORDER — CLOTRIMAZOLE AND BETAMETHASONE DIPROPIONATE 10; .64 MG/G; MG/G
CREAM TOPICAL 2 TIMES DAILY
Qty: 15 G | Refills: 2 | Status: SHIPPED | OUTPATIENT
Start: 2021-04-29 | End: 2021-07-14 | Stop reason: SDUPTHER

## 2021-04-29 NOTE — TELEPHONE ENCOUNTER
Last Refill: 01/12/21  Last Visit: 3/11/2021   Next Visit: 5/11/2021    Requested Prescriptions     Pending Prescriptions Disp Refills    clotrimazole-betamethasone (LOTRISONE) topical cream 15 g 2     Sig: Apply  to affected area two (2) times a day.

## 2021-05-11 ENCOUNTER — OFFICE VISIT (OUTPATIENT)
Dept: INTERNAL MEDICINE CLINIC | Age: 71
End: 2021-05-11
Payer: MEDICARE

## 2021-05-11 VITALS
BODY MASS INDEX: 37.29 KG/M2 | WEIGHT: 223.8 LBS | HEIGHT: 65 IN | DIASTOLIC BLOOD PRESSURE: 80 MMHG | TEMPERATURE: 98.4 F | SYSTOLIC BLOOD PRESSURE: 132 MMHG | HEART RATE: 75 BPM | RESPIRATION RATE: 16 BRPM | OXYGEN SATURATION: 97 %

## 2021-05-11 DIAGNOSIS — J30.1 SEASONAL ALLERGIC RHINITIS DUE TO POLLEN: ICD-10-CM

## 2021-05-11 DIAGNOSIS — I10 ESSENTIAL HYPERTENSION: ICD-10-CM

## 2021-05-11 DIAGNOSIS — K59.04 CHRONIC IDIOPATHIC CONSTIPATION: Primary | ICD-10-CM

## 2021-05-11 DIAGNOSIS — E66.01 CLASS 2 SEVERE OBESITY DUE TO EXCESS CALORIES WITH SERIOUS COMORBIDITY AND BODY MASS INDEX (BMI) OF 37.0 TO 37.9 IN ADULT (HCC): ICD-10-CM

## 2021-05-11 PROCEDURE — G8754 DIAS BP LESS 90: HCPCS | Performed by: NURSE PRACTITIONER

## 2021-05-11 PROCEDURE — 99214 OFFICE O/P EST MOD 30 MIN: CPT | Performed by: NURSE PRACTITIONER

## 2021-05-11 PROCEDURE — G8536 NO DOC ELDER MAL SCRN: HCPCS | Performed by: NURSE PRACTITIONER

## 2021-05-11 PROCEDURE — G9899 SCRN MAM PERF RSLTS DOC: HCPCS | Performed by: NURSE PRACTITIONER

## 2021-05-11 PROCEDURE — G8432 DEP SCR NOT DOC, RNG: HCPCS | Performed by: NURSE PRACTITIONER

## 2021-05-11 PROCEDURE — G8427 DOCREV CUR MEDS BY ELIG CLIN: HCPCS | Performed by: NURSE PRACTITIONER

## 2021-05-11 PROCEDURE — 1090F PRES/ABSN URINE INCON ASSESS: CPT | Performed by: NURSE PRACTITIONER

## 2021-05-11 PROCEDURE — G8417 CALC BMI ABV UP PARAM F/U: HCPCS | Performed by: NURSE PRACTITIONER

## 2021-05-11 PROCEDURE — G8752 SYS BP LESS 140: HCPCS | Performed by: NURSE PRACTITIONER

## 2021-05-11 PROCEDURE — 3017F COLORECTAL CA SCREEN DOC REV: CPT | Performed by: NURSE PRACTITIONER

## 2021-05-11 PROCEDURE — 1101F PT FALLS ASSESS-DOCD LE1/YR: CPT | Performed by: NURSE PRACTITIONER

## 2021-05-11 NOTE — PROGRESS NOTES
Mahin Barnes is a 80 y.o. female    Chief Complaint   Patient presents with    Discuss Medications     3 month follow up       Visit Vitals  /80 (BP 1 Location: Left upper arm, BP Patient Position: Sitting, BP Cuff Size: Small adult)   Pulse 75   Temp 98.4 °F (36.9 °C)   Resp 16   Ht 5' 4.5\" (1.638 m)   Wt 223 lb 12.8 oz (101.5 kg)   SpO2 97%   BMI 37.82 kg/m²           1. Have you been to the ER, urgent care clinic since your last visit? Hospitalized since your last visit? No     2. Have you seen or consulted any other health care providers outside of the [address] [place] [address] since your last visit? Include any pap smears or colon screening.  No
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
## 2021-06-22 NOTE — PROGRESS NOTES
Verified two paint identifiers, name and . Patient provided with lab results. Patient stated that she does have a odor, but as far as urgency no more than normal for her. oral

## 2021-07-03 DIAGNOSIS — J45.20 MILD INTERMITTENT ASTHMA WITHOUT COMPLICATION: ICD-10-CM

## 2021-07-06 RX ORDER — MONTELUKAST SODIUM 10 MG/1
TABLET ORAL
Qty: 90 TABLET | Refills: 1 | Status: SHIPPED | OUTPATIENT
Start: 2021-07-06 | End: 2022-01-03

## 2021-07-14 RX ORDER — CLOTRIMAZOLE AND BETAMETHASONE DIPROPIONATE 10; .64 MG/G; MG/G
CREAM TOPICAL 2 TIMES DAILY
Qty: 15 G | Refills: 2 | Status: SHIPPED | OUTPATIENT
Start: 2021-07-14 | End: 2021-12-28

## 2021-07-14 NOTE — TELEPHONE ENCOUNTER
Last Refill: 04/29/21  Last Visit: 5/11/2021   Next Visit: 11/11/2021    Requested Prescriptions     Pending Prescriptions Disp Refills    clotrimazole-betamethasone (LOTRISONE) topical cream 15 g 2     Sig: Apply  to affected area two (2) times a day.

## 2021-07-16 RX ORDER — DICLOFENAC SODIUM 75 MG/1
75 TABLET, DELAYED RELEASE ORAL
Qty: 90 TABLET | Refills: 0 | Status: SHIPPED | OUTPATIENT
Start: 2021-07-16 | End: 2021-08-10

## 2021-07-16 NOTE — TELEPHONE ENCOUNTER
PCP: Sasha Padilla NP    Last appt: 5/11/2021  Future Appointments   Date Time Provider [place]   11/11/2021  8:00 AM Sasha Padilla NP [facility] AMB       Requested Prescriptions     Pending Prescriptions Disp Refills    diclofenac EC (VOLTAREN) 75 mg EC tablet 90 Tablet 0     Sig: Take 1 Tablet by mouth daily as needed for Pain.          Other Comments:

## 2021-07-16 NOTE — TELEPHONE ENCOUNTER
Last Refill: N/A  Last Visit: 5/11/2021   Next Visit: 11/11/2021    Requested Prescriptions     Pending Prescriptions Disp Refills    diclofenac EC (VOLTAREN) 75 mg EC tablet 90 Tablet 0     Sig: Take 1 Tablet by mouth daily as needed for Pain.

## 2021-08-09 DIAGNOSIS — I10 ESSENTIAL HYPERTENSION: ICD-10-CM

## 2021-08-09 DIAGNOSIS — K59.04 CHRONIC IDIOPATHIC CONSTIPATION: ICD-10-CM

## 2021-08-09 NOTE — TELEPHONE ENCOUNTER
Last Refill: 02/08/21  N/A  Last Visit: 5/11/2021   Next Visit: 11/11/2021    Requested Prescriptions     Pending Prescriptions Disp Refills    hydroCHLOROthiazide (HYDRODIURIL) 25 mg tablet 90 Tablet 2     Sig: Take 1 Tablet by mouth daily.  senna-docusate (PERICOLACE) 8.6-50 mg per tablet 180 Tablet 5     Sig: Take 2 Tablets by mouth three (3) times daily as needed for Constipation.

## 2021-08-10 RX ORDER — AMOXICILLIN 250 MG
2 CAPSULE ORAL
Qty: 180 TABLET | Refills: 5 | Status: SHIPPED | OUTPATIENT
Start: 2021-08-10 | End: 2022-06-06

## 2021-08-10 RX ORDER — DICLOFENAC SODIUM 75 MG/1
TABLET, DELAYED RELEASE ORAL
Qty: 90 TABLET | Refills: 0 | Status: SHIPPED | OUTPATIENT
Start: 2021-08-10 | End: 2021-12-16

## 2021-08-10 RX ORDER — HYDROCHLOROTHIAZIDE 25 MG/1
25 TABLET ORAL DAILY
Qty: 90 TABLET | Refills: 2 | Status: SHIPPED | OUTPATIENT
Start: 2021-08-10 | End: 2022-04-27

## 2021-08-11 ENCOUNTER — OFFICE VISIT (OUTPATIENT)
Dept: INTERNAL MEDICINE CLINIC | Age: 71
End: 2021-08-11
Payer: MEDICARE

## 2021-08-11 VITALS
TEMPERATURE: 98.3 F | RESPIRATION RATE: 16 BRPM | DIASTOLIC BLOOD PRESSURE: 88 MMHG | WEIGHT: 238.8 LBS | BODY MASS INDEX: 39.79 KG/M2 | HEART RATE: 92 BPM | HEIGHT: 65 IN | SYSTOLIC BLOOD PRESSURE: 134 MMHG | OXYGEN SATURATION: 98 %

## 2021-08-11 DIAGNOSIS — J01.10 ACUTE NON-RECURRENT FRONTAL SINUSITIS: Primary | ICD-10-CM

## 2021-08-11 PROCEDURE — G8427 DOCREV CUR MEDS BY ELIG CLIN: HCPCS | Performed by: NURSE PRACTITIONER

## 2021-08-11 PROCEDURE — G8536 NO DOC ELDER MAL SCRN: HCPCS | Performed by: NURSE PRACTITIONER

## 2021-08-11 PROCEDURE — G8754 DIAS BP LESS 90: HCPCS | Performed by: NURSE PRACTITIONER

## 2021-08-11 PROCEDURE — 96372 THER/PROPH/DIAG INJ SC/IM: CPT | Performed by: NURSE PRACTITIONER

## 2021-08-11 PROCEDURE — 1090F PRES/ABSN URINE INCON ASSESS: CPT | Performed by: NURSE PRACTITIONER

## 2021-08-11 PROCEDURE — G8417 CALC BMI ABV UP PARAM F/U: HCPCS | Performed by: NURSE PRACTITIONER

## 2021-08-11 PROCEDURE — G9899 SCRN MAM PERF RSLTS DOC: HCPCS | Performed by: NURSE PRACTITIONER

## 2021-08-11 PROCEDURE — 3017F COLORECTAL CA SCREEN DOC REV: CPT | Performed by: NURSE PRACTITIONER

## 2021-08-11 PROCEDURE — 99213 OFFICE O/P EST LOW 20 MIN: CPT | Performed by: NURSE PRACTITIONER

## 2021-08-11 PROCEDURE — G8432 DEP SCR NOT DOC, RNG: HCPCS | Performed by: NURSE PRACTITIONER

## 2021-08-11 PROCEDURE — 1101F PT FALLS ASSESS-DOCD LE1/YR: CPT | Performed by: NURSE PRACTITIONER

## 2021-08-11 PROCEDURE — G8752 SYS BP LESS 140: HCPCS | Performed by: NURSE PRACTITIONER

## 2021-08-11 RX ORDER — METHYLPREDNISOLONE ACETATE 40 MG/ML
40 INJECTION, SUSPENSION INTRA-ARTICULAR; INTRALESIONAL; INTRAMUSCULAR; SOFT TISSUE ONCE
Qty: 1 VIAL | Refills: 0
Start: 2021-08-11 | End: 2021-08-11

## 2021-08-11 RX ORDER — DOXYCYCLINE 100 MG/1
100 TABLET ORAL 2 TIMES DAILY
Qty: 14 TABLET | Refills: 0 | Status: SHIPPED | OUTPATIENT
Start: 2021-08-11 | End: 2021-11-11

## 2021-08-11 RX ORDER — CHOLECALCIFEROL (VITAMIN D3) 25 MCG
TABLET ORAL
Qty: 180 TABLET | Refills: 1 | Status: SHIPPED | OUTPATIENT
Start: 2021-08-11 | End: 2022-02-21
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
## 2021-08-11 NOTE — PROGRESS NOTES
Kiera Ortiz is a 80 y.o. female    Chief Complaint   Patient presents with    Sinus Infection     acute care       Visit Vitals  /88 (BP 1 Location: Left upper arm, BP Patient Position: Sitting, BP Cuff Size: Small adult)   Pulse 92   Temp 98.3 °F (36.8 °C)   Resp 16   Ht 5' 4.5\" (1.638 m)   Wt 238 lb 12.8 oz (108.3 kg)   SpO2 98%   BMI 40.36 kg/m²           1. Have you been to the ER, urgent care clinic since your last visit? Hospitalized since your last visit? NO    2. Have you seen or consulted any other health care providers outside of the [address] [place] [address] since your last visit? Include any pap smears or colon screening.  NO

## 2021-08-11 NOTE — PROGRESS NOTES
After obtaining consent, and per orders of KAREN SMITH,NP injection of METHYLPREDNISOLONE 40 MG/ML given by Que Chowdhury. Patient instructed to remain in clinic for 20 minutes afterwards, and to report any adverse reaction to me immediately. Administered METHYLPREDNISOLONE 40 MG/ML in RIGHT DELTOID patient tolerated well.     LOT #:[id number]     EXP:12/1/21    RQR:[id number]

## 2021-08-16 ENCOUNTER — OFFICE VISIT (OUTPATIENT)
Dept: INTERNAL MEDICINE CLINIC | Age: 71
End: 2021-08-16
Payer: MEDICARE

## 2021-08-16 VITALS
RESPIRATION RATE: 18 BRPM | WEIGHT: 237.8 LBS | HEIGHT: 65 IN | DIASTOLIC BLOOD PRESSURE: 88 MMHG | TEMPERATURE: 98.6 F | BODY MASS INDEX: 39.62 KG/M2 | SYSTOLIC BLOOD PRESSURE: 154 MMHG | HEART RATE: 90 BPM | OXYGEN SATURATION: 99 %

## 2021-08-16 DIAGNOSIS — J20.9 ACUTE BRONCHITIS, UNSPECIFIED ORGANISM: Primary | ICD-10-CM

## 2021-08-16 DIAGNOSIS — R05.9 COUGH: ICD-10-CM

## 2021-08-16 DIAGNOSIS — R53.81 MALAISE AND FATIGUE: ICD-10-CM

## 2021-08-16 DIAGNOSIS — R53.83 MALAISE AND FATIGUE: ICD-10-CM

## 2021-08-16 PROCEDURE — 1101F PT FALLS ASSESS-DOCD LE1/YR: CPT | Performed by: NURSE PRACTITIONER

## 2021-08-16 PROCEDURE — 1090F PRES/ABSN URINE INCON ASSESS: CPT | Performed by: NURSE PRACTITIONER

## 2021-08-16 PROCEDURE — G8754 DIAS BP LESS 90: HCPCS | Performed by: NURSE PRACTITIONER

## 2021-08-16 PROCEDURE — 99213 OFFICE O/P EST LOW 20 MIN: CPT | Performed by: NURSE PRACTITIONER

## 2021-08-16 PROCEDURE — G8417 CALC BMI ABV UP PARAM F/U: HCPCS | Performed by: NURSE PRACTITIONER

## 2021-08-16 PROCEDURE — G8536 NO DOC ELDER MAL SCRN: HCPCS | Performed by: NURSE PRACTITIONER

## 2021-08-16 PROCEDURE — G9899 SCRN MAM PERF RSLTS DOC: HCPCS | Performed by: NURSE PRACTITIONER

## 2021-08-16 PROCEDURE — G8427 DOCREV CUR MEDS BY ELIG CLIN: HCPCS | Performed by: NURSE PRACTITIONER

## 2021-08-16 PROCEDURE — 3017F COLORECTAL CA SCREEN DOC REV: CPT | Performed by: NURSE PRACTITIONER

## 2021-08-16 PROCEDURE — G8432 DEP SCR NOT DOC, RNG: HCPCS | Performed by: NURSE PRACTITIONER

## 2021-08-16 PROCEDURE — G8753 SYS BP > OR = 140: HCPCS | Performed by: NURSE PRACTITIONER

## 2021-08-16 RX ORDER — METHYLPREDNISOLONE 4 MG/1
TABLET ORAL
Qty: 1 DOSE PACK | Refills: 0 | Status: SHIPPED | OUTPATIENT
Start: 2021-08-16 | End: 2021-11-11

## 2021-08-16 RX ORDER — AZITHROMYCIN 250 MG/1
250 TABLET, FILM COATED ORAL SEE ADMIN INSTRUCTIONS
Qty: 6 TABLET | Refills: 0 | Status: SHIPPED | OUTPATIENT
Start: 2021-08-16 | End: 2021-08-21

## 2021-08-16 RX ORDER — GUAIFENESIN 600 MG/1
600 TABLET, EXTENDED RELEASE ORAL 2 TIMES DAILY
Qty: 20 TABLET | Refills: 0 | Status: SHIPPED | OUTPATIENT
Start: 2021-08-16 | End: 2021-11-11
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
## 2021-08-28 ENCOUNTER — OFFICE VISIT (OUTPATIENT)
Dept: URGENT CARE | Age: 71
End: 2021-08-28

## 2021-08-28 VITALS — HEART RATE: 82 BPM | OXYGEN SATURATION: 94 % | TEMPERATURE: 98.3 F | RESPIRATION RATE: 15 BRPM

## 2021-11-11 ENCOUNTER — OFFICE VISIT (OUTPATIENT)
Dept: INTERNAL MEDICINE CLINIC | Age: 71
End: 2021-11-11
Payer: MEDICARE

## 2021-11-11 VITALS
RESPIRATION RATE: 16 BRPM | BODY MASS INDEX: 41.62 KG/M2 | SYSTOLIC BLOOD PRESSURE: 124 MMHG | OXYGEN SATURATION: 100 % | HEART RATE: 89 BPM | TEMPERATURE: 98.4 F | HEIGHT: 65 IN | WEIGHT: 249.8 LBS | DIASTOLIC BLOOD PRESSURE: 83 MMHG

## 2021-11-11 DIAGNOSIS — I71.20 THORACIC AORTIC ANEURYSM WITHOUT RUPTURE: ICD-10-CM

## 2021-11-11 DIAGNOSIS — E66.01 CLASS 3 SEVERE OBESITY DUE TO EXCESS CALORIES WITHOUT SERIOUS COMORBIDITY WITH BODY MASS INDEX (BMI) OF 40.0 TO 44.9 IN ADULT (HCC): ICD-10-CM

## 2021-11-11 DIAGNOSIS — I10 ESSENTIAL HYPERTENSION: ICD-10-CM

## 2021-11-11 DIAGNOSIS — J45.20 MILD INTERMITTENT ASTHMA WITHOUT COMPLICATION: ICD-10-CM

## 2021-11-11 DIAGNOSIS — Z00.00 ENCOUNTER FOR ANNUAL WELLNESS VISIT (AWV) IN MEDICARE PATIENT: Primary | ICD-10-CM

## 2021-11-11 DIAGNOSIS — E55.9 VITAMIN D DEFICIENCY: ICD-10-CM

## 2021-11-11 LAB
25(OH)D3 SERPL-MCNC: 48.6 NG/ML (ref 30–100)
ALBUMIN SERPL-MCNC: 3.7 G/DL (ref 3.5–5)
ALBUMIN/GLOB SERPL: 0.9 {RATIO} (ref 1.1–2.2)
ALP SERPL-CCNC: 94 U/L (ref 45–117)
ALT SERPL-CCNC: 24 U/L (ref 12–78)
ANION GAP SERPL CALC-SCNC: 2 MMOL/L (ref 5–15)
APPEARANCE UR: CLEAR
AST SERPL-CCNC: 11 U/L (ref 15–37)
BACTERIA URNS QL MICRO: ABNORMAL /HPF
BILIRUB SERPL-MCNC: 0.5 MG/DL (ref 0.2–1)
BILIRUB UR QL: NEGATIVE
BUN SERPL-MCNC: 21 MG/DL (ref 6–20)
BUN/CREAT SERPL: 22 (ref 12–20)
CALCIUM SERPL-MCNC: 10.3 MG/DL (ref 8.5–10.1)
CHLORIDE SERPL-SCNC: 106 MMOL/L (ref 97–108)
CHOLEST SERPL-MCNC: 182 MG/DL
CO2 SERPL-SCNC: 30 MMOL/L (ref 21–32)
COLOR UR: ABNORMAL
CREAT SERPL-MCNC: 0.94 MG/DL (ref 0.55–1.02)
EPITH CASTS URNS QL MICRO: ABNORMAL /LPF
ERYTHROCYTE [DISTWIDTH] IN BLOOD BY AUTOMATED COUNT: 15 % (ref 11.5–14.5)
GLOBULIN SER CALC-MCNC: 4 G/DL (ref 2–4)
GLUCOSE SERPL-MCNC: 96 MG/DL (ref 65–100)
GLUCOSE UR STRIP.AUTO-MCNC: NEGATIVE MG/DL
HCT VFR BLD AUTO: 38.1 % (ref 35–47)
HDLC SERPL-MCNC: 56 MG/DL
HDLC SERPL: 3.3 {RATIO} (ref 0–5)
HGB BLD-MCNC: 13.6 G/DL (ref 11.5–16)
HGB UR QL STRIP: NEGATIVE
HYALINE CASTS URNS QL MICRO: ABNORMAL /LPF (ref 0–5)
KETONES UR QL STRIP.AUTO: NEGATIVE MG/DL
LDLC SERPL CALC-MCNC: 100.6 MG/DL (ref 0–100)
LEUKOCYTE ESTERASE UR QL STRIP.AUTO: ABNORMAL
MCH RBC QN AUTO: 30.1 PG (ref 26–34)
MCHC RBC AUTO-ENTMCNC: 35.7 G/DL (ref 30–36.5)
MCV RBC AUTO: 84.3 FL (ref 80–99)
NITRITE UR QL STRIP.AUTO: NEGATIVE
NRBC # BLD: 0 K/UL (ref 0–0.01)
NRBC BLD-RTO: 0 PER 100 WBC
PH UR STRIP: 5 [PH] (ref 5–8)
PLATELET # BLD AUTO: 459 K/UL (ref 150–400)
PMV BLD AUTO: 9.8 FL (ref 8.9–12.9)
POTASSIUM SERPL-SCNC: 4.7 MMOL/L (ref 3.5–5.1)
PROT SERPL-MCNC: 7.7 G/DL (ref 6.4–8.2)
PROT UR STRIP-MCNC: NEGATIVE MG/DL
RBC # BLD AUTO: 4.52 M/UL (ref 3.8–5.2)
RBC #/AREA URNS HPF: ABNORMAL /HPF (ref 0–5)
SODIUM SERPL-SCNC: 138 MMOL/L (ref 136–145)
SP GR UR REFRACTOMETRY: 1.02 (ref 1–1.03)
TRIGL SERPL-MCNC: 127 MG/DL (ref ?–150)
TSH SERPL DL<=0.05 MIU/L-ACNC: 2.82 UIU/ML (ref 0.36–3.74)
UR CULT HOLD, URHOLD: NORMAL
UROBILINOGEN UR QL STRIP.AUTO: 0.2 EU/DL (ref 0.2–1)
VLDLC SERPL CALC-MCNC: 25.4 MG/DL
WBC # BLD AUTO: 9.9 K/UL (ref 3.6–11)
WBC URNS QL MICRO: ABNORMAL /HPF (ref 0–4)

## 2021-11-11 PROCEDURE — G8417 CALC BMI ABV UP PARAM F/U: HCPCS | Performed by: NURSE PRACTITIONER

## 2021-11-11 PROCEDURE — G0439 PPPS, SUBSEQ VISIT: HCPCS | Performed by: NURSE PRACTITIONER

## 2021-11-11 PROCEDURE — 1101F PT FALLS ASSESS-DOCD LE1/YR: CPT | Performed by: NURSE PRACTITIONER

## 2021-11-11 PROCEDURE — G8427 DOCREV CUR MEDS BY ELIG CLIN: HCPCS | Performed by: NURSE PRACTITIONER

## 2021-11-11 PROCEDURE — G8536 NO DOC ELDER MAL SCRN: HCPCS | Performed by: NURSE PRACTITIONER

## 2021-11-11 PROCEDURE — G8432 DEP SCR NOT DOC, RNG: HCPCS | Performed by: NURSE PRACTITIONER

## 2021-11-11 PROCEDURE — 3017F COLORECTAL CA SCREEN DOC REV: CPT | Performed by: NURSE PRACTITIONER

## 2021-11-11 PROCEDURE — G9899 SCRN MAM PERF RSLTS DOC: HCPCS | Performed by: NURSE PRACTITIONER

## 2021-11-11 PROCEDURE — G8754 DIAS BP LESS 90: HCPCS | Performed by: NURSE PRACTITIONER

## 2021-11-11 PROCEDURE — G8752 SYS BP LESS 140: HCPCS | Performed by: NURSE PRACTITIONER
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
## 2021-11-11 NOTE — PROGRESS NOTES
Jelani Rizvi is a 80 y.o. female    Chief Complaint   Patient presents with    Labs     6 month follow up       Visit Vitals  /83 (BP 1 Location: Left upper arm, BP Patient Position: Sitting, BP Cuff Size: Large adult)   Pulse 89   Temp 98.4 °F (36.9 °C)   Resp 16   Ht 5' 4.5\" (1.638 m)   Wt 249 lb 12.8 oz (113.3 kg)   SpO2 100%   BMI 42.22 kg/m²           1. Have you been to the ER, urgent care clinic since your last visit? Hospitalized since your last visit? NO    2. Have you seen or consulted any other health care providers outside of the [address] [place] [address] since your last visit? Include any pap smears or colon screening.  NO

## 2021-11-12 NOTE — PROGRESS NOTES
Vitamin D level shows to be in good range. Urinalysis shows no signs of infection. Thyroid appears to be functioning normally. Cholesterol levels are in an acceptable range. Electrolytes are normal.  Kidney function shows some mild decline but not significant. This is likely age-related. Blood count shows no signs of anemia, but platelets remain elevated. We will keep an eye on this. Repeat labs in 6 months.

## 2021-12-16 RX ORDER — DICLOFENAC SODIUM 75 MG/1
TABLET, DELAYED RELEASE ORAL
Qty: 90 TABLET | Refills: 0 | Status: SHIPPED | OUTPATIENT
Start: 2021-12-16 | End: 2022-03-14

## 2021-12-28 RX ORDER — CLOTRIMAZOLE AND BETAMETHASONE DIPROPIONATE 10; .64 MG/G; MG/G
CREAM TOPICAL
Qty: 15 G | Refills: 2 | Status: SHIPPED | OUTPATIENT
Start: 2021-12-28 | End: 2022-04-27

## 2022-01-01 DIAGNOSIS — J45.20 MILD INTERMITTENT ASTHMA WITHOUT COMPLICATION: ICD-10-CM

## 2022-01-03 RX ORDER — MONTELUKAST SODIUM 10 MG/1
TABLET ORAL
Qty: 90 TABLET | Refills: 1 | Status: SHIPPED | OUTPATIENT
Start: 2022-01-03

## 2022-02-21 RX ORDER — CHOLECALCIFEROL (VITAMIN D3) 25 MCG
TABLET ORAL
Qty: 180 TABLET | Refills: 1 | Status: SHIPPED | OUTPATIENT
Start: 2022-02-21

## 2022-03-14 RX ORDER — DICLOFENAC SODIUM 75 MG/1
TABLET, DELAYED RELEASE ORAL
Qty: 90 TABLET | Refills: 0 | Status: SHIPPED | OUTPATIENT
Start: 2022-03-14 | End: 2022-06-18

## 2022-03-18 PROBLEM — D72.820 LYMPHOCYTOSIS: Status: ACTIVE | Noted: 2019-02-06

## 2022-03-18 PROBLEM — I71.20 THORACIC AORTIC ANEURYSM WITHOUT RUPTURE (HCC): Status: ACTIVE | Noted: 2017-01-12

## 2022-03-18 PROBLEM — D75.839 THROMBOCYTOSIS: Status: ACTIVE | Noted: 2019-02-06

## 2022-03-18 PROBLEM — E55.9 VITAMIN D DEFICIENCY: Status: ACTIVE | Noted: 2018-10-16

## 2022-03-18 PROBLEM — R91.8 MULTIPLE LUNG NODULES ON CT: Status: ACTIVE | Noted: 2017-07-24

## 2022-03-19 PROBLEM — K57.30 SIGMOID DIVERTICULOSIS: Status: ACTIVE | Noted: 2019-07-11

## 2022-03-19 PROBLEM — J03.90 TONSILLITIS: Status: ACTIVE | Noted: 2020-01-17

## 2022-03-19 PROBLEM — R59.9 ADENOPATHY: Status: ACTIVE | Noted: 2019-03-06

## 2022-03-19 PROBLEM — J30.1 SEASONAL ALLERGIC RHINITIS DUE TO POLLEN: Status: ACTIVE | Noted: 2021-05-11

## 2022-03-19 PROBLEM — A41.9 SEPSIS (HCC): Status: ACTIVE | Noted: 2020-01-17

## 2022-03-19 PROBLEM — K59.04 CHRONIC IDIOPATHIC CONSTIPATION: Status: ACTIVE | Noted: 2021-05-11

## 2022-03-19 PROBLEM — E66.01 MORBID OBESITY (HCC): Status: ACTIVE | Noted: 2021-11-11

## 2022-03-19 PROBLEM — M81.0 OSTEOPOROSIS WITHOUT CURRENT PATHOLOGICAL FRACTURE: Status: ACTIVE | Noted: 2018-06-12

## 2022-04-19 ENCOUNTER — OFFICE VISIT (OUTPATIENT)
Dept: ORTHOPEDIC SURGERY | Age: 72
End: 2022-04-19
Payer: MEDICARE

## 2022-04-19 VITALS — BODY MASS INDEX: 43.02 KG/M2 | WEIGHT: 252 LBS | HEIGHT: 64 IN

## 2022-04-19 DIAGNOSIS — M17.11 LOCALIZED OSTEOARTHRITIS OF RIGHT KNEE: Primary | ICD-10-CM

## 2022-04-19 DIAGNOSIS — M17.12 LOCALIZED OSTEOARTHRITIS OF LEFT KNEE: ICD-10-CM

## 2022-04-19 DIAGNOSIS — M25.561 ACUTE PAIN OF BOTH KNEES: ICD-10-CM

## 2022-04-19 DIAGNOSIS — M25.562 ACUTE PAIN OF BOTH KNEES: ICD-10-CM

## 2022-04-19 PROCEDURE — G8536 NO DOC ELDER MAL SCRN: HCPCS | Performed by: ORTHOPAEDIC SURGERY

## 2022-04-19 PROCEDURE — 99214 OFFICE O/P EST MOD 30 MIN: CPT | Performed by: ORTHOPAEDIC SURGERY

## 2022-04-19 PROCEDURE — G8427 DOCREV CUR MEDS BY ELIG CLIN: HCPCS | Performed by: ORTHOPAEDIC SURGERY

## 2022-04-19 PROCEDURE — G9899 SCRN MAM PERF RSLTS DOC: HCPCS | Performed by: ORTHOPAEDIC SURGERY

## 2022-04-19 PROCEDURE — 3017F COLORECTAL CA SCREEN DOC REV: CPT | Performed by: ORTHOPAEDIC SURGERY

## 2022-04-19 PROCEDURE — 1101F PT FALLS ASSESS-DOCD LE1/YR: CPT | Performed by: ORTHOPAEDIC SURGERY

## 2022-04-19 PROCEDURE — G8432 DEP SCR NOT DOC, RNG: HCPCS | Performed by: ORTHOPAEDIC SURGERY

## 2022-04-19 PROCEDURE — G8756 NO BP MEASURE DOC: HCPCS | Performed by: ORTHOPAEDIC SURGERY

## 2022-04-19 PROCEDURE — 1090F PRES/ABSN URINE INCON ASSESS: CPT | Performed by: ORTHOPAEDIC SURGERY

## 2022-04-19 PROCEDURE — G8417 CALC BMI ABV UP PARAM F/U: HCPCS | Performed by: ORTHOPAEDIC SURGERY

## 2022-04-19 NOTE — LETTER
4/19/2022    Patient: Renny Thornton   YOB: 1950   Date of Visit: 4/19/2022     Alfredo Smith NP  [address]  Via In Basket    Dear Alfredo Smith NP,      Thank you for referring Ms. Debbie Green to [facility] for evaluation. My notes for this consultation are attached. If you have questions, please do not hesitate to call me. I look forward to following your patient along with you.       Sincerely,    Leela Holland MD
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
## 2022-04-27 RX ORDER — CLOTRIMAZOLE AND BETAMETHASONE DIPROPIONATE 10; .64 MG/G; MG/G
CREAM TOPICAL
Qty: 15 G | Refills: 2 | Status: SHIPPED | OUTPATIENT
Start: 2022-04-27 | End: 2022-07-04 | Stop reason: SDUPTHER

## 2022-04-29 ENCOUNTER — HOSPITAL ENCOUNTER (EMERGENCY)
Age: 72
Discharge: HOME | End: 2022-04-29
Attending: EMERGENCY MEDICINE
Payer: MEDICARE

## 2022-04-29 ENCOUNTER — APPOINTMENT (OUTPATIENT)
Dept: GENERAL RADIOLOGY | Age: 72
End: 2022-04-29
Attending: EMERGENCY MEDICINE
Payer: MEDICARE

## 2022-04-29 VITALS
RESPIRATION RATE: 16 BRPM | TEMPERATURE: 98.1 F | SYSTOLIC BLOOD PRESSURE: 130 MMHG | HEART RATE: 92 BPM | DIASTOLIC BLOOD PRESSURE: 60 MMHG | WEIGHT: 250 LBS | BODY MASS INDEX: 40.18 KG/M2 | HEIGHT: 66 IN | OXYGEN SATURATION: 100 %
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
## 2022-04-29 DIAGNOSIS — M25.572 ACUTE LEFT ANKLE PAIN: ICD-10-CM

## 2022-04-29 DIAGNOSIS — M25.512 PAIN IN JOINT OF LEFT SHOULDER: Primary | ICD-10-CM

## 2022-04-29 DIAGNOSIS — M25.562 ACUTE PAIN OF LEFT KNEE: ICD-10-CM

## 2022-04-29 DIAGNOSIS — M17.12 LOCALIZED OSTEOARTHRITIS OF LEFT KNEE: Primary | ICD-10-CM

## 2022-04-29 PROCEDURE — 99284 EMERGENCY DEPT VISIT MOD MDM: CPT

## 2022-04-29 PROCEDURE — 73610 X-RAY EXAM OF ANKLE: CPT

## 2022-04-29 PROCEDURE — 73562 X-RAY EXAM OF KNEE 3: CPT

## 2022-04-29 PROCEDURE — 74011250637 HC RX REV CODE- 250/637: Performed by: EMERGENCY MEDICINE

## 2022-04-29 PROCEDURE — 96374 THER/PROPH/DIAG INJ IV PUSH: CPT

## 2022-04-29 PROCEDURE — 74011000250 HC RX REV CODE- 250: Performed by: EMERGENCY MEDICINE

## 2022-04-29 PROCEDURE — 96375 TX/PRO/DX INJ NEW DRUG ADDON: CPT

## 2022-04-29 PROCEDURE — 74011250636 HC RX REV CODE- 250/636: Performed by: EMERGENCY MEDICINE

## 2022-04-29 PROCEDURE — 73030 X-RAY EXAM OF SHOULDER: CPT

## 2022-04-29 RX ORDER — KETOROLAC TROMETHAMINE 30 MG/ML
15 INJECTION, SOLUTION INTRAMUSCULAR; INTRAVENOUS
Status: COMPLETED | OUTPATIENT
Start: 2022-04-29 | End: 2022-04-29

## 2022-04-29 RX ORDER — ONDANSETRON 2 MG/ML
4 INJECTION INTRAMUSCULAR; INTRAVENOUS
Status: COMPLETED | OUTPATIENT
Start: 2022-04-29 | End: 2022-04-29

## 2022-04-29 RX ORDER — FENTANYL CITRATE 50 UG/ML
100 INJECTION, SOLUTION INTRAMUSCULAR; INTRAVENOUS
Status: COMPLETED | OUTPATIENT
Start: 2022-04-29 | End: 2022-04-29

## 2022-04-29 RX ORDER — OXYCODONE HYDROCHLORIDE 5 MG/1
5 TABLET ORAL
Qty: 12 TABLET | Refills: 0 | Status: SHIPPED | OUTPATIENT
Start: 2022-04-29 | End: 2022-05-02

## 2022-04-29 RX ORDER — METHOCARBAMOL 750 MG/1
750 TABLET, FILM COATED ORAL
Qty: 20 TABLET | Refills: 0 | Status: SHIPPED | OUTPATIENT
Start: 2022-04-29 | End: 2022-06-06

## 2022-04-29 RX ORDER — MORPHINE SULFATE 2 MG/ML
2 INJECTION, SOLUTION INTRAMUSCULAR; INTRAVENOUS
Status: COMPLETED | OUTPATIENT
Start: 2022-04-29 | End: 2022-04-29

## 2022-04-29 RX ORDER — DIAZEPAM 5 MG/1
5 TABLET ORAL
Status: COMPLETED | OUTPATIENT
Start: 2022-04-29 | End: 2022-04-29

## 2022-04-29 RX ORDER — SODIUM CHLORIDE 0.9 % (FLUSH) 0.9 %
5-40 SYRINGE (ML) INJECTION AS NEEDED
Status: DISCONTINUED | OUTPATIENT
Start: 2022-04-29 | End: 2022-04-29 | Stop reason: HOSPADM

## 2022-04-29 RX ORDER — SODIUM CHLORIDE 0.9 % (FLUSH) 0.9 %
5-40 SYRINGE (ML) INJECTION EVERY 8 HOURS
Status: DISCONTINUED | OUTPATIENT
Start: 2022-04-29 | End: 2022-04-29 | Stop reason: HOSPADM

## 2022-04-29 RX ADMIN — MORPHINE SULFATE 2 MG: 2 INJECTION, SOLUTION INTRAMUSCULAR; INTRAVENOUS at 11:18

## 2022-04-29 RX ADMIN — ONDANSETRON 4 MG: 2 INJECTION INTRAMUSCULAR; INTRAVENOUS at 09:42

## 2022-04-29 RX ADMIN — FENTANYL CITRATE 100 MCG: 50 INJECTION, SOLUTION INTRAMUSCULAR; INTRAVENOUS at 09:42

## 2022-04-29 RX ADMIN — SODIUM CHLORIDE, PRESERVATIVE FREE 10 ML: 5 INJECTION INTRAVENOUS at 09:44

## 2022-04-29 RX ADMIN — KETOROLAC TROMETHAMINE 15 MG: 30 INJECTION, SOLUTION INTRAMUSCULAR at 11:18

## 2022-04-29 RX ADMIN — DIAZEPAM 5 MG: 5 TABLET ORAL at 09:38

## 2022-04-29 NOTE — ED NOTES
Reviewed discharge instructions with patient and son. Patient had sling applied to left should and arm. Ace wrap bandaged applied to left knee, ankle and foot by Dr. Zachary Renteria. Patient placed in wheelchair and taken out to private vehicle in stable condition.
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
## 2022-05-02 ENCOUNTER — OFFICE VISIT (OUTPATIENT)
Dept: ORTHOPEDIC SURGERY | Age: 72
End: 2022-05-02
Payer: MEDICARE

## 2022-05-02 ENCOUNTER — PATIENT OUTREACH (OUTPATIENT)
Dept: CASE MANAGEMENT | Age: 72
End: 2022-05-02

## 2022-05-02 DIAGNOSIS — E66.01 OBESITY, CLASS III, BMI 40-49.9 (MORBID OBESITY) (HCC): ICD-10-CM

## 2022-05-02 DIAGNOSIS — S80.02XA CONTUSION OF LEFT KNEE, INITIAL ENCOUNTER: ICD-10-CM

## 2022-05-02 DIAGNOSIS — S42.142S GLENOID FRACTURE OF SHOULDER, LEFT, SEQUELA: Primary | ICD-10-CM

## 2022-05-02 DIAGNOSIS — S42.152A GLENOID CAVITY AND NECK OF SCAPULA FRACTURE, LEFT, CLOSED, INITIAL ENCOUNTER: Primary | ICD-10-CM

## 2022-05-02 DIAGNOSIS — S42.142A GLENOID CAVITY AND NECK OF SCAPULA FRACTURE, LEFT, CLOSED, INITIAL ENCOUNTER: Primary | ICD-10-CM

## 2022-05-02 DIAGNOSIS — S42.152S GLENOID FRACTURE OF SHOULDER, LEFT, SEQUELA: Primary | ICD-10-CM

## 2022-05-02 PROCEDURE — G8427 DOCREV CUR MEDS BY ELIG CLIN: HCPCS | Performed by: ORTHOPAEDIC SURGERY

## 2022-05-02 PROCEDURE — G8432 DEP SCR NOT DOC, RNG: HCPCS | Performed by: ORTHOPAEDIC SURGERY

## 2022-05-02 PROCEDURE — 3017F COLORECTAL CA SCREEN DOC REV: CPT | Performed by: ORTHOPAEDIC SURGERY

## 2022-05-02 PROCEDURE — G8756 NO BP MEASURE DOC: HCPCS | Performed by: ORTHOPAEDIC SURGERY

## 2022-05-02 PROCEDURE — G8536 NO DOC ELDER MAL SCRN: HCPCS | Performed by: ORTHOPAEDIC SURGERY

## 2022-05-02 PROCEDURE — 99214 OFFICE O/P EST MOD 30 MIN: CPT | Performed by: ORTHOPAEDIC SURGERY

## 2022-05-02 PROCEDURE — G9899 SCRN MAM PERF RSLTS DOC: HCPCS | Performed by: ORTHOPAEDIC SURGERY

## 2022-05-02 PROCEDURE — G8417 CALC BMI ABV UP PARAM F/U: HCPCS | Performed by: ORTHOPAEDIC SURGERY

## 2022-05-02 PROCEDURE — 1101F PT FALLS ASSESS-DOCD LE1/YR: CPT | Performed by: ORTHOPAEDIC SURGERY

## 2022-05-02 PROCEDURE — 1090F PRES/ABSN URINE INCON ASSESS: CPT | Performed by: ORTHOPAEDIC SURGERY

## 2022-05-02 RX ORDER — OXYCODONE AND ACETAMINOPHEN 5; 325 MG/1; MG/1
1 TABLET ORAL
Qty: 30 TABLET | Refills: 0 | Status: SHIPPED | OUTPATIENT
Start: 2022-05-02 | End: 2022-05-07

## 2022-05-02 NOTE — LETTER
5/2/2022    Patient: Darren Boggs   YOB: 1950   Date of Visit: 5/2/2022     Joann Lee MD  [address]  [address] [id number]  Via Fax: [phone]    Dear Joann Lee MD,      Thank you for referring Ms. Julia Mejia to [facility] for evaluation. My notes for this consultation are attached. If you have questions, please do not hesitate to call me. I look forward to following your patient along with you.       Sincerely,    Donna Mohamud MD
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
## 2022-05-02 NOTE — PROGRESS NOTES
05/02/22   Ambulatory Care Management Note    Date/Time:  5/2/2022 11:19 AM    This patient was received as a referral from Daily assignment. Ambulatory Care Manager outreached to patient today to offer care management services. Introduction to self and role of care manager provided. Patient declined care management services at this time. No follow up call scheduled at this time. Patient has Ambulatory Care Manager's contact number for for any questions or concerns. Pt informed this ACM that she has changed PCPs to DR Chloé Marc, EMR updated. Pt states she is still having pain from fall on 4/29 and she is taking the prescribed medications; she is not using ice though she is trying to rest. She does have an appoint with Ortho Dr Ama Mayes today. She denies any other questions or concerns.

## 2022-05-03 ENCOUNTER — HOSPITAL ENCOUNTER (OUTPATIENT)
Dept: MRI IMAGING | Age: 72
Discharge: HOME | End: 2022-05-03
Attending: ORTHOPAEDIC SURGERY
Payer: MEDICARE

## 2022-05-03 DIAGNOSIS — S42.152A GLENOID CAVITY AND NECK OF SCAPULA FRACTURE, LEFT, CLOSED, INITIAL ENCOUNTER: ICD-10-CM

## 2022-05-03 DIAGNOSIS — S42.142A GLENOID CAVITY AND NECK OF SCAPULA FRACTURE, LEFT, CLOSED, INITIAL ENCOUNTER: ICD-10-CM

## 2022-05-03 PROCEDURE — 73221 MRI JOINT UPR EXTREM W/O DYE: CPT

## 2022-05-09 ENCOUNTER — OFFICE VISIT (OUTPATIENT)
Dept: ORTHOPEDIC SURGERY | Age: 72
End: 2022-05-09
Payer: MEDICARE

## 2022-05-09 DIAGNOSIS — S46.012A TRAUMATIC COMPLETE TEAR OF LEFT ROTATOR CUFF, INITIAL ENCOUNTER: ICD-10-CM

## 2022-05-09 DIAGNOSIS — S42.152S GLENOID FRACTURE OF SHOULDER, LEFT, SEQUELA: Primary | ICD-10-CM

## 2022-05-09 DIAGNOSIS — S42.142S GLENOID FRACTURE OF SHOULDER, LEFT, SEQUELA: Primary | ICD-10-CM

## 2022-05-09 DIAGNOSIS — N18.31 STAGE 3A CHRONIC KIDNEY DISEASE (HCC): ICD-10-CM

## 2022-05-09 PROBLEM — N18.30 CHRONIC RENAL DISEASE, STAGE III (HCC): Status: ACTIVE | Noted: 2022-05-09

## 2022-05-09 PROCEDURE — 1101F PT FALLS ASSESS-DOCD LE1/YR: CPT | Performed by: ORTHOPAEDIC SURGERY

## 2022-05-09 PROCEDURE — 3017F COLORECTAL CA SCREEN DOC REV: CPT | Performed by: ORTHOPAEDIC SURGERY

## 2022-05-09 PROCEDURE — 99213 OFFICE O/P EST LOW 20 MIN: CPT | Performed by: ORTHOPAEDIC SURGERY

## 2022-05-09 PROCEDURE — G8756 NO BP MEASURE DOC: HCPCS | Performed by: ORTHOPAEDIC SURGERY

## 2022-05-09 PROCEDURE — G8536 NO DOC ELDER MAL SCRN: HCPCS | Performed by: ORTHOPAEDIC SURGERY

## 2022-05-09 PROCEDURE — 1090F PRES/ABSN URINE INCON ASSESS: CPT | Performed by: ORTHOPAEDIC SURGERY

## 2022-05-09 PROCEDURE — G8432 DEP SCR NOT DOC, RNG: HCPCS | Performed by: ORTHOPAEDIC SURGERY

## 2022-05-09 PROCEDURE — G8427 DOCREV CUR MEDS BY ELIG CLIN: HCPCS | Performed by: ORTHOPAEDIC SURGERY

## 2022-05-09 PROCEDURE — G8417 CALC BMI ABV UP PARAM F/U: HCPCS | Performed by: ORTHOPAEDIC SURGERY

## 2022-05-09 PROCEDURE — G9899 SCRN MAM PERF RSLTS DOC: HCPCS | Performed by: ORTHOPAEDIC SURGERY

## 2022-05-09 NOTE — LETTER
5/9/2022    Patient: Siri Rose   YOB: 1950   Date of Visit: 5/9/2022     Flory Raymond MD  [address]  [address] [address]  Via Fax: [phone]    Dear Flory Raymond MD,      Thank you for referring Ms. Daniel Seth to [facility] for evaluation. My notes for this consultation are attached. If you have questions, please do not hesitate to call me. I look forward to following your patient along with you.       Sincerely,    Ezra Benson MD
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
## 2022-05-20 ENCOUNTER — OFFICE VISIT (OUTPATIENT)
Dept: ORTHOPEDIC SURGERY | Age: 72
End: 2022-05-20
Payer: MEDICARE

## 2022-05-20 DIAGNOSIS — M25.512 ACUTE PAIN OF LEFT SHOULDER: ICD-10-CM

## 2022-05-20 DIAGNOSIS — S46.012D TRAUMATIC COMPLETE TEAR OF LEFT ROTATOR CUFF, SUBSEQUENT ENCOUNTER: ICD-10-CM

## 2022-05-20 DIAGNOSIS — S42.145D: Primary | ICD-10-CM

## 2022-05-20 PROCEDURE — 97162 PT EVAL MOD COMPLEX 30 MIN: CPT | Performed by: PHYSICAL THERAPIST

## 2022-05-20 PROCEDURE — 97140 MANUAL THERAPY 1/> REGIONS: CPT | Performed by: PHYSICAL THERAPIST

## 2022-05-20 PROCEDURE — 97110 THERAPEUTIC EXERCISES: CPT | Performed by: PHYSICAL THERAPIST

## 2022-05-23 ENCOUNTER — OFFICE VISIT (OUTPATIENT)
Dept: ORTHOPEDIC SURGERY | Age: 72
End: 2022-05-23
Payer: MEDICARE

## 2022-05-23 DIAGNOSIS — S42.152S GLENOID FRACTURE OF SHOULDER, LEFT, SEQUELA: ICD-10-CM

## 2022-05-23 DIAGNOSIS — S42.142S GLENOID FRACTURE OF SHOULDER, LEFT, SEQUELA: ICD-10-CM

## 2022-05-23 DIAGNOSIS — M25.512 ACUTE PAIN OF LEFT SHOULDER: Primary | ICD-10-CM

## 2022-05-23 DIAGNOSIS — S46.012D TRAUMATIC COMPLETE TEAR OF LEFT ROTATOR CUFF, SUBSEQUENT ENCOUNTER: ICD-10-CM

## 2022-05-23 PROCEDURE — 97110 THERAPEUTIC EXERCISES: CPT

## 2022-05-23 PROCEDURE — 97140 MANUAL THERAPY 1/> REGIONS: CPT

## 2022-05-23 NOTE — PROGRESS NOTES
PT DAILY TREATMENT NOTE    Patient Name: Ashok Pain  Date:2022  : 1950  [x]  Patient  Verified  Payor: Darcie Conrad / Plan: [place] / Product Type: Medicare /    Total Treatment Time (min): 30  Total Timed Codes (min): 30  1:1 Treatment Time ( [address] only): 30   Referring Provider: Immanuel Stephens MD    1. Acute pain of left shoulder  2. Traumatic complete tear of left rotator cuff, subsequent encounter  3. Glenoid fracture of shoulder, left, sequela      SUBJECTIVE  Subjective functional status/changes:   [] No changes reported    Patient reports she is quite sore after hitting her shoulder against a door jam earlier today. OBJECTIVE/TREATMENT    Manual Therapy x 20 mins:   Manual posterior and inferior glenohumeral mobilizations and PROM for glenohumeral mobility and flexibility all planes to decrease joint restrictions and increase range of motion. LLPS for internal/external rotation with humeral head stabilization. Gentle STM to infraspinatus/teres. Neuromuscular Re-education (NMR) x   minutes:  []  Kinesiotaping for   []  Neuromuscular reeducation to the VMO with use of [place] electrical stimulation in conjunction with active contraction and exercises. []  balance/proprioceptive exercises and activities in clinic as listed below as NMR. Therapeutic Exercise x 10 mins:   Strengthening/Endurance/ADL function/Neuromuscular reeducation activities/exercises supervised and completed as listed below.       EXERCISE X= completed on  2022   Ball roll @table x                                                                               Added/Changed Exercises:  []  Advanced to address: [] functional strength/ROM deficits [] balance/proprioceptive tasks  []  Modified: [] per subjective reports [] for patient time constraints [] for clinic time constraints    Modality:  []  E-Stim: type _ x _ min     []att   []unatt   []w/ice   []w/heat  []  Ultrasound: []cont   []pulse    _ W/cm2 x _  min   []1MHz   []3MHz  []  Ice pack: post      []  Hot pack: pre    []  Other:     Patient Education: [] Review HEP    [] Progressed/Changed HEP to include:  [] positioning   [] body mechanics   [] transfers   [] heat/ice application      ASSESSMENT    Quite sore/tender with ROM exercises in clinic. Painful with ER stretching and limited in tolerance to passive elevation exercises.      Progress towards goals / Updated goals:    PLAN  [x]  Upgrade activities as tolerated      [x]  Continue plan of care  []  Discharge due to:  [] Other:      Co-treatment by Campbell Mart, PTA 5/23/2022

## 2022-05-24 NOTE — PROGRESS NOTES
I have reviewed the notes, assessments, and/or procedures performed by Michael Hernandez PTA, I concur with her/his documentation of Kiera Ortiz.

## 2022-05-26 ENCOUNTER — OFFICE VISIT (OUTPATIENT)
Dept: ORTHOPEDIC SURGERY | Age: 72
End: 2022-05-26
Payer: MEDICARE

## 2022-05-26 DIAGNOSIS — S46.012D TRAUMATIC COMPLETE TEAR OF LEFT ROTATOR CUFF, SUBSEQUENT ENCOUNTER: ICD-10-CM

## 2022-05-26 DIAGNOSIS — M25.512 ACUTE PAIN OF LEFT SHOULDER: Primary | ICD-10-CM

## 2022-05-26 DIAGNOSIS — S42.152S GLENOID FRACTURE OF SHOULDER, LEFT, SEQUELA: ICD-10-CM

## 2022-05-26 DIAGNOSIS — S42.142S GLENOID FRACTURE OF SHOULDER, LEFT, SEQUELA: ICD-10-CM

## 2022-05-26 PROCEDURE — 97110 THERAPEUTIC EXERCISES: CPT

## 2022-05-26 PROCEDURE — 97140 MANUAL THERAPY 1/> REGIONS: CPT

## 2022-05-26 NOTE — PROGRESS NOTES
PT DAILY TREATMENT NOTE    Patient Name: Jackson Alexis  Date:2022  : 1950  [x]  Patient  Verified  Payor: Shana Blanton / Plan: [place] MEDICARE PART [place] & B / Product Type: Medicare /    Total Treatment Time (min): 30  Total Timed Codes (min): 30  1:1 Treatment Time ( [address] only): 30   Referring Provider: Mg Vasques MD    1. Acute pain of left shoulder  2. Traumatic complete tear of left rotator cuff, subsequent encounter  3. Glenoid fracture of shoulder, left, sequela      SUBJECTIVE  Subjective functional status/changes:   [] No changes reported    Patient reports she used ice at home following her last appointment and was not as sore as she expected to be. Still reports morning pain/stiffness in the shoulder. OBJECTIVE/TREATMENT    Manual Therapy x 20 mins:   Manual posterior and inferior glenohumeral mobilizations and PROM for glenohumeral mobility and flexibility all planes to decrease joint restrictions and increase range of motion. LLPS for internal/external rotation with humeral head stabilization. Gentle STM to infraspinatus/teres. Neuromuscular Re-education (NMR) x   minutes:  []  Kinesiotaping for   []  Neuromuscular reeducation to the VMO with use of [place] electrical stimulation in conjunction with active contraction and exercises. []  balance/proprioceptive exercises and activities in clinic as listed below as NMR. Therapeutic Exercise x 10 mins:   Strengthening/Endurance/ADL function/Neuromuscular reeducation activities/exercises supervised and completed as listed below.       EXERCISE X= completed on  2022   Ball roll @table x                                                                               Added/Changed Exercises:  []  Advanced to address: [] functional strength/ROM deficits [] balance/proprioceptive tasks  []  Modified: [] per subjective reports [] for patient time constraints [] for clinic time constraints    Modality:  []  E-Stim: type _ x _ min     []att []unatt   []w/ice   []w/heat  []  Ultrasound: []cont   []pulse    _ W/cm2 x _  min   []1MHz   []3MHz  []  Ice pack: post      [x]  Hot pack: pre    []  Other:     Patient Education: [] Review HEP    [] Progressed/Changed HEP to include:  [] positioning   [] body mechanics   [] transfers   [] heat/ice application      ASSESSMENT    Less sore with PROM exercises in clinic today. Improved passive mobility into overhead flexion today though still with painful arc at midrange. Tight and sore with ER.     Progress towards goals / Updated goals:    PLAN  [x]  Upgrade activities as tolerated      [x]  Continue plan of care  []  Discharge due to:  [] Other:      Co-treatment by Jez Jalloh PTA 5/26/2022

## 2022-05-31 ENCOUNTER — OFFICE VISIT (OUTPATIENT)
Dept: ORTHOPEDIC SURGERY | Age: 72
End: 2022-05-31
Payer: MEDICARE

## 2022-05-31 DIAGNOSIS — M25.512 ACUTE PAIN OF LEFT SHOULDER: Primary | ICD-10-CM

## 2022-05-31 DIAGNOSIS — S42.145D: ICD-10-CM

## 2022-05-31 DIAGNOSIS — S46.012D TRAUMATIC COMPLETE TEAR OF LEFT ROTATOR CUFF, SUBSEQUENT ENCOUNTER: ICD-10-CM

## 2022-05-31 PROCEDURE — 97110 THERAPEUTIC EXERCISES: CPT

## 2022-05-31 PROCEDURE — 97140 MANUAL THERAPY 1/> REGIONS: CPT

## 2022-05-31 NOTE — PROGRESS NOTES
PT DAILY TREATMENT NOTE    Patient Name: David Montanez  Date:2022  : 1950  [x]  Patient  Verified  Payor: Astrid Baumannman / Plan: [place] MEDICARE PART [place] & B / Product Type: Medicare /    Total Treatment Time (min): 30  Total Timed Codes (min): 30  1:1 Treatment Time ([address] only): 30   Referring Provider: Maria Isabel Mejia MD    1. Acute pain of left shoulder  2. Traumatic complete tear of left rotator cuff, subsequent encounter  3. Nondisplaced fracture of glenoid cavity of scapula, left shoulder, subsequent encounter for fracture with routine healing      SUBJECTIVE  Subjective functional status/changes:   [] No changes reported    Patient reports shoulder had been less sore until she strained on the drive to therapy today. OBJECTIVE/TREATMENT    Manual Therapy x 20 mins:   Manual posterior and inferior glenohumeral mobilizations and PROM for glenohumeral mobility and flexibility all planes to decrease joint restrictions and increase range of motion. LLPS for internal/external rotation with humeral head stabilization. Gentle STM to infraspinatus/teres. Neuromuscular Re-education (NMR) x   minutes:  []  Kinesiotaping for   []  Neuromuscular reeducation to the VMO with use of [place] electrical stimulation in conjunction with active contraction and exercises. []  balance/proprioceptive exercises and activities in clinic as listed below as NMR. Therapeutic Exercise x 10 mins:   Strengthening/Endurance/ADL function/Neuromuscular reeducation activities/exercises supervised and completed as listed below.       EXERCISE X= completed on  2022   Ball roll @table x                                                                               Added/Changed Exercises:  []  Advanced to address: [] functional strength/ROM deficits [] balance/proprioceptive tasks  []  Modified: [] per subjective reports [] for patient time constraints [] for clinic time constraints    Modality:  []  E-Stim: type _ x _ min []att   []unatt   []w/ice   []w/heat  []  Ultrasound: []cont   []pulse    _ W/cm2 x _  min   []1MHz   []3MHz  [x]  Ice pack: post      [x]  Hot pack: pre    []  Other:     Patient Education: [] Review HEP    [] Progressed/Changed HEP to include:  [] positioning   [] body mechanics   [] transfers   [] heat/ice application      ASSESSMENT    She was more tender with shoulder mobilizations today compared to last visit, david with overhead flexion. Improved tolerance to ER stretching. Still with obvious limitations in strength.     Progress towards goals / Updated goals:    PLAN  [x]  Upgrade activities as tolerated      [x]  Continue plan of care  []  Discharge due to:  [] Other:      Co-treatment by Nakita Rivera PTA 5/31/2022

## 2022-05-31 NOTE — PROGRESS NOTES
I have reviewed the notes, assessments, and/or procedures performed by Jean Bajwa PTA, I concur with her/his documentation of Siri Rose.

## 2022-05-31 NOTE — PROGRESS NOTES
PT DAILY TREATMENT NOTE    Patient Name: Darren Boggs  Date:2022  : 1950  [x]  Patient  Verified  Payor: Kayla Praful / Plan: [place] / Product Type: Medicare /    Total Treatment Time (min): 30  Total Timed Codes (min): 30  1:1 Treatment Time ( [address] only): 30   Referring Provider: Alexis Enriquez MD    1. Acute pain of left shoulder  2. Traumatic complete tear of left rotator cuff, subsequent encounter  3. Nondisplaced fracture of glenoid cavity of scapula, left shoulder, subsequent encounter for fracture with routine healing      SUBJECTIVE  Subjective functional status/changes:   [] No changes reported      OBJECTIVE/TREATMENT    Manual Therapy x 20 mins:   Manual posterior and inferior glenohumeral mobilizations and PROM for glenohumeral mobility and flexibility all planes to decrease joint restrictions and increase range of motion. LLPS for internal/external rotation with humeral head stabilization. Gentle STM to infraspinatus/teres. Neuromuscular Re-education (NMR) x   minutes:  []  Kinesiotaping for   []  Neuromuscular reeducation to the VMO with use of [place] electrical stimulation in conjunction with active contraction and exercises. []  balance/proprioceptive exercises and activities in clinic as listed below as NMR. Therapeutic Exercise x 10 mins:   Strengthening/Endurance/ADL function/Neuromuscular reeducation activities/exercises supervised and completed as listed below.       EXERCISE X= completed on  2022   Ball roll @table x                                                                               Added/Changed Exercises:  []  Advanced to address: [] functional strength/ROM deficits [] balance/proprioceptive tasks  []  Modified: [] per subjective reports [] for patient time constraints [] for clinic time constraints    Modality:  []  E-Stim: type _ x _ min     []att   []unatt   []w/ice   []w/heat  []  Ultrasound: []cont   []pulse    _ W/cm2 x _  min []1MHz   []3MHz  []  Ice pack: post      [x]  Hot pack: pre    []  Other:     Patient Education: [] Review HEP    [] Progressed/Changed HEP to include:  [] positioning   [] body mechanics   [] transfers   [] heat/ice application      ASSESSMENT        PLAN  [x]  Upgrade activities as tolerated      [x]  Continue plan of care  []  Discharge due to:  [] Other:      Co-treatment by Cami Junior, PT, DPT 5/31/2022

## 2022-06-01 ENCOUNTER — HOSPITAL ENCOUNTER (OUTPATIENT)
Dept: ULTRASOUND IMAGING | Age: 72
Discharge: HOME | End: 2022-06-01
Attending: INTERNAL MEDICINE
Payer: MEDICARE

## 2022-06-01 ENCOUNTER — TRANSCRIBE ORDER (OUTPATIENT)
Dept: SCHEDULING | Age: 72
End: 2022-06-01

## 2022-06-01 DIAGNOSIS — R60.0 EDEMA, LEG: Primary | ICD-10-CM

## 2022-06-01 DIAGNOSIS — R60.0 EDEMA, LEG: ICD-10-CM

## 2022-06-01 PROCEDURE — 93971 EXTREMITY STUDY: CPT

## 2022-06-01 NOTE — PROGRESS NOTES
I have reviewed the notes, assessments, and/or procedures performed by Nikki Mclean PTA, I concur with her/his documentation of Missy Carpenter.

## 2022-06-02 ENCOUNTER — OFFICE VISIT (OUTPATIENT)
Dept: ORTHOPEDIC SURGERY | Age: 72
End: 2022-06-02
Payer: MEDICARE

## 2022-06-02 DIAGNOSIS — S42.145D: ICD-10-CM

## 2022-06-02 DIAGNOSIS — S42.142S GLENOID FRACTURE OF SHOULDER, LEFT, SEQUELA: ICD-10-CM

## 2022-06-02 DIAGNOSIS — S42.152S GLENOID FRACTURE OF SHOULDER, LEFT, SEQUELA: ICD-10-CM

## 2022-06-02 DIAGNOSIS — S46.012D TRAUMATIC COMPLETE TEAR OF LEFT ROTATOR CUFF, SUBSEQUENT ENCOUNTER: ICD-10-CM

## 2022-06-02 DIAGNOSIS — M25.512 ACUTE PAIN OF LEFT SHOULDER: Primary | ICD-10-CM

## 2022-06-02 PROCEDURE — 97110 THERAPEUTIC EXERCISES: CPT

## 2022-06-02 PROCEDURE — 97140 MANUAL THERAPY 1/> REGIONS: CPT

## 2022-06-02 NOTE — PROGRESS NOTES
PT DAILY TREATMENT NOTE    Patient Name: Clotilde Tee  Date:2022  : 1950  [x]  Patient  Verified  Payor: Beti Leach / Plan: [place] MED[place] / Product Type: Medicare /    Total Treatment Time (min): 30  Total Timed Codes (min): 30  1:1 Treatment Time ( [address] only): 30   Referring Provider: Aurea Barlow MD    1. Acute pain of left shoulder  2. Traumatic complete tear of left rotator cuff, subsequent encounter  3. Nondisplaced fracture of glenoid cavity of scapula, left shoulder, subsequent encounter for fracture with routine healing  4. Glenoid fracture of shoulder, left, sequela      SUBJECTIVE  Subjective functional status/changes:   [] No changes reported    Patient reports her shoulder feels better today compared to last visit. OBJECTIVE/TREATMENT    Manual Therapy x 20 mins:   Manual posterior and inferior glenohumeral mobilizations and PROM for glenohumeral mobility and flexibility all planes to decrease joint restrictions and increase range of motion. LLPS for internal/external rotation with humeral head stabilization. Gentle STM to infraspinatus/teres. Neuromuscular Re-education (NMR) x   minutes:  []  Kinesiotaping for   []  Neuromuscular reeducation to the VMO with use of [place] electrical stimulation in conjunction with active contraction and exercises. []  balance/proprioceptive exercises and activities in clinic as listed below as NMR. Therapeutic Exercise x 10 mins:   Strengthening/Endurance/ADL function/Neuromuscular reeducation activities/exercises supervised and completed as listed below.       EXERCISE X= completed on  2022   Ball roll @table x   Standing pulleys x                                                                           Added/Changed Exercises:  [x]  Advanced to address: [x] functional strength/ROM deficits [] balance/proprioceptive tasks  []  Modified: [] per subjective reports [] for patient time constraints [] for clinic time constraints    Modality:  []  E-Stim: type _ x _ min     []att   []unatt   []w/ice   []w/heat  []  Ultrasound: []cont   []pulse    _ W/cm2 x _  min   []1MHz   []3MHz  [x]  Ice pack: post      [x]  Hot pack: pre    []  Other:     Patient Education: [] Review HEP    [] Progressed/Changed HEP to include:  [] positioning   [] body mechanics   [] transfers   [] heat/ice application      ASSESSMENT    Still with painful arc through mid range with overhead flexion ROM as well as pain with ER stretching. She does have better tolerance to PROM exercises.     Progress towards goals / Updated goals:    PLAN  [x]  Upgrade activities as tolerated      [x]  Continue plan of care  []  Discharge due to:  [] Other:      Co-treatment by Elaine Smalls PTA 6/2/2022

## 2022-06-02 NOTE — PROGRESS NOTES
I have reviewed the notes, assessments, and/or procedures performed by Iveth Lane PTA, I concur with her/his documentation of Carlos Espinoza.

## 2022-06-06 ENCOUNTER — HOSPITAL ENCOUNTER (OUTPATIENT)
Dept: PREADMISSION TESTING | Age: 72
Discharge: HOME | End: 2022-06-06
Payer: MEDICARE

## 2022-06-06 VITALS
SYSTOLIC BLOOD PRESSURE: 133 MMHG | DIASTOLIC BLOOD PRESSURE: 81 MMHG | TEMPERATURE: 97.9 F | HEART RATE: 105 BPM | RESPIRATION RATE: 16 BRPM | BODY MASS INDEX: 42.68 KG/M2 | HEIGHT: 65 IN | WEIGHT: 256.17 LBS

## 2022-06-06 LAB
ABO + RH BLD: NORMAL
ANION GAP SERPL CALC-SCNC: 7 MMOL/L (ref 5–15)
APPEARANCE UR: CLEAR
ATRIAL RATE: 87 BPM
BACTERIA URNS QL MICRO: ABNORMAL /HPF
BILIRUB UR QL: NEGATIVE
BLOOD GROUP ANTIBODIES SERPL: NORMAL
BUN SERPL-MCNC: 18 MG/DL (ref 6–20)
BUN/CREAT SERPL: 18 (ref 12–20)
CALCIUM SERPL-MCNC: 9.4 MG/DL (ref 8.5–10.1)
CALCULATED P AXIS, ECG09: 62 DEGREES
CALCULATED R AXIS, ECG10: 31 DEGREES
CALCULATED T AXIS, ECG11: 41 DEGREES
CHLORIDE SERPL-SCNC: 109 MMOL/L (ref 97–108)
CO2 SERPL-SCNC: 27 MMOL/L (ref 21–32)
COLOR UR: ABNORMAL
CREAT SERPL-MCNC: 1 MG/DL (ref 0.55–1.02)
DIAGNOSIS, 93000: NORMAL
EPITH CASTS URNS QL MICRO: ABNORMAL /LPF
ERYTHROCYTE [DISTWIDTH] IN BLOOD BY AUTOMATED COUNT: 15 % (ref 11.5–14.5)
EST. AVERAGE GLUCOSE BLD GHB EST-MCNC: 108 MG/DL
GLUCOSE SERPL-MCNC: 103 MG/DL (ref 65–100)
GLUCOSE UR STRIP.AUTO-MCNC: NEGATIVE MG/DL
HBA1C MFR BLD: 5.4 % (ref 4–5.6)
HCT VFR BLD AUTO: 33.6 % (ref 35–47)
HGB BLD-MCNC: 12.1 G/DL (ref 11.5–16)
HGB UR QL STRIP: NEGATIVE
HYALINE CASTS URNS QL MICRO: ABNORMAL /LPF (ref 0–5)
INR PPP: 1 (ref 0.9–1.1)
KETONES UR QL STRIP.AUTO: NEGATIVE MG/DL
LEUKOCYTE ESTERASE UR QL STRIP.AUTO: ABNORMAL
MCH RBC QN AUTO: 29.4 PG (ref 26–34)
MCHC RBC AUTO-ENTMCNC: 36 G/DL (ref 30–36.5)
MCV RBC AUTO: 81.6 FL (ref 80–99)
NITRITE UR QL STRIP.AUTO: NEGATIVE
NRBC # BLD: 0 K/UL (ref 0–0.01)
NRBC BLD-RTO: 0 PER 100 WBC
P-R INTERVAL, ECG05: 202 MS
PH UR STRIP: 5 [PH] (ref 5–8)
PLATELET # BLD AUTO: 449 K/UL (ref 150–400)
PMV BLD AUTO: 9.5 FL (ref 8.9–12.9)
POTASSIUM SERPL-SCNC: 3.8 MMOL/L (ref 3.5–5.1)
PROT UR STRIP-MCNC: NEGATIVE MG/DL
PROTHROMBIN TIME: 10.8 SEC (ref 9–11.1)
Q-T INTERVAL, ECG07: 362 MS
QRS DURATION, ECG06: 98 MS
QTC CALCULATION (BEZET), ECG08: 435 MS
RBC # BLD AUTO: 4.12 M/UL (ref 3.8–5.2)
RBC #/AREA URNS HPF: ABNORMAL /HPF (ref 0–5)
SODIUM SERPL-SCNC: 143 MMOL/L (ref 136–145)
SP GR UR REFRACTOMETRY: 1.01 (ref 1–1.03)
SPECIMEN EXP DATE BLD: NORMAL
UA: UC IF INDICATED,UAUC: ABNORMAL
UROBILINOGEN UR QL STRIP.AUTO: 0.2 EU/DL (ref 0.2–1)
VENTRICULAR RATE, ECG03: 87 BPM
WBC # BLD AUTO: 11.6 K/UL (ref 3.6–11)
WBC URNS QL MICRO: ABNORMAL /HPF (ref 0–4)

## 2022-06-06 PROCEDURE — 81001 URINALYSIS AUTO W/SCOPE: CPT

## 2022-06-06 PROCEDURE — 85027 COMPLETE CBC AUTOMATED: CPT

## 2022-06-06 PROCEDURE — 85610 PROTHROMBIN TIME: CPT

## 2022-06-06 PROCEDURE — 83036 HEMOGLOBIN GLYCOSYLATED A1C: CPT

## 2022-06-06 PROCEDURE — 86900 BLOOD TYPING SEROLOGIC ABO: CPT

## 2022-06-06 PROCEDURE — 87086 URINE CULTURE/COLONY COUNT: CPT

## 2022-06-06 PROCEDURE — 80048 BASIC METABOLIC PNL TOTAL CA: CPT

## 2022-06-06 PROCEDURE — 93005 ELECTROCARDIOGRAM TRACING: CPT

## 2022-06-06 RX ORDER — LOSARTAN POTASSIUM 100 MG/1
100 TABLET ORAL EVERY MORNING
COMMUNITY

## 2022-06-06 RX ORDER — DICLOFENAC SODIUM 10 MG/G
GEL TOPICAL DAILY
COMMUNITY
End: 2022-06-18

## 2022-06-06 NOTE — PERIOP NOTES
ORTHOPEDIC PRE-OP ASSESSMENT AND PLANNING FORM SENT FOR SCANNING. Copy of COVID Vaccine Card place on chart.

## 2022-06-06 NOTE — PERIOP NOTES
[facility] INSTRUCTIONS  ORTHOPAEDIC    Surgery Date:   6/16/22    Your surgeon's office or [facility] staff will call you between 4 PM- 8 PM the day before surgery with your arrival time. If your surgery is on a Monday, you will receive a call the preceding Friday. 1. Please report to [facility] Patient Access/Admitting on the 1st floor. Bring your insurance card, photo identification, and any copayment (if applicable). 2. If you are going home the same day of your surgery, you must have a responsible adult to drive you home. You need to have a responsible adult to stay with you the first 24 hours after surgery and you should not drive a car for 24 hours following your surgery. 3. Do NOT eat any solid foods after midnight the night before surgery including candy, mints or gum. You may drink clear liquids from midnight until 1 hour prior to arrival time. You may drink up to 12 ounces at one time every 4 hours. 4. Do NOT drink alcohol or smoke 24 hours before surgery. STOP smoking for 14 days prior as it helps with breathing and healing after surgery. 5. If your arrival time is 3pm or later, you may eat a light breakfast before 8am (toast, bagel-no butter, black coffee, plain tea, fruit juice-no pulp) Please note special instructions, if applicable, below for medications. 6. If you are being admitted to the hospital,please leave personal belongings/luggage in your car until you have an assigned hospital room number. 7. Please wear comfortable clothes. Wear your glasses instead of contacts. We ask that all money, jewelry and valuables be left at home. Wear no make up, particularly mascara, the day of surgery. 8.  All body piercings, rings, and jewelry need to be removed and left at home. Please remove any nail polish or artificial nails from your fingernails. Please wear your hair loose or down. Please no pony-tails, buns, or any metal hair accessories.  If you shower the morning of surgery, please do not apply any lotions or powders afterwards. You may wear deodorant. Do not shave any body area within 24 hours of your surgery. 9. Please follow all instructions to avoid any potential surgical cancellation. 10. Should your physical condition change, (i.e. fever, cold, flu, etc.) please notify your surgeon as soon as possible. 11. It is important to be on time. If a situation occurs where you may be delayed, please call:  [phone] / [phone] on the day of surgery. 12. The Preadmission Testing staff can be reached at [phone]. 13. Special instructions: INHALERS, WALKER FOR FITTING    Current Outpatient Medications   Medication Sig    sennosides/docusate sodium (SENNA PLUS PO) Take 100 mg by mouth daily.  diclofenac (VOLTAREN) 1 % gel Apply  to affected area daily. TO KNEES    losartan (COZAAR) 100 mg tablet Take 100 mg by mouth Every morning.  clotrimazole-betamethasone (LOTRISONE) topical cream APPLY TO AFFECTED AREA TWICE A DAY (Patient taking differently: daily.)    diclofenac EC (VOLTAREN) 75 mg EC tablet TAKE 1 TABLET BY MOUTH EVERY DAY AS NEEDED FOR PAIN (Patient taking differently: Take 75 mg by mouth Every morning.)    Vitamin D3 25 mcg (1,000 unit) tablet TAKE 2 TABLETS BY MOUTH EVERY DAY (Patient taking differently: Take 1,000 Units by mouth daily.)    montelukast (SINGULAIR) 10 mg tablet TAKE 1 TABLET BY MOUTH EVERY DAY    nystatin (MYCOSTATIN) powder Apply  to affected area three (3) times daily. (Patient taking differently: Apply  to affected area as needed.)    fluticasone propionate (FLONASE) 50 mcg/actuation nasal spray SPRAY 2 SPRAYS INTO EACH NOSTRIL EVERY DAY    nystatin-triamcinolone (MYCOLOG) 100,000-0.1 unit/gram-% ointment Apply  to affected area two (2) times a day.  albuterol (Ventolin HFA) 90 mcg/actuation inhaler Take 2 Puffs by inhalation every four (4) hours as needed for Wheezing.     aspirin delayed-release 81 mg tablet Take 81 mg by mouth daily. No current facility-administered medications for this encounter. 1. YOU MUST ONLY TAKE THESE MEDICATIONS THE MORNING OF SURGERY WITH A SIP OF WATER: SINGULAIR, FLONASE  2. MEDICATIONS TO TAKE THE MORNING OF SURGERY ONLY IF NEEDED: ALBUTEROL INHALER  3. HOLD these prescription medications BEFORE Surgery: DICLOFENAC TABLET AND GEL 3 DAYS PRIOR TO SURGERY  4. Ask your surgeon/prescribing physician about when/if to STOP taking these medications: N/A  5. Stop any non-steroidal anti-inflammatory drugs (i.e. Ibuprofen, Naproxen, Advil, Aleve) 3 days before surgery. You may take Tylenol. STOP all vitamins and herbal supplements 1 week prior to  surgery. 6. If you are currently taking Plavix, Coumadin, or any other blood-thinning/anticoagulant medication contact your prescribing physician for instructions. Preventing Infections Before and After - Your Surgery    IMPORTANT INSTRUCTIONS    You play an important role in your health and preparation for surgery. To reduce the germs on your skin you will need to shower with CHG soap (Chorhexidine gluconate 4%) two times before surgery. CHG soap (Hibiclens, Hex-A-Clens or store brand)   CHG soap will be provided at your Preadmission Testing (PAT) appointment.  If you do not have a PAT appointment before surgery, you may arrange to  CHG soap from our office or purchase CHG soap at a pharmacy, grocery or department store.  You need to purchase TWO 4 ounce bottles to use for your 2 showers. Steps to follow:  1. Wash your hair with your normal shampoo and your body with regular soap and rinse well to remove shampoo and soap from your skin. 2. Wet a clean washcloth and turn off the shower. 3. Put CHG soap on washcloth and apply to your entire body from the neck down. Do not use on your head, face or private parts(genitals). Do not use CHG soap on open sores, wounds or areas of skin irritation.   4. Wash you body gently for 5 minutes. Do not wash your skin too hard. This soap does not create lather. Pay special attention to your underarms and from your belly button to your feet. 5. Turn the shower back on and rinse well to get CHG soap off your body. 6. Pat your skin dry with a clean, dry towel. Do not apply lotions or moisturizer. 7. Put on clean clothes and sleep on fresh bed sheets and do not allow pets to sleep with you. Shower with CHG soap 2 times before your surgery   The evening before your surgery   The morning of your surgery      Tips to help prevent infections after your surgery:  1. Protect your surgical wound from germs:  ? Hand washing is the most important thing you and your caregivers can do to prevent infections. ? Keep your bandage clean and dry! ? Do not touch your surgical wound. 2. Use clean, freshly washed towels and washcloths every time you shower; do not share bath linens with others. 3. Until your surgical wound is healed, wear clothing and sleep on bed linens each day that are clean and freshly washed. 4. Do not allow pets to sleep in your bed with you or touch your surgical wound. 5. Do not smoke - smoking delays wound healing. This may be a good time to stop smoking. 6. If you have diabetes, it is important for you to manage your blood sugar levels properly before your surgery as well as after your surgery. Poorly managed blood sugar levels slow down wound healing and prevent you from healing completely. Prevention of Infection  Testing for Staphylococcus aureus on your skin before surgery    Staphylococcus aureus (staph) is a common bacteria that is found on the body. It normally does not cause infection on healthy skin. Before surgery, you will be tested to see if you have staph by swabbing the inside of your nose. When you have an incision with surgery, the goal is to protect that incision from infection.  Removal of the staph bacteria before surgery can decrease the risk of a surgical site infection. If your nose swab is positive for staph you will be called. Your treatment will include 2 steps:   Prescription for Mupirocin ointment to be used in each nostril twice a day for 5 days.  Showering with Chlorhexidine (CHG) liquid soap for 5 days prior to surgery. How to use Mupirocin ointment in your nose  1.  the prescription from your pharmacy. You will receive a large tube of ointment which will be big enough for all of your treatments. You will apply this ointment to each nostril 2 times a day for 5 days. 2. Wash your hands with  gel or soap and water for 20 seconds before using ointment. 3. Place a pea-sized amount of ointment on a cotton Q-tip. 4. Apply ointment just inside of each nostril with the Q-tip. Do not push Q-tip or ointment deep inside you nose. 5. Press your nostrils together and massage for a few seconds. 6. Wash your hands with  gel or soap and water after you are finished. 7. Do not get ointment near your eyes. If it gets into your eyes, rinse them with cool water. 8. If you need to use nasal spray, clean the tip of the bottle with alcohol before use and do not use both at the same time. 9. If you are scheduled for COVID testing during the 5 days, do NOT apply morning dose until after the COVID test has been performed. How to use Chlorhexidine (CHG) 4% liquid soap  1. Purchase an 8 ounce bottle of CHG liquid soap (Chlorhexidine 4%, Hibiclens, Hex-A-Clens or store brand) at a pharmacy or grocery store. 2. Wash your hair with your normal shampoo and your body with regular soap and rinse well to remove shampoo and soap from your skin. 3. Wet a clean washcloth and turn off the shower. 4. Put CHG soap on washcloth and apply to your entire body from the neck down. Do not use on your head, face or private parts(genitals). Do not use CHG soap on open sores, wounds or areas of skin irritation. 5. Wash your body gently for 5 minutes.  Do not wash your skin too hard. This soap does not create lather. Pay special attention to your underarms and from your belly button to your feet. 6. Turn the shower back on and rinse well to get CHG soap off your body. 7. Pat your skin dry with a clean, dry towel. Do not apply lotions or moisturizer. 8. Put on clean clothes and sleep on fresh bed sheets the night before surgery. Do not allow pets to sleep with you. Eating and Drinking Before Surgery     You may eat a regular dinner at the usual time on the day before your surgery.  Do NOT eat any solid foods after midnight unless your arrival time at the hospital is 3pm or later.  You may drink clear liquids only from 12 midnight until 1 hours prior to your arrival time at the hospital on the day of your surgery. Do NOT drink alcohol.  Clear liquids include:  o Water  o Fruit juices without pulp( i.e. apple juice)  o Carbonated beverages  o Black coffee (no cream/milk)  o Tea (no cream/milk)  o Gatorade   You may drink up to 12-16 ounces at one time every 4 hours between the hours of midnight and 1 hour before your arrival time at the hospital. Example- if your arrival time at the hospital is 6am, you may drink 12-16 ounces of clear liquids no later than 5am.   If your arrival time at the hospital is 3pm or later, you may eat a light breakfast before 8am.   A light breakfast includes:  o Toast or bagel (no butter)  o Black coffee (no cream/milk)  o Tea (no cream/milk)  o Fruit juices without pulp ( i.e. apple juice)  o Do NOT eat meat, eggs, vegetables or fruit   If you have any questions, please contact your surgeon's office. Patient Information Regarding COVID Restrictions    Day of Procedure     Please park in the parking deck or any designated visitor parking lot.    Enter the facility through the Main Entrance of the hospital.   On the day of surgery, please provide the cell phone number of the person who will be waiting for you to the Patient Access representative at the time of registration.  Please wear a mask on the day of your procedure.  We are now allowing two designated visitors per stay. Pediatric patients may have 2 designated visitors. These two people may come in with you on the day of your procedure.  No visitors under the age of 13.  The designated visitor must also wear a mask.  Once your procedure and the immediate recovery period is completed, a nurse in the recovery area will contact your designated visitor to inform them of your room number or to otherwise review other pertinent information regarding your care.  Social distancing practices are to be adhered to in waiting areas and the cafeteria. The patient was contacted in person. She verbalized understanding of all instructions does not  need reinforcement.

## 2022-06-07 ENCOUNTER — OFFICE VISIT (OUTPATIENT)
Dept: ORTHOPEDIC SURGERY | Age: 72
End: 2022-06-07
Payer: MEDICARE

## 2022-06-07 DIAGNOSIS — S42.145D: Primary | ICD-10-CM

## 2022-06-07 DIAGNOSIS — S46.012D TRAUMATIC COMPLETE TEAR OF LEFT ROTATOR CUFF, SUBSEQUENT ENCOUNTER: ICD-10-CM

## 2022-06-07 DIAGNOSIS — M25.512 ACUTE PAIN OF LEFT SHOULDER: ICD-10-CM

## 2022-06-07 LAB
BACTERIA SPEC CULT: NORMAL
SERVICE CMNT-IMP: NORMAL
SERVICE CMNT-IMP: NORMAL

## 2022-06-07 PROCEDURE — 97140 MANUAL THERAPY 1/> REGIONS: CPT | Performed by: PHYSICAL THERAPIST

## 2022-06-07 PROCEDURE — 97110 THERAPEUTIC EXERCISES: CPT | Performed by: PHYSICAL THERAPIST

## 2022-06-07 NOTE — PERIOP NOTES
GOOD MORNING   ABNORMAL LABS FROM PAT APPT ON 06/06/2022 DOS 06/16    CBC    WBC H 11.6  HEMATOCRIT L 33.6  PLT  H 449    URINE CULTURE SENT- WILL FOLLOW UP WITH TRINA RICHMOND NP ON THIS     ANY QUESTIONS I CAN BE REACHED [phone] OR JUST MESSAGE BACK THANKS MARGARET    MESSAGE SENT VIA CC

## 2022-06-07 NOTE — PROGRESS NOTES
PT DAILY TREATMENT NOTE    Patient Name: David PAULINOLI:[id number]  : 1950  [x]  Patient  Verified  Payor: Astrid Fuentes / Plan: [place] MEDICARE PART [place] & B / Product Type: Medicare /    Total Treatment Time (min): 45  Total Timed Codes (min): 45  1:1 Treatment Time ([address] only): 45   Referring Provider: Maria Isabel Mejia MD    1. Nondisplaced fracture of glenoid cavity of scapula, left shoulder, subsequent encounter for fracture with routine healing  2. Traumatic complete tear of left rotator cuff, subsequent encounter  3. Acute pain of left shoulder      SUBJECTIVE  Subjective functional status/changes:   [] No changes reported    Patient states her shoulder is gradually feeling better. Still with appropriate soreness. Still attempting to maintain passive restrictions. OBJECTIVE/TREATMENT    Manual Therapy x 20 mins:   Manual posterior and inferior glenohumeral mobilizations and PROM for glenohumeral mobility and flexibility all planes to decrease joint restrictions and increase range of motion. LLPS for internal/external rotation with humeral head stabilization. Neuromuscular Re-education (NMR) x   minutes:  []  Kinesiotaping for   []  Neuromuscular reeducation to the VMO with use of [place] electrical stimulation in conjunction with active contraction and exercises. []  balance/proprioceptive exercises and activities in clinic as listed below as NMR. Therapeutic Exercise x 25 mins:   Strengthening/Endurance/ADL function/Neuromuscular reeducation activities/exercises supervised and completed as listed below. I did instruct patient in passive elevation while supine to add to her home exercise program.  Passive osteokinematic and arthrokinematic exercises in supine.        EXERCISE X= completed on  2022   Ball roll @table x   Standing pulleys x                                                                           Added/Changed Exercises:  [x]  Advanced to address: [x] functional strength/ROM deficits [] balance/proprioceptive tasks  []  Modified: [] per subjective reports [] for patient time constraints [] for clinic time constraints    Modality:  []  E-Stim: type _ x _ min     []att   []unatt   []w/ice   []w/heat  []  Ultrasound: []cont   []pulse    _ W/cm2 x _  min   []1MHz   []3MHz  [x]  Ice pack: post      []  Hot pack: pre    []  Other:     Patient Education: [] Review HEP    [] Progressed/Changed HEP to include:  [] positioning   [] body mechanics   [] transfers   [] heat/ice application      ASSESSMENT    Improving tolerance to passive mobility in all directions.   Seems to have good understanding for updates to home exercise program.    PLAN  [x]  Upgrade activities as tolerated      [x]  Continue plan of care  []  Discharge due to:  [] Other:       Brian Cannon, PT, DPT 6/7/2022
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
## 2022-06-09 ENCOUNTER — OFFICE VISIT (OUTPATIENT)
Dept: ORTHOPEDIC SURGERY | Age: 72
End: 2022-06-09
Payer: MEDICARE

## 2022-06-09 DIAGNOSIS — M25.512 ACUTE PAIN OF LEFT SHOULDER: ICD-10-CM

## 2022-06-09 DIAGNOSIS — S42.145D: Primary | ICD-10-CM

## 2022-06-09 DIAGNOSIS — S46.012D TRAUMATIC COMPLETE TEAR OF LEFT ROTATOR CUFF, SUBSEQUENT ENCOUNTER: ICD-10-CM

## 2022-06-09 PROCEDURE — 97110 THERAPEUTIC EXERCISES: CPT

## 2022-06-09 PROCEDURE — 97140 MANUAL THERAPY 1/> REGIONS: CPT

## 2022-06-09 NOTE — PROGRESS NOTES
PT DAILY TREATMENT NOTE    Patient Name: Kelli Cazares  Date:2022  : 1950  [x]  Patient  Verified  Payor: Guillermo Richard / Plan: [place] & [place] / Product Type: Medicare /    Total Treatment Time (min): 45  Total Timed Codes (min): 35  1:1 Treatment Time ( [address] only): 35   Referring Provider: Marcella Telles MD    1. Nondisplaced fracture of glenoid cavity of scapula, left shoulder, subsequent encounter for fracture with routine healing  2. Traumatic complete tear of left rotator cuff, subsequent encounter  3. Acute pain of left shoulder      SUBJECTIVE  Subjective functional status/changes:   [] No changes reported    Patient states her shoulder is gradually feeling better. Still with appropriate soreness. Still attempting to maintain passive restrictions. OBJECTIVE/TREATMENT    Manual Therapy x 20 mins:   Manual posterior and inferior glenohumeral mobilizations and PROM for glenohumeral mobility and flexibility all planes to decrease joint restrictions and increase range of motion. LLPS for internal/external rotation with humeral head stabilization. Neuromuscular Re-education (NMR) x   minutes:  []  Kinesiotaping for   []  Neuromuscular reeducation to the VMO with use of [place] electrical stimulation in conjunction with active contraction and exercises. []  balance/proprioceptive exercises and activities in clinic as listed below as NMR. Therapeutic Exercise x 15 mins:   Strengthening/Endurance/ADL function/Neuromuscular reeducation activities/exercises supervised and completed as listed below. Manual resistance with 4-way shoulder isometrics.        EXERCISE X= completed on  2022   Ball roll @table x   Standing pulleys x   scap rows grn                                                                       Added/Changed Exercises:  [x]  Advanced to address: [x] functional strength/ROM deficits [] balance/proprioceptive tasks  []  Modified: [] per subjective reports [] for patient time constraints [] for clinic time constraints    Modality:  []  E-Stim: type _ x _ min     []att   []unatt   []w/ice   []w/heat  []  Ultrasound: []cont   []pulse    _ W/cm2 x _  min   []1MHz   []3MHz  [x]  Ice pack: post      []  Hot pack: pre    []  Other:     Patient Education: [] Review HEP    [] Progressed/Changed HEP to include:  [] positioning   [] body mechanics   [] transfers   [] heat/ice application      ASSESSMENT    Passive mobility improving all directions but still with painful arc through midrange of elevation. No pain with shoulder isometrics.     PLAN  [x]  Upgrade activities as tolerated      [x]  Continue plan of care  []  Discharge due to:  [] Other:       Tiara Mcintosh, SHIVA 6/9/2022

## 2022-06-10 NOTE — PROGRESS NOTES
I have reviewed the notes, assessments, and/or procedures performed by Elaine Smalls PTA, I concur with her/his documentation of Jhon Sánchez.

## 2022-06-14 ENCOUNTER — OFFICE VISIT (OUTPATIENT)
Dept: ORTHOPEDIC SURGERY | Age: 72
End: 2022-06-14
Payer: MEDICARE

## 2022-06-14 DIAGNOSIS — S42.142S GLENOID FRACTURE OF SHOULDER, LEFT, SEQUELA: ICD-10-CM

## 2022-06-14 DIAGNOSIS — S46.012D TRAUMATIC COMPLETE TEAR OF LEFT ROTATOR CUFF, SUBSEQUENT ENCOUNTER: ICD-10-CM

## 2022-06-14 DIAGNOSIS — S42.152S GLENOID FRACTURE OF SHOULDER, LEFT, SEQUELA: ICD-10-CM

## 2022-06-14 DIAGNOSIS — S42.145D: Primary | ICD-10-CM

## 2022-06-14 DIAGNOSIS — M25.512 ACUTE PAIN OF LEFT SHOULDER: ICD-10-CM

## 2022-06-14 PROCEDURE — 97110 THERAPEUTIC EXERCISES: CPT

## 2022-06-14 PROCEDURE — 97140 MANUAL THERAPY 1/> REGIONS: CPT

## 2022-06-14 NOTE — PROGRESS NOTES
I have reviewed the notes, assessments, and/or procedures performed by Betsy Yeh PTA, I concur with her/his documentation of Mati Walker.

## 2022-06-14 NOTE — PROGRESS NOTES
PT DAILY TREATMENT NOTE    Patient Name: Sourav Collins  Date:2022  : 1950  [x]  Patient  Verified  Payor: Dia Browne / Plan: [place] MED[place] PART [place] & B / Product Type: Medicare /    Total Treatment Time (min): 55  Total Timed Codes (min): 40  1:1 Treatment Time ([address] only): 40  Referring Provider: Kleley Ford MD    1. Nondisplaced fracture of glenoid cavity of scapula, left shoulder, subsequent encounter for fracture with routine healing  2. Traumatic complete tear of left rotator cuff, subsequent encounter  3. Acute pain of left shoulder  4. Glenoid fracture of shoulder, left, sequela      SUBJECTIVE  Subjective functional status/changes:   [] No changes reported    Patient reports she is having less pain in her shoulder as long as she is careful not to reach or lift with it. OBJECTIVE/TREATMENT    Manual Therapy x 15 mins:   Manual posterior and inferior glenohumeral mobilizations and PROM for glenohumeral mobility and flexibility all planes to decrease joint restrictions and increase range of motion. LLPS for internal/external rotation with humeral head stabilization. Neuromuscular Re-education (NMR) x   minutes:  []  Kinesiotaping for   []  Neuromuscular reeducation to the VMO with use of [place] electrical stimulation in conjunction with active contraction and exercises. []  balance/proprioceptive exercises and activities in clinic as listed below as NMR. Therapeutic Exercise x 25 mins:   Strengthening/Endurance/ADL function/Neuromuscular reeducation activities/exercises supervised and completed as listed below. Manual resistance with 4-way shoulder isometrics.        EXERCISE X= completed on  2022   Ball roll @table x   Standing pulleys x   scap rows grn                                                                       Added/Changed Exercises:  []  Advanced to address: [] functional strength/ROM deficits [] balance/proprioceptive tasks  []  Modified: [] per subjective reports [] for patient time constraints [] for clinic time constraints    Modality:  []  E-Stim: type _ x _ min     []att   []unatt   []w/ice   []w/heat  []  Ultrasound: []cont   []pulse    _ W/cm2 x _  min   []1MHz   []3MHz  [x]  Ice pack: post      []  Hot pack: pre    []  Other:     Patient Education: [] Review HEP    [] Progressed/Changed HEP to include:  [] positioning   [] body mechanics   [] transfers   [] heat/ice application      ASSESSMENT     She has made good progress with pain levels in the shoulder at rest and with light ADLs. She is still obviously limited in her ability to lift/carry/reach due to know tear. We will D/C at this time as she is undergoing a L TKA this Thursday 6/16/22.      PLAN  []  Upgrade activities as tolerated      []  Continue plan of care  [x]  Discharge due to upcoming procedure and subsequent recovery  [] Other:       Monia Osler, PTA 6/14/2022

## 2022-06-16 ENCOUNTER — HOSPITAL ENCOUNTER (INPATIENT)
Age: 72
LOS: 2 days | Discharge: HOME HEALTH | DRG: 470 | End: 2022-06-18
Attending: ORTHOPAEDIC SURGERY | Admitting: ORTHOPAEDIC SURGERY
Payer: MEDICARE

## 2022-06-16 ENCOUNTER — ANESTHESIA EVENT (OUTPATIENT)
Dept: SURGERY | Age: 72
DRG: 470 | End: 2022-06-16
Payer: MEDICARE

## 2022-06-16 ENCOUNTER — ANESTHESIA (OUTPATIENT)
Dept: SURGERY | Age: 72
DRG: 470 | End: 2022-06-16
Payer: MEDICARE

## 2022-06-16 DIAGNOSIS — M17.12 PRIMARY LOCALIZED OSTEOARTHRITIS OF LEFT KNEE: Primary | ICD-10-CM

## 2022-06-16 LAB
GLUCOSE BLD STRIP.AUTO-MCNC: 89 MG/DL (ref 65–117)
SERVICE CMNT-IMP: NORMAL

## 2022-06-16 PROCEDURE — 77030039497 HC CST PAD STERILE CHCS -A: Performed by: ORTHOPAEDIC SURGERY

## 2022-06-16 PROCEDURE — 74011000250 HC RX REV CODE- 250: Performed by: NURSE ANESTHETIST, CERTIFIED REGISTERED

## 2022-06-16 PROCEDURE — 77030006822 HC BLD SAW SAG BRSM -B: Performed by: ORTHOPAEDIC SURGERY

## 2022-06-16 PROCEDURE — 76010000173 HC OR TIME 3 TO 3.5 HR INTENSV-TIER 1: Performed by: ORTHOPAEDIC SURGERY

## 2022-06-16 PROCEDURE — 77030036638 HC ACC KT GPS KNE V2 EXAC -D: Performed by: ORTHOPAEDIC SURGERY

## 2022-06-16 PROCEDURE — 77030035236 HC SUT PDS STRATFX BARB J&J -B: Performed by: ORTHOPAEDIC SURGERY

## 2022-06-16 PROCEDURE — 74011250637 HC RX REV CODE- 250/637: Performed by: ORTHOPAEDIC SURGERY

## 2022-06-16 PROCEDURE — 77030040361 HC SLV COMPR DVT MDII -B: Performed by: ORTHOPAEDIC SURGERY

## 2022-06-16 PROCEDURE — C1776 JOINT DEVICE (IMPLANTABLE): HCPCS | Performed by: ORTHOPAEDIC SURGERY

## 2022-06-16 PROCEDURE — 74011250636 HC RX REV CODE- 250/636: Performed by: ORTHOPAEDIC SURGERY

## 2022-06-16 PROCEDURE — 76210000006 HC OR PH I REC 0.5 TO 1 HR: Performed by: ORTHOPAEDIC SURGERY

## 2022-06-16 PROCEDURE — 74011250636 HC RX REV CODE- 250/636: Performed by: NURSE ANESTHETIST, CERTIFIED REGISTERED

## 2022-06-16 PROCEDURE — 74011250636 HC RX REV CODE- 250/636: Performed by: ANESTHESIOLOGY

## 2022-06-16 PROCEDURE — 76060000037 HC ANESTHESIA 3 TO 3.5 HR: Performed by: ORTHOPAEDIC SURGERY

## 2022-06-16 PROCEDURE — 77030007866 HC KT SPN ANES BBMI -B: Performed by: ANESTHESIOLOGY

## 2022-06-16 PROCEDURE — 77030036722: Performed by: ORTHOPAEDIC SURGERY

## 2022-06-16 PROCEDURE — 0SRD0J9 REPLACEMENT OF LEFT KNEE JOINT WITH SYNTHETIC SUBSTITUTE, CEMENTED, OPEN APPROACH: ICD-10-PCS | Performed by: ORTHOPAEDIC SURGERY

## 2022-06-16 PROCEDURE — 77030038692 HC WND DEB SYS IRMX -B: Performed by: ORTHOPAEDIC SURGERY

## 2022-06-16 PROCEDURE — C1713 ANCHOR/SCREW BN/BN,TIS/BN: HCPCS | Performed by: ORTHOPAEDIC SURGERY

## 2022-06-16 PROCEDURE — 2709999900 HC NON-CHARGEABLE SUPPLY: Performed by: ORTHOPAEDIC SURGERY

## 2022-06-16 PROCEDURE — 74011000250 HC RX REV CODE- 250: Performed by: ORTHOPAEDIC SURGERY

## 2022-06-16 PROCEDURE — 65270000029 HC RM PRIVATE

## 2022-06-16 PROCEDURE — C9290 INJ, BUPIVACAINE LIPOSOME: HCPCS | Performed by: ORTHOPAEDIC SURGERY

## 2022-06-16 PROCEDURE — 77030000032 HC CUF TRNQT ZIMM -B: Performed by: ORTHOPAEDIC SURGERY

## 2022-06-16 PROCEDURE — 77030040922 HC BLNKT HYPOTHRM STRY -A

## 2022-06-16 PROCEDURE — 82962 GLUCOSE BLOOD TEST: CPT

## 2022-06-16 PROCEDURE — 77030003601 HC NDL NRV BLK BBMI -A

## 2022-06-16 PROCEDURE — 74011000272 HC RX REV CODE- 272: Performed by: ORTHOPAEDIC SURGERY

## 2022-06-16 PROCEDURE — 77030002933 HC SUT MCRYL J&J -A: Performed by: ORTHOPAEDIC SURGERY

## 2022-06-16 PROCEDURE — 77030031139 HC SUT VCRL2 J&J -A: Performed by: ORTHOPAEDIC SURGERY

## 2022-06-16 PROCEDURE — 74011250637 HC RX REV CODE- 250/637: Performed by: ANESTHESIOLOGY

## 2022-06-16 PROCEDURE — 77030006812 HC BLD SAW RECIP STRY -B: Performed by: ORTHOPAEDIC SURGERY

## 2022-06-16 PROCEDURE — 77030006835 HC BLD SAW SAG STRY -B: Performed by: ORTHOPAEDIC SURGERY

## 2022-06-16 PROCEDURE — 77030042556 HC PNCL CAUT -B: Performed by: ORTHOPAEDIC SURGERY

## 2022-06-16 DEVICE — IMPLANTABLE DEVICE
Type: IMPLANTABLE DEVICE | Site: KNEE | Status: FUNCTIONAL
Brand: OPTETRAK LOGIC

## 2022-06-16 DEVICE — IMPLANTABLE DEVICE
Type: IMPLANTABLE DEVICE | Site: KNEE | Status: FUNCTIONAL
Brand: TRULIANT

## 2022-06-16 DEVICE — COMPONENT TOT KNEE CAPPED K1 STD HEMI CEM: Type: IMPLANTABLE DEVICE | Status: FUNCTIONAL

## 2022-06-16 DEVICE — IMPLANTABLE DEVICE
Type: IMPLANTABLE DEVICE | Site: KNEE | Status: FUNCTIONAL
Brand: OPTETRAK

## 2022-06-16 RX ORDER — ROPIVACAINE HYDROCHLORIDE 5 MG/ML
30 INJECTION, SOLUTION EPIDURAL; INFILTRATION; PERINEURAL ONCE
Status: DISCONTINUED | OUTPATIENT
Start: 2022-06-16 | End: 2022-06-16 | Stop reason: HOSPADM

## 2022-06-16 RX ORDER — ACETAMINOPHEN 325 MG/1
650 TABLET ORAL ONCE
Status: COMPLETED | OUTPATIENT
Start: 2022-06-16 | End: 2022-06-16

## 2022-06-16 RX ORDER — SODIUM CHLORIDE, SODIUM LACTATE, POTASSIUM CHLORIDE, CALCIUM CHLORIDE 600; 310; 30; 20 MG/100ML; MG/100ML; MG/100ML; MG/100ML
75 INJECTION, SOLUTION INTRAVENOUS CONTINUOUS
Status: DISCONTINUED | OUTPATIENT
Start: 2022-06-16 | End: 2022-06-16 | Stop reason: HOSPADM

## 2022-06-16 RX ORDER — MIDAZOLAM HYDROCHLORIDE 1 MG/ML
1 INJECTION, SOLUTION INTRAMUSCULAR; INTRAVENOUS AS NEEDED
Status: DISCONTINUED | OUTPATIENT
Start: 2022-06-16 | End: 2022-06-16 | Stop reason: HOSPADM

## 2022-06-16 RX ORDER — FENTANYL CITRATE 50 UG/ML
25 INJECTION, SOLUTION INTRAMUSCULAR; INTRAVENOUS
Status: DISCONTINUED | OUTPATIENT
Start: 2022-06-16 | End: 2022-06-16 | Stop reason: HOSPADM

## 2022-06-16 RX ORDER — HYDROMORPHONE HYDROCHLORIDE 1 MG/ML
0.2 INJECTION, SOLUTION INTRAMUSCULAR; INTRAVENOUS; SUBCUTANEOUS
Status: DISCONTINUED | OUTPATIENT
Start: 2022-06-16 | End: 2022-06-16 | Stop reason: HOSPADM

## 2022-06-16 RX ORDER — ALBUTEROL SULFATE 0.83 MG/ML
2.5 SOLUTION RESPIRATORY (INHALATION)
Status: DISCONTINUED | OUTPATIENT
Start: 2022-06-16 | End: 2022-06-18 | Stop reason: HOSPADM

## 2022-06-16 RX ORDER — ONDANSETRON 2 MG/ML
INJECTION INTRAMUSCULAR; INTRAVENOUS AS NEEDED
Status: DISCONTINUED | OUTPATIENT
Start: 2022-06-16 | End: 2022-06-16 | Stop reason: HOSPADM

## 2022-06-16 RX ORDER — SODIUM CHLORIDE 0.9 % (FLUSH) 0.9 %
5-40 SYRINGE (ML) INJECTION EVERY 8 HOURS
Status: DISCONTINUED | OUTPATIENT
Start: 2022-06-16 | End: 2022-06-16 | Stop reason: HOSPADM

## 2022-06-16 RX ORDER — SODIUM CHLORIDE, SODIUM LACTATE, POTASSIUM CHLORIDE, CALCIUM CHLORIDE 600; 310; 30; 20 MG/100ML; MG/100ML; MG/100ML; MG/100ML
125 INJECTION, SOLUTION INTRAVENOUS CONTINUOUS
Status: DISCONTINUED | OUTPATIENT
Start: 2022-06-16 | End: 2022-06-16 | Stop reason: HOSPADM

## 2022-06-16 RX ORDER — PROPOFOL 10 MG/ML
INJECTION, EMULSION INTRAVENOUS
Status: DISCONTINUED | OUTPATIENT
Start: 2022-06-16 | End: 2022-06-16 | Stop reason: HOSPADM

## 2022-06-16 RX ORDER — OXYCODONE HYDROCHLORIDE 5 MG/1
10 TABLET ORAL
Qty: 40 TABLET | Refills: 0 | Status: ON HOLD | OUTPATIENT
Start: 2022-06-16 | End: 2022-06-22 | Stop reason: SDUPTHER

## 2022-06-16 RX ORDER — MONTELUKAST SODIUM 10 MG/1
10 TABLET ORAL DAILY
Status: DISCONTINUED | OUTPATIENT
Start: 2022-06-17 | End: 2022-06-18 | Stop reason: HOSPADM

## 2022-06-16 RX ORDER — MIDAZOLAM HYDROCHLORIDE 1 MG/ML
INJECTION, SOLUTION INTRAMUSCULAR; INTRAVENOUS AS NEEDED
Status: DISCONTINUED | OUTPATIENT
Start: 2022-06-16 | End: 2022-06-16 | Stop reason: HOSPADM

## 2022-06-16 RX ORDER — BUPIVACAINE HYDROCHLORIDE 5 MG/ML
INJECTION, SOLUTION EPIDURAL; INTRACAUDAL AS NEEDED
Status: DISCONTINUED | OUTPATIENT
Start: 2022-06-16 | End: 2022-06-16 | Stop reason: HOSPADM

## 2022-06-16 RX ORDER — HYDROMORPHONE HYDROCHLORIDE 1 MG/ML
0.5 INJECTION, SOLUTION INTRAMUSCULAR; INTRAVENOUS; SUBCUTANEOUS
Status: ACTIVE | OUTPATIENT
Start: 2022-06-16 | End: 2022-06-17

## 2022-06-16 RX ORDER — SODIUM CHLORIDE 9 MG/ML
25 INJECTION, SOLUTION INTRAVENOUS CONTINUOUS
Status: DISCONTINUED | OUTPATIENT
Start: 2022-06-16 | End: 2022-06-16 | Stop reason: HOSPADM

## 2022-06-16 RX ORDER — LIDOCAINE HYDROCHLORIDE 10 MG/ML
0.1 INJECTION, SOLUTION EPIDURAL; INFILTRATION; INTRACAUDAL; PERINEURAL AS NEEDED
Status: DISCONTINUED | OUTPATIENT
Start: 2022-06-16 | End: 2022-06-16 | Stop reason: HOSPADM

## 2022-06-16 RX ORDER — WARFARIN 2.5 MG/1
2.5 TABLET ORAL DAILY
Qty: 60 TABLET | Refills: 3 | Status: SHIPPED | OUTPATIENT
Start: 2022-06-16 | End: 2022-08-05 | Stop reason: ALTCHOICE

## 2022-06-16 RX ORDER — DIPHENHYDRAMINE HYDROCHLORIDE 50 MG/ML
12.5 INJECTION, SOLUTION INTRAMUSCULAR; INTRAVENOUS AS NEEDED
Status: DISCONTINUED | OUTPATIENT
Start: 2022-06-16 | End: 2022-06-16 | Stop reason: HOSPADM

## 2022-06-16 RX ORDER — SODIUM CHLORIDE 9 MG/ML
125 INJECTION, SOLUTION INTRAVENOUS CONTINUOUS
Status: DISPENSED | OUTPATIENT
Start: 2022-06-16 | End: 2022-06-18

## 2022-06-16 RX ORDER — FAMOTIDINE 20 MG/1
20 TABLET, FILM COATED ORAL 2 TIMES DAILY
Status: DISCONTINUED | OUTPATIENT
Start: 2022-06-16 | End: 2022-06-18 | Stop reason: HOSPADM

## 2022-06-16 RX ORDER — ACETAMINOPHEN 325 MG/1
650 TABLET ORAL
Status: DISCONTINUED | OUTPATIENT
Start: 2022-06-16 | End: 2022-06-17

## 2022-06-16 RX ORDER — HYDROXYZINE HYDROCHLORIDE 10 MG/1
10 TABLET, FILM COATED ORAL
Status: DISCONTINUED | OUTPATIENT
Start: 2022-06-16 | End: 2022-06-18 | Stop reason: HOSPADM

## 2022-06-16 RX ORDER — LIDOCAINE HYDROCHLORIDE 20 MG/ML
INJECTION, SOLUTION EPIDURAL; INFILTRATION; INTRACAUDAL; PERINEURAL AS NEEDED
Status: DISCONTINUED | OUTPATIENT
Start: 2022-06-16 | End: 2022-06-16 | Stop reason: HOSPADM

## 2022-06-16 RX ORDER — TRANEXAMIC ACID 100 MG/ML
INJECTION, SOLUTION INTRAVENOUS AS NEEDED
Status: DISCONTINUED | OUTPATIENT
Start: 2022-06-16 | End: 2022-06-16 | Stop reason: HOSPADM

## 2022-06-16 RX ORDER — WARFARIN SODIUM 5 MG/1
5 TABLET ORAL ONCE
Status: COMPLETED | OUTPATIENT
Start: 2022-06-16 | End: 2022-06-16

## 2022-06-16 RX ORDER — FLUTICASONE PROPIONATE 50 MCG
2 SPRAY, SUSPENSION (ML) NASAL DAILY
Status: DISCONTINUED | OUTPATIENT
Start: 2022-06-17 | End: 2022-06-18 | Stop reason: HOSPADM

## 2022-06-16 RX ORDER — SODIUM CHLORIDE 0.9 % (FLUSH) 0.9 %
5-40 SYRINGE (ML) INJECTION EVERY 8 HOURS
Status: DISCONTINUED | OUTPATIENT
Start: 2022-06-16 | End: 2022-06-18 | Stop reason: HOSPADM

## 2022-06-16 RX ORDER — SODIUM CHLORIDE 0.9 % (FLUSH) 0.9 %
5-40 SYRINGE (ML) INJECTION AS NEEDED
Status: DISCONTINUED | OUTPATIENT
Start: 2022-06-16 | End: 2022-06-16 | Stop reason: HOSPADM

## 2022-06-16 RX ORDER — ONDANSETRON 2 MG/ML
4 INJECTION INTRAMUSCULAR; INTRAVENOUS AS NEEDED
Status: DISCONTINUED | OUTPATIENT
Start: 2022-06-16 | End: 2022-06-16 | Stop reason: HOSPADM

## 2022-06-16 RX ORDER — LOSARTAN POTASSIUM 50 MG/1
100 TABLET ORAL EVERY MORNING
Status: DISCONTINUED | OUTPATIENT
Start: 2022-06-17 | End: 2022-06-18

## 2022-06-16 RX ORDER — MORPHINE SULFATE 2 MG/ML
2 INJECTION, SOLUTION INTRAMUSCULAR; INTRAVENOUS
Status: DISCONTINUED | OUTPATIENT
Start: 2022-06-16 | End: 2022-06-16 | Stop reason: HOSPADM

## 2022-06-16 RX ORDER — DEXAMETHASONE SODIUM PHOSPHATE 4 MG/ML
INJECTION, SOLUTION INTRA-ARTICULAR; INTRALESIONAL; INTRAMUSCULAR; INTRAVENOUS; SOFT TISSUE AS NEEDED
Status: DISCONTINUED | OUTPATIENT
Start: 2022-06-16 | End: 2022-06-16 | Stop reason: HOSPADM

## 2022-06-16 RX ORDER — POLYETHYLENE GLYCOL 3350 17 G/17G
17 POWDER, FOR SOLUTION ORAL DAILY
Status: DISCONTINUED | OUTPATIENT
Start: 2022-06-17 | End: 2022-06-18 | Stop reason: HOSPADM

## 2022-06-16 RX ORDER — ROPIVACAINE HYDROCHLORIDE 5 MG/ML
INJECTION, SOLUTION EPIDURAL; INFILTRATION; PERINEURAL
Status: COMPLETED | OUTPATIENT
Start: 2022-06-16 | End: 2022-06-16

## 2022-06-16 RX ORDER — AMOXICILLIN 250 MG
1 CAPSULE ORAL 2 TIMES DAILY
Status: DISCONTINUED | OUTPATIENT
Start: 2022-06-16 | End: 2022-06-18 | Stop reason: HOSPADM

## 2022-06-16 RX ORDER — KETOROLAC TROMETHAMINE 30 MG/ML
15 INJECTION, SOLUTION INTRAMUSCULAR; INTRAVENOUS EVERY 6 HOURS
Status: COMPLETED | OUTPATIENT
Start: 2022-06-16 | End: 2022-06-17

## 2022-06-16 RX ORDER — FACIAL-BODY WIPES
10 EACH TOPICAL DAILY PRN
Status: DISCONTINUED | OUTPATIENT
Start: 2022-06-18 | End: 2022-06-18 | Stop reason: HOSPADM

## 2022-06-16 RX ORDER — FENTANYL CITRATE 50 UG/ML
50 INJECTION, SOLUTION INTRAMUSCULAR; INTRAVENOUS AS NEEDED
Status: DISCONTINUED | OUTPATIENT
Start: 2022-06-16 | End: 2022-06-16 | Stop reason: HOSPADM

## 2022-06-16 RX ORDER — MIDAZOLAM HYDROCHLORIDE 1 MG/ML
0.5 INJECTION, SOLUTION INTRAMUSCULAR; INTRAVENOUS
Status: DISCONTINUED | OUTPATIENT
Start: 2022-06-16 | End: 2022-06-16 | Stop reason: HOSPADM

## 2022-06-16 RX ORDER — FENTANYL CITRATE 50 UG/ML
INJECTION, SOLUTION INTRAMUSCULAR; INTRAVENOUS AS NEEDED
Status: DISCONTINUED | OUTPATIENT
Start: 2022-06-16 | End: 2022-06-16 | Stop reason: HOSPADM

## 2022-06-16 RX ORDER — CEFAZOLIN SODIUM 1 G/3ML
INJECTION, POWDER, FOR SOLUTION INTRAMUSCULAR; INTRAVENOUS AS NEEDED
Status: DISCONTINUED | OUTPATIENT
Start: 2022-06-16 | End: 2022-06-16 | Stop reason: HOSPADM

## 2022-06-16 RX ORDER — OXYCODONE HYDROCHLORIDE 5 MG/1
5 TABLET ORAL
Status: DISCONTINUED | OUTPATIENT
Start: 2022-06-16 | End: 2022-06-18 | Stop reason: HOSPADM

## 2022-06-16 RX ORDER — SODIUM CHLORIDE 0.9 % (FLUSH) 0.9 %
5-40 SYRINGE (ML) INJECTION AS NEEDED
Status: DISCONTINUED | OUTPATIENT
Start: 2022-06-16 | End: 2022-06-18 | Stop reason: HOSPADM

## 2022-06-16 RX ORDER — OXYCODONE HYDROCHLORIDE 5 MG/1
10 TABLET ORAL
Status: DISCONTINUED | OUTPATIENT
Start: 2022-06-16 | End: 2022-06-18 | Stop reason: HOSPADM

## 2022-06-16 RX ORDER — NALOXONE HYDROCHLORIDE 0.4 MG/ML
0.4 INJECTION, SOLUTION INTRAMUSCULAR; INTRAVENOUS; SUBCUTANEOUS AS NEEDED
Status: DISCONTINUED | OUTPATIENT
Start: 2022-06-16 | End: 2022-06-18 | Stop reason: HOSPADM

## 2022-06-16 RX ORDER — CYANOCOBALAMIN (VITAMIN B-12) 500 MCG
1000 TABLET ORAL DAILY
Status: DISCONTINUED | OUTPATIENT
Start: 2022-06-17 | End: 2022-06-18 | Stop reason: HOSPADM

## 2022-06-16 RX ORDER — BUPIVACAINE HYDROCHLORIDE 5 MG/ML
INJECTION, SOLUTION EPIDURAL; INTRACAUDAL
Status: COMPLETED | OUTPATIENT
Start: 2022-06-16 | End: 2022-06-16

## 2022-06-16 RX ORDER — ONDANSETRON 2 MG/ML
4 INJECTION INTRAMUSCULAR; INTRAVENOUS
Status: ACTIVE | OUTPATIENT
Start: 2022-06-16 | End: 2022-06-17

## 2022-06-16 RX ORDER — BACITRACIN ZINC 500 UNIT/G
OINTMENT (GRAM) TOPICAL AS NEEDED
Status: DISCONTINUED | OUTPATIENT
Start: 2022-06-16 | End: 2022-06-16 | Stop reason: HOSPADM

## 2022-06-16 RX ADMIN — OXYCODONE 10 MG: 5 TABLET ORAL at 21:31

## 2022-06-16 RX ADMIN — FENTANYL CITRATE 25 MCG: 50 INJECTION, SOLUTION INTRAMUSCULAR; INTRAVENOUS at 12:12

## 2022-06-16 RX ADMIN — BUPIVACAINE HYDROCHLORIDE 13 MG: 5 INJECTION, SOLUTION EPIDURAL; INTRACAUDAL; PERINEURAL at 11:55

## 2022-06-16 RX ADMIN — SENNOSIDES AND DOCUSATE SODIUM 1 TABLET: 50; 8.6 TABLET ORAL at 17:30

## 2022-06-16 RX ADMIN — FENTANYL CITRATE 25 MCG: 50 INJECTION, SOLUTION INTRAMUSCULAR; INTRAVENOUS at 13:44

## 2022-06-16 RX ADMIN — MIDAZOLAM 1 MG: 1 INJECTION INTRAMUSCULAR; INTRAVENOUS at 11:50

## 2022-06-16 RX ADMIN — KETOROLAC TROMETHAMINE 15 MG: 30 INJECTION, SOLUTION INTRAMUSCULAR; INTRAVENOUS at 17:31

## 2022-06-16 RX ADMIN — FENTANYL CITRATE 25 MCG: 50 INJECTION, SOLUTION INTRAMUSCULAR; INTRAVENOUS at 12:30

## 2022-06-16 RX ADMIN — FENTANYL CITRATE 50 MCG: 50 INJECTION, SOLUTION INTRAMUSCULAR; INTRAVENOUS at 11:50

## 2022-06-16 RX ADMIN — LIDOCAINE HYDROCHLORIDE 40 MG: 20 INJECTION, SOLUTION EPIDURAL; INFILTRATION; INTRACAUDAL; PERINEURAL at 12:02

## 2022-06-16 RX ADMIN — SODIUM CHLORIDE, PRESERVATIVE FREE 10 ML: 5 INJECTION INTRAVENOUS at 21:32

## 2022-06-16 RX ADMIN — ONDANSETRON HYDROCHLORIDE 4 MG: 2 INJECTION, SOLUTION INTRAMUSCULAR; INTRAVENOUS at 13:57

## 2022-06-16 RX ADMIN — FAMOTIDINE 20 MG: 20 TABLET ORAL at 17:30

## 2022-06-16 RX ADMIN — MIDAZOLAM HYDROCHLORIDE 2 MG: 1 INJECTION, SOLUTION INTRAMUSCULAR; INTRAVENOUS at 10:44

## 2022-06-16 RX ADMIN — OXYCODONE 10 MG: 5 TABLET ORAL at 17:30

## 2022-06-16 RX ADMIN — WARFARIN SODIUM 5 MG: 5 TABLET ORAL at 19:18

## 2022-06-16 RX ADMIN — KETOROLAC TROMETHAMINE 15 MG: 30 INJECTION, SOLUTION INTRAMUSCULAR; INTRAVENOUS at 23:23

## 2022-06-16 RX ADMIN — WATER 2 G: 1 INJECTION INTRAMUSCULAR; INTRAVENOUS; SUBCUTANEOUS at 19:18

## 2022-06-16 RX ADMIN — ROPIVACAINE HYDROCHLORIDE 30 ML: 5 INJECTION, SOLUTION EPIDURAL; INFILTRATION; PERINEURAL at 10:48

## 2022-06-16 RX ADMIN — PROPOFOL 40 MCG/KG/MIN: 10 INJECTION, EMULSION INTRAVENOUS at 12:02

## 2022-06-16 RX ADMIN — DEXAMETHASONE SODIUM PHOSPHATE 4 MG: 4 INJECTION, SOLUTION INTRAMUSCULAR; INTRAVENOUS at 12:34

## 2022-06-16 RX ADMIN — SODIUM CHLORIDE, PRESERVATIVE FREE 10 ML: 5 INJECTION INTRAVENOUS at 17:00

## 2022-06-16 RX ADMIN — FENTANYL CITRATE 100 MCG: 50 INJECTION, SOLUTION INTRAMUSCULAR; INTRAVENOUS at 10:44

## 2022-06-16 RX ADMIN — SODIUM CHLORIDE 25 ML/HR: 9 INJECTION, SOLUTION INTRAVENOUS at 15:33

## 2022-06-16 RX ADMIN — ACETAMINOPHEN 650 MG: 325 TABLET ORAL at 10:35

## 2022-06-16 RX ADMIN — SODIUM CHLORIDE, POTASSIUM CHLORIDE, SODIUM LACTATE AND CALCIUM CHLORIDE 125 ML/HR: 600; 310; 30; 20 INJECTION, SOLUTION INTRAVENOUS at 10:35

## 2022-06-16 RX ADMIN — SODIUM CHLORIDE 125 ML/HR: 900 INJECTION, SOLUTION INTRAVENOUS at 23:23

## 2022-06-16 RX ADMIN — FENTANYL CITRATE 25 MCG: 50 INJECTION, SOLUTION INTRAMUSCULAR; INTRAVENOUS at 13:08

## 2022-06-16 RX ADMIN — FENTANYL CITRATE 50 MCG: 50 INJECTION, SOLUTION INTRAMUSCULAR; INTRAVENOUS at 11:53

## 2022-06-16 RX ADMIN — MIDAZOLAM 1 MG: 1 INJECTION INTRAMUSCULAR; INTRAVENOUS at 11:53

## 2022-06-16 RX ADMIN — CEFAZOLIN 2 G: 330 INJECTION, POWDER, FOR SOLUTION INTRAMUSCULAR; INTRAVENOUS at 12:14

## 2022-06-16 RX ADMIN — SODIUM CHLORIDE 125 ML/HR: 900 INJECTION, SOLUTION INTRAVENOUS at 16:00

## 2022-06-16 NOTE — PROGRESS NOTES
Problem: Falls - Risk of  Goal: *Absence of Falls  Description: Document Cinthya Castaneda Fall Risk and appropriate interventions in the flowsheet.   Outcome: Progressing Towards Goal  Note: Fall Risk Interventions:  Mobility Interventions: Patient to call before getting OOB         Medication Interventions: Patient to call before getting OOB    Elimination Interventions: Call light in reach              Problem: Patient Education: Go to Patient Education Activity  Goal: Patient/Family Education  Outcome: Progressing Towards Goal     Problem: Patient Education: Go to Patient Education Activity  Goal: Patient/Family Education  Outcome: Progressing Towards Goal     Problem: Knee Replacement: Day of Surgery/Unit  Goal: Off Pathway (Use only if patient is Off Pathway)  Outcome: Progressing Towards Goal  Goal: Activity/Safety  Outcome: Progressing Towards Goal  Goal: Consults, if ordered  Outcome: Progressing Towards Goal  Goal: Diagnostic Test/Procedures  Outcome: Progressing Towards Goal  Goal: Nutrition/Diet  Outcome: Progressing Towards Goal  Goal: Medications  Outcome: Progressing Towards Goal  Goal: Respiratory  Outcome: Progressing Towards Goal  Goal: Treatments/Interventions/Procedures  Outcome: Progressing Towards Goal  Goal: Psychosocial  Outcome: Progressing Towards Goal  Goal: *Initiate mobility  Outcome: Progressing Towards Goal  Goal: *Optimal pain control at patient's stated goal  Outcome: Progressing Towards Goal  Goal: *Hemodynamically stable  Outcome: Progressing Towards Goal     Problem: Knee Replacement: Post-Op Day 1  Goal: Off Pathway (Use only if patient is Off Pathway)  Outcome: Progressing Towards Goal  Goal: Activity/Safety  Outcome: Progressing Towards Goal  Goal: Diagnostic Test/Procedures  Outcome: Progressing Towards Goal  Goal: Nutrition/Diet  Outcome: Progressing Towards Goal  Goal: Medications  Outcome: Progressing Towards Goal  Goal: Respiratory  Outcome: Progressing Towards Goal  Goal: Treatments/Interventions/Procedures  Outcome: Progressing Towards Goal  Goal: Psychosocial  Outcome: Progressing Towards Goal  Goal: Discharge Planning  Outcome: Progressing Towards Goal  Goal: *Demonstrates progressive activity  Outcome: Progressing Towards Goal  Goal: *Optimal pain control at patient's stated goal  Outcome: Progressing Towards Goal  Goal: *Hemodynamically stable  Outcome: Progressing Towards Goal  Goal: *Discharge plan identified  Outcome: Progressing Towards Goal     Problem: Knee Replacement: Post-Op Day 2  Goal: Off Pathway (Use only if patient is Off Pathway)  Outcome: Progressing Towards Goal  Goal: Activity/Safety  Outcome: Progressing Towards Goal  Goal: Diagnostic Test/Procedures  Outcome: Progressing Towards Goal  Goal: Medications  Outcome: Progressing Towards Goal  Goal: Respiratory  Outcome: Progressing Towards Goal  Goal: Treatments/Interventions/Procedures  Outcome: Progressing Towards Goal  Goal: Psychosocial  Outcome: Progressing Towards Goal  Goal: *Met physical therapy criteria for discharge to the next level of care  Outcome: Progressing Towards Goal  Goal: *Optimal pain control with oral analgesia  Outcome: Progressing Towards Goal  Goal: *Hemodynamically stable  Outcome: Progressing Towards Goal  Goal: *Tolerating diet  Outcome: Progressing Towards Goal  Goal: *Patient verbalizes understanding of discharge instructions  Outcome: Progressing Towards Goal

## 2022-06-16 NOTE — ANESTHESIA POSTPROCEDURE EVALUATION
Procedure(s):  LEFT TOTAL KNEE ARTHROPLASTY. regional, MAC, spinal    Anesthesia Post Evaluation      Multimodal analgesia: multimodal analgesia used between 6 hours prior to anesthesia start to PACU discharge  Patient location during evaluation: PACU  Patient participation: complete - patient participated  Level of consciousness: awake  Pain score: 2  Pain management: adequate  Airway patency: patent  Anesthetic complications: no  Cardiovascular status: acceptable  Respiratory status: acceptable  Hydration status: acceptable  Comments: I have evaluated the patient and meets criteria for discharge from PACU. Cintia Gomez MD  Post anesthesia nausea and vomiting:  controlled  Final Post Anesthesia Temperature Assessment:  Normothermia (36.0-37.5 degrees C)      INITIAL Post-op Vital signs:   Vitals Value Taken Time   /81 06/16/22 1545   Temp 36.4 °C (97.6 °F) 06/16/22 1525   Pulse 92 06/16/22 1550   Resp 14 06/16/22 1550   SpO2 97 % 06/16/22 1550   Vitals shown include unvalidated device data.

## 2022-06-16 NOTE — BRIEF OP NOTE
Brief Postoperative Note    Patient: Siri Rose  YOB: 1950  MRN: [id number]    Date of Procedure: 6/16/2022     Pre-Op Diagnosis: Primary osteoarthritis of left knee [M17.12]    Post-Op Diagnosis: Same as preoperative diagnosis. Procedure(s):  LEFT TOTAL KNEE ARTHROPLASTY  OPEN LATERAL RELEASE    Surgeon(s): Patricia Levine MD    Surgical Assistant: Physician Assistant: Jazlyn Mc PA-C  Surg Asst-1: Raudel Medrano    Anesthesia: SPINAL WITH MAC    Estimated Blood Loss (mL): 475     Complications: None    Specimens: BONE DISCARDED    Implants:   Implant Name Type Inv.  Item Serial No.  Lot No. LRB No. Used Action   Cemex System Genta   N/A TECRES [id number] Left 1 Implanted   COMPONENT FEM SZ 4 LT POST STBL KAREN TRULIANT - [id number]  COMPONENT FEM SZ 4 LT POST STBL KAREN TRULIANT [id number] [employer]WD N/A Left 1 Implanted   SCREW ATTCH TIB STEM EXACTECH - [id number]  SCREW ATTCH TIB STEM EXACTECH [id number] [employer] N/A Left 1 Implanted   COMPONENT PAT 32MM VIANEY 7MM THCK 3 PEG 3 RND LILIYA STD KAREN NP - [id number]  COMPONENT PAT 32MM VIANEY 7MM THCK 3 PEG 3 RND LILIYA STD KAREN NP [id number] [employer] N/A Left 1 Implanted   STEM EXTN 14MM VIANEY 40MM BETTINA TIB STABILIZING LILIYA Y KNEE FEM - [id number]  STEM EXTN 14MM VIANEY 40MM BETTINA TIB STABILIZING LILIYA Y KNEE FEM [id number] [employer]WD N/A Left 1 Implanted   COMPONENT TIB KAREN FIT 4F 3T LOGIC - [id number]  COMPONENT TIB KAREN FIT 4F 3T LOGIC [id number] [employer] N/A Left 1 Implanted   TRULIANT TIBIAL INSERT POSTERIOR STABILIZZED SIZE 4, 10MM   [id number] [employer] N/A Left 1 Implanted       Drains: NONE    Findings: OA    Electronically Signed by Ezra Benson MD on 6/16/2022 at 2:12 PM

## 2022-06-16 NOTE — PROGRESS NOTES
Primary Nurse Duncan Smiley, COLLINS and Shaina Marie RN performed a dual skin assessment on this patient No impairment noted  Antonino score is 19

## 2022-06-16 NOTE — ANESTHESIA PROCEDURE NOTES
Spinal Block    Start time: 6/16/2022 11:48 AM  End time: 6/16/2022 11:55 AM  Performed by: Suly Quiroz CRNA  Authorized by: Priscila Mueller MD     Pre-procedure:   Indications: primary anesthetic  Preanesthetic Checklist: patient identified, risks and benefits discussed, anesthesia consent, site marked, patient being monitored and timeout performed      Spinal Block:   Patient Position:  Seated  Prep Region:  Lumbar  Prep: chlorhexidine      Location:  L2-3  Technique:  Single shot    Local Dose (mL):  3    Needle:   Needle Type:  Pencil-tip  Needle Gauge:  24 G  Attempts:  1      Events: CSF confirmed, no blood with aspiration and no paresthesia        Assessment:  Insertion:  Uncomplicated  Patient tolerance:  Patient tolerated the procedure well with no immediate complications

## 2022-06-16 NOTE — ANESTHESIA PREPROCEDURE EVALUATION
Relevant Problems   No relevant active problems       Anesthetic History   No history of anesthetic complications            Review of Systems / Medical History  Patient summary reviewed, nursing notes reviewed and pertinent labs reviewed    Pulmonary            Asthma : well controlled       Neuro/Psych   Within defined limits           Cardiovascular    Hypertension                   GI/Hepatic/Renal  Within defined limits              Endo/Other        Morbid obesity and arthritis     Other Findings              Physical Exam    Airway  Mallampati: II  TM Distance: > 6 cm  Neck ROM: normal range of motion   Mouth opening: Normal     Cardiovascular  Regular rate and rhythm,  S1 and S2 normal,  no murmur, click, rub, or gallop             Dental  No notable dental hx       Pulmonary  Breath sounds clear to auscultation               Abdominal  GI exam deferred       Other Findings            Anesthetic Plan    ASA: 3  Anesthesia type: general          Induction: Intravenous  Anesthetic plan and risks discussed with: Patient

## 2022-06-16 NOTE — PROGRESS NOTES
Pharmacist Note - Warfarin Dosing  Consult provided for this 71 y.o. female to manage warfarin for DVT prophylaxis left knee total arthroplasty. INR Goal: 1.7- 2.2    Therapy Day: 1    Preop Dose: Kennedi Peer- none    Drugs that may increase INR: None  Drugs that may decrease INR: None  Other current anticoagulants/ drugs that may increase bleeding risk: None  Risk factors: Age > 65  Daily INR ordered: NO    No results for input(s): HGB, INR, HGBEXT, INREXT in the last 72 hours. Date               INR                 Dose  6/6  1  -  6/16  -  5 mg     Assessment/ Plan: Will order warfarin 5 mg PO x 1 dose. Pharmacy will continue to monitor daily and adjust therapy as indicated.

## 2022-06-16 NOTE — ANESTHESIA PROCEDURE NOTES
Peripheral Block    Start time: 6/16/2022 10:43 AM  End time: 6/16/2022 10:48 AM  Performed by: Terra Rosario MD  Authorized by: Terra Rosario MD       Pre-procedure: Indications: at surgeon's request and post-op pain management    Preanesthetic Checklist: patient identified, risks and benefits discussed, site marked, timeout performed, anesthesia consent given and patient being monitored    Timeout Time: 10:43 EDT          Block Type:   Block Type:   Adductor canal  Laterality:  Left  Monitoring:  Standard ASA monitoring, continuous pulse ox, frequent vital sign checks, heart rate, responsive to questions and oxygen  Injection Technique:  Single shot  Procedures: ultrasound guided    Patient Position: supine  Prep: DuraPrep    Needle Type:  Stimuplex  Needle Gauge:  22 G  Needle Localization:  Ultrasound guidance  Medication Injected:  Ropivacaine (PF) (NAROPIN)(0.5%) 5 mg/mL injection, 30 mL  Med Admin Time: 6/16/2022 10:48 AM    Assessment:  Number of attempts:  1  Injection Assessment:  Incremental injection every 5 mL, local visualized surrounding nerve on ultrasound, negative aspiration for blood, no paresthesia and no intravascular symptoms  Patient tolerance:  Patient tolerated the procedure well with no immediate complications
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
## 2022-06-16 NOTE — DISCHARGE INSTRUCTIONS
[facility]. Total Knee Replacement   Post-Op Discharge Instructions Knee   Dr. Terry David    Patient Name  Fransisco Burciaga  Date of procedure  6/16/2022    Procedure  Procedure(s):  LEFT TOTAL KNEE ARTHROPLASTY  Surgeon  Surgeon(s) and Role:     Chelsea Bojorquez MD - Primary  PCP: Mary Wagner MD    Follow up care   Follow up visit with Dr. Jumana Do in 2-3 weeks. Call [phone], extension [phone] to make an appointment    Activity at home   Take a short walk every hour; except at night when sleeping   Do your Home Exercise Program 3 times every day    After exercising lie down and elevate your leg on pillows for 15-30 minutes to decrease swelling   Refer to your patient notebook for more information   Preventing blood clots   Take Warfarin (Coumadin) every day as prescribed.  Do not take aspirin or anti-inflammatory medicine while taking Warfarin   Wear elastic stockings (TEDS) for 4 weeks. You may remove them daily for 1 hour when shoering/sponge-bathing.  Call Dr. Jumana Do if you have side effects of blood thinning medication: bleeding, bruising, upset stomach, or diarrhea.  Call Dr. Jumana Do for signs of a blood clot in your leg: calf pain, tenderness, redness, swelling of lower leg   Preventing lung congestion   Use your incentive spirometer 4 times a day; do 10 repetitions each time   Remember to keep the small blue ball between the two arrows when taking a slow, deep breath   Pain Management   Get up and walk a short distance to relieve pain and stiffness.  Place ice wrap on your knee except when you are walking. The gel ice packs should be changed about every 4 hours   Elevate your leg on pillows for 15-30 minutes    If needed, take a narcotic pain pill every 4-6 hours as prescribed.  Take all medications with a small amount of food.     As your pain decreases, take the narcotics less often or take ½ of a pill   Call Dr. Jumana Do if you have side effects from your narcotic pain medication: itching, drowsiness, dizziness, upset stomach, dry mouth, constipation or if you medication is not relieving your pain. Diet after surgery   You may resume your normal diet. Include vegetables, fruit, whole grains, lean meats, and low-fat dairy products. Eat food high in fiber    Drink plenty of fluids, including 8 cups of water daily   Take Senokot-s twice a day to prevent constipation    Avoid after surgery   Do not take any over-the-counter medication for pain except Tylenol   Do not take more than 3000mg (3 grams) of Tylenol in 24 hours.  Do not drink alcoholic beverages   Do not smoke   Do not drive until seen for follow up appointment   Do not place frozen gel pack directly on your skin. It can cause frostbite.  Do not take a tub bath, swim or get in a hot tub for 6 weeks  Prevention of falls and safety at home   Set up an area where you can rest comfortably leaving space around furniture to allow you to walk with your walker   Keep stairs, hallways and bathrooms well lit; especially at night   Arrange for care for your pets   Keep your home free of clutter   Call Dr. Gabriela Peters at [phone] for:   Pain that is not relieved by pain medication, ice and activity   Side effects of medications   Increased/spread of bruising   Warning signs of infection:  ? persistent fever greater than 100 degrees  ? shaking or chills  ? increased redness, tenderness, swelling or drainage from incision  ? increased pain during activity or rest   Warning signs of a blood clot in your leg:  ? increased pain in your calf  ? tenderness or redness  ? increased swelling or knee, calf, ankle or foot    Call [phone] after 5pm or on a weekend.  The on call physician will return your phone call  Call your Primary Care Doctor for:    Concerns about your medical conditions such as diabetes, high blood pressure, asthma, congestive heart failure   Blood sugars greater than 180   Persistent headache or dizziness   Coughing or congestion   Constipation or diarrhea   Burning when you go to the bathroom   Abnormal heart rate (fast or slow)      Call 911 and go to the nearest hospital for:    Sudden increased shortness of breath   Sudden onset of chest pain   Difficulty breathing   Localized chest pain with coughing or taking a deep breath     Home Health Care   Physical therapy   o 3 times a week for 3 weeks. o  Routine exercises, transfers, gait training, active straight leg raises  [address]  o Draw PT/INR every Wednesday for 3 weeks. o Fax the results to Dr. Jumana Do at [phone].   o If results are abnormal call [phone], extension [phone].

## 2022-06-17 ENCOUNTER — TELEPHONE (OUTPATIENT)
Dept: INTERNAL MEDICINE CLINIC | Age: 72
End: 2022-06-17

## 2022-06-17 LAB
ANION GAP SERPL CALC-SCNC: 9 MMOL/L (ref 5–15)
BUN SERPL-MCNC: 18 MG/DL (ref 6–20)
BUN/CREAT SERPL: 16 (ref 12–20)
CALCIUM SERPL-MCNC: 8.6 MG/DL (ref 8.5–10.1)
CHLORIDE SERPL-SCNC: 108 MMOL/L (ref 97–108)
CO2 SERPL-SCNC: 23 MMOL/L (ref 21–32)
CREAT SERPL-MCNC: 1.12 MG/DL (ref 0.55–1.02)
GLUCOSE SERPL-MCNC: 131 MG/DL (ref 65–100)
HGB BLD-MCNC: 10 G/DL (ref 11.5–16)
INR PPP: 1.1 (ref 0.9–1.1)
POTASSIUM SERPL-SCNC: 3.8 MMOL/L (ref 3.5–5.1)
PROTHROMBIN TIME: 11.5 SEC (ref 9–11.1)
SODIUM SERPL-SCNC: 140 MMOL/L (ref 136–145)

## 2022-06-17 PROCEDURE — 74011250637 HC RX REV CODE- 250/637

## 2022-06-17 PROCEDURE — 36415 COLL VENOUS BLD VENIPUNCTURE: CPT

## 2022-06-17 PROCEDURE — 97116 GAIT TRAINING THERAPY: CPT

## 2022-06-17 PROCEDURE — 74011250636 HC RX REV CODE- 250/636

## 2022-06-17 PROCEDURE — 74011250636 HC RX REV CODE- 250/636: Performed by: ORTHOPAEDIC SURGERY

## 2022-06-17 PROCEDURE — 85610 PROTHROMBIN TIME: CPT

## 2022-06-17 PROCEDURE — 97161 PT EVAL LOW COMPLEX 20 MIN: CPT

## 2022-06-17 PROCEDURE — 65270000029 HC RM PRIVATE

## 2022-06-17 PROCEDURE — 74011250637 HC RX REV CODE- 250/637: Performed by: ORTHOPAEDIC SURGERY

## 2022-06-17 PROCEDURE — 80048 BASIC METABOLIC PNL TOTAL CA: CPT

## 2022-06-17 PROCEDURE — 77030012893

## 2022-06-17 PROCEDURE — 97530 THERAPEUTIC ACTIVITIES: CPT

## 2022-06-17 PROCEDURE — 74011000250 HC RX REV CODE- 250: Performed by: ORTHOPAEDIC SURGERY

## 2022-06-17 PROCEDURE — 85018 HEMOGLOBIN: CPT

## 2022-06-17 RX ORDER — WARFARIN SODIUM 5 MG/1
5 TABLET ORAL ONCE
Status: COMPLETED | OUTPATIENT
Start: 2022-06-17 | End: 2022-06-17

## 2022-06-17 RX ORDER — ACETAMINOPHEN 325 MG/1
650 TABLET ORAL EVERY 6 HOURS
Status: DISCONTINUED | OUTPATIENT
Start: 2022-06-17 | End: 2022-06-18 | Stop reason: HOSPADM

## 2022-06-17 RX ADMIN — OXYCODONE 10 MG: 5 TABLET ORAL at 17:03

## 2022-06-17 RX ADMIN — WATER 2 G: 1 INJECTION INTRAMUSCULAR; INTRAVENOUS; SUBCUTANEOUS at 03:45

## 2022-06-17 RX ADMIN — WARFARIN SODIUM 5 MG: 5 TABLET ORAL at 12:20

## 2022-06-17 RX ADMIN — SENNOSIDES AND DOCUSATE SODIUM 1 TABLET: 50; 8.6 TABLET ORAL at 18:32

## 2022-06-17 RX ADMIN — ACETAMINOPHEN 650 MG: 325 TABLET ORAL at 23:39

## 2022-06-17 RX ADMIN — ACETAMINOPHEN 650 MG: 325 TABLET ORAL at 18:32

## 2022-06-17 RX ADMIN — OXYCODONE 10 MG: 5 TABLET ORAL at 23:39

## 2022-06-17 RX ADMIN — LOSARTAN POTASSIUM 100 MG: 50 TABLET, FILM COATED ORAL at 07:28

## 2022-06-17 RX ADMIN — OXYCODONE 10 MG: 5 TABLET ORAL at 07:28

## 2022-06-17 RX ADMIN — OXYCODONE 10 MG: 5 TABLET ORAL at 03:45

## 2022-06-17 RX ADMIN — ACETAMINOPHEN 650 MG: 325 TABLET ORAL at 11:14

## 2022-06-17 RX ADMIN — SODIUM CHLORIDE 125 ML/HR: 900 INJECTION, SOLUTION INTRAVENOUS at 17:04

## 2022-06-17 RX ADMIN — Medication 1000 UNITS: at 09:44

## 2022-06-17 RX ADMIN — FLUTICASONE PROPIONATE 2 SPRAY: 50 SPRAY, METERED NASAL at 09:45

## 2022-06-17 RX ADMIN — ACETAMINOPHEN 650 MG: 325 TABLET ORAL at 03:45

## 2022-06-17 RX ADMIN — FAMOTIDINE 20 MG: 20 TABLET ORAL at 18:32

## 2022-06-17 RX ADMIN — OXYCODONE 10 MG: 5 TABLET ORAL at 13:59

## 2022-06-17 RX ADMIN — OXYCODONE 10 MG: 5 TABLET ORAL at 20:32

## 2022-06-17 RX ADMIN — KETOROLAC TROMETHAMINE 15 MG: 30 INJECTION, SOLUTION INTRAMUSCULAR; INTRAVENOUS at 07:28

## 2022-06-17 RX ADMIN — KETOROLAC TROMETHAMINE 15 MG: 30 INJECTION, SOLUTION INTRAMUSCULAR; INTRAVENOUS at 11:15

## 2022-06-17 RX ADMIN — MONTELUKAST 10 MG: 10 TABLET, FILM COATED ORAL at 09:43

## 2022-06-17 RX ADMIN — POLYETHYLENE GLYCOL 3350 17 G: 17 POWDER, FOR SOLUTION ORAL at 09:43

## 2022-06-17 RX ADMIN — SODIUM CHLORIDE, PRESERVATIVE FREE 10 ML: 5 INJECTION INTRAVENOUS at 07:29

## 2022-06-17 RX ADMIN — SENNOSIDES AND DOCUSATE SODIUM 1 TABLET: 50; 8.6 TABLET ORAL at 09:43

## 2022-06-17 RX ADMIN — SODIUM CHLORIDE, PRESERVATIVE FREE 10 ML: 5 INJECTION INTRAVENOUS at 22:24

## 2022-06-17 RX ADMIN — OXYCODONE 10 MG: 5 TABLET ORAL at 09:44

## 2022-06-17 RX ADMIN — FAMOTIDINE 20 MG: 20 TABLET ORAL at 09:43

## 2022-06-17 NOTE — OP NOTES
[address]   OPERATIVE REPORT    Name:  Marcos Mclain  MR#:  [id number]  :  1950  ACCOUNT #:  [de-identified]  DATE OF SERVICE:  2022      PREOPERATIVE DIAGNOSIS:  End-stage osteoarthritis of the left knee. POSTOPERATIVE DIAGNOSIS:  End-stage osteoarthritis of the left knee. PROCEDURES PERFORMED:  1. Total knee arthroplasty. 2.  Open lateral release, left knee. SURGEON:  Cami Boss MD    ASSISTANT:  Mariza Díaz PA-C    ANESTHESIA:  Spinal with MAC  And exparell    COMPLICATIONS:  Negative. SPECIMENS REMOVED:  Bone, discarded. IMPLANTS:  Exactech Truliant posterior stabilized knee, size 4 femur, size 3 tibia with a 10-mm posterior stablized and 32 patella component cemented. ESTIMATED BLOOD LOSS:  200    FLUIDS:  200 mL crystalloids given. PREOPERATIVE MEDICINE:  2 g of Ancef. HISTORY:  A 71year-old who I have taken care of in my office for years. She has bilateral knee osteoarthritis, valgus deformity with bone-on-bone arthritis of the left knee, reurned to the office several months ago with increased pain in both knees. She rated her pain 7/10. She describes significant decline in her activities of daily living. She was gaining weight because she was not able to get out of the house and move. She wished to proceed with knee replacement. I thought it was reasonable and appropriate to consider this given her x-ray findings and her description. I talked to her about total knee replacement. I went over the technique in great detail. I talked to her about her expectation. She does have a higher BMI and I explained to the patient, she very well may have higher risks of postoperative comorbidities with UTIs, postoperative infection, fracture dislocation. She understood this.   We went over the associated risks with total knee replacement such as but not limited to postoperative pain, black and blue, numbness and tingling, fracture dislocation, revision total knee surgery, DVT, PE, MI, CVA, and other unforeseen events could occur in a perioperative fashion. The patient also understood a significant amount of physical therapy will be required. Understanding all the risks and benefits as mentioned above, the patient wished to proceed, signed the consent, and was taken to surgery. PROCEDURE:  The patient was taken back to surgery, placed supine on the operating table, administered spinal anesthetic by CRNA. After adequate anesthetic, the patient was placed supine on the operating table with all bony prominences protected. Tourniquet was applied on her left upper thigh. She received 2 g of Ancef. Time-out was performed and all parties agreed that the left knee was the correct knee. Sterile prep and drape of the left knee was performed. Esmarch was used to exsanguinate the extremity. Tourniquet was inflated to 300 mmHg. Standard anterior midline incision was made with a 10 blade. Sharp dissection was taken down through the skin and subcutaneous tissue. Extensor mechanism was developed. A standard medial parapatellar arthrotomy was performed. Small portion of the fat pad was removed. The patella was everted. Freehand resection of the patella was performed with a bone saw, the size to be 32, appropriate drill holes were made. Osteophytes were removed as well as the ACL and PCL to set up for PS knee. Outriggers were attached for navigation. We mapped all bony landmarks. Computer went through its requisition. We mixed and matched until the flexion gap and extension gap was appropriate. We chose the size 4. The femoral jig was attached and the distal cut block was adjusted until it was corrected via navigation. We checked this 3 times and a cut was made respectively. The tibia was then cut using navigation. It was cut at neutral at 5 degrees of slope. We checked the extension gap.   It seemed to be appropriate for anywhere from 9 to 10. A 4-in-1 cutting block was then attached to the femur and the cuts were made respectively. The bone stock was removed. Exparel was injected into the posterior compartment. The femur was then notched. We then trialed with a size 9 and the knee was well balanced both medial to lateral as well as flexion and extension. Trial components were removed. We sized the tibia to be size 3. We drilled and broached appropriately. Because of the size of the patient, I did recommend an extension onto the tibia to aid in stability. The wound was irrigated with Irrisept followed by pulsatile irrigation. Antibiotic-impregnated cement was mixed in the back table. Bone stock was dried. We cemented the tibia followed by the femur, placed a 10-mm posterior stabilized trial in place. The patella was then cemented. Any excess cement was removed. The knee was drawn into extension and left there until final curing of the cement. The rest of the Exparel was injected through the anterior compartment. After curing of the cement, the knee was placed through range of motion. The knee had great stability and range of motion, had a tendency to track just slightly lateral.  I believe the majority of this was secondary to old scar and extensor mechanism manipulation from a surgery that was performed years prior to this in the superior region of her thigh. Nevertheless, I felt the lateral release would be appropriate. An external lateral release was performed taking care not to invade the synovium. This did improve maltracking greatly. The trial was removed. We irrigated the knee one last time. The final size 10 was inserted. The knee was reduced. Tourniquet was released. TXA was topically placed in the wound. Electrocautery was used to control any of the more prominent bleeding. The wound was then pulsatile irrigated.   #2 Vicryl was used to close the extensor mechanism in the superior portion followed by Stratafix in the inferior portion of the extensor mechanism. 0 Vicryl was placed in the subcutaneous layers of Basia. 2-0 Vicryl was placed in the dermal layer and running 4-0 Monocryl placed in the epidermis. Steri-Strips were applied over the incision. Sterile dry dressing was placed over the shoulder. The patient was awakened from sedation and transferred to the recovery room in stable condition. Geryl Peeling was available throughout the case. She helped in positioning the patient. She also helped with holding retractor and balancing the knee. She was also integral in closing the wound and transporting the patient to the recovery room.         MD ANDREI [email or web address]/[id number]  D:  06/17/2022 6:23  T:  06/17/2022 10:50  JOB #:  [id number]

## 2022-06-17 NOTE — PROGRESS NOTES
Problem: Falls - Risk of  Goal: *Absence of Falls  Description: Document Natalee Galicia Fall Risk and appropriate interventions in the flowsheet.   Outcome: Progressing Towards Goal  Note: Fall Risk Interventions:  Mobility Interventions: Bed/chair exit alarm,Patient to call before getting OOB         Medication Interventions: Patient to call before getting OOB    Elimination Interventions: Call light in reach    History of Falls Interventions: Bed/chair exit alarm         Problem: Patient Education: Go to Patient Education Activity  Goal: Patient/Family Education  Outcome: Progressing Towards Goal     Problem: Patient Education: Go to Patient Education Activity  Goal: Patient/Family Education  Outcome: Progressing Towards Goal     Problem: Knee Replacement: Day of Surgery/Unit  Goal: Off Pathway (Use only if patient is Off Pathway)  Outcome: Progressing Towards Goal  Goal: Activity/Safety  Outcome: Progressing Towards Goal  Goal: Consults, if ordered  Outcome: Progressing Towards Goal  Goal: Diagnostic Test/Procedures  Outcome: Progressing Towards Goal  Goal: Nutrition/Diet  Outcome: Progressing Towards Goal  Goal: Medications  Outcome: Progressing Towards Goal  Goal: Respiratory  Outcome: Progressing Towards Goal  Goal: Treatments/Interventions/Procedures  Outcome: Progressing Towards Goal  Goal: Psychosocial  Outcome: Progressing Towards Goal  Goal: *Initiate mobility  Outcome: Progressing Towards Goal  Goal: *Optimal pain control at patient's stated goal  Outcome: Progressing Towards Goal  Goal: *Hemodynamically stable  Outcome: Progressing Towards Goal     Problem: Knee Replacement: Post-Op Day 1  Goal: Off Pathway (Use only if patient is Off Pathway)  Outcome: Progressing Towards Goal  Goal: Activity/Safety  Outcome: Progressing Towards Goal  Goal: Diagnostic Test/Procedures  Outcome: Progressing Towards Goal  Goal: Nutrition/Diet  Outcome: Progressing Towards Goal  Goal: Medications  Outcome: Progressing Towards Goal  Goal: Respiratory  Outcome: Progressing Towards Goal  Goal: Treatments/Interventions/Procedures  Outcome: Progressing Towards Goal  Goal: Psychosocial  Outcome: Progressing Towards Goal  Goal: Discharge Planning  Outcome: Progressing Towards Goal  Goal: *Demonstrates progressive activity  Outcome: Progressing Towards Goal  Goal: *Optimal pain control at patient's stated goal  Outcome: Progressing Towards Goal  Goal: *Hemodynamically stable  Outcome: Progressing Towards Goal  Goal: *Discharge plan identified  Outcome: Progressing Towards Goal     Problem: Knee Replacement: Post-Op Day 2  Goal: Off Pathway (Use only if patient is Off Pathway)  Outcome: Progressing Towards Goal  Goal: Activity/Safety  Outcome: Progressing Towards Goal  Goal: Diagnostic Test/Procedures  Outcome: Progressing Towards Goal  Goal: Medications  Outcome: Progressing Towards Goal  Goal: Respiratory  Outcome: Progressing Towards Goal  Goal: Treatments/Interventions/Procedures  Outcome: Progressing Towards Goal  Goal: Psychosocial  Outcome: Progressing Towards Goal  Goal: *Met physical therapy criteria for discharge to the next level of care  Outcome: Progressing Towards Goal  Goal: *Optimal pain control with oral analgesia  Outcome: Progressing Towards Goal  Goal: *Hemodynamically stable  Outcome: Progressing Towards Goal  Goal: *Tolerating diet  Outcome: Progressing Towards Goal  Goal: *Patient verbalizes understanding of discharge instructions  Outcome: Progressing Towards Goal

## 2022-06-17 NOTE — PROGRESS NOTES
Problem: Falls - Risk of  Goal: *Absence of Falls  Description: Document Cindia Neigh Fall Risk and appropriate interventions in the flowsheet.   Outcome: Progressing Towards Goal  Note: Fall Risk Interventions:  Mobility Interventions: PT Consult for assist device competence,PT Consult for mobility concerns,Patient to call before getting OOB,OT consult for ADLs,Bed/chair exit alarm         Medication Interventions: Teach patient to arise slowly,Patient to call before getting OOB    Elimination Interventions: Call light in reach,Toileting schedule/hourly rounds    History of Falls Interventions: Room close to nurse's station,Investigate reason for fall,Door open when patient unattended

## 2022-06-17 NOTE — PROGRESS NOTES
Patient assessed for readiness to ambulate. Vital Signs  Level of Consciousness: Alert (0)  Temp: 98.2 °F (36.8 °C)  Temp Source: Oral  Pulse (Heart Rate): (!) 112  Heart Rate Source: Monitor  Cardiac Rhythm: Sinus Rhythm  Resp Rate: 18  BP: 119/74  MAP (Calculated): 89  BP 1 Location: Right upper arm  BP 1 Method: Automatic  BP Patient Position: At rest  MEWS Score: 3. Patient ambulated with assistance of 2 nurses. Patient ambulated with gait belt and walker. Patient walked to Bedside commode. Patient returned safely to bed.
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
## 2022-06-17 NOTE — PROGRESS NOTES
Pharmacist Note - Warfarin Dosing  Consult provided for this 71 y. o.female to manage warfarin for DVT prophylaxis left knee total arthroplasty. INR Goal: 1.7- 2.2    Home regimen/ tablet size: 2    Drugs that may increase INR: None  Drugs that may decrease INR: None  Other current anticoagulants/ drugs that may increase bleeding risk: None  Risk factors: Age > 65  Daily INR ordered: YES    Recent Labs     06/17/22  0559   HGB 10.0*   INR 1.1     Date               INR                  Dose  6/6   1   -   6/16   -   5 mg    6/17    1.1   5 mg                                                                               Assessment/ Plan: Will order warfarin 5 mg PO x 1 dose. Pharmacy will continue to monitor daily and adjust therapy as indicated. Please contact the pharmacist at  for outpatient recommendations if needed.

## 2022-06-17 NOTE — PROGRESS NOTES
Problem: Mobility Impaired (Adult and Pediatric)  Goal: *Acute Goals and Plan of Care (Insert Text)  Description: FUNCTIONAL STATUS PRIOR TO ADMISSION: Patient was independent and active without use of DME.    HOME SUPPORT PRIOR TO ADMISSION: The patient lived with children but did not require assist. She will be going to son's house for 2 weeks upon discharge. Physical Therapy Goals  Initiated 6/17/2022    1. Patient will move from supine to sit and sit to supine , scoot up and down, and roll side to side in bed with modified independence within 4 days. 2. Patient will perform sit to stand with modified independence within 4 days. 3. Patient will ambulate with modified independence for 75 feet with the least restrictive device within 4 days. 4. Patient will ascend/descend 4 stairs with cane and one handrail(s) with supervision/set-up within 4 days. 5. Patient will perform home exercise program per protocol with independence within 4 days. 6. Patient will demonstrate AROM 0-90 degrees in operative joint within 4 days. 6/17/2022 1549 by Moise Padilla  Outcome: Progressing Towards Goal   PHYSICAL THERAPY TREATMENT  Patient: Fransisco Burciaga (21 y.o. female)  Date: 6/17/2022  Diagnosis: Primary osteoarthritis of left knee [M17.12]  Primary localized osteoarthritis of left knee [M17.12] <principal problem not specified>  Procedure(s) (LRB):  LEFT TOTAL KNEE ARTHROPLASTY (Left) 1 Day Post-Op  Precautions: Fall,WBAT  Chart, physical therapy assessment, plan of care and goals were reviewed. ASSESSMENT  Patient continues with skilled PT services and is progressing towards goals. Patient performed bed mobility, transfers, gait. Able to self manage LLE with supine to sit (via \"scoop\" method), but still required assist to manage LLE with sit-supine (despite attempting \"scoop\", then \"strap\" methods). Better gait better (step-through) better gait speed, better gait tolerance.     Anticipate discharge tomorrow after 1-2 more PT sessions. Recommend taking patient to gym in wheelchair to attempt stairs tomorrow, as her activity tolerance is generally diminished. Current Level of Function Impacting Discharge (mobility/balance): Contact guard for supine-sit, bed, toilet transfers, gait. Minimal assistance for sit to supine to manage LLE. Contact guard assistance with RW and gait belt, 75 ft, better speed, step-through gait placing left foot down on the ground. Other factors to consider for discharge: Motivated/A & O x 4/Supportive Family/Independent PLOF          PLAN :  Patient continues to benefit from skilled intervention to address the above impairments. Continue treatment per established plan of care. to address goals. Recommendation for discharge: (in order for the patient to meet his/her long term goals)  Physical therapy at least 2 days/week in the home AND ensure assist and/or supervision for safety with all mobility and ADLs. (Will be staying with son upon discharge). This discharge recommendation:  Has been made in collaboration with the attending provider and/or case management    IF patient discharges home will need the following DME: patient owns DME required for discharge       SUBJECTIVE:   Patient stated I need to go to the bathroom.     OBJECTIVE DATA SUMMARY:   Critical Behavior:  Neurologic State: Alert,Appropriate for age  Orientation Level: Oriented X4        Functional Mobility Training:  Bed Mobility:     Supine to Sit: Contact guard assistance; Additional time (\"scoop\" method to manage RLE)  Sit to Supine: Minimum assistance; Additional time (attempted scoop and strap methods, still needed assist LLE)  Scooting: Minimum assistance        Transfers:  Sit to Stand: Contact guard assistance  Stand to Sit: Contact guard assistance        Bed to Chair: Contact guard assistance                    Balance:  Sitting: Intact  Standing: Impaired; Without support  Standing - Static: Good;Constant support  Standing - Dynamic : Good;Constant support  Ambulation/Gait Training:  Distance (ft): 75 Feet (ft)  Assistive Device: Walker, rolling;Gait belt  Ambulation - Level of Assistance: Contact guard assistance        Gait Abnormalities: Antalgic;Decreased step clearance     Left Side Weight Bearing: As tolerated  Base of Support: Widened;Shift to right  Stance: Left decreased  Speed/Sonia: Slow;Shuffled  Step Length: Right shortened  Swing Pattern: Left asymmetrical     Interventions: Safety awareness training;Verbal cues         Therapeutic Exercises: Ankle Pumps  Ham Sets  Quad Sets  Heel Slides  X 10 each, 5-7x daily      Pain Ratin/10    Activity Tolerance:   Good    After treatment patient left in no apparent distress:   Supine in bed, Call bell within reach, Side rails x 3, and nurse notified. COMMUNICATION/COLLABORATION:   The patients plan of care was discussed with: Registered nurse and Case management.      Ilir Fuller   Time Calculation: 45 mins

## 2022-06-17 NOTE — PROGRESS NOTES
Transition of Care Plan   RUR: 8%   Disposition: The disposition plan is home with [facility] [address]; placed on AVS   F/U with PCP/Specialist     Transport: son       Reason for Admission:  Primary osteoarthritis of left knee                     RUR Score:      8%               Plan for utilizing home health:  SN/PT/OT recommended; [facility]      PCP: First and Last name:  Chika Ortiz MD     Name of Practice:    Are you a current patient: Yes/No:    Approximate date of last visit:    Can you participate in a virtual visit with your PCP:                     Current Advanced Directive/Advance Care Plan: Full Code      Healthcare Decision Maker:                     Transition of Care Plan:                      CRM met with patient, introduced self, explained role, verified demographics, and offered assistance. The patient lives alone in a home with 4 steps to enter. The patient is independent in her ADL's/IADL's and does have a walker. The patient uses [facility] for her prescriptions. Patient is going to stay with her son for the next 2 weeks. The address is [address] [place] [address], [address] [place] [address]. Care Management Interventions  PCP Verified by CM: Yes  Palliative Care Criteria Met (RRAT>21 & CHF Dx)?: No  Mode of Transport at Discharge:  Other (see comment) (son)  Transition of Care Consult (CM Consult): Discharge Planning  MyChart Signup: No  Discharge Durable Medical Equipment: No  Health Maintenance Reviewed: Yes  Physical Therapy Consult: Yes  Occupational Therapy Consult: Yes  Speech Therapy Consult: No  Support Systems: Child(diann)  Confirm Follow Up Transport: Family  [employer] Information Provided?: No  Discharge Location  Patient Expects to be Discharged to[de-identified] Home with home health    9:13 AM  HARLAN Seymour

## 2022-06-17 NOTE — PROGRESS NOTES
POD 1 Day Post-Op    Procedure:  Procedure(s):  LEFT TOTAL KNEE ARTHROPLASTY    Subjective:     Patient doing well. Pain somewhat controlled    Objective:     Blood pressure 139/75, pulse (!) 114, temperature (!) 100.5 °F (38.1 °C), resp. rate 18, height 5' 4.5\" (1.638 m), weight 256 lb 2.8 oz (116.2 kg), SpO2 92 %. Temp (24hrs), Av.2 °F (36.8 °C), Min:97.6 °F (36.4 °C), Max:100.5 °F (38.1 °C)      Physical Exam:  Examination of the left knee reveals that the incision is clean, dry and intact. Sensation is intact to light touch.  mild shadow drainage not unanticipated. no calf pain.   NVI    Labs:   Lab Results   Component Value Date/Time    HGB 10.0 (L) 2022 05:59 AM         Assessment:     Active Problems:    Primary localized osteoarthritis of left knee (2022)        Plan/Recommendations/Medical Decision Making:     Continue physical therapy  Ice and elevate  Continue coumadin for DVT prophylaxis

## 2022-06-17 NOTE — PROGRESS NOTES
NP Pastor Fisher notified of patients . No new orders at this time keep patient on fluids over night and encourage the use of incentive spirometer.

## 2022-06-17 NOTE — PROGRESS NOTES
Ortho NP Note    POD# 1  s/p LEFT TOTAL KNEE ARTHROPLASTY   Pt seen in room    Pt resting in bed. Reports pain to anterior and posterior knee is currently 8/10. Has been taking oxycodone overnight with some relief but reports ongoing pain to knee exacerbated by movement, ambulation. Patient has used oxycodone in the past and denies side effects. Ate breakfast, no N/V. Denies CP, SOB. Aware of plan for therapy sessions today to evaluate mobility for discharge. Tachycardia, but afebrile this morning. Patient reports that overnight she was covered with 4 blankets and then felt warm but denies chills, rigors. Has not yet started using IS, extensive instruction and return demonstration for use. Visit Vitals  /68 (BP 1 Location: Left arm, BP Patient Position: At rest)   Pulse (!) 116   Temp 98.9 °F (37.2 °C)   Resp 18   Ht 5' 4.5\" (1.638 m)   Wt 116.2 kg (256 lb 2.8 oz)   SpO2 92%   BMI 43.29 kg/m²       Voiding status: spontaneous void   Output (mL)  Urine Voided: 150 ml (06/16/22 1700)  Last Bowel Movement Date: 06/16/22 (06/16/22 2127)  Unmeasurable Output  Urine Occurrence(s): 2 (06/17/22 0035)      Labs    Lab Results   Component Value Date/Time    HGB 10.0 (L) 06/17/2022 05:59 AM      Lab Results   Component Value Date/Time    INR 1.1 06/17/2022 05:59 AM      Lab Results   Component Value Date/Time    Sodium 140 06/17/2022 05:59 AM    Potassium 3.8 06/17/2022 05:59 AM    Chloride 108 06/17/2022 05:59 AM    CO2 23 06/17/2022 05:59 AM    Glucose 131 (H) 06/17/2022 05:59 AM    BUN 18 06/17/2022 05:59 AM    Creatinine 1.12 (H) 06/17/2022 05:59 AM    Calcium 8.6 06/17/2022 05:59 AM     Recent Glucose Results:   Lab Results   Component Value Date/Time     (H) 06/17/2022 05:59 AM    GLUCPOC 89 06/16/2022 10:23 AM           ACE wrap removed with moderate amount of dried sanguinous drainage to gauze pads. No active bleeding noted. ABD/tape dressing applie. d    Cryotherapy in place over incision  Calves soft and supple; No pain with passive stretch  Bilateral LEs warm, dry. 2+ DP pulses. Sensation and motor intact - PF/DF/EHL intact 5/5  Foot Pumps for mechanical DVT proph while in bed     PLAN:  I have reviewed progress note and am in agreement with plan as documented by Dr Dee uMrillo. Interval updates and discharge preparation as follows:        1) PT: BID WBAT in hospital.  Therapy to be continued at home with HH--CM involved to arrange  2) Anticoagulation: Coumadin for DVT Prophylaxis. 5 mg yesterday, INR today: 1.1. Encouraged ongoing mobilization, bed exercises. 3) Pain - Multimodal approach including cryotherapy, scheduled tylenol, toradol with PRN oxycodone or IV dilaudid depending on pain severity. To be discharged with oxycodone rx--all medications at [address] per patient request.  Discussed precautions for narcotic use: avoid driving, ETOH, other sedating medications. 4) Post operative care: Continue IS use, wound care and dressing change instructions provided to patient  5) Post operative anemia: PAT Hgb on 6/6: 12.1. EBL: 200 mL. POD 1 Hgb: 10.0. No active bleeding noted on dressing change, patient currently asymptomatic but will continue VS, evaluate with therapy. Expected Acute blood loss post-op anemia, continue to monitor. 6) Tachycardia: Pre op HR: 105 on 6/6/22. Likely due to pain, post operative anemia. Extended maintenance IV fluids, encouraged PO intake. Continue VS.    7) Readniess for discharge: to son's house at discharge, will have 24/7 supervision.        Discharge to home with [facility] likely Saturday pending therapy clearance (stair training), pain control , and medical stability     Meds: Rx's sent by MD to [facility] Rivas Miguel NP  Available via [employer]

## 2022-06-18 VITALS
TEMPERATURE: 98.8 F | HEART RATE: 119 BPM | WEIGHT: 256.17 LBS | DIASTOLIC BLOOD PRESSURE: 89 MMHG | RESPIRATION RATE: 16 BRPM | SYSTOLIC BLOOD PRESSURE: 155 MMHG | OXYGEN SATURATION: 90 % | BODY MASS INDEX: 42.68 KG/M2 | HEIGHT: 65 IN

## 2022-06-18 LAB
INR PPP: 1.3 (ref 0.9–1.1)
PROTHROMBIN TIME: 13.4 SEC (ref 9–11.1)

## 2022-06-18 PROCEDURE — 74011250637 HC RX REV CODE- 250/637: Performed by: ORTHOPAEDIC SURGERY

## 2022-06-18 PROCEDURE — 85610 PROTHROMBIN TIME: CPT

## 2022-06-18 PROCEDURE — 74011000250 HC RX REV CODE- 250: Performed by: ORTHOPAEDIC SURGERY

## 2022-06-18 PROCEDURE — 36415 COLL VENOUS BLD VENIPUNCTURE: CPT

## 2022-06-18 PROCEDURE — 97116 GAIT TRAINING THERAPY: CPT

## 2022-06-18 PROCEDURE — 74011250637 HC RX REV CODE- 250/637

## 2022-06-18 PROCEDURE — 97530 THERAPEUTIC ACTIVITIES: CPT

## 2022-06-18 RX ORDER — LOSARTAN POTASSIUM 50 MG/1
100 TABLET ORAL DAILY
Status: DISCONTINUED | OUTPATIENT
Start: 2022-06-18 | End: 2022-06-18 | Stop reason: HOSPADM

## 2022-06-18 RX ORDER — WARFARIN SODIUM 5 MG/1
5 TABLET ORAL ONCE
Status: DISCONTINUED | OUTPATIENT
Start: 2022-06-18 | End: 2022-06-18 | Stop reason: HOSPADM

## 2022-06-18 RX ADMIN — FLUTICASONE PROPIONATE 2 SPRAY: 50 SPRAY, METERED NASAL at 08:33

## 2022-06-18 RX ADMIN — POLYETHYLENE GLYCOL 3350 17 G: 17 POWDER, FOR SOLUTION ORAL at 08:34

## 2022-06-18 RX ADMIN — Medication 1000 UNITS: at 08:33

## 2022-06-18 RX ADMIN — ACETAMINOPHEN 650 MG: 325 TABLET ORAL at 11:52

## 2022-06-18 RX ADMIN — SODIUM CHLORIDE, PRESERVATIVE FREE 10 ML: 5 INJECTION INTRAVENOUS at 06:40

## 2022-06-18 RX ADMIN — OXYCODONE 10 MG: 5 TABLET ORAL at 13:56

## 2022-06-18 RX ADMIN — ACETAMINOPHEN 650 MG: 325 TABLET ORAL at 06:40

## 2022-06-18 RX ADMIN — OXYCODONE 10 MG: 5 TABLET ORAL at 06:40

## 2022-06-18 RX ADMIN — SODIUM CHLORIDE, PRESERVATIVE FREE 10 ML: 5 INJECTION INTRAVENOUS at 14:00

## 2022-06-18 RX ADMIN — MONTELUKAST 10 MG: 10 TABLET, FILM COATED ORAL at 08:33

## 2022-06-18 RX ADMIN — OXYCODONE 10 MG: 5 TABLET ORAL at 10:11

## 2022-06-18 RX ADMIN — OXYCODONE 10 MG: 5 TABLET ORAL at 02:55

## 2022-06-18 RX ADMIN — SENNOSIDES AND DOCUSATE SODIUM 1 TABLET: 50; 8.6 TABLET ORAL at 08:33

## 2022-06-18 RX ADMIN — FAMOTIDINE 20 MG: 20 TABLET ORAL at 08:33

## 2022-06-18 RX ADMIN — LOSARTAN POTASSIUM 100 MG: 50 TABLET, FILM COATED ORAL at 08:33

## 2022-06-18 NOTE — PROGRESS NOTES
Problem: Mobility Impaired (Adult and Pediatric)  Goal: *Acute Goals and Plan of Care (Insert Text)  Description: FUNCTIONAL STATUS PRIOR TO ADMISSION: Patient was independent and active without use of DME.    HOME SUPPORT PRIOR TO ADMISSION: The patient lived with children but did not require assist. She will be going to son's house for 2 weeks upon discharge. Physical Therapy Goals  Initiated 6/17/2022    1. Patient will move from supine to sit and sit to supine , scoot up and down, and roll side to side in bed with modified independence within 4 days. 2. Patient will perform sit to stand with modified independence within 4 days. 3. Patient will ambulate with modified independence for 75 feet with the least restrictive device within 4 days. 4. Patient will ascend/descend 4 stairs with cane and one handrail(s) with supervision/set-up within 4 days. 5. Patient will perform home exercise program per protocol with independence within 4 days. 6. Patient will demonstrate AROM 0-90 degrees in operative joint within 4 days. Outcome: Progressing Towards Goal   PHYSICAL THERAPY NOTE  Patient: Fransisco Burciaga (45 y.o. female)  Date: 6/18/2022  Primary Diagnosis: Primary osteoarthritis of left knee [M17.12]  Primary localized osteoarthritis of left knee [M17.12]  Procedure(s) (LRB):  LEFT TOTAL KNEE ARTHROPLASTY (Left) 2 Days Post-Op   Precautions:  Fall,WBAT    Fransisco Burciaga was seen for morning PT session. She is walking 30 ft this morning w/ the RW. Continue to note pt amb on toes d/t c/o tightness in back of LLE. She I able to intermittently amb w/ a flat foot, however reverts to toe walking and requires cues to keep heel flat. Pt w/ limited activity tolerance and fatigues fairly easily. W/c transported to steps. Patient was instructed in stair management and able to negotiate 4 steps easily w/ CGA using both rails.  She returned to recliner chair at bedside w/ LE's reclined/elevated and pillow under Left LE/heel w/ ice packs applied ant/post.  Reviewed activity recommendations and restrictions with patient. Jackson Alexis is cleared for discharge home w/ HHPT from a mobility standpoint and RN is aware. Morning session functional mobility:  Bed Mobility:     Supine to Sit: Minimum assistance (to EOB)  Sit to Supine:  (LLE only)     Transfers:  Sit to Stand: Contact guard assistance  Stand to Sit: Contact guard assistance                       Balance:   Sitting: Intact  Standing: Intact; With support  Standing - Static: Constant support;Good  Standing - Dynamic : Constant support;Good  Ambulation/Gait Training:  Distance (ft): 30 Feet (ft)  Assistive Device: Gait belt;Walker, rolling  Ambulation - Level of Assistance: Contact guard assistance;Assist x1        Gait Abnormalities: Antalgic;Decreased step clearance; Step to gait; Toe walking (intermittently able to amb w/ flat foot but reverts to toes)  Right Side Weight Bearing: Full  Left Side Weight Bearing: As tolerated  Base of Support: Shift to right;Widened  Stance: Left decreased  Speed/Sonia: Pace decreased (<100 feet/min); Slow  Step Length: Left shortened;Right shortened  Swing Pattern: Left asymmetrical     Interventions: Safety awareness training        Stairs:  Number of Stairs Trained: 4  Stairs - Level of Assistance: Contact guard assistance;Assist X1  Rail Use: Both    Thank you,  Vanessa Lancaster,PTA   Time Calculation: 29 mins

## 2022-06-18 NOTE — PROGRESS NOTES
Problem: Falls - Risk of  Goal: *Absence of Falls  Description: Document Maria E Bai Fall Risk and appropriate interventions in the flowsheet.   Outcome: Progressing Towards Goal  Note: Fall Risk Interventions:  Mobility Interventions: Patient to call before getting OOB         Medication Interventions: Patient to call before getting OOB    Elimination Interventions: Call light in reach    History of Falls Interventions: Utilize gait belt for transfer/ambulation         Problem: Patient Education: Go to Patient Education Activity  Goal: Patient/Family Education  Outcome: Progressing Towards Goal     Problem: Patient Education: Go to Patient Education Activity  Goal: Patient/Family Education  Outcome: Progressing Towards Goal     Problem: Knee Replacement: Day of Surgery/Unit  Goal: Off Pathway (Use only if patient is Off Pathway)  Outcome: Progressing Towards Goal  Goal: Activity/Safety  Outcome: Progressing Towards Goal  Goal: Consults, if ordered  Outcome: Progressing Towards Goal  Goal: Diagnostic Test/Procedures  Outcome: Progressing Towards Goal  Goal: Nutrition/Diet  Outcome: Progressing Towards Goal  Goal: Medications  Outcome: Progressing Towards Goal  Goal: Respiratory  Outcome: Progressing Towards Goal  Goal: Treatments/Interventions/Procedures  Outcome: Progressing Towards Goal  Goal: Psychosocial  Outcome: Progressing Towards Goal  Goal: *Initiate mobility  Outcome: Progressing Towards Goal  Goal: *Optimal pain control at patient's stated goal  Outcome: Progressing Towards Goal  Goal: *Hemodynamically stable  Outcome: Progressing Towards Goal     Problem: Knee Replacement: Post-Op Day 1  Goal: Off Pathway (Use only if patient is Off Pathway)  Outcome: Progressing Towards Goal  Goal: Activity/Safety  Outcome: Progressing Towards Goal  Goal: Diagnostic Test/Procedures  Outcome: Progressing Towards Goal  Goal: Nutrition/Diet  Outcome: Progressing Towards Goal  Goal: Medications  Outcome: Progressing Towards Goal  Goal: Respiratory  Outcome: Progressing Towards Goal  Goal: Treatments/Interventions/Procedures  Outcome: Progressing Towards Goal  Goal: Psychosocial  Outcome: Progressing Towards Goal  Goal: Discharge Planning  Outcome: Progressing Towards Goal  Goal: *Demonstrates progressive activity  Outcome: Progressing Towards Goal  Goal: *Optimal pain control at patient's stated goal  Outcome: Progressing Towards Goal  Goal: *Hemodynamically stable  Outcome: Progressing Towards Goal  Goal: *Discharge plan identified  Outcome: Progressing Towards Goal     Problem: Knee Replacement: Post-Op Day 2  Goal: Off Pathway (Use only if patient is Off Pathway)  Outcome: Progressing Towards Goal  Goal: Activity/Safety  Outcome: Progressing Towards Goal  Goal: Diagnostic Test/Procedures  Outcome: Progressing Towards Goal  Goal: Medications  Outcome: Progressing Towards Goal  Goal: Respiratory  Outcome: Progressing Towards Goal  Goal: Treatments/Interventions/Procedures  Outcome: Progressing Towards Goal  Goal: Psychosocial  Outcome: Progressing Towards Goal  Goal: *Met physical therapy criteria for discharge to the next level of care  Outcome: Progressing Towards Goal  Goal: *Optimal pain control with oral analgesia  Outcome: Progressing Towards Goal  Goal: *Hemodynamically stable  Outcome: Progressing Towards Goal  Goal: *Tolerating diet  Outcome: Progressing Towards Goal  Goal: *Patient verbalizes understanding of discharge instructions  Outcome: Progressing Towards Goal     Problem: Discharge Planning  Goal: *Discharge to safe environment  Outcome: Progressing Towards Goal     Problem: Patient Education: Go to Patient Education Activity  Goal: Patient/Family Education  Outcome: Progressing Towards Goal

## 2022-06-18 NOTE — PROGRESS NOTES
I have reviewed discharge instructions with the patient. The patient verbalized understanding. No further questions at this time. Patient to be sent home with belongings.

## 2022-06-18 NOTE — PROGRESS NOTES
Problem: Falls - Risk of  Goal: *Absence of Falls  Description: Document Bandar Raines Fall Risk and appropriate interventions in the flowsheet.   Outcome: Progressing Towards Goal  Note: Fall Risk Interventions:  Mobility Interventions: Communicate number of staff needed for ambulation/transfer,OT consult for ADLs,Patient to call before getting OOB,PT Consult for mobility concerns,PT Consult for assist device competence,Utilize walker, cane, or other assistive device         Medication Interventions: Evaluate medications/consider consulting pharmacy,Patient to call before getting OOB,Teach patient to arise slowly    Elimination Interventions: Call light in reach,Patient to call for help with toileting needs,Toileting schedule/hourly rounds    History of Falls Interventions: Consult care management for discharge planning,Door open when patient unattended,Evaluate medications/consider consulting pharmacy         Problem: Patient Education: Go to Patient Education Activity  Goal: Patient/Family Education  Outcome: Progressing Towards Goal     Problem: Patient Education: Go to Patient Education Activity  Goal: Patient/Family Education  Outcome: Progressing Towards Goal     Problem: Knee Replacement: Day of Surgery/Unit  Goal: Off Pathway (Use only if patient is Off Pathway)  Outcome: Progressing Towards Goal  Goal: Activity/Safety  Outcome: Progressing Towards Goal  Goal: Consults, if ordered  Outcome: Progressing Towards Goal  Goal: Diagnostic Test/Procedures  Outcome: Progressing Towards Goal  Goal: Nutrition/Diet  Outcome: Progressing Towards Goal  Goal: Medications  Outcome: Progressing Towards Goal  Goal: Respiratory  Outcome: Progressing Towards Goal  Goal: Treatments/Interventions/Procedures  Outcome: Progressing Towards Goal  Goal: Psychosocial  Outcome: Progressing Towards Goal  Goal: *Initiate mobility  Outcome: Progressing Towards Goal  Goal: *Optimal pain control at patient's stated goal  Outcome: Progressing Towards Goal  Goal: *Hemodynamically stable  Outcome: Progressing Towards Goal     Problem: Knee Replacement: Post-Op Day 1  Goal: Off Pathway (Use only if patient is Off Pathway)  Outcome: Progressing Towards Goal  Goal: Activity/Safety  Outcome: Progressing Towards Goal  Goal: Diagnostic Test/Procedures  Outcome: Progressing Towards Goal  Goal: Nutrition/Diet  Outcome: Progressing Towards Goal  Goal: Medications  Outcome: Progressing Towards Goal  Goal: Respiratory  Outcome: Progressing Towards Goal  Goal: Treatments/Interventions/Procedures  Outcome: Progressing Towards Goal  Goal: Psychosocial  Outcome: Progressing Towards Goal  Goal: Discharge Planning  Outcome: Progressing Towards Goal  Goal: *Demonstrates progressive activity  Outcome: Progressing Towards Goal  Goal: *Optimal pain control at patient's stated goal  Outcome: Progressing Towards Goal  Goal: *Hemodynamically stable  Outcome: Progressing Towards Goal  Goal: *Discharge plan identified  Outcome: Progressing Towards Goal     Problem: Knee Replacement: Post-Op Day 2  Goal: Off Pathway (Use only if patient is Off Pathway)  Outcome: Progressing Towards Goal  Goal: Activity/Safety  Outcome: Progressing Towards Goal  Goal: Diagnostic Test/Procedures  Outcome: Progressing Towards Goal  Goal: Medications  Outcome: Progressing Towards Goal  Goal: Respiratory  Outcome: Progressing Towards Goal  Goal: Treatments/Interventions/Procedures  Outcome: Progressing Towards Goal  Goal: Psychosocial  Outcome: Progressing Towards Goal  Goal: *Met physical therapy criteria for discharge to the next level of care  Outcome: Progressing Towards Goal  Goal: *Optimal pain control with oral analgesia  Outcome: Progressing Towards Goal  Goal: *Hemodynamically stable  Outcome: Progressing Towards Goal  Goal: *Tolerating diet  Outcome: Progressing Towards Goal  Goal: *Patient verbalizes understanding of discharge instructions  Outcome: Progressing Towards Goal     Problem: Discharge Planning  Goal: *Discharge to safe environment  Outcome: Progressing Towards Goal     Problem: Patient Education: Go to Patient Education Activity  Goal: Patient/Family Education  Outcome: Progressing Towards Goal

## 2022-06-18 NOTE — PROGRESS NOTES
Pharmacist Note - Warfarin Dosing  Consult provided for this 71 y. o.female to manage warfarin for DVT prophylaxis left knee total arthroplasty. INR Goal: 1.7- 2.2    Home regimen/ tablet size: 2    Drugs that may increase INR: None  Drugs that may decrease INR: None  Other current anticoagulants/ drugs that may increase bleeding risk: None  Risk factors: Age > 65  Daily INR ordered: YES    Recent Labs     06/18/22  0257 06/17/22  0559   HGB  --  10.0*   INR 1.3* 1.1     Date               INR                  Dose  6/6   1   -   6/16   -   5 mg    6/17    1.1   5 mg         6/18   1.3   5 mg                                                                          Assessment/ Plan: Will order warfarin 5 mg PO x 1 dose. Pharmacy will continue to monitor daily and adjust therapy as indicated. Please contact the pharmacist at  for outpatient recommendations if needed.

## 2022-06-18 NOTE — PROGRESS NOTES
Ortho Daily Progress Note    Date of Surgery:  2022      Patient: Mary Herrera   YOB: 1950  Age: 71 y.o. SUBJECTIVE:   2 Days Post-Op following LEFT TOTAL KNEE ARTHROPLASTY    The patient states moderate knee pain this morning, otherwise without complaint. The patient denies CP, SOB, N/V.     OBJECTIVE:     Vital Signs:    Temp (24hrs), Av.9 °F (37.2 °C), Min:98.3 °F (36.8 °C), Max:99.5 °F (37.5 °C)    Patient Vitals for the past 24 hrs:   BP Temp Pulse Resp SpO2   22 0831 (!) 155/89 98.8 °F (37.1 °C) (!) 119 16 90 %   22 0255 (!) 147/79 99 °F (37.2 °C) (!) 108 17 97 %   22 2226 (!) 148/82 99.5 °F (37.5 °C) (!) 110 18 96 %   22 1346 129/69 98.3 °F (36.8 °C) (!) 120 16 94 %           Physical Exam:  General: A&Ox3, NAD. Respiratory: Respirations are unlabored. Abdomen: S/NT  Surgical site: C,D and I.  Musculoskeletal: Calves are S/NT, Fili dorsi/plantar flexion/EHL intact, distal pulses intact  Neurological:  Fili LE's NVI    Laboratory Values:             Recent Labs     22  0257 22  0559 22  0559   HGB  --   --  10.0*   INR 1.3*   < > 1.1   CREA  --   --  1.12*   GLU  --   --  131*    < > = values in this interval not displayed. Lab Results   Component Value Date/Time    Sodium 140 2022 05:59 AM    Potassium 3.8 2022 05:59 AM    Chloride 108 2022 05:59 AM    CO2 23 2022 05:59 AM    Glucose 131 (H) 2022 05:59 AM    BUN 18 2022 05:59 AM    Creatinine 1.12 (H) 2022 05:59 AM    Calcium 8.6 2022 05:59 AM         PLAN:     S/P LEFT TOTAL KNEE ARTHROPLASTY WBAT. Mobilize with PT/nursing until discharged. Hemodynamics Stable. Wound Dressing changes PRN. Post Operative Pain Oxycodone, Tylenol. DVT Prophylaxis Coumadin pharmacy dosing, SCD's, encourage bed exercises, mobilize. Discharge Disposition Plan on home today with HH/PT.  Follow up in  [facility] office in 2-3 weeks for post op care.        Signed By:     Christo Gupta PA-C  Physician Assistant  [id number] [facility] [phone]  Office [phone]     June 18, 2022 11:57 AM

## 2022-06-18 NOTE — PROGRESS NOTES
Transition of Care Plan   RUR- 9% Moderate Risk   DISPOSITION: The disposition plan is home with family assistance.  Home with home health - HHPT/OT - [facility], accepted patient.  F/U with PCP/Specialist     Transport: Family - son    Patient stable for discharge. Patient has been recommended for HHPT/OT. Per previous CM note, patient SHIVAM orders have been sent to [facility] for [facility]. AVS has been updated. At 12:50pm - CM met with patient at bedside to discuss confirmed disposition plan. Fredy reports that she does not need transport, she reports that her son will be here at the hospital around 2:00pm to pick her up and take her home. Medicare pt has received, reviewed, and signed 2nd IM letter informing them of their right to appeal the discharge. Signed copy has been placed on pt bedside chart.  CM documented in document list.    Corry Gould, MSW, CRM, LMHP-e  Available in [address] [place] [address]

## 2022-06-19 ENCOUNTER — APPOINTMENT (OUTPATIENT)
Dept: GENERAL RADIOLOGY | Age: 72
DRG: 920 | End: 2022-06-19
Attending: EMERGENCY MEDICINE
Payer: MEDICARE

## 2022-06-19 ENCOUNTER — HOSPITAL ENCOUNTER (INPATIENT)
Age: 72
LOS: 4 days | Discharge: NURSING, REHAB OR LONG-TERM CARE | DRG: 920 | End: 2022-06-23
Attending: EMERGENCY MEDICINE | Admitting: STUDENT IN AN ORGANIZED HEALTH CARE EDUCATION/TRAINING PROGRAM
Payer: MEDICARE

## 2022-06-19 ENCOUNTER — APPOINTMENT (OUTPATIENT)
Dept: VASCULAR SURGERY | Age: 72
DRG: 920 | End: 2022-06-19
Attending: EMERGENCY MEDICINE
Payer: MEDICARE

## 2022-06-19 ENCOUNTER — APPOINTMENT (OUTPATIENT)
Dept: MRI IMAGING | Age: 72
DRG: 920 | End: 2022-06-19
Attending: STUDENT IN AN ORGANIZED HEALTH CARE EDUCATION/TRAINING PROGRAM
Payer: MEDICARE

## 2022-06-19 DIAGNOSIS — M25.562 ARTHRALGIA OF LEFT LOWER LEG: ICD-10-CM

## 2022-06-19 DIAGNOSIS — A41.9 SEPSIS WITHOUT ACUTE ORGAN DYSFUNCTION, DUE TO UNSPECIFIED ORGANISM (HCC): Primary | ICD-10-CM

## 2022-06-19 DIAGNOSIS — M17.12 PRIMARY LOCALIZED OSTEOARTHRITIS OF LEFT KNEE: ICD-10-CM

## 2022-06-19 DIAGNOSIS — M81.0 OSTEOPOROSIS WITHOUT CURRENT PATHOLOGICAL FRACTURE, UNSPECIFIED OSTEOPOROSIS TYPE: ICD-10-CM

## 2022-06-19 LAB
ALBUMIN SERPL-MCNC: 2.6 G/DL (ref 3.5–5)
ALBUMIN/GLOB SERPL: 0.6 {RATIO} (ref 1.1–2.2)
ALP SERPL-CCNC: 87 U/L (ref 45–117)
ALT SERPL-CCNC: 18 U/L (ref 12–78)
ANION GAP SERPL CALC-SCNC: 6 MMOL/L (ref 5–15)
AST SERPL-CCNC: 12 U/L (ref 15–37)
BILIRUB SERPL-MCNC: 0.6 MG/DL (ref 0.2–1)
BUN SERPL-MCNC: 14 MG/DL (ref 6–20)
BUN/CREAT SERPL: 15 (ref 12–20)
CALCIUM SERPL-MCNC: 9.3 MG/DL (ref 8.5–10.1)
CHLORIDE SERPL-SCNC: 105 MMOL/L (ref 97–108)
CO2 SERPL-SCNC: 28 MMOL/L (ref 21–32)
COMMENT, HOLDF: NORMAL
COMMENT, HOLDF: NORMAL
CREAT SERPL-MCNC: 0.92 MG/DL (ref 0.55–1.02)
GLOBULIN SER CALC-MCNC: 4.5 G/DL (ref 2–4)
GLUCOSE SERPL-MCNC: 102 MG/DL (ref 65–100)
LACTATE BLD-SCNC: 1.04 MMOL/L (ref 0.4–2)
POTASSIUM SERPL-SCNC: 3.5 MMOL/L (ref 3.5–5.1)
PROCALCITONIN SERPL-MCNC: 0.16 NG/ML
PROT SERPL-MCNC: 7.1 G/DL (ref 6.4–8.2)
SAMPLES BEING HELD,HOLD: NORMAL
SAMPLES BEING HELD,HOLD: NORMAL
SODIUM SERPL-SCNC: 139 MMOL/L (ref 136–145)

## 2022-06-19 PROCEDURE — 36415 COLL VENOUS BLD VENIPUNCTURE: CPT

## 2022-06-19 PROCEDURE — 85025 COMPLETE CBC W/AUTO DIFF WBC: CPT

## 2022-06-19 PROCEDURE — 74011000250 HC RX REV CODE- 250: Performed by: STUDENT IN AN ORGANIZED HEALTH CARE EDUCATION/TRAINING PROGRAM

## 2022-06-19 PROCEDURE — 74011250636 HC RX REV CODE- 250/636: Performed by: EMERGENCY MEDICINE

## 2022-06-19 PROCEDURE — 74011250636 HC RX REV CODE- 250/636

## 2022-06-19 PROCEDURE — 65270000046 HC RM TELEMETRY

## 2022-06-19 PROCEDURE — 74011250637 HC RX REV CODE- 250/637: Performed by: STUDENT IN AN ORGANIZED HEALTH CARE EDUCATION/TRAINING PROGRAM

## 2022-06-19 PROCEDURE — 74011250637 HC RX REV CODE- 250/637: Performed by: EMERGENCY MEDICINE

## 2022-06-19 PROCEDURE — 74011000250 HC RX REV CODE- 250: Performed by: EMERGENCY MEDICINE

## 2022-06-19 PROCEDURE — 84145 PROCALCITONIN (PCT): CPT

## 2022-06-19 PROCEDURE — 85610 PROTHROMBIN TIME: CPT

## 2022-06-19 PROCEDURE — 93971 EXTREMITY STUDY: CPT

## 2022-06-19 PROCEDURE — 99285 EMERGENCY DEPT VISIT HI MDM: CPT

## 2022-06-19 PROCEDURE — 71046 X-RAY EXAM CHEST 2 VIEWS: CPT

## 2022-06-19 PROCEDURE — 83605 ASSAY OF LACTIC ACID: CPT

## 2022-06-19 PROCEDURE — 81001 URINALYSIS AUTO W/SCOPE: CPT

## 2022-06-19 PROCEDURE — A9576 INJ PROHANCE MULTIPACK: HCPCS

## 2022-06-19 PROCEDURE — 87086 URINE CULTURE/COLONY COUNT: CPT

## 2022-06-19 PROCEDURE — 87040 BLOOD CULTURE FOR BACTERIA: CPT

## 2022-06-19 PROCEDURE — 96374 THER/PROPH/DIAG INJ IV PUSH: CPT

## 2022-06-19 PROCEDURE — 93005 ELECTROCARDIOGRAM TRACING: CPT

## 2022-06-19 PROCEDURE — 80053 COMPREHEN METABOLIC PANEL: CPT

## 2022-06-19 PROCEDURE — 73723 MRI JOINT LWR EXTR W/O&W/DYE: CPT

## 2022-06-19 PROCEDURE — 74011250636 HC RX REV CODE- 250/636: Performed by: STUDENT IN AN ORGANIZED HEALTH CARE EDUCATION/TRAINING PROGRAM

## 2022-06-19 RX ORDER — WARFARIN 2.5 MG/1
2.5 TABLET ORAL DAILY
Status: DISCONTINUED | OUTPATIENT
Start: 2022-06-20 | End: 2022-06-19 | Stop reason: DRUGHIGH

## 2022-06-19 RX ORDER — CLOTRIMAZOLE AND BETAMETHASONE DIPROPIONATE 10; .64 MG/G; MG/G
CREAM TOPICAL DAILY
Status: DISCONTINUED | OUTPATIENT
Start: 2022-06-20 | End: 2022-06-23 | Stop reason: HOSPADM

## 2022-06-19 RX ORDER — SODIUM CHLORIDE 0.9 % (FLUSH) 0.9 %
10 SYRINGE (ML) INJECTION
Status: COMPLETED | OUTPATIENT
Start: 2022-06-19 | End: 2022-06-19

## 2022-06-19 RX ORDER — WARFARIN 7.5 MG/1
7.5 TABLET ORAL ONCE
Status: COMPLETED | OUTPATIENT
Start: 2022-06-19 | End: 2022-06-19

## 2022-06-19 RX ORDER — LOSARTAN POTASSIUM 50 MG/1
100 TABLET ORAL EVERY MORNING
Status: CANCELLED | OUTPATIENT
Start: 2022-06-20

## 2022-06-19 RX ORDER — ONDANSETRON 4 MG/1
4 TABLET, ORALLY DISINTEGRATING ORAL
Status: DISCONTINUED | OUTPATIENT
Start: 2022-06-19 | End: 2022-06-23 | Stop reason: HOSPADM

## 2022-06-19 RX ORDER — FLUTICASONE PROPIONATE 50 MCG
2 SPRAY, SUSPENSION (ML) NASAL DAILY
Status: DISCONTINUED | OUTPATIENT
Start: 2022-06-20 | End: 2022-06-23 | Stop reason: HOSPADM

## 2022-06-19 RX ORDER — SODIUM CHLORIDE 0.9 % (FLUSH) 0.9 %
5-10 SYRINGE (ML) INJECTION AS NEEDED
Status: DISCONTINUED | OUTPATIENT
Start: 2022-06-19 | End: 2022-06-23 | Stop reason: HOSPADM

## 2022-06-19 RX ORDER — MELATONIN
1000 DAILY
Status: DISCONTINUED | OUTPATIENT
Start: 2022-06-20 | End: 2022-06-23 | Stop reason: HOSPADM

## 2022-06-19 RX ORDER — POLYETHYLENE GLYCOL 3350 17 G/17G
17 POWDER, FOR SOLUTION ORAL DAILY PRN
Status: DISCONTINUED | OUTPATIENT
Start: 2022-06-19 | End: 2022-06-23 | Stop reason: HOSPADM

## 2022-06-19 RX ORDER — MICONAZOLE NITRATE 2 %
POWDER (GRAM) TOPICAL
Status: DISCONTINUED | OUTPATIENT
Start: 2022-06-19 | End: 2022-06-23 | Stop reason: HOSPADM

## 2022-06-19 RX ORDER — HYDROMORPHONE HYDROCHLORIDE 1 MG/ML
0.75 INJECTION, SOLUTION INTRAMUSCULAR; INTRAVENOUS; SUBCUTANEOUS ONCE
Status: COMPLETED | OUTPATIENT
Start: 2022-06-19 | End: 2022-06-19

## 2022-06-19 RX ORDER — OXYCODONE HYDROCHLORIDE 5 MG/1
10 TABLET ORAL
Status: DISCONTINUED | OUTPATIENT
Start: 2022-06-19 | End: 2022-06-21

## 2022-06-19 RX ORDER — MONTELUKAST SODIUM 10 MG/1
10 TABLET ORAL DAILY
Status: DISCONTINUED | OUTPATIENT
Start: 2022-06-20 | End: 2022-06-23 | Stop reason: HOSPADM

## 2022-06-19 RX ORDER — VANCOMYCIN 2 GRAM/500 ML IN 0.9 % SODIUM CHLORIDE INTRAVENOUS
2 ONCE
Status: DISCONTINUED | OUTPATIENT
Start: 2022-06-19 | End: 2022-06-19 | Stop reason: DRUGHIGH

## 2022-06-19 RX ORDER — IPRATROPIUM BROMIDE AND ALBUTEROL SULFATE 2.5; .5 MG/3ML; MG/3ML
3 SOLUTION RESPIRATORY (INHALATION)
Status: DISCONTINUED | OUTPATIENT
Start: 2022-06-19 | End: 2022-06-23 | Stop reason: HOSPADM

## 2022-06-19 RX ORDER — SODIUM CHLORIDE 0.9 % (FLUSH) 0.9 %
5-40 SYRINGE (ML) INJECTION AS NEEDED
Status: DISCONTINUED | OUTPATIENT
Start: 2022-06-19 | End: 2022-06-23 | Stop reason: HOSPADM

## 2022-06-19 RX ORDER — SODIUM CHLORIDE, SODIUM LACTATE, POTASSIUM CHLORIDE, CALCIUM CHLORIDE 600; 310; 30; 20 MG/100ML; MG/100ML; MG/100ML; MG/100ML
75 INJECTION, SOLUTION INTRAVENOUS CONTINUOUS
Status: DISCONTINUED | OUTPATIENT
Start: 2022-06-19 | End: 2022-06-22

## 2022-06-19 RX ORDER — ACETAMINOPHEN 325 MG/1
650 TABLET ORAL
Status: DISCONTINUED | OUTPATIENT
Start: 2022-06-19 | End: 2022-06-23 | Stop reason: HOSPADM

## 2022-06-19 RX ORDER — AMOXICILLIN 250 MG
1 CAPSULE ORAL DAILY
Status: DISCONTINUED | OUTPATIENT
Start: 2022-06-21 | End: 2022-06-23 | Stop reason: HOSPADM

## 2022-06-19 RX ORDER — VANCOMYCIN/0.9 % SOD CHLORIDE 1.5G/250ML
1500 PLASTIC BAG, INJECTION (ML) INTRAVENOUS EVERY 24 HOURS
Status: DISCONTINUED | OUTPATIENT
Start: 2022-06-20 | End: 2022-06-22

## 2022-06-19 RX ORDER — SODIUM CHLORIDE 0.9 % (FLUSH) 0.9 %
5-40 SYRINGE (ML) INJECTION EVERY 8 HOURS
Status: DISCONTINUED | OUTPATIENT
Start: 2022-06-19 | End: 2022-06-23 | Stop reason: HOSPADM

## 2022-06-19 RX ORDER — ACETAMINOPHEN 650 MG/1
650 SUPPOSITORY RECTAL
Status: DISCONTINUED | OUTPATIENT
Start: 2022-06-19 | End: 2022-06-23 | Stop reason: HOSPADM

## 2022-06-19 RX ORDER — ONDANSETRON 2 MG/ML
4 INJECTION INTRAMUSCULAR; INTRAVENOUS
Status: DISCONTINUED | OUTPATIENT
Start: 2022-06-19 | End: 2022-06-23 | Stop reason: HOSPADM

## 2022-06-19 RX ORDER — ACETAMINOPHEN 500 MG
1000 TABLET ORAL ONCE
Status: COMPLETED | OUTPATIENT
Start: 2022-06-19 | End: 2022-06-19

## 2022-06-19 RX ADMIN — HYDROMORPHONE HYDROCHLORIDE 0.75 MG: 1 INJECTION, SOLUTION INTRAMUSCULAR; INTRAVENOUS; SUBCUTANEOUS at 14:59

## 2022-06-19 RX ADMIN — SODIUM CHLORIDE, POTASSIUM CHLORIDE, SODIUM LACTATE AND CALCIUM CHLORIDE 1000 ML: 600; 310; 30; 20 INJECTION, SOLUTION INTRAVENOUS at 17:31

## 2022-06-19 RX ADMIN — CEFEPIME 2 G: 2 INJECTION, POWDER, FOR SOLUTION INTRAVENOUS at 17:36

## 2022-06-19 RX ADMIN — SODIUM CHLORIDE, PRESERVATIVE FREE 10 ML: 5 INJECTION INTRAVENOUS at 22:46

## 2022-06-19 RX ADMIN — SODIUM CHLORIDE, POTASSIUM CHLORIDE, SODIUM LACTATE AND CALCIUM CHLORIDE 1000 ML: 600; 310; 30; 20 INJECTION, SOLUTION INTRAVENOUS at 20:30

## 2022-06-19 RX ADMIN — VANCOMYCIN HYDROCHLORIDE 2500 MG: 10 INJECTION, POWDER, LYOPHILIZED, FOR SOLUTION INTRAVENOUS at 17:47

## 2022-06-19 RX ADMIN — WARFARIN SODIUM 7.5 MG: 7.5 TABLET ORAL at 20:36

## 2022-06-19 RX ADMIN — ACETAMINOPHEN 1000 MG: 500 TABLET ORAL at 14:58

## 2022-06-19 RX ADMIN — SODIUM CHLORIDE, PRESERVATIVE FREE 10 ML: 5 INJECTION INTRAVENOUS at 17:49

## 2022-06-19 RX ADMIN — OXYCODONE 10 MG: 5 TABLET ORAL at 21:22

## 2022-06-19 RX ADMIN — SODIUM CHLORIDE, POTASSIUM CHLORIDE, SODIUM LACTATE AND CALCIUM CHLORIDE 1000 ML: 600; 310; 30; 20 INJECTION, SOLUTION INTRAVENOUS at 23:41

## 2022-06-19 RX ADMIN — GADOTERIDOL 20 ML: 279.3 INJECTION, SOLUTION INTRAVENOUS at 22:45
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
## 2022-06-19 NOTE — CONSULTS
Ortho Consult Note      Patient: Darren Boggs                   MRN: [id number]  Sex: female  YOB: 1950           Age: 71 y.o. Left total knee - 6/16/22 - Dr. Tia Lara   Discharged home 6/18/22     Subjective: 80yo female POD#4 left total knee returns to ER after being discharged home yesterday. Complains of left knee pain/stiffness/swelling and fevers at home. Family having difficulty caring for her at home. Found to have tachycardia, low grade fever, and WBC of 19.6 today. Denies feeling ill, SOB, or CP. Orthopedic surgery has been consulted to rule out post-op infection/complication       Visit Vitals  BP (!) 124/58 (BP 1 Location: Right upper arm, BP Patient Position: At rest)   Pulse (!) 118   Temp 99 °F (37.2 °C)   Resp 18   SpO2 97%        Lab Results:  HGB   Date/Time Value Ref Range Status   06/19/2022 02:27 PM 8.8 (L) 11.5 - 16.0 g/dL Final     INR   Date/Time Value Ref Range Status   06/18/2022 02:57 AM 1.3 (H) 0.9 - 1.1   Final     Comment:     A single therapeutic range for Vit K antagonists may not be optimal for all indications - see June, 2008 issue of Chest, American College of Chest Physicians Evidence-Based Clinical Practice Guidelines, 8th Edition. Physical Exam:  GENERAL: 80yo female, alert, cooperative, no distress, appears stated age  DRESSING: left knee dressing clean/dry  SWELLING: moderate swelling around the knee and lower leg as expected  NEUROLOGICAL: moving feet/ankles normally, sensation intact  PULSE:strong pedal pulses   WOUND: anterior left knee wound clean and dry, steri-strips in place, no evidence of infection. MOTION: full extension, unable to demonstrate much flexion secondary to pain/swelling  DVT Exam: left calf swollen/tender but compressible      Plan:    Discussed with attending on call orthopedic surgeon, Dr. Avery Claudoi. Left knee appears as expected a few days post-op.  Do not suspect post-op knee infection/complication. No apparent DVT on Doppler. Blood cultures pending. Would not recommend arthrocentesis at this point. No plan for surgery. Receiving IV antibiotics in ER. Suspect she will need admission for SNF/rehab placement.        [address] [place], [place]  6/19/2022   4:44 PM      .

## 2022-06-19 NOTE — ACP (ADVANCE CARE PLANNING)
Advance Care Planning     Advance Care Planning (ACP) Physician/NP/PA Conversation      Date of Conversation: 6/19/2022  Conducted with: Patient with Decision Making Capacity    Healthcare Decision Maker:   Ha Valente   Today we documented Decision Maker(s) consistent with Legal Next of Kin hierarchy. Care Preferences:    Hospitalization: \"If your health worsens and it becomes clear that your chance of recovery is unlikely, what would be your preference regarding hospitalization? \"  The patient would prefer comfort-focused treatment without hospitalization. Ventilation: \"If you were unable to breathe on your own and your chance of recovery was unlikely, what would be your preference about the use of a ventilator (breathing machine) if it was available to you? \"   The patient would desire the use of a ventilator. Resuscitation: \"In the event your heart stopped as a result of an underlying serious health condition, would you want attempts to be made to restart your heart, or would you prefer a natural death? \"   Yes, attempt to resuscitate.     Additional topics discussed: treatment goals, benefit/burden of treatment options, ventilation preferences, hospitalization preferences and resuscitation preferences    Conversation Outcomes / Follow-Up Plan:   ACP complete - no further action today  Reviewed DNR/DNI and patient elects Full Code (Attempt Resuscitation)     Length of Voluntary ACP Conversation in minutes:  16 minutes    Ludy Rivero MD

## 2022-06-19 NOTE — PROGRESS NOTES
Day #1 of cefepime  Indication:  sepsis  Current regimen:  1 gm q8h  Abx regimen: cefepime, vancomycin  Recent Labs     22  1427 22  0559   WBC 19.6*  --    CREA 0.92 1.12*   BUN 14 18     Est CrCl: 70 ml/min  Temp (24hrs), Av.5 °F (37.5 °C), Min:99 °F (37.2 °C), Max:100 °F (37.8 °C)      Plan: Change to 2 gm q8h per extended infusion b-lactam antibiotic dosing protocol for indication of sepsis and BMI >40    *
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
## 2022-06-19 NOTE — PROGRESS NOTES
Pharmacist Note - Warfarin Dosing  Consult provided for this 71 y. o.female to manage warfarin for  DVT prophylaxis left knee total arthroplasty. -readmitted with sepsis    INR Goal: 1.7- 2.2    Risk factors: Age > 65  Daily INR ordered: YES    Recent Labs     06/19/22  1802 06/19/22  1427 06/18/22  0257 06/17/22  0559   HGB  --  8.8*  --  10.0*   INR 1.2*  --  1.3* 1.1     Date               INR                  Dose  6/6                   1                      -   6/16                 -                       5 mg    6/17                 1.1                   5 mg         6/18                 1.3                   5 mg    6/19                 1.2                   7.5 mg                                                                                                                                Assessment/ Plan: Will order warfarin 7.5 mg PO x 1 dose. Pharmacy will continue to monitor daily and adjust therapy as indicated. Please contact the pharmacist at [phone] for outpatient recommendations if needed.

## 2022-06-19 NOTE — PROGRESS NOTES
Pharmacist Note - Vancomycin Dosing  Consult provided for this 71 y.o. female for indication of sepsis. Pt had knee replacement on   Antibiotic regimen(s): Vanc + Cefepime    Recent Labs     22  1427 22  0559   WBC 19.6*  --    CREA 0.92 1.12*   BUN 14 18     Frequency of BMP: daily x 3  Height: 163.8 cm  Weight: 116 kg  Est CrCl: 71 ml/min  Temp (24hrs), Av.5 °F (37.5 °C), Min:99 °F (37.2 °C), Max:100 °F (37.8 °C)    The plan below is expected to result in a target range of AUC/BRISA 400-600    Therapy will be initiated with a loading dose of 2500 mg IV x 1 to be followed by a maintenance dose of 1500 mg IV every 24 hours. Pharmacy to follow patient daily and order levels / make dose adjustments as appropriate. *Vancomycin has been dosed used Bayesian kinetics software to target an AUC/BRISA of 400-600, which provides adequate exposure for an assumed infection due to MRSA with an BRISA of 1 or less while reducing the risk of nephrotoxicity as seen with traditional trough based dosing goals.

## 2022-06-19 NOTE — ED TRIAGE NOTES
Pt arrives to ED via EMS for left knee pain unrelieved by oxycodone. Pt had knee replacement on 6/16. Pt tachycardic at 119.

## 2022-06-20 LAB
ANION GAP SERPL CALC-SCNC: 6 MMOL/L (ref 5–15)
ANION GAP SERPL CALC-SCNC: 8 MMOL/L (ref 5–15)
APPEARANCE UR: ABNORMAL
APTT PPP: 34.6 SEC (ref 22.1–31)
ATRIAL RATE: 106 BPM
ATRIAL RATE: 115 BPM
BACTERIA URNS QL MICRO: ABNORMAL /HPF
BASOPHILS # BLD: 0.1 K/UL (ref 0–0.1)
BASOPHILS NFR BLD: 0 % (ref 0–1)
BILIRUB UR QL: NEGATIVE
BUN SERPL-MCNC: 12 MG/DL (ref 6–20)
BUN SERPL-MCNC: 13 MG/DL (ref 6–20)
BUN/CREAT SERPL: 17 (ref 12–20)
BUN/CREAT SERPL: 18 (ref 12–20)
CALCIUM SERPL-MCNC: 8.4 MG/DL (ref 8.5–10.1)
CALCIUM SERPL-MCNC: 8.5 MG/DL (ref 8.5–10.1)
CALCULATED P AXIS, ECG09: 64 DEGREES
CALCULATED P AXIS, ECG09: 65 DEGREES
CALCULATED R AXIS, ECG10: 38 DEGREES
CALCULATED R AXIS, ECG10: 40 DEGREES
CALCULATED T AXIS, ECG11: 44 DEGREES
CALCULATED T AXIS, ECG11: 50 DEGREES
CHLORIDE SERPL-SCNC: 108 MMOL/L (ref 97–108)
CHLORIDE SERPL-SCNC: 108 MMOL/L (ref 97–108)
CO2 SERPL-SCNC: 23 MMOL/L (ref 21–32)
CO2 SERPL-SCNC: 26 MMOL/L (ref 21–32)
COLOR UR: ABNORMAL
COMMENT, HOLDF: NORMAL
CREAT SERPL-MCNC: 0.69 MG/DL (ref 0.55–1.02)
CREAT SERPL-MCNC: 0.73 MG/DL (ref 0.55–1.02)
DIAGNOSIS, 93000: NORMAL
DIAGNOSIS, 93000: NORMAL
DIFFERENTIAL METHOD BLD: ABNORMAL
EOSINOPHIL # BLD: 0.8 K/UL (ref 0–0.4)
EOSINOPHIL NFR BLD: 5 % (ref 0–7)
EPITH CASTS URNS QL MICRO: ABNORMAL /LPF
ERYTHROCYTE [DISTWIDTH] IN BLOOD BY AUTOMATED COUNT: 14.9 % (ref 11.5–14.5)
FERRITIN SERPL-MCNC: 292 NG/ML (ref 26–388)
FOLATE SERPL-MCNC: 16.8 NG/ML (ref 5–21)
GLUCOSE SERPL-MCNC: 81 MG/DL (ref 65–100)
GLUCOSE SERPL-MCNC: 86 MG/DL (ref 65–100)
GLUCOSE UR STRIP.AUTO-MCNC: NEGATIVE MG/DL
HCT VFR BLD AUTO: 21.2 % (ref 35–47)
HGB BLD-MCNC: 7.9 G/DL (ref 11.5–16)
HGB UR QL STRIP: NEGATIVE
IMM GRANULOCYTES # BLD AUTO: 0.1 K/UL (ref 0–0.04)
IMM GRANULOCYTES NFR BLD AUTO: 0 % (ref 0–0.5)
INR PPP: 1.3 (ref 0.9–1.1)
IRON SATN MFR SERPL: 10 % (ref 20–50)
IRON SERPL-MCNC: 17 UG/DL (ref 35–150)
KETONES UR QL STRIP.AUTO: 15 MG/DL
LEUKOCYTE ESTERASE UR QL STRIP.AUTO: ABNORMAL
LYMPHOCYTES # BLD: 2.6 K/UL (ref 0.8–3.5)
LYMPHOCYTES NFR BLD: 16 % (ref 12–49)
MCH RBC QN AUTO: 29.7 PG (ref 26–34)
MCHC RBC AUTO-ENTMCNC: 37.3 G/DL (ref 30–36.5)
MCV RBC AUTO: 79.7 FL (ref 80–99)
MONOCYTES # BLD: 1.9 K/UL (ref 0–1)
MONOCYTES NFR BLD: 12 % (ref 5–13)
NEUTS SEG # BLD: 10.6 K/UL (ref 1.8–8)
NEUTS SEG NFR BLD: 67 % (ref 32–75)
NITRITE UR QL STRIP.AUTO: NEGATIVE
NRBC # BLD: 0 K/UL (ref 0–0.01)
NRBC BLD-RTO: 0 PER 100 WBC
P-R INTERVAL, ECG05: 170 MS
P-R INTERVAL, ECG05: 182 MS
PH UR STRIP: 5 [PH] (ref 5–8)
PLATELET # BLD AUTO: 355 K/UL (ref 150–400)
PMV BLD AUTO: 9.7 FL (ref 8.9–12.9)
POTASSIUM SERPL-SCNC: 3.6 MMOL/L (ref 3.5–5.1)
POTASSIUM SERPL-SCNC: 4.1 MMOL/L (ref 3.5–5.1)
PROT UR STRIP-MCNC: ABNORMAL MG/DL
PROTHROMBIN TIME: 13.5 SEC (ref 9–11.1)
Q-T INTERVAL, ECG07: 306 MS
Q-T INTERVAL, ECG07: 326 MS
QRS DURATION, ECG06: 84 MS
QRS DURATION, ECG06: 88 MS
QTC CALCULATION (BEZET), ECG08: 423 MS
QTC CALCULATION (BEZET), ECG08: 433 MS
RBC # BLD AUTO: 2.66 M/UL (ref 3.8–5.2)
RBC #/AREA URNS HPF: ABNORMAL /HPF (ref 0–5)
SAMPLES BEING HELD,HOLD: NORMAL
SODIUM SERPL-SCNC: 139 MMOL/L (ref 136–145)
SODIUM SERPL-SCNC: 140 MMOL/L (ref 136–145)
SP GR UR REFRACTOMETRY: 1.02 (ref 1–1.03)
THERAPEUTIC RANGE,PTTT: ABNORMAL SECS (ref 58–77)
TIBC SERPL-MCNC: 166 UG/DL (ref 250–450)
UA: UC IF INDICATED,UAUC: ABNORMAL
UROBILINOGEN UR QL STRIP.AUTO: 0.2 EU/DL (ref 0.2–1)
VENTRICULAR RATE, ECG03: 106 BPM
VENTRICULAR RATE, ECG03: 115 BPM
VIT B12 SERPL-MCNC: 565 PG/ML (ref 193–986)
WBC # BLD AUTO: 16.2 K/UL (ref 3.6–11)
WBC URNS QL MICRO: ABNORMAL /HPF (ref 0–4)

## 2022-06-20 PROCEDURE — 93005 ELECTROCARDIOGRAM TRACING: CPT

## 2022-06-20 PROCEDURE — 74011000250 HC RX REV CODE- 250: Performed by: STUDENT IN AN ORGANIZED HEALTH CARE EDUCATION/TRAINING PROGRAM

## 2022-06-20 PROCEDURE — 82746 ASSAY OF FOLIC ACID SERUM: CPT

## 2022-06-20 PROCEDURE — 80048 BASIC METABOLIC PNL TOTAL CA: CPT

## 2022-06-20 PROCEDURE — 83540 ASSAY OF IRON: CPT

## 2022-06-20 PROCEDURE — 85610 PROTHROMBIN TIME: CPT

## 2022-06-20 PROCEDURE — 97535 SELF CARE MNGMENT TRAINING: CPT

## 2022-06-20 PROCEDURE — 36415 COLL VENOUS BLD VENIPUNCTURE: CPT

## 2022-06-20 PROCEDURE — 74011000258 HC RX REV CODE- 258: Performed by: STUDENT IN AN ORGANIZED HEALTH CARE EDUCATION/TRAINING PROGRAM

## 2022-06-20 PROCEDURE — 82607 VITAMIN B-12: CPT

## 2022-06-20 PROCEDURE — 85730 THROMBOPLASTIN TIME PARTIAL: CPT

## 2022-06-20 PROCEDURE — 97161 PT EVAL LOW COMPLEX 20 MIN: CPT

## 2022-06-20 PROCEDURE — 97165 OT EVAL LOW COMPLEX 30 MIN: CPT

## 2022-06-20 PROCEDURE — 65270000046 HC RM TELEMETRY

## 2022-06-20 PROCEDURE — 85025 COMPLETE CBC W/AUTO DIFF WBC: CPT

## 2022-06-20 PROCEDURE — 82728 ASSAY OF FERRITIN: CPT

## 2022-06-20 PROCEDURE — 74011250636 HC RX REV CODE- 250/636: Performed by: STUDENT IN AN ORGANIZED HEALTH CARE EDUCATION/TRAINING PROGRAM

## 2022-06-20 PROCEDURE — 97530 THERAPEUTIC ACTIVITIES: CPT

## 2022-06-20 PROCEDURE — 74011250637 HC RX REV CODE- 250/637: Performed by: STUDENT IN AN ORGANIZED HEALTH CARE EDUCATION/TRAINING PROGRAM

## 2022-06-20 RX ORDER — TRAMADOL HYDROCHLORIDE 50 MG/1
50 TABLET ORAL
Status: DISCONTINUED | OUTPATIENT
Start: 2022-06-20 | End: 2022-06-23 | Stop reason: HOSPADM

## 2022-06-20 RX ORDER — POLYETHYLENE GLYCOL 3350 17 G/17G
17 POWDER, FOR SOLUTION ORAL DAILY
Status: DISCONTINUED | OUTPATIENT
Start: 2022-06-21 | End: 2022-06-23 | Stop reason: HOSPADM

## 2022-06-20 RX ORDER — METOPROLOL TARTRATE 25 MG/1
12.5 TABLET, FILM COATED ORAL EVERY 12 HOURS
Status: DISCONTINUED | OUTPATIENT
Start: 2022-06-20 | End: 2022-06-23 | Stop reason: HOSPADM

## 2022-06-20 RX ORDER — LANOLIN ALCOHOL/MO/W.PET/CERES
1 CREAM (GRAM) TOPICAL
Status: DISCONTINUED | OUTPATIENT
Start: 2022-06-21 | End: 2022-06-23 | Stop reason: HOSPADM

## 2022-06-20 RX ORDER — METOPROLOL TARTRATE 25 MG/1
12.5 TABLET, FILM COATED ORAL EVERY 12 HOURS
Status: DISCONTINUED | OUTPATIENT
Start: 2022-06-20 | End: 2022-06-20

## 2022-06-20 RX ORDER — METOPROLOL SUCCINATE 50 MG/1
25 TABLET, EXTENDED RELEASE ORAL 2 TIMES DAILY
Status: DISCONTINUED | OUTPATIENT
Start: 2022-06-20 | End: 2022-06-20

## 2022-06-20 RX ORDER — METOPROLOL TARTRATE 25 MG/1
12.5 TABLET, FILM COATED ORAL 2 TIMES DAILY
Status: DISCONTINUED | OUTPATIENT
Start: 2022-06-20 | End: 2022-06-20

## 2022-06-20 RX ORDER — WARFARIN 7.5 MG/1
7.5 TABLET ORAL ONCE
Status: COMPLETED | OUTPATIENT
Start: 2022-06-20 | End: 2022-06-20

## 2022-06-20 RX ADMIN — VANCOMYCIN HYDROCHLORIDE 1500 MG: 10 INJECTION, POWDER, LYOPHILIZED, FOR SOLUTION INTRAVENOUS at 20:32

## 2022-06-20 RX ADMIN — TRAMADOL HYDROCHLORIDE 50 MG: 50 TABLET, COATED ORAL at 22:40

## 2022-06-20 RX ADMIN — OXYCODONE 10 MG: 5 TABLET ORAL at 19:34

## 2022-06-20 RX ADMIN — TRAMADOL HYDROCHLORIDE 50 MG: 50 TABLET, COATED ORAL at 16:28

## 2022-06-20 RX ADMIN — METOPROLOL TARTRATE 12.5 MG: 25 TABLET, FILM COATED ORAL at 19:05

## 2022-06-20 RX ADMIN — CEFEPIME 2 G: 2 INJECTION, POWDER, FOR SOLUTION INTRAVENOUS at 08:45

## 2022-06-20 RX ADMIN — OXYCODONE 10 MG: 5 TABLET ORAL at 06:22

## 2022-06-20 RX ADMIN — CEFEPIME 2 G: 2 INJECTION, POWDER, FOR SOLUTION INTRAVENOUS at 01:49

## 2022-06-20 RX ADMIN — CEFEPIME 2 G: 2 INJECTION, POWDER, FOR SOLUTION INTRAVENOUS at 23:38

## 2022-06-20 RX ADMIN — ACETAMINOPHEN 650 MG: 325 TABLET ORAL at 01:48

## 2022-06-20 RX ADMIN — OXYCODONE 10 MG: 5 TABLET ORAL at 10:26

## 2022-06-20 RX ADMIN — SODIUM CHLORIDE, PRESERVATIVE FREE 10 ML: 5 INJECTION INTRAVENOUS at 19:08

## 2022-06-20 RX ADMIN — SODIUM CHLORIDE, PRESERVATIVE FREE 10 ML: 5 INJECTION INTRAVENOUS at 22:59

## 2022-06-20 RX ADMIN — ACETAMINOPHEN 650 MG: 325 TABLET ORAL at 14:51

## 2022-06-20 RX ADMIN — OXYCODONE 10 MG: 5 TABLET ORAL at 01:48

## 2022-06-20 RX ADMIN — OXYCODONE 10 MG: 5 TABLET ORAL at 23:36

## 2022-06-20 RX ADMIN — WARFARIN SODIUM 7.5 MG: 7.5 TABLET ORAL at 20:36

## 2022-06-20 RX ADMIN — ACETAMINOPHEN 650 MG: 325 TABLET ORAL at 08:45

## 2022-06-20 RX ADMIN — CEFEPIME 2 G: 2 INJECTION, POWDER, FOR SOLUTION INTRAVENOUS at 16:20

## 2022-06-20 RX ADMIN — OXYCODONE 10 MG: 5 TABLET ORAL at 14:51

## 2022-06-20 RX ADMIN — SODIUM CHLORIDE, POTASSIUM CHLORIDE, SODIUM LACTATE AND CALCIUM CHLORIDE 75 ML/HR: 600; 310; 30; 20 INJECTION, SOLUTION INTRAVENOUS at 01:55

## 2022-06-20 RX ADMIN — Medication 1000 UNITS: at 08:45
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
## 2022-06-20 NOTE — PROGRESS NOTES
Orders received, chart reviewed and patient evaluated by occupational therapy. Pending progression with skilled acute occupational therapy, recommend:  D/c to rehab (pt requesting), if unable then home with [address] [place] [address] and assist/supervision from family for OOB mobility/ADL/IADL tasks. Recommend with nursing ADLs with supervision/setup, OOB to chair 3x/day and toileting via beside commode x1 assist and walker. Thank you for completing as able in order to maintain patient strength, endurance and independence. Full evaluation to follow.    Thank you,   Ksenia Case, OTD, OTR/L

## 2022-06-20 NOTE — PROGRESS NOTES
Pharmacist Note - Warfarin Dosing  Consult provided for this 71 y. o.female to manage warfarin for DVT prophylaxis s/p left knee total arthroplasty. -readmitted with sepsis    INR Goal: 1.7- 2.2    Daily INR ordered: YES    Recent Labs     06/20/22  0747 06/18/22  0257   HGB 7.9*  --    INR 1.3* 1.3*     Date               INR                  Dose  6/6                   1                      -   6/16                 -                       5 mg    6/17                 1.1                   5 mg         6/18                 1.3                   5 mg    6/19                 1.2                   7.5 mg  6/20                 1.3                   7.5 mg                                                                               Assessment/ Plan: Will order warfarin 7.5 mg PO x 1 dose. Pharmacy will continue to monitor daily and adjust therapy as indicated. Please contact the pharmacist at  for outpatient recommendations if needed.
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
## 2022-06-20 NOTE — ED NOTES
Bedside shift change report given to chantelle (oncoming nurse) by miriam/jessi (offgoing nurse). Report included the following information SBAR, ED Summary and OR Summary.

## 2022-06-20 NOTE — PROGRESS NOTES
Care Management:    Transition of Care Plan:     · RUR: 15%  · Disposition: 1st IPR - Sent via [facility] to [facility] and [facility] 6-20-22. Patient is agreeable to either location. · If needs SNF, her preference is [facility]. (Have not sent referral.)  · Insurance: Medicare A&B, Commercial supplement  · Transportation: son vs BLS      Received consult during IDR rounds that patient will need SNF at discharge. 6-16-22 to 6-18-22 inpatient - left knee replacement. Patient was discharged to son's home at [address], [place], [address] with home health. Referral was sent to multiple agencies. Multiple agencies accepted her. [facility] RN visited her to open the case, but once vitals were taken, she referred patient back to hospital. [facility]. Spoke briefly with patient in room. She was not feeling well. CM asked about her preference for discharge. Patient would prefer to go to a facility for rehab. Offered [facility] for SNF. Patient prefers [facility] as she is familiar with the facility. Asked if CM could bring her a list of other facilities to consider. Patient politely declined. Patient gave CM permission to call her son to obtain the rest of the information to complete the initial assessment. Called patient's son Irving Martinez ([phone]). Obtained the below information. Discussed SNF. He said patient was worried because he had to get up every 4 hours and give her the pain medication and patient prefers to go to SNF. Son said the care he gave was what he expected, but patient wants to go to SNF first and then son's home when discharged from SNF. Discussed [facility]. Explained patient's preference. Son is in agreement with patient's choice and would like to add any other facilities that would be close to where son resides. [facility] said [facility] going to see patient. Explained CM would investigate.  Called him back to update him that [facility] that actually saw patient. Demographics confirmed: yes  Living Situation: Prior to surgery 6-16-22, patient lived at home alone in a multi level home with 4 steps to enter. She discharged to her son's home on 6-18-22 after surgery. DME: rolling walker  ADLs: Independent prior to surgery. Drove prior to surgery. [facility] [address]  Previous HH/SNF/rehab: [facility] saw [facility], but did not open her as the sent her to the ER after they took her vitals. Insurance: Medicare, Commercial Supplement  Transportation: son    Reason for Readmission:     sepsis         RUR Score/Risk Level: 15%        PCP: First and Last name:  Dr. Kathrine Huber   Name of Practice:    Are you a current patient: Yes/No: yes   Approximate date of last visit: Just prior to surgery. Can you participate in a virtual visit with your PCP:  unknown    Is a Care Conference indicated: no      Did you attend your follow up appointment (s): If not, why not: Patient was not out of hospital long enough to attend any appointments. Resources/supports as identified by patient/family:          Top Challenges facing patient (as identified by patient/family and CM): Finances/Medication cost?     none  Transportation     Self or son   Support system or lack thereof? Living arrangements? Alone, was going to stay with her son for 2 weeks post op. Self-care/ADLs/Cognition? Independent prior to surgery. Patient is alert and oriented x 4. Current Advanced Directive/Advance Care Plan:  None. Son is her legal next of kin. Plan for utilizing home health:   Was seen by [facility] prior to admission. Transition of Care Plan:    Based on readmission, the patient's previous Plan of Care   has been evaluated and/or modified.  The current Transition of Care Plan is:       SNF    Readmission Assessment  Number of days since last admission?: 1-7 days  Previous disposition: Home with Home Health  Who is being interviewed?: Caregiver  What was the patient's/caregiver's perception as to why they think they needed to return back to the hospital?: Other (Comment) (sepsis)  Did you visit your Primary Care Physician after you left the hospital, before you returned this time?: No  Why weren't you able to visit your PCP?: Other (Comment) (Returned within 2 days)  Did you see a specialist, such as Cardiac, Pulmonary, Orthopedic Physician, etc. after you left the hospital?: No  Who advised the patient to return to the hospital?: [facility] Staff  Does the patient report anything that got in the way of taking their medications?: No  In our efforts to provide the best possible care to you and others like you, can you think of anything that we could have done to help you after you left the hospital the first time, so that you might not have needed to return so soon?: Other (Comment) (Son felt patient should have been observed longer. He stated her BP and heart rate were still elevated at discharge.)    Care Management Interventions  PCP Verified by CM:  (Dr. Eboni Lane)  Palliative Care Criteria Met (RRAT>21 & CHF Dx)?: No  Mode of Transport at Discharge: Other (see comment) (son)  Transition of Care Consult (CM Consult): SNF  Discharge Durable Medical Equipment: No  Physical Therapy Consult: Yes  Speech Therapy Consult: No  Support Systems: Child(diann)  Confirm Follow Up Transport: Family  [employer] Information Provided?: No  Discharge Location  Patient Expects to be Discharged to[de-identified] Skilled nursing facility     [facility] called earlier and said they never saw patient as she was readmitted. CM called [facility], who said their records indication patient was to be seen by [facility]. '    Spoke with Beatris at [facility] ([phone]). RN saw patient to do start of care. Once she assessed patient, she recommended patient go to ER. The did not officially open patient since she was going back to hospital. They will follow patient at whichever SNF patient goes to.     13:32 Per chart review, PT is recommending Inpatient Rehab. Will need to meet with patient again to discuss. MIRI Knott    Addendum: spoke with MD who asked CM to go ahead and send IPR referral today. Infection in joint has been ruled out. Infection is from urine. Patient for potential discharge tomorrow. Met with patient in room and spoke with son Levon Blood on phone while in room with patient. Explained recommendation for IPR. Offered [facility]. Patient wanted facility closest to [place] Blood. Levon Blood said both are same distance. Patient agreeable to either facility. Sent referral to [facility] and [facility] via [address]. Spoke with Rik Olivers with [facility]. They will have beds tomorrow. Spoke with Gayatri Winters liaison for [facility]. They will likely have beds tomorrow, but do not know how many potential admissions they already have.     MIRI Knott

## 2022-06-20 NOTE — PROGRESS NOTES
Night RN had sent CBC and BMP to lab prior to leaving for her shift however, the lab called and there was not an order in the sunquest due to the labels already being printed. Put in STAT CBC and BMP order at this time and called lab.

## 2022-06-20 NOTE — PROGRESS NOTES
1611 TRANSFER - IN REPORT:    Verbal report received from [facility]) on Tommy Stewart  being received from ED(unit) for routine progression of care      Report consisted of patients Situation, Background, Assessment and   Recommendations(SBAR). Information from the following report(s) SBAR was reviewed with the receiving nurse. Opportunity for questions and clarification was provided. Assessment completed upon patients arrival to unit and care assumed. 8098 Primary Nurse Toro Almanza RN and Valencia Figueroa RN performed a dual skin assessment on this patient No impairment noted  Antonino score is 18.     2000 Verbal bedside report given to Michael Casiano RN oncoming nurse by Malcom Lomas RN off-going nurse. Report included current pt status and condition, recent results, hx of present illness, heart rate and rhythm, and respiratory status.

## 2022-06-20 NOTE — PROGRESS NOTES
Orthopedic Progress Note    S: Pain well controlled with pain meds in bed, very painful to flex or extend; ;Denies numbness, tingling, focal weakness, cp, sob, fever, chills    O: Mild distress, respirations unlabored; Dressings dry, intact, small bloody stains that appear old; no drainage from incisions; compartments soft, moderate edema in lower leg, lateral knee and posterior calf ttp; EHL/DF/PF 5/5, SILT; Cap refill brisk, foot warm, DP2+    Patient Vitals for the past 4 hrs:   Pulse   06/20/22 1036 (!) 139     Recent Labs     06/20/22  0747 06/20/22  0636   HGB 7.9*  --    HCT 21.2*  --      --    BUN 13 12   CREA 0.73 0.69   K 3.6 4.1    139       MRI KNEE LT W WO CONT    Result Date: 6/20/2022  1. Status post total knee arthroplasty which creates artifact. 2. Fluid collection in the subcutaneous tissues anteriorly. This may represent a postoperative fluid collection, but infection cannot be excluded. A/P:  Dr. David Dears aware of latest labs, imagine, hx and exam; there is no plan to take patient back to operative room at this time. Unlikely infection, more likely hematoma given the surgery. Pt can be discharged to home or SNF; discharge instructions received prior to this visit still apply. F/U with Dr. David Deakika as outpatient.     - DVT ppx -Coumadin; SCDs  - PT/OT - WBAT  - Pain - Acetaminophen, Oxycodone prn; Ice, Elevation, Ace wrap as needed  - Dressing - Leave in place if it remains dry and intact; change as needed for saturation with dry sterile island dressing  - Dispo - SNF or Home with Home Health; needs f/u in 7-10 days with Dr. Shayy Gallegos, [address]    -------    Pt seen again this evening at 1045; sleeping comfortably, endorses some improvement of pain. Complains still primarily of posterior calf pain, area of fluid collection is nonttp, no change in dressing or marked area of erythema.

## 2022-06-20 NOTE — WOUND CARE
Seen in ER. Consulted for knee and rash. No rash noted. Knee seen by ortho with recs in place to leave dressing. Heels intact with no redness - left heel offloaded with pillow. Will sign off.    PASTORA Cruz

## 2022-06-20 NOTE — PROGRESS NOTES
[facility] to provide [facility] over the weekend - patient presented back to [facility] and is now admitted.

## 2022-06-20 NOTE — PROGRESS NOTES
[facility] Adult  Hospitalist Group                                                                                          Hospitalist Progress Note  Chioma Alvarez MD  Answering service: [phone] or [phone] from in house phone        Date of Service:  2022  NAME:  Alex Gutierrez  :  1950  MRN:  [id number]      Admission Summary:   HPI: Gabrielle Varma is a 71 y.o. female medical history of seasonal allergies that is post knee replacement done on  by Dr. Kg Galvan and d/c'd presents with worsening knee pain and swelling, tachycardia that was found by home visiting nurse prompted her to seek ER attention. She was recently d/c'd . Patient has had fevers at home. Denies significant chills. She has noticed blood dc from knee incision but states she did not see sig purulent drainage.      The patient denies any headache, blurry vision, sore throat, trouble swallowing, trouble with speech, chest pain, SOB, cough,  chills, N/V/D, abd pain, urinary symptoms, constipation, recent travels, sick contacts, focal or generalized neurological symptoms, falls, injuries, rashes, contact with COVID-19 diagnosed patients, hematemesis, melena, hemoptysis, hematuria, rashes, denies starting any new medications and denies any other concerns or problems besides as mentioned above.       In the ER heart rate to 118, blood pressure stable satting well on room air. Labs notable for WBC count of 19.6. POC lactic was 1.04. \"       Interval history / Subjective:   Patient seen examined at bedside earlier. Still having slight knee pain.      Assessment & Plan:     Sepsis  -ddx: UTI likely vs less likely post op knee infection, ortho evaled MRI appears more hematoma, no evidence of abscess  -Blood cultures ngtd, UA + pending urine cx , pct 0.16, mrsa swab ordered, chest x-ray chronic changes no new infiltrate  -patient is not hypoxic, has been on coumadin for dvt ppx, low suspicion of PE   - Continue Vanco, cefepime , de-escalate accordingly following cultures  - ID consulted, appreciate recs  -Orthopedics evaled, recommend SNF vs home, WBAT   -wound care consulted     Sinus tach  -2/2 pain vs infectious etiology  -cont IVF, start Toprol twice daily  -prn pain control      Acute on chronic anemia  -  Iron studies consistent with LG some component of acute blood loss anemia post procedure, b12 an folate wnl  -start PO iron   -cont to follow  -Transfuse for hemoglobin less than 7    Status post left knee replacement  - Procedure done by Dr. Nichole Troy on 6/16  -cont coumadin for dvt ppx   -standing bowel regimen   -PT OT      Seasonal allergies  Cutaneous candidiasis  - Continue home meds          Code status: full   Prophylaxis: coumadin   Care Plan discussed with: patient/family, nurse, cm   Anticipated Disposition: 24-48 hrs      Hospital Problems  Date Reviewed: 6/14/2022          Codes Class Noted POA    Sepsis ([facility] [address] ICD-10-CM: A41.9  ICD-9-CM: 038.9, 995.91  1/17/2020 Unknown                Review of Systems:   A comprehensive review of systems was negative except for that written in the HPI. Vital Signs:    Last 24hrs VS reviewed since prior progress note. Most recent are:  Visit Vitals  /81 (BP 1 Location: Left arm, BP Patient Position: At rest;Supine)   Pulse (!) 139   Temp 99.3 °F (37.4 °C)   Resp 16   SpO2 95%         Intake/Output Summary (Last 24 hours) at 6/20/2022 1452  Last data filed at 6/19/2022 2155  Gross per 24 hour   Intake 2500 ml   Output --   Net 2500 ml        Physical Examination:     I had a face to face encounter with this patient and independently examined them on 6/20/2022 as outlined below:          Constitutional:  No acute distress, cooperative, pleasant    ENT:  Oral mucosa moist, oropharynx benign. Resp:  CTA bilaterally. No wheezing/rhonchi/rales. No accessory muscle use.     CV:  Regular rhythm, normal rate, no murmurs, gallops, rubs    GI:  Soft, non distended, non tender. normoactive bowel sounds, no hepatosplenomegaly     Musculoskeletal:  No edema, warm, 2+ pulses throughout    Neurologic:  Moves all extremities. AAOx3, CN II-XII reviewed            Data Review:    Review and/or order of clinical lab test  Review and/or order of tests in the radiology section of CPT  Review and/or order of tests in the medicine section of [facility]      Labs:     Recent Labs     06/20/22  0747   WBC 16.2*   HGB 7.9*   HCT 21.2*        Recent Labs     06/20/22  0747 06/20/22  0636 06/19/22  1427    139 139   K 3.6 4.1 3.5    108 105   CO2 26 23 28   BUN 13 12 14   CREA 0.73 0.69 0.92   GLU 86 81 102*   CA 8.5 8.4* 9.3     Recent Labs     06/19/22  1427   ALT 18   AP 87   TBILI 0.6   TP 7.1   ALB 2.6*   GLOB 4.5*     Recent Labs     06/18/22  0257   INR 1.3*   PTP 13.4*      Recent Labs     06/20/22  0636   TIBC 166*   PSAT 10*   FERR 292      Lab Results   Component Value Date/Time    Folate 16.8 06/20/2022 06:36 AM      No results for input(s): PH, PCO2, PO2 in the last 72 hours. No results for input(s): CPK, CKNDX, TROIQ in the last 72 hours.     No lab exists for component: CPKMB  Lab Results   Component Value Date/Time    Cholesterol, total 182 11/11/2021 08:43 AM    HDL Cholesterol 56 11/11/2021 08:43 AM    LDL, calculated 100.6 (H) 11/11/2021 08:43 AM    Triglyceride 127 11/11/2021 08:43 AM    CHOL/HDL Ratio 3.3 11/11/2021 08:43 AM     Lab Results   Component Value Date/Time    Glucose (POC) 89 06/16/2022 10:23 AM    Glucose (POC) 95 12/16/2016 11:59 AM     Lab Results   Component Value Date/Time    Color YELLOW/STRAW 06/19/2022 09:42 PM    Appearance CLOUDY (A) 06/19/2022 09:42 PM    Specific gravity 1.025 06/19/2022 09:42 PM    pH (UA) 5.0 06/19/2022 09:42 PM    Protein TRACE (A) 06/19/2022 09:42 PM    Glucose Negative 06/19/2022 09:42 PM    Ketone 15 (A) 06/19/2022 09:42 PM    Bilirubin Negative 06/19/2022 09:42 PM    Urobilinogen 0.2 06/19/2022 09:42 PM Nitrites Negative 06/19/2022 09:42 PM    Leukocyte Esterase SMALL (A) 06/19/2022 09:42 PM    Epithelial cells MODERATE (A) 06/19/2022 09:42 PM    Bacteria 2+ (A) 06/19/2022 09:42 PM    WBC 10-20 06/19/2022 09:42 PM    RBC 0-5 06/19/2022 09:42 PM         Medications Reviewed:     Current Facility-Administered Medications   Medication Dose Route Frequency    [START ON 6/21/2022] ferrous sulfate tablet 325 mg  1 Tablet Oral DAILY WITH BREAKFAST    [START ON 6/21/2022] polyethylene glycol (MIRALAX) packet 17 g  17 g Oral DAILY    metoprolol succinate (TOPROL-XL) XL tablet 25 mg  25 mg Oral BID    sodium chloride (NS) flush 5-10 mL  5-10 mL IntraVENous PRN    cefepime (MAXIPIME) 2 g in 0.9% sodium chloride (MBP/ADV) 100 mL MBP  2 g IntraVENous Q8H    sodium chloride (NS) flush 5-40 mL  5-40 mL IntraVENous Q8H    sodium chloride (NS) flush 5-40 mL  5-40 mL IntraVENous PRN    acetaminophen (TYLENOL) tablet 650 mg  650 mg Oral Q6H PRN    Or    acetaminophen (TYLENOL) suppository 650 mg  650 mg Rectal Q6H PRN    polyethylene glycol (MIRALAX) packet 17 g  17 g Oral DAILY PRN    ondansetron (ZOFRAN ODT) tablet 4 mg  4 mg Oral Q8H PRN    Or    ondansetron (ZOFRAN) injection 4 mg  4 mg IntraVENous Q6H PRN    albuterol-ipratropium (DUO-NEB) 2.5 MG-0.5 MG/3 ML  3 mL Nebulization Q4H PRN    clotrimazole-betamethasone (LOTRISONE) 1-0.05 % cream   Topical DAILY    fluticasone propionate (FLONASE) 50 mcg/actuation nasal spray 2 Spray  2 Spray Both Nostrils DAILY    montelukast (SINGULAIR) tablet 10 mg  10 mg Oral DAILY    miconazole (MICOTIN) 2 % powder   Topical TID PRN    oxyCODONE IR (ROXICODONE) tablet 10 mg  10 mg Oral Q4H PRN    [START ON 6/21/2022] senna-docusate (PERICOLACE) 8.6-50 mg per tablet 1 Tablet  1 Tablet Oral DAILY    cholecalciferol (VITAMIN D3) (1000 Units /25 mcg) tablet 1,000 Units  1,000 Units Oral DAILY    Warfarin- pharmacy to dose   Other Rx Dosing/Monitoring    lactated Ringers infusion  75 mL/hr IntraVENous CONTINUOUS    Vancomycin- pharmacy to dose   Other Rx Dosing/Monitoring    vancomycin (VANCOCIN) 1500 mg in  ml infusion  1,500 mg IntraVENous Q24H     Current Outpatient Medications   Medication Sig    oxyCODONE IR (Roxicodone) 5 mg immediate release tablet Take 2 Tablets by mouth every four (4) hours as needed for Pain for up to 7 days. Max Daily Amount: 60 mg.    warfarin (Coumadin) 2.5 mg tablet Take 1 Tablet by mouth daily.  losartan (COZAAR) 100 mg tablet Take 100 mg by mouth Every morning.  clotrimazole-betamethasone (LOTRISONE) topical cream APPLY TO AFFECTED AREA TWICE A DAY (Patient taking differently: daily.)    Vitamin D3 25 mcg (1,000 unit) tablet TAKE 2 TABLETS BY MOUTH EVERY DAY (Patient taking differently: Take 1,000 Units by mouth daily.)    montelukast (SINGULAIR) 10 mg tablet TAKE 1 TABLET BY MOUTH EVERY DAY    nystatin (MYCOSTATIN) powder Apply  to affected area three (3) times daily. (Patient taking differently: Apply  to affected area as needed.)    fluticasone propionate (FLONASE) 50 mcg/actuation nasal spray SPRAY 2 SPRAYS INTO EACH NOSTRIL EVERY DAY    nystatin-triamcinolone (MYCOLOG) 100,000-0.1 unit/gram-% ointment Apply  to affected area two (2) times a day.  albuterol (Ventolin HFA) 90 mcg/actuation inhaler Take 2 Puffs by inhalation every four (4) hours as needed for Wheezing.  sennosides/docusate sodium (SENNA PLUS PO) Take 100 mg by mouth daily.      ______________________________________________________________________  EXPECTED LENGTH OF STAY: - - -  ACTUAL LENGTH OF STAY:          1                 Elijah Erazo MD

## 2022-06-21 ENCOUNTER — TELEPHONE (OUTPATIENT)
Dept: INTERNAL MEDICINE CLINIC | Age: 72
End: 2022-06-21

## 2022-06-21 LAB
ANION GAP SERPL CALC-SCNC: 5 MMOL/L (ref 5–15)
BACTERIA SPEC CULT: NORMAL
BASOPHILS # BLD: 0 K/UL (ref 0–0.1)
BASOPHILS NFR BLD: 0 % (ref 0–1)
BUN SERPL-MCNC: 14 MG/DL (ref 6–20)
BUN/CREAT SERPL: 18 (ref 12–20)
CALCIUM SERPL-MCNC: 8.3 MG/DL (ref 8.5–10.1)
CHLORIDE SERPL-SCNC: 108 MMOL/L (ref 97–108)
CO2 SERPL-SCNC: 26 MMOL/L (ref 21–32)
CREAT SERPL-MCNC: 0.76 MG/DL (ref 0.55–1.02)
DIFFERENTIAL METHOD BLD: ABNORMAL
EOSINOPHIL # BLD: 0.8 K/UL (ref 0–0.4)
EOSINOPHIL NFR BLD: 5 % (ref 0–7)
ERYTHROCYTE [DISTWIDTH] IN BLOOD BY AUTOMATED COUNT: 15.1 % (ref 11.5–14.5)
GLUCOSE SERPL-MCNC: 89 MG/DL (ref 65–100)
HCT VFR BLD AUTO: 21.4 % (ref 35–47)
HGB BLD-MCNC: 7.6 G/DL (ref 11.5–16)
IMM GRANULOCYTES # BLD AUTO: 0.1 K/UL (ref 0–0.04)
IMM GRANULOCYTES NFR BLD AUTO: 1 % (ref 0–0.5)
INR PPP: 1.7 (ref 0.9–1.1)
LYMPHOCYTES # BLD: 2.4 K/UL (ref 0.8–3.5)
LYMPHOCYTES NFR BLD: 15 % (ref 12–49)
MCH RBC QN AUTO: 29.5 PG (ref 26–34)
MCHC RBC AUTO-ENTMCNC: 35.5 G/DL (ref 30–36.5)
MCV RBC AUTO: 82.9 FL (ref 80–99)
MONOCYTES # BLD: 1.9 K/UL (ref 0–1)
MONOCYTES NFR BLD: 12 % (ref 5–13)
NEUTS SEG # BLD: 10.2 K/UL (ref 1.8–8)
NEUTS SEG NFR BLD: 67 % (ref 32–75)
NRBC # BLD: 0 K/UL (ref 0–0.01)
NRBC BLD-RTO: 0 PER 100 WBC
PLATELET # BLD AUTO: 371 K/UL (ref 150–400)
PMV BLD AUTO: 9.4 FL (ref 8.9–12.9)
POTASSIUM SERPL-SCNC: 3.6 MMOL/L (ref 3.5–5.1)
PROTHROMBIN TIME: 17.3 SEC (ref 9–11.1)
RBC # BLD AUTO: 2.58 M/UL (ref 3.8–5.2)
SERVICE CMNT-IMP: NORMAL
SODIUM SERPL-SCNC: 139 MMOL/L (ref 136–145)
WBC # BLD AUTO: 15.4 K/UL (ref 3.6–11)

## 2022-06-21 PROCEDURE — 85610 PROTHROMBIN TIME: CPT

## 2022-06-21 PROCEDURE — 74011250637 HC RX REV CODE- 250/637: Performed by: STUDENT IN AN ORGANIZED HEALTH CARE EDUCATION/TRAINING PROGRAM

## 2022-06-21 PROCEDURE — 74011000258 HC RX REV CODE- 258: Performed by: STUDENT IN AN ORGANIZED HEALTH CARE EDUCATION/TRAINING PROGRAM

## 2022-06-21 PROCEDURE — 74011250636 HC RX REV CODE- 250/636: Performed by: STUDENT IN AN ORGANIZED HEALTH CARE EDUCATION/TRAINING PROGRAM

## 2022-06-21 PROCEDURE — 85025 COMPLETE CBC W/AUTO DIFF WBC: CPT

## 2022-06-21 PROCEDURE — 97110 THERAPEUTIC EXERCISES: CPT

## 2022-06-21 PROCEDURE — 97535 SELF CARE MNGMENT TRAINING: CPT

## 2022-06-21 PROCEDURE — 65270000046 HC RM TELEMETRY

## 2022-06-21 PROCEDURE — 97116 GAIT TRAINING THERAPY: CPT

## 2022-06-21 PROCEDURE — 36415 COLL VENOUS BLD VENIPUNCTURE: CPT

## 2022-06-21 PROCEDURE — 74011000250 HC RX REV CODE- 250: Performed by: STUDENT IN AN ORGANIZED HEALTH CARE EDUCATION/TRAINING PROGRAM

## 2022-06-21 PROCEDURE — 80048 BASIC METABOLIC PNL TOTAL CA: CPT

## 2022-06-21 RX ORDER — WARFARIN SODIUM 5 MG/1
5 TABLET ORAL ONCE
Status: COMPLETED | OUTPATIENT
Start: 2022-06-21 | End: 2022-06-21

## 2022-06-21 RX ORDER — OXYCODONE HYDROCHLORIDE 5 MG/1
10 TABLET ORAL
Status: DISCONTINUED | OUTPATIENT
Start: 2022-06-21 | End: 2022-06-23 | Stop reason: HOSPADM

## 2022-06-21 RX ORDER — FOLIC ACID 5 MG/ML
1 INJECTION, SOLUTION INTRAMUSCULAR; INTRAVENOUS; SUBCUTANEOUS DAILY
Status: DISCONTINUED | OUTPATIENT
Start: 2022-06-21 | End: 2022-06-21 | Stop reason: CLARIF

## 2022-06-21 RX ADMIN — FERROUS SULFATE TAB 325 MG (65 MG ELEMENTAL FE) 325 MG: 325 (65 FE) TAB at 09:53

## 2022-06-21 RX ADMIN — WARFARIN SODIUM 5 MG: 5 TABLET ORAL at 19:45

## 2022-06-21 RX ADMIN — OXYCODONE 10 MG: 5 TABLET ORAL at 20:37

## 2022-06-21 RX ADMIN — SODIUM CHLORIDE, PRESERVATIVE FREE 10 ML: 5 INJECTION INTRAVENOUS at 16:32

## 2022-06-21 RX ADMIN — OXYCODONE 10 MG: 5 TABLET ORAL at 08:12

## 2022-06-21 RX ADMIN — TRAMADOL HYDROCHLORIDE 50 MG: 50 TABLET, COATED ORAL at 05:36

## 2022-06-21 RX ADMIN — METOPROLOL TARTRATE 12.5 MG: 25 TABLET, FILM COATED ORAL at 20:37

## 2022-06-21 RX ADMIN — CEFEPIME 2 G: 2 INJECTION, POWDER, FOR SOLUTION INTRAVENOUS at 08:12

## 2022-06-21 RX ADMIN — VANCOMYCIN HYDROCHLORIDE 1500 MG: 10 INJECTION, POWDER, LYOPHILIZED, FOR SOLUTION INTRAVENOUS at 21:47

## 2022-06-21 RX ADMIN — FOLIC ACID: 5 INJECTION, SOLUTION INTRAMUSCULAR; INTRAVENOUS; SUBCUTANEOUS at 21:47

## 2022-06-21 RX ADMIN — TRAMADOL HYDROCHLORIDE 50 MG: 50 TABLET, COATED ORAL at 16:29

## 2022-06-21 RX ADMIN — TRAMADOL HYDROCHLORIDE 50 MG: 50 TABLET, COATED ORAL at 23:00

## 2022-06-21 RX ADMIN — OXYCODONE 10 MG: 5 TABLET ORAL at 16:29

## 2022-06-21 RX ADMIN — Medication 1000 UNITS: at 09:53

## 2022-06-21 RX ADMIN — SENNOSIDES AND DOCUSATE SODIUM 1 TABLET: 50; 8.6 TABLET ORAL at 09:53

## 2022-06-21 RX ADMIN — MONTELUKAST 10 MG: 10 TABLET, FILM COATED ORAL at 09:53

## 2022-06-21 RX ADMIN — SODIUM CHLORIDE, PRESERVATIVE FREE 10 ML: 5 INJECTION INTRAVENOUS at 07:02

## 2022-06-21 RX ADMIN — SODIUM CHLORIDE, PRESERVATIVE FREE 10 ML: 5 INJECTION INTRAVENOUS at 22:00

## 2022-06-21 RX ADMIN — FLUTICASONE PROPIONATE 2 SPRAY: 50 SPRAY, METERED NASAL at 12:20

## 2022-06-21 RX ADMIN — OXYCODONE 10 MG: 5 TABLET ORAL at 04:22

## 2022-06-21 RX ADMIN — OXYCODONE 10 MG: 5 TABLET ORAL at 12:20

## 2022-06-21 RX ADMIN — CEFEPIME 2 G: 2 INJECTION, POWDER, FOR SOLUTION INTRAVENOUS at 16:29

## 2022-06-21 RX ADMIN — CEFEPIME 2 G: 2 INJECTION, POWDER, FOR SOLUTION INTRAVENOUS at 23:02

## 2022-06-21 RX ADMIN — METOPROLOL TARTRATE 12.5 MG: 25 TABLET, FILM COATED ORAL at 09:53

## 2022-06-21 NOTE — PROGRESS NOTES
ID Progress Note  2022    Subjective:     C/o severe LLE painn  Review of Systems:            Symptom Y/N Comments   Symptom Y/N Comments   Fever/Chills       Chest Pain  n      Poor Appetite       Edema        Cough       Abdominal Pain  n      Sputum       Joint Pain  y      SOB/PATEL  n     Pruritis/Rash        Nausea/vomit  n     Tolerating PT/OT        Diarrhea  n     Tolerating Diet        Constipation  y     Other           Could NOT obtain due to:       Objective:     Vitals:   Visit Vitals  BP (!) 146/78   Pulse (!) 105   Temp 99.1 °F (37.3 °C)   Resp 19   Wt 123.7 kg (272 lb 12.8 oz)   SpO2 94%   BMI 46.10 kg/m²        Tmax:  Temp (24hrs), Av.7 °F (37.1 °C), Min:98 °F (36.7 °C), Max:99.3 °F (37.4 °C)      PHYSICAL EXAM:  General: Chronically ill appearing, WD, WN. Alert, cooperative, no acute distress    EENT:  EOMI. Anicteric sclerae. MMM  Resp:  Clear in apex with decreased breath sounds, no wheezing or rales. No accessory muscle use  CV:  Regular  rhythm,  +edema  GI:  Soft, obese, Non tender. +Bowel sounds  Neurologic:  Alert and oriented X 3, normal speech,   Psych:   Good insight. Not anxious nor agitated  Skin:  No rashes.   No jaundice      Labs:   Lab Results   Component Value Date/Time    WBC 15.4 (H) 2022 02:15 AM    Hemoglobin (POC) 14.6 2016 11:59 AM    HGB 7.6 (L) 2022 02:15 AM    Hematocrit (POC) 43 2016 11:59 AM    HCT 21.4 (L) 2022 02:15 AM    PLATELET 062  02:15 AM    MCV 82.9 2022 02:15 AM     Lab Results   Component Value Date/Time    Sodium 139 2022 02:15 AM    Potassium 3.6 2022 02:15 AM    Chloride 108 2022 02:15 AM    CO2 26 2022 02:15 AM    Anion gap 5 2022 02:15 AM    Glucose 89 2022 02:15 AM    BUN 14 2022 02:15 AM    Creatinine 0.76 2022 02:15 AM    BUN/Creatinine ratio 18 2022 02:15 AM    GFR est AA >60 2022 02:15 AM    GFR est non-AA >60 2022 02:15 AM Calcium 8.3 (L) 06/21/2022 02:15 AM    Bilirubin, total 0.6 06/19/2022 02:27 PM    Alk. phosphatase 87 06/19/2022 02:27 PM    Protein, total 7.1 06/19/2022 02:27 PM    Albumin 2.6 (L) 06/19/2022 02:27 PM    Globulin 4.5 (H) 06/19/2022 02:27 PM    A-G Ratio 0.6 (L) 06/19/2022 02:27 PM    ALT (SGPT) 18 06/19/2022 02:27 PM         Cultures:   Results     Procedure Component Value Units Date/Time    CULTURE, MRSA [[id number]] Collected: 06/21/22 0215    Order Status: Completed Specimen: Nasal from Nares Updated: 06/21/22 0323    CULTURE, URINE [[id number]] Collected: 06/19/22 2142    Order Status: Completed Specimen: Urine Updated: 06/21/22 0655     Special Requests: --        NO SPECIAL REQUESTS  Reflexed from [id number]       Culture result: No growth (<1,000 CFU/ML)       CULTURE, MRSA [[id number]]     Order Status: Canceled Specimen: Nasal from [address], BLOOD [[id number]] Collected: 06/19/22 1427    Order Status: Completed Specimen: Blood Updated: 06/21/22 0518     Special Requests: NO SPECIAL REQUESTS        Culture result: NO GROWTH 2 DAYS       CULTURE, BLOOD [[id number]] Collected: 06/19/22 1427    Order Status: Completed Specimen: Blood Updated: 06/21/22 0518     Special Requests: NO SPECIAL REQUESTS        Culture result: NO GROWTH 2 DAYS            Assessment:   OA of left knee  S/p Left TKA (6/17) by Dr. Elisha Villar  UTI ruled out; urine cx no growth  - afebrile, WBC 16->15.4    Blood cx (6/19) no growth so far    U/A revealed WBC 10-20 with 2+ bacteria with negative urine cx    MRI of left knee (6/19) Artifact from orthopedic hardware. Diffuse subcutaneous edema. Small joint effusion.  Fluid collection anterior and lateral to the distal femur and patella proximal 4.8 x 2.3 x 7.0 cm could represent abscess, just deep to the skin surface  Plan:   ·  continue with IV cefepime and vancomycin  ·  ortho team following; Unlikely infection, more likely hematoma given the surgery.         -> will stop the ABX therapy if blood cx remain negative for another 24 hours   ·  Fever work up if temp >= 100.4    Above plan of care discussed and agreed with Dr. Presley Noel, NP

## 2022-06-21 NOTE — PROGRESS NOTES
Problem: Mobility Impaired (Adult and Pediatric)  Goal: *Acute Goals and Plan of Care (Insert Text)  Description: FUNCTIONAL STATUS PRIOR TO ADMISSION: Patient was independent and active without use of DME.    HOME SUPPORT PRIOR TO ADMISSION: The patient lived alone with son close by. She discharged to her son's home after her TKE. Her son has been assisting her, she is open to home health (seen by RN, returned to hosp before PT stated). Physical Therapy Goals  Initiated 6/20/2022  1. Patient will move from supine to sit and sit to supine  in bed with modified independence within 7 day(s). 2.  Patient will transfer from bed to chair and chair to bed with modified independence using the least restrictive device within 7 day(s). 3.  Patient will perform sit to stand with modified independence within 7 day(s). 4.  Patient will ambulate with modified independence for 150 feet with the least restrictive device within 7 day(s). 5.  Patient will ascend/descend 12 stairs with handrail(s) with contact guard assist within 7 day(s). Outcome: Progressing Towards Goal   PHYSICAL THERAPY TREATMENT  Patient: Mary Herrera (62 y.o. female)  Date: 6/21/2022  Diagnosis: Sepsis ([facility] [address] [A41.9] <principal problem not specified>       Precautions: Fall,Skin,WBAT (LLE)  Chart, physical therapy assessment, plan of care and goals were reviewed. ASSESSMENT  Patient continues with skilled PT services and is slowly progressing towards goals. She remains limited by significant LLE pain, limited flexion, as well as edema (doppler on 6/19/22 was negative for a DVT). She requires additional time for all of task completion because of the pain and was only able to amb a short non household distance post ex. Of note, HR tachy during activity, up to 118 bpm. She was able to remain up to the chair post session. Anticipate slow steady gains and recommending timing with pain meds. Ice packs used post session.      Current Level of Function Impacting Discharge (mobility/balance): min assist to contact guard. Impaired standing balance. Other factors to consider for discharge: POD 5 from TKR. BMI of 46.1, lives alone, local son with flight of stairs up to the bedroom level. Of note: pt reported that her rolling walker had been left behind at time of her discharge. Rehab  located the walker and delivered it to pt today. PLAN :  Patient continues to benefit from skilled intervention to address the above impairments. Continue treatment per established plan of care. to address goals. Recommendation for discharge: (in order for the patient to meet his/her long term goals)    Therapy 3 hours per day 5-7 days per week (IPR vs SNF pending progress, participation). If unable to place, then home with HHPT and caregiver assistance. This discharge recommendation:  A follow-up discussion with the attending provider and/or case management is planned    IF patient discharges home will need the following DME: bedside commode and medical transport and assist with mobility. SUBJECTIVE:   Patient stated I want to try.     OBJECTIVE DATA SUMMARY:   Chart checked, pt cleared by nursing  Critical Behavior:  Neurologic State: Alert  Orientation Level: Oriented X4  Cognition: Appropriate decision making,Appropriate for age attention/concentration,Appropriate safety awareness,Follows commands  Safety/Judgement: Awareness of environment    Range of Motion:            Nearly full extension left knee  About 30 degrees of flexion left knee                Functional Mobility Training:  Bed Mobility:     Supine to Sit: Minimum assistance;Bed Modified; Adaptive equipment; Additional time              Transfers:  Sit to Stand: Contact guard assistance  Stand to Sit: Contact guard assistance                             Balance:  Sitting: Intact; Without support  Standing: Impaired  Standing - Static: Constant support;Good  Standing - Dynamic : Constant support; Fair  Ambulation/Gait Training:  Distance (ft): 8 Feet (ft)  Assistive Device: Gait belt;Walker, rolling  Ambulation - Level of Assistance: Contact guard assistance;Minimal assistance        Gait Abnormalities: Antalgic (tendency for toe walking)     Left Side Weight Bearing: As tolerated  Base of Support: Widened  Stance: Left decreased  Speed/Sonia: Slow;Pace decreased (<100 feet/min)  Step Length: Left shortened;Right shortened  Swing Pattern: Left asymmetrical                 Stairs:               Therapeutic Exercises:     EXERCISE   Sets   Reps   Active Active Assist   Passive Self ROM   Comments   Ankle Pumps  10 [x]                                        []                                        []                                        []                                           Quad Sets  10 [x]                                        []                                        []                                        []                                           Hamstring Sets  10 [x]                                        []                                        []                                        []                                           Short Arc Quads  10 []                                        [x]                                        []                                        []                                           Knee Extension Stretch     []                                          []                                          []                                          []                                           Heel Slides  10 []                                        [x]                                        []                                        []                                           Long Arc Quads   []                                        []                                        []                                        [] Knee Flexion Stretch   []                                        []                                        []                                        []                                           Straight Leg Raises  10 []                                        [x]                                        []                                        []                                               Pain Ratin/10    Activity Tolerance:   Good, requires rest breaks, and see assessment    After treatment patient left in no apparent distress:   Sitting in chair and Call bell within reach    COMMUNICATION/COLLABORATION:   The patients plan of care was discussed with: Registered nurse.      Donovan Cabral   Time Calculation: 35 mins

## 2022-06-21 NOTE — PROGRESS NOTES
Problem: Self Care Deficits Care Plan (Adult)  Goal: *Acute Goals and Plan of Care (Insert Text)  Description: FUNCTIONAL STATUS PRIOR TO ADMISSION: At baseline, prior to recent TKA (6/2022), pt was independent and active without use of DME.     HOME SUPPORT: At baseline the patient lives alone. Following TKA, pt d/c to son's home and received assistance with all ADL/IADL tasks. Occupational Therapy Goals  Initiated 6/20/2022  1. Patient will perform lower body ADLs with supervision/set-up within 4 day(s). 2.  Patient will perform upper body ADLs standing 5 mins without fatigue or LOB with supervision/set-up within 4 day(s). 3.  Patient will perform all aspects of toileting with supervision/set-up within 4 day(s). 4.  Patient will participate in upper extremity therapeutic exercise/activities with supervision/set-up for 10 minutes within 4 day(s). 5.  Patient will utilize energy conservation techniques during functional activities without cues within 4 day(s). Outcome: Progressing Towards Goal   OCCUPATIONAL THERAPY TREATMENT  Patient: Anil Beltran (2 y.o. female)  Date: 6/21/2022  Diagnosis: [facility] ([facility] [A41.9] <principal problem not specified>       Precautions: Fall,Skin,WBAT (LLE)  Chart, occupational therapy assessment, plan of care, and goals were reviewed. ASSESSMENT  Patient continues with skilled OT services and is progressing towards goals. Patient is received resting in bed, agreeable to session. Patient tolerates increased functional mobility this session, motivated by desire to complete toileting in privacy of bathroom. Patient continues to require increased time and overall min assist for LLE management during bed mobility, standing from EOB with min assist. Patient initially with great difficulty weightbearing through LLE, however gait improves over time.  Patient ambulates to bathroom, performing toileting at [facility] placed at sink (bathroom toilet extremely low and set-up too tight to safely navigate). Patient benefits from CGA-min assist for hygiene and clothing management, then performing seated serial grooming ADLs at bathroom sink with material set-up. Patient utilizes seated grooming activity as rest break for mobility, benefiting from several minutes additional rest after task completion. Patient ambulates back to bed with same CGA-min assist, relying heavily on RW for UE support and reporting L shoulder discomfort after extended ambulation (reports rotator cuff injury). Patient returns to bed at end of session per request with patient HR up to 133. At this time, patient remains well below baseline with significant assistance needed for safe engagement in daily self-care and limited functional mobility. Recommend IPR at discharge; if unable, patient would benefit from SNF. Current Level of Function Impacting Discharge (ADLs): min A    Other factors to consider for discharge: pain; tachy         PLAN :  Patient continues to benefit from skilled intervention to address the above impairments. Continue treatment per established plan of care to address goals. Recommendation for discharge: (in order for the patient to meet his/her long term goals)  Therapy 3 hours per day 5-7 days per week; If unable, would require SNF rehab. Patient unsafe to return home alone. This discharge recommendation:  Has been made in collaboration with the attending provider and/or case management    IF patient discharges home will need the following DME: bariatric BSC       SUBJECTIVE:   Patient stated I have to make noises when I move it\" in reference to LLE. OBJECTIVE DATA SUMMARY:   Cognitive/Behavioral Status:  Neurologic State: Alert; Appropriate for age  Orientation Level: Oriented X4  Cognition: Follows commands  Perception: Appears intact  Perseveration: No perseveration noted  Safety/Judgement: Awareness of environment; Fall prevention    Functional Mobility and Transfers for ADLs:  Bed Mobility:  Supine to Sit: Minimum assistance; Additional time;Bed Modified (assist for LLE)  Sit to Supine: Minimum assistance; Additional time (assist for LLE)  Scooting: Stand-by assistance; Additional time    Transfers:  Sit to Stand: Minimum assistance;Contact guard assistance (improving to CGA w/ repetition)  Functional Transfers  Bathroom Mobility: Contact guard assistance  Toilet Transfer : Contact guard assistance;Minimum assistance (from bariatric BSC)  Cues: Verbal cues provided;Visual cues provided     Balance:  Sitting: Intact; Without support  Standing: Impaired  Standing - Static: Constant support;Good  Standing - Dynamic : Constant support; Fair    ADL Intervention:  Feeding  Feeding Assistance: Set-up  Drink to Mouth: Independent    Grooming  Grooming Assistance: Set-up; Stand-by assistance  Position Performed: Seated in chair (at bathroom sink)  Washing Face: Supervision  Washing Hands: Supervision  Brushing Teeth: Supervision  Cues: Verbal cues provided;Visual cues provided    Toileting  Toileting Assistance: Minimum assistance  Bladder Hygiene: Contact guard assistance  Clothing Management: Minimum assistance  Cues: Verbal cues provided;Visual cues provided  Adaptive Equipment:  (bariatric BSC)    Cognitive Retraining  Safety/Judgement: Awareness of environment; Fall prevention    Pain:  Significant LLE pain reported - RN aware and following. Activity Tolerance:   Fair and requires rest breaks    After treatment patient left in no apparent distress:   Supine in bed, Call bell within reach, and Side rails x 3    COMMUNICATION/COLLABORATION:   The patients plan of care was discussed with: Physical therapist and Registered nurse.      Gisela Red OT  Time Calculation: 60 mins

## 2022-06-21 NOTE — TELEPHONE ENCOUNTER
----- Message from [address] sent at 6/20/2022  2:11 PM EDT -----  Subject: Message to Provider    QUESTIONS  Information for Provider? Reggie Landry from [place] [address] was   checking into seeing if Dr. Gisel Yap was going to follow Mani Query though [facility]. She also needs the last visit notes. Please call Reggie Landry   [phone] for any questions. Please fax last visit notes to   [phone] this needs to be asap  ---------------------------------------------------------------------------  --------------  [address]  What is the best way for the office to contact you? OK to leave message on   voicemail  Preferred Call Back Phone Number? [phone]  ---------------------------------------------------------------------------  --------------  SCRIPT ANSWERS  Relationship to Patient? Third Party  Third Party Type? Home Health Care? Representative Name?  Reggie Landry

## 2022-06-21 NOTE — PROGRESS NOTES
POD * No surgery found *    Procedure:  * No surgery found *    Subjective:     Patient doing better. I have reviewed the admission and the previous notes. Currently, the patient states she is feeling better. She is still having pain with activity. At rest her pain is controllable. She denies fever or chills. I have also had a discussion with the nurse. Nurse states she is doing better as well. Her temperature has not exceeded 99 today. Objective:     Blood pressure 139/69, pulse (!) 105, temperature 99.3 °F (37.4 °C), resp. rate 21, weight 272 lb 12.8 oz (123.7 kg), SpO2 97 %. Temp (24hrs), Av °F (37.2 °C), Min:98 °F (36.7 °C), Max:100.1 °F (37.8 °C)      Physical Exam:      Left knee: Incision line is healing very well. There is no erythema right along the incision line. She has ecchymosis that is mostly noted along the anterior medial proximal aspect of the tibia. There is edema that is noted distally around the calf and this is pretty much circumferential.  There is an isolated area of swelling that is noted laterally on the knee. This is directly over and adjacent to the prior scar from when she had an accident many years prior. This area has no erythema but it does have discoloration of ecchymosis. Calf is soft to touch and is tender proximal medially. She is able to bend her knee to approximately 20 degrees while resting in bed. Palpable pulses noted distally. Neurovascular examination is otherwise intact. Labs:   Lab Results   Component Value Date/Time    HGB 7.6 (L) 2022 02:15 AM         Assessment:     Status post left total knee replacement    Plan/Recommendations/Medical Decision Making:     Again, I have reviewed the admission. Based on the examination, I believe the current condition is secondary to hematoma. Anatomically, I had to dissect through old scar located in the proximal portion of her thigh.   In addition of this dissection, a lateral release was also required for positioning of the patella. Both of these can create more postoperative bleeding. Her subcutaneous space is fairly expansive and it is not surprising a fluid collection was identified on diagnostic testing. It would be rare for a collection of purulent material to infiltrate this location so quickly after index procedure. I certainly do not discount this possibility but I believe hematoma is more plausible. In addition of this, it would appear her white count is started to trend down and her temperatures have been below 100. Based on these findings, I would not recommend surgical repeat intervention. Further physical therapy is obviously going to be required and I suspect a transfer to skilled nursing facility would be beneficial and productive for her overall wellbeing.

## 2022-06-21 NOTE — PROGRESS NOTES
Pharmacist Note - Warfarin Dosing  Consult provided for this 71 y. o.female to manage warfarin for DVT prophylaxis s/p left knee total arthroplasty. INR Goal: 1.7- 2.2    Home regimen/ tablet size: 2.5 mg daily (prescribed at discharge)    Drugs that may increase INR: None  Drugs that may decrease INR: None  Other current anticoagulants/ drugs that may increase bleeding risk: None  Risk factors: Age > 65  Daily INR ordered: YES    Recent Labs     06/21/22  0215 06/20/22  0747   HGB 7.6* 7.9*   INR 1.7* 1.3*     Date               INR                  Dose  6/6                1                 --    6/16                  -                      5 mg  6/17                 1.1                   5 mg  6/18                 1.3                   5 mg  6/19                 1.2                   7.5 mg  6/20                 1.3                   7.5 mg   6/21                 1.7                   5 mg                                                                               Assessment/ Plan: Will order warfarin 5 mg PO x 1 dose. Pharmacy will continue to monitor daily and adjust therapy as indicated. Please contact the pharmacist at x [phone] for outpatient recommendations if needed.        Karly Garland, PharmD, BCPS

## 2022-06-21 NOTE — TELEPHONE ENCOUNTER
----- Message from [address] sent at 6/20/2022  2:11 PM EDT -----  Subject: Message to Provider    QUESTIONS  Information for Provider? Priya Reed from [place] [address] was   checking into seeing if Dr. Hoang Miller was going to follow Thomas November though [facility]. She also needs the last visit notes. Please call Priya Reed   [phone] for any questions. Please fax last visit notes to   [phone] this needs to be asap  ---------------------------------------------------------------------------  --------------  [address]  What is the best way for the office to contact you? OK to leave message on   voicemail  Preferred Call Back Phone Number? [phone]  ---------------------------------------------------------------------------  --------------  SCRIPT ANSWERS  Relationship to Patient? Third Party  Third Party Type? Home Health Care? Representative Name?  Priya Reed

## 2022-06-21 NOTE — PROGRESS NOTES
[facility]  Hospitalist Group                                                                                          Hospitalist Progress Note  Esteban Goodwin MD  Answering service: [phone] from in house phone        Date of Service:  2022  NAME:  Gregory Hinson  :  1950  MRN:  [id number]      Admission Summary:   HPI: Cassia Trivedi is a 71 y.o. female medical history of seasonal allergies that is post knee replacement done on  by Dr. Author Parrish and d/c'd presents with worsening knee pain and swelling, tachycardia that was found by home visiting nurse prompted her to seek ER attention. She was recently d/c'd . Patient has had fevers at home. Denies significant chills. She has noticed blood dc from knee incision but states she did not see sig purulent drainage.      The patient denies any headache, blurry vision, sore throat, trouble swallowing, trouble with speech, chest pain, SOB, cough,  chills, N/V/D, abd pain, urinary symptoms, constipation, recent travels, sick contacts, focal or generalized neurological symptoms, falls, injuries, rashes, contact with COVID-19 diagnosed patients, hematemesis, melena, hemoptysis, hematuria, rashes, denies starting any new medications and denies any other concerns or problems besides as mentioned above.       In the ER heart rate to 118, blood pressure stable satting well on room air. Labs notable for WBC count of 19.6. POC lactic was 1.04. \"       Interval history / Subjective:   Patient seen examined at bedside earlier. Still having slight knee pain. Assessment & Plan:     Sepsis  -ddx: UTI ? ? vs less likely post op knee infection, ortho evaled MRI appears more hematoma, no evidence of abscess  -Blood cultures ngtd, UA + urine culture shows no growth , pct 0.16, mrsa swab ordered, chest x-ray chronic changes no new infiltrate  -patient is not hypoxic, has been on coumadin for dvt ppx, low suspicion of PE - Continue Vanco, cefepime , will cont per ID to her cultures remain negative for another 24 hours  - ID consulted, appreciate recs  -Orthopedics evaled, recommend SNF vs home, WBAT   -wound care consulted     Sinus tach  -2/2 pain  More likely  Than infectious etiology  -cont IVF, cont Toprol twice daily  -prn pain control   -LLE neg for DVT      Acute on chronic anemia  -  Iron studies consistent with LG some component of acute blood loss anemia post procedure, b12 an folate wnl  -start PO iron   -cont to follow  -Transfuse for hemoglobin less than 7    Status post left knee replacement  - Procedure done by Dr. Kena Champion on 6/16  -cont coumadin for dvt ppx   -standing bowel regimen   -PT OT      Seasonal allergies  Cutaneous candidiasis  - Continue home meds          Code status: full   Prophylaxis: coumadin   Care Plan discussed with: patient/family, nurse, cm   Anticipated Disposition: 24-48 hrs IPR      Hospital Problems  Date Reviewed: 6/14/2022          Codes Class Noted POA    Sepsis ([facility] ICD-10-CM: A41.9  ICD-9-CM: 038.9, 995.91  1/17/2020 Unknown                Review of Systems:   A comprehensive review of systems was negative except for that written in the HPI. Vital Signs:    Last 24hrs VS reviewed since prior progress note. Most recent are:  Visit Vitals  BP 96/81 (BP 1 Location: Right upper arm, BP Patient Position: At rest;Sitting)   Pulse (!) 103   Temp 100.1 °F (37.8 °C)   Resp 15   Wt 123.7 kg (272 lb 12.8 oz)   SpO2 99%   BMI 46.10 kg/m²         Intake/Output Summary (Last 24 hours) at 6/21/2022 1553  Last data filed at 6/21/2022 0913  Gross per 24 hour   Intake --   Output 300 ml   Net -300 ml        Physical Examination:     I had a face to face encounter with this patient and independently examined them on 6/21/2022 as outlined below:          Constitutional:  No acute distress, cooperative, pleasant    ENT:  Oral mucosa moist, oropharynx benign. Resp:  CTA bilaterally.  No wheezing/rhonchi/rales. No accessory muscle use. CV:  Regular rhythm, normal rate, no murmurs, gallops, rubs    GI:  Soft, non distended, non tender. normoactive bowel sounds, no hepatosplenomegaly    :  nondistended, +bowel sounds   Ext: No clubbing, no cyanosis, left lower extremity is warm around knee, slight erythematous  tender greater than right bloody drainage noted from right knee incision    Neurologic:  Moves all extremities. AAOx3, CN II-XII reviewed            Data Review:    Review and/or order of clinical lab test  Review and/or order of tests in the radiology section of CPT  Review and/or order of tests in the medicine section of CPT      Labs:     Recent Labs     06/21/22  0215 06/20/22  0747   WBC 15.4* 16.2*   HGB 7.6* 7.9*   HCT 21.4* 21.2*    355     Recent Labs     06/21/22  0215 06/20/22  0747 06/20/22  0636    140 139   K 3.6 3.6 4.1    108 108   CO2 26 26 23   BUN 14 13 12   CREA 0.76 0.73 0.69   GLU 89 86 81   CA 8.3* 8.5 8.4*     Recent Labs     06/19/22  1427   ALT 18   AP 87   TBILI 0.6   TP 7.1   ALB 2.6*   GLOB 4.5*     Recent Labs     06/21/22  0215 06/20/22  1400 06/20/22  0747   INR 1.7*  --  1.3*   PTP 17.3*  --  13.5*   APTT  --  34.6*  --       Recent Labs     06/20/22  0636   TIBC 166*   PSAT 10*   FERR 292      Lab Results   Component Value Date/Time    Folate 16.8 06/20/2022 06:36 AM      No results for input(s): PH, PCO2, PO2 in the last 72 hours. No results for input(s): CPK, CKNDX, TROIQ in the last 72 hours.     No lab exists for component: CPKMB  Lab Results   Component Value Date/Time    Cholesterol, total 182 11/11/2021 08:43 AM    HDL Cholesterol 56 11/11/2021 08:43 AM    LDL, calculated 100.6 (H) 11/11/2021 08:43 AM    Triglyceride 127 11/11/2021 08:43 AM    CHOL/HDL Ratio 3.3 11/11/2021 08:43 AM     Lab Results   Component Value Date/Time    Glucose (POC) 89 06/16/2022 10:23 AM    Glucose (POC) 95 12/16/2016 11:59 AM     Lab Results   Component Value Date/Time    Color YELLOW/STRAW 06/19/2022 09:42 PM    Appearance CLOUDY (A) 06/19/2022 09:42 PM    Specific gravity 1.025 06/19/2022 09:42 PM    pH (UA) 5.0 06/19/2022 09:42 PM    Protein TRACE (A) 06/19/2022 09:42 PM    Glucose Negative 06/19/2022 09:42 PM    Ketone 15 (A) 06/19/2022 09:42 PM    Bilirubin Negative 06/19/2022 09:42 PM    Urobilinogen 0.2 06/19/2022 09:42 PM    Nitrites Negative 06/19/2022 09:42 PM    Leukocyte Esterase SMALL (A) 06/19/2022 09:42 PM    Epithelial cells MODERATE (A) 06/19/2022 09:42 PM    Bacteria 2+ (A) 06/19/2022 09:42 PM    WBC 10-20 06/19/2022 09:42 PM    RBC 0-5 06/19/2022 09:42 PM         Medications Reviewed:     Current Facility-Administered Medications   Medication Dose Route Frequency    warfarin (COUMADIN) tablet 5 mg  5 mg Oral ONCE    ferrous sulfate tablet 325 mg  1 Tablet Oral DAILY WITH BREAKFAST    polyethylene glycol (MIRALAX) packet 17 g  17 g Oral DAILY    traMADoL (ULTRAM) tablet 50 mg  50 mg Oral Q6H PRN    metoprolol tartrate (LOPRESSOR) tablet 12.5 mg  12.5 mg Oral Q12H    sodium chloride (NS) flush 5-10 mL  5-10 mL IntraVENous PRN    cefepime (MAXIPIME) 2 g in 0.9% sodium chloride (MBP/ADV) 100 mL MBP  2 g IntraVENous Q8H    sodium chloride (NS) flush 5-40 mL  5-40 mL IntraVENous Q8H    sodium chloride (NS) flush 5-40 mL  5-40 mL IntraVENous PRN    acetaminophen (TYLENOL) tablet 650 mg  650 mg Oral Q6H PRN    Or    acetaminophen (TYLENOL) suppository 650 mg  650 mg Rectal Q6H PRN    polyethylene glycol (MIRALAX) packet 17 g  17 g Oral DAILY PRN    ondansetron (ZOFRAN ODT) tablet 4 mg  4 mg Oral Q8H PRN    Or    ondansetron (ZOFRAN) injection 4 mg  4 mg IntraVENous Q6H PRN    albuterol-ipratropium (DUO-NEB) 2.5 MG-0.5 MG/3 ML  3 mL Nebulization Q4H PRN    clotrimazole-betamethasone (LOTRISONE) 1-0.05 % cream   Topical DAILY    fluticasone propionate (FLONASE) 50 mcg/actuation nasal spray 2 Spray  2 Spray Both Nostrils DAILY    montelukast (SINGULAIR) tablet 10 mg  10 mg Oral DAILY    miconazole (MICOTIN) 2 % powder   Topical TID PRN    oxyCODONE IR (ROXICODONE) tablet 10 mg  10 mg Oral Q4H PRN    senna-docusate (PERICOLACE) 8.6-50 mg per tablet 1 Tablet  1 Tablet Oral DAILY    cholecalciferol (VITAMIN D3) (1000 Units /25 mcg) tablet 1,000 Units  1,000 Units Oral DAILY    Warfarin- pharmacy to dose   Other Rx Dosing/Monitoring    lactated Ringers infusion  75 mL/hr IntraVENous CONTINUOUS    Vancomycin- pharmacy to dose   Other Rx Dosing/Monitoring    vancomycin (VANCOCIN) 1500 mg in  ml infusion  1,500 mg IntraVENous Q24H     ______________________________________________________________________  EXPECTED LENGTH OF STAY: 3d 12h  ACTUAL LENGTH OF STAY:          2                 Esteban Goodwin MD

## 2022-06-22 LAB
ANION GAP SERPL CALC-SCNC: 6 MMOL/L (ref 5–15)
BACTERIA SPEC CULT: NORMAL
BACTERIA SPEC CULT: NORMAL
BASOPHILS # BLD: 0 K/UL (ref 0–0.1)
BASOPHILS NFR BLD: 0 % (ref 0–1)
BUN SERPL-MCNC: 12 MG/DL (ref 6–20)
BUN/CREAT SERPL: 16 (ref 12–20)
CALCIUM SERPL-MCNC: 8.5 MG/DL (ref 8.5–10.1)
CHLORIDE SERPL-SCNC: 106 MMOL/L (ref 97–108)
CO2 SERPL-SCNC: 26 MMOL/L (ref 21–32)
COVID-19 RAPID TEST, COVR: NOT DETECTED
CREAT SERPL-MCNC: 0.76 MG/DL (ref 0.55–1.02)
DIFFERENTIAL METHOD BLD: ABNORMAL
EOSINOPHIL # BLD: 0.6 K/UL (ref 0–0.4)
EOSINOPHIL NFR BLD: 4 % (ref 0–7)
ERYTHROCYTE [DISTWIDTH] IN BLOOD BY AUTOMATED COUNT: 15.2 % (ref 11.5–14.5)
GLUCOSE SERPL-MCNC: 92 MG/DL (ref 65–100)
HCT VFR BLD AUTO: 20 % (ref 35–47)
HGB BLD-MCNC: 7.3 G/DL (ref 11.5–16)
IMM GRANULOCYTES # BLD AUTO: 0.2 K/UL (ref 0–0.04)
IMM GRANULOCYTES NFR BLD AUTO: 1 % (ref 0–0.5)
INR PPP: 2.3 (ref 0.9–1.1)
LYMPHOCYTES # BLD: 2.7 K/UL (ref 0.8–3.5)
LYMPHOCYTES NFR BLD: 17 % (ref 12–49)
MCH RBC QN AUTO: 29.7 PG (ref 26–34)
MCHC RBC AUTO-ENTMCNC: 36.5 G/DL (ref 30–36.5)
MCV RBC AUTO: 81.3 FL (ref 80–99)
MONOCYTES # BLD: 2.2 K/UL (ref 0–1)
MONOCYTES NFR BLD: 14 % (ref 5–13)
NEUTS SEG # BLD: 10.2 K/UL (ref 1.8–8)
NEUTS SEG NFR BLD: 64 % (ref 32–75)
NRBC # BLD: 0.02 K/UL (ref 0–0.01)
NRBC BLD-RTO: 0.1 PER 100 WBC
PLATELET # BLD AUTO: 399 K/UL (ref 150–400)
PMV BLD AUTO: 9.4 FL (ref 8.9–12.9)
POTASSIUM SERPL-SCNC: 3.4 MMOL/L (ref 3.5–5.1)
PROTHROMBIN TIME: 23.2 SEC (ref 9–11.1)
RBC # BLD AUTO: 2.46 M/UL (ref 3.8–5.2)
RBC MORPH BLD: ABNORMAL
SERVICE CMNT-IMP: NORMAL
SODIUM SERPL-SCNC: 138 MMOL/L (ref 136–145)
SOURCE, COVRS: NORMAL
WBC # BLD AUTO: 15.9 K/UL (ref 3.6–11)

## 2022-06-22 PROCEDURE — 85610 PROTHROMBIN TIME: CPT

## 2022-06-22 PROCEDURE — 74011250637 HC RX REV CODE- 250/637: Performed by: STUDENT IN AN ORGANIZED HEALTH CARE EDUCATION/TRAINING PROGRAM

## 2022-06-22 PROCEDURE — 36415 COLL VENOUS BLD VENIPUNCTURE: CPT

## 2022-06-22 PROCEDURE — 85025 COMPLETE CBC W/AUTO DIFF WBC: CPT

## 2022-06-22 PROCEDURE — 74011000258 HC RX REV CODE- 258: Performed by: STUDENT IN AN ORGANIZED HEALTH CARE EDUCATION/TRAINING PROGRAM

## 2022-06-22 PROCEDURE — 65270000046 HC RM TELEMETRY

## 2022-06-22 PROCEDURE — 94760 N-INVAS EAR/PLS OXIMETRY 1: CPT

## 2022-06-22 PROCEDURE — 74011000250 HC RX REV CODE- 250: Performed by: STUDENT IN AN ORGANIZED HEALTH CARE EDUCATION/TRAINING PROGRAM

## 2022-06-22 PROCEDURE — 87635 SARS-COV-2 COVID-19 AMP PRB: CPT

## 2022-06-22 PROCEDURE — 80048 BASIC METABOLIC PNL TOTAL CA: CPT

## 2022-06-22 RX ORDER — LANOLIN ALCOHOL/MO/W.PET/CERES
325 CREAM (GRAM) TOPICAL
Qty: 30 TABLET | Refills: 0 | Status: SHIPPED
Start: 2022-06-22 | End: 2022-07-22

## 2022-06-22 RX ORDER — OXYCODONE HYDROCHLORIDE 5 MG/1
10 TABLET ORAL
Qty: 40 TABLET | Refills: 0 | Status: SHIPPED | OUTPATIENT
Start: 2022-06-22 | End: 2022-06-25

## 2022-06-22 RX ORDER — WARFARIN 2.5 MG/1
2.5 TABLET ORAL ONCE
Status: COMPLETED | OUTPATIENT
Start: 2022-06-22 | End: 2022-06-22

## 2022-06-22 RX ORDER — TRAMADOL HYDROCHLORIDE 50 MG/1
50 TABLET ORAL
Qty: 9 TABLET | Refills: 0 | Status: SHIPPED | OUTPATIENT
Start: 2022-06-22 | End: 2022-06-25

## 2022-06-22 RX ORDER — SENNOSIDES 8.6 MG/1
1 TABLET ORAL 2 TIMES DAILY
Qty: 60 TABLET | Refills: 0 | Status: SHIPPED
Start: 2022-06-22 | End: 2022-07-22

## 2022-06-22 RX ORDER — POLYETHYLENE GLYCOL 3350 17 G/17G
17 POWDER, FOR SOLUTION ORAL DAILY
Qty: 30 PACKET | Refills: 0 | Status: SHIPPED
Start: 2022-06-22 | End: 2022-07-22

## 2022-06-22 RX ORDER — METOPROLOL TARTRATE 25 MG/1
12.5 TABLET, FILM COATED ORAL EVERY 12 HOURS
Qty: 30 TABLET | Refills: 0 | Status: SHIPPED
Start: 2022-06-22 | End: 2022-07-22

## 2022-06-22 RX ADMIN — WARFARIN SODIUM 2.5 MG: 2.5 TABLET ORAL at 19:46

## 2022-06-22 RX ADMIN — MONTELUKAST 10 MG: 10 TABLET, FILM COATED ORAL at 09:38

## 2022-06-22 RX ADMIN — FERROUS SULFATE TAB 325 MG (65 MG ELEMENTAL FE) 325 MG: 325 (65 FE) TAB at 09:38

## 2022-06-22 RX ADMIN — OXYCODONE 10 MG: 5 TABLET ORAL at 04:38

## 2022-06-22 RX ADMIN — Medication 1000 UNITS: at 09:38

## 2022-06-22 RX ADMIN — TRAMADOL HYDROCHLORIDE 50 MG: 50 TABLET, COATED ORAL at 06:24

## 2022-06-22 RX ADMIN — FOLIC ACID: 5 INJECTION, SOLUTION INTRAMUSCULAR; INTRAVENOUS; SUBCUTANEOUS at 19:46

## 2022-06-22 RX ADMIN — SODIUM CHLORIDE, PRESERVATIVE FREE 10 ML: 5 INJECTION INTRAVENOUS at 21:36

## 2022-06-22 RX ADMIN — SENNOSIDES AND DOCUSATE SODIUM 1 TABLET: 50; 8.6 TABLET ORAL at 09:38

## 2022-06-22 RX ADMIN — TRAMADOL HYDROCHLORIDE 50 MG: 50 TABLET, COATED ORAL at 21:36

## 2022-06-22 RX ADMIN — TRAMADOL HYDROCHLORIDE 50 MG: 50 TABLET, COATED ORAL at 15:15

## 2022-06-22 RX ADMIN — METOPROLOL TARTRATE 12.5 MG: 25 TABLET, FILM COATED ORAL at 21:36

## 2022-06-22 RX ADMIN — METOPROLOL TARTRATE 12.5 MG: 25 TABLET, FILM COATED ORAL at 09:38

## 2022-06-22 RX ADMIN — OXYCODONE 10 MG: 5 TABLET ORAL at 19:45

## 2022-06-22 RX ADMIN — OXYCODONE 10 MG: 5 TABLET ORAL at 15:15

## 2022-06-22 RX ADMIN — SODIUM CHLORIDE, PRESERVATIVE FREE 10 ML: 5 INJECTION INTRAVENOUS at 04:39

## 2022-06-22 RX ADMIN — SODIUM CHLORIDE, PRESERVATIVE FREE 10 ML: 5 INJECTION INTRAVENOUS at 06:26

## 2022-06-22 RX ADMIN — FLUTICASONE PROPIONATE 2 SPRAY: 50 SPRAY, METERED NASAL at 09:41

## 2022-06-22 RX ADMIN — OXYCODONE 10 MG: 5 TABLET ORAL at 00:36

## 2022-06-22 NOTE — PROGRESS NOTES
Pharmacist Note - Warfarin Dosing  Consult provided for this 71 y. o.female to manage warfarin for DVT prophylaxis s/p left knee total arthroplasty. INR Goal: 1.7- 2.2    Home regimen/ tablet size: 2.5 mg daily    Drugs that may increase INR: None  Drugs that may decrease INR: None  Other current anticoagulants/ drugs that may increase bleeding risk: None  Risk factors: Age > 65  Daily INR ordered: YES    Recent Labs     06/22/22  0318 06/21/22  0215 06/20/22  0747   HGB 7.3* 7.6* 7.9*   INR 2.3* 1.7* 1.3*     Date               INR                  Dose  6/6                1                 --    6/16                  -                      5 mg  6/17                 1.1                   5 mg  6/18                 1.3                   5 mg  6/19                 1.2                   7.5 mg  6/20                 1.3                   7.5 mg   6/21                 1.7                   5 mg  6/22                 2.3                   2.5 mg                                                                               Assessment/ Plan: Will order warfarin 2.5 mg PO x 1 dose. Pharmacy will continue to monitor daily and adjust therapy as indicated. Please contact the pharmacist at x [phone] for outpatient recommendations if needed.      Pernell Casas, PharmD, BCPS

## 2022-06-22 NOTE — PROGRESS NOTES
Transition Plan of Care  RUR 16%-Med  Disposition-[facility] has declined admit. Stating that she does not have an IPR diagnosis. Their medical directors recommend SNF level. Attending updated. Per previous CM their chose for SNF is [facility]. Referral sent via [facility]. Awaiting their response. She will   not need authorization. Update-[facility] has accepted and they can admit tomorrow. Needs Covid test resulted prior to admit. Order for covid placed. Attending made aware of this update.

## 2022-06-22 NOTE — PROGRESS NOTES
ID Progress Note  2022    Subjective:     C/o severe LLE pain, very tearful  Review of Systems:            Symptom Y/N Comments   Symptom Y/N Comments   Fever/Chills  n     Chest Pain  n      Poor Appetite       Edema        Cough       Abdominal Pain  n      Sputum       Joint Pain  y      SOB/PATEL  n     Pruritis/Rash        Nausea/vomit  n     Tolerating PT/OT        Diarrhea  n     Tolerating Diet        Constipation  y     Other           Could NOT obtain due to:       Objective:     Vitals:   Visit Vitals  BP (!) 144/76 (BP 1 Location: Left upper arm, BP Patient Position: At rest)   Pulse (!) 104   Temp 99.4 °F (37.4 °C)   Resp 16   Wt 125.4 kg (276 lb 6.4 oz)   SpO2 99%   BMI 46.71 kg/m²        Tmax:  Temp (24hrs), Av.4 °F (37.4 °C), Min:98.7 °F (37.1 °C), Max:100.1 °F (37.8 °C)      PHYSICAL EXAM:  General: Chronically ill appearing, WD, WN. Alert, cooperative, no acute distress    EENT:  EOMI. Anicteric sclerae. MMM  Resp:  Clear in apex with decreased breath sounds, no wheezing or rales. No accessory muscle use  CV:  Regular  rhythm,  +edema  GI:  Soft, obese, Non tender. +Bowel sounds  Neurologic:  Alert and oriented X 3, normal speech,   Psych:   Good insight. Not anxious nor agitated  Skin:  No rashes.   No jaundice,     LLE: surgical incision with diffuse erythema, swelling/ & tenderness worsen in left lateral side of patella to behind patella    Labs:   Lab Results   Component Value Date/Time    WBC 15.9 (H) 2022 03:18 AM    Hemoglobin (POC) 14.6 2016 11:59 AM    HGB 7.3 (L) 2022 03:18 AM    Hematocrit (POC) 43 2016 11:59 AM    HCT 20.0 (L) 2022 03:18 AM    PLATELET 300  03:18 AM    MCV 81.3 2022 03:18 AM     Lab Results   Component Value Date/Time    Sodium 138 2022 03:18 AM    Potassium 3.4 (L) 2022 03:18 AM    Chloride 106 2022 03:18 AM    CO2 26 2022 03:18 AM    Anion gap 6 2022 03:18 AM    Glucose 92 2022 03:18 AM    BUN 12 06/22/2022 03:18 AM    Creatinine 0.76 06/22/2022 03:18 AM    BUN/Creatinine ratio 16 06/22/2022 03:18 AM    GFR est AA >60 06/22/2022 03:18 AM    GFR est non-AA >60 06/22/2022 03:18 AM    Calcium 8.5 06/22/2022 03:18 AM    Bilirubin, total 0.6 06/19/2022 02:27 PM    Alk. phosphatase 87 06/19/2022 02:27 PM    Protein, total 7.1 06/19/2022 02:27 PM    Albumin 2.6 (L) 06/19/2022 02:27 PM    Globulin 4.5 (H) 06/19/2022 02:27 PM    A-G Ratio 0.6 (L) 06/19/2022 02:27 PM    ALT (SGPT) 18 06/19/2022 02:27 PM         Cultures:   Results     Procedure Component Value Units Date/Time    CULTURE, MRSA [[id number]] Collected: 06/21/22 0215    Order Status: Completed Specimen: Nasal from Nares Updated: 06/22/22 0736     Special Requests: NO SPECIAL REQUESTS        Culture result: MRSA NOT PRESENT               Screening of patient nares for MRSA is for surveillance purposes and, if positive, to facilitate isolation considerations in high risk settings. It is not intended for automatic decolonization interventions per se as regimens are not sufficiently effective to warrant routine use.           CULTURE, URINE [[id number]] Collected: 06/19/22 2142    Order Status: Completed Specimen: Urine Updated: 06/21/22 0655     Special Requests: --        NO SPECIAL REQUESTS  Reflexed from [id number]       Culture result: No growth (<1,000 CFU/ML)       CULTURE, MRSA [[id number]]     Order Status: Canceled Specimen: Nasal from Nares     CULTURE, BLOOD [[id number]] Collected: 06/19/22 1427    Order Status: Completed Specimen: Blood Updated: 06/22/22 0540     Special Requests: NO SPECIAL REQUESTS        Culture result: NO GROWTH 3 DAYS       CULTURE, BLOOD [[id number]] Collected: 06/19/22 1427    Order Status: Completed Specimen: Blood Updated: 06/22/22 0540     Special Requests: NO SPECIAL REQUESTS        Culture result: NO GROWTH 3 DAYS            Assessment:   OA of left knee  S/p Left TKA (6/17) by Dr. Gonzales Verde  UTI ruled out; urine cx no growth  - T-max 100.1, WBC 16->15.4->15.9    Blood cx (6/19) no growth so far    U/A revealed WBC 10-20 with 2+ bacteria with negative urine cx    MRI of left knee (6/19) Artifact from orthopedic hardware. Diffuse subcutaneous edema. Small joint effusion. Fluid collection anterior and lateral to the distal femur and patella proximal 4.8 x 2.3 x 7.0 cm could represent abscess, just deep to the skin surface  Plan:   ·  Received IV cefepime and vancomycin, stopped the ABX therapy in absence of surgical site infection. Monitor off abx therapy  ·  ortho team following; Unlikely infection, more likely hematoma given the surgery. ·  Fever work up if temp >= 100.4      Above plan of care discussed and agreed with Dr. Dharmesh Rockwell    ID team signing off. Please contact us with any questions.       Brooklynn Nickerson NP

## 2022-06-22 NOTE — PROGRESS NOTES
[facility] [facility] Adult  Hospitalist Group                                                                                          Hospitalist Progress Note  Kassi Lion MD  Answering service: [phone] from in house phone        Date of Service:  2022  NAME:  Sourav Collins  :  1950  MRN:  [id number]      Admission Summary:   HPI: Suly Isaac is a 71 y.o. female medical history of seasonal allergies that is post knee replacement done on  by Dr. Yvrose Cuenca and d/c'd presents with worsening knee pain and swelling, tachycardia that was found by home visiting nurse prompted her to seek ER attention. She was recently d/c'd . Patient has had fevers at home. Denies significant chills. She has noticed blood dc from knee incision but states she did not see sig purulent drainage.      The patient denies any headache, blurry vision, sore throat, trouble swallowing, trouble with speech, chest pain, SOB, cough,  chills, N/V/D, abd pain, urinary symptoms, constipation, recent travels, sick contacts, focal or generalized neurological symptoms, falls, injuries, rashes, contact with COVID-19 diagnosed patients, hematemesis, melena, hemoptysis, hematuria, rashes, denies starting any new medications and denies any other concerns or problems besides as mentioned above.       In the ER heart rate to 118, blood pressure stable satting well on room air. Labs notable for WBC count of 19.6. POC lactic was 1.04. \"       Interval history / Subjective:   Patient seen examined at bedside earlier. Still having slight knee pain. Assessment & Plan:     Sepsis  -ddx: UTI ? ? vs less likely post op knee infection, ortho evaled MRI appears more hematoma, no evidence of abscess  -per ortho need for any repeat surgical intervention  -Blood cultures ngtd, UA + urine culture shows no growth , pct 0.16, mrsa swab ordered neg, chest x-ray chronic changes no new infiltrate  -patient is not hypoxic, has been on coumadin for dvt ppx, low suspicion of PE   -  Garcia, cefepime de-escalated per ID recommendations no evidence of surgical site infection, have remained negative for greater than 48 hours  - ID consulted, appreciate recs  -Orthopedics evaled, recommend SNF vs home, WBAT   -wound care consulted     Sinus tach  -2/2 pain  More likely  Than infectious etiology  -cont IVF, cont Toprol twice daily  -prn pain control   -LLE neg for DVT      Acute on chronic anemia  -  Iron studies consistent with LG some component of acute blood loss anemia post procedure, b12 an folate wnl  -start PO iron   -cont to follow  -Transfuse for hemoglobin less than 7    Status post left knee replacement  - Procedure done by Dr. Jaye Aquino on 6/16  -cont coumadin for dvt ppx   -standing bowel regimen   -PT OT      Seasonal allergies  Cutaneous candidiasis  - Continue home meds       Spoke to  case management medically ready, patient accepted to SNF bed availability for tomorrow 6/23 plan for discharge in a.m. not accepted to [facility]     Code status: full   Prophylaxis: coumadin   Care Plan discussed with: patient/family, nurse, cm   Anticipated Disposition: 24 hrs, snf     Hospital Problems  Date Reviewed: 6/14/2022          Codes Class Noted POA    Sepsis ([facility] [address] ICD-10-CM: A41.9  ICD-9-CM: 038.9, 995.91  1/17/2020 Unknown                Review of Systems:   A comprehensive review of systems was negative except for that written in the HPI. Vital Signs:    Last 24hrs VS reviewed since prior progress note.  Most recent are:  Visit Vitals  BP (!) 140/75 (BP 1 Location: Left lower arm, BP Patient Position: Sitting)   Pulse (!) 102   Temp 99.4 °F (37.4 °C)   Resp 15   Wt 125.4 kg (276 lb 6.4 oz)   SpO2 96%   BMI 46.71 kg/m²       No intake or output data in the 24 hours ending 06/22/22 0470     Physical Examination:     I had a face to face encounter with this patient and independently examined them on 6/22/2022 as outlined below:          Constitutional:  No acute distress, cooperative, pleasant    ENT:  Oral mucosa moist, oropharynx benign. Resp:  CTA bilaterally. No wheezing/rhonchi/rales. No accessory muscle use. CV:  Regular rhythm, normal rate, no murmurs, gallops, rubs    GI:  Soft, non distended, non tender. normoactive bowel sounds, no hepatosplenomegaly    :  nondistended, +bowel sounds   Ext: No clubbing, no cyanosis, left lower extremity is warm around knee, slight erythematous  tender greater no drainage noted from right knee incision    Neurologic:  Moves all extremities. AAOx3, CN II-XII reviewed            Data Review:    Review and/or order of clinical lab test  Review and/or order of tests in the radiology section of CPT  Review and/or order of tests in the medicine section of CPT      Labs:     Recent Labs     06/22/22 0318 06/21/22 0215   WBC 15.9* 15.4*   HGB 7.3* 7.6*   HCT 20.0* 21.4*    371     Recent Labs     06/22/22 0318 06/21/22  0215 06/20/22  0747    139 140   K 3.4* 3.6 3.6    108 108   CO2 26 26 26   BUN 12 14 13   CREA 0.76 0.76 0.73   GLU 92 89 86   CA 8.5 8.3* 8.5     No results for input(s): ALT, AP, TBIL, TBILI, TP, ALB, GLOB, GGT, AML, LPSE in the last 72 hours. No lab exists for component: SGOT, GPT, AMYP, HLPSE  Recent Labs     06/22/22 0318 06/21/22  0215 06/20/22  1400 06/20/22  0747   INR 2.3* 1.7*  --  1.3*   PTP 23.2* 17.3*  --  13.5*   APTT  --   --  34.6*  --       Recent Labs     06/20/22  0636   TIBC 166*   PSAT 10*   FERR 292      Lab Results   Component Value Date/Time    Folate 16.8 06/20/2022 06:36 AM      No results for input(s): PH, PCO2, PO2 in the last 72 hours. No results for input(s): CPK, CKNDX, TROIQ in the last 72 hours.     No lab exists for component: CPKMB  Lab Results   Component Value Date/Time    Cholesterol, total 182 11/11/2021 08:43 AM    HDL Cholesterol 56 11/11/2021 08:43 AM    LDL, calculated 100.6 (H) 11/11/2021 08:43 AM Triglyceride 127 11/11/2021 08:43 AM    CHOL/HDL Ratio 3.3 11/11/2021 08:43 AM     Lab Results   Component Value Date/Time    Glucose (POC) 89 06/16/2022 10:23 AM    Glucose (POC) 95 12/16/2016 11:59 AM     Lab Results   Component Value Date/Time    Color YELLOW/STRAW 06/19/2022 09:42 PM    Appearance CLOUDY (A) 06/19/2022 09:42 PM    Specific gravity 1.025 06/19/2022 09:42 PM    pH (UA) 5.0 06/19/2022 09:42 PM    Protein TRACE (A) 06/19/2022 09:42 PM    Glucose Negative 06/19/2022 09:42 PM    Ketone 15 (A) 06/19/2022 09:42 PM    Bilirubin Negative 06/19/2022 09:42 PM    Urobilinogen 0.2 06/19/2022 09:42 PM    Nitrites Negative 06/19/2022 09:42 PM    Leukocyte Esterase SMALL (A) 06/19/2022 09:42 PM    Epithelial cells MODERATE (A) 06/19/2022 09:42 PM    Bacteria 2+ (A) 06/19/2022 09:42 PM    WBC 10-20 06/19/2022 09:42 PM    RBC 0-5 06/19/2022 09:42 PM         Medications Reviewed:     Current Facility-Administered Medications   Medication Dose Route Frequency    warfarin (COUMADIN) tablet 2.5 mg  2.5 mg Oral ONCE    folic acid (FOLVITE) 1 mg in 0.9% sodium chloride 50 mL ivpb   IntraVENous Q24H    oxyCODONE IR (ROXICODONE) tablet 10 mg  10 mg Oral Q4H PRN    ferrous sulfate tablet 325 mg  1 Tablet Oral DAILY WITH BREAKFAST    polyethylene glycol (MIRALAX) packet 17 g  17 g Oral DAILY    traMADoL (ULTRAM) tablet 50 mg  50 mg Oral Q6H PRN    metoprolol tartrate (LOPRESSOR) tablet 12.5 mg  12.5 mg Oral Q12H    sodium chloride (NS) flush 5-10 mL  5-10 mL IntraVENous PRN    sodium chloride (NS) flush 5-40 mL  5-40 mL IntraVENous Q8H    sodium chloride (NS) flush 5-40 mL  5-40 mL IntraVENous PRN    acetaminophen (TYLENOL) tablet 650 mg  650 mg Oral Q6H PRN    Or    acetaminophen (TYLENOL) suppository 650 mg  650 mg Rectal Q6H PRN    polyethylene glycol (MIRALAX) packet 17 g  17 g Oral DAILY PRN    ondansetron (ZOFRAN ODT) tablet 4 mg  4 mg Oral Q8H PRN    Or    ondansetron (ZOFRAN) injection 4 mg  4 mg IntraVENous Q6H PRN    albuterol-ipratropium (DUO-NEB) 2.5 MG-0.5 MG/3 ML  3 mL Nebulization Q4H PRN    clotrimazole-betamethasone (LOTRISONE) 1-0.05 % cream   Topical DAILY    fluticasone propionate (FLONASE) 50 mcg/actuation nasal spray 2 Spray  2 Spray Both Nostrils DAILY    montelukast (SINGULAIR) tablet 10 mg  10 mg Oral DAILY    miconazole (MICOTIN) 2 % powder   Topical TID PRN    senna-docusate (PERICOLACE) 8.6-50 mg per tablet 1 Tablet  1 Tablet Oral DAILY    cholecalciferol (VITAMIN D3) (1000 Units /25 mcg) tablet 1,000 Units  1,000 Units Oral DAILY    Warfarin- pharmacy to dose   Other Rx Dosing/Monitoring    lactated Ringers infusion  75 mL/hr IntraVENous CONTINUOUS     ______________________________________________________________________  EXPECTED LENGTH OF STAY: 3d 12h  ACTUAL LENGTH OF STAY:          3                 Letty Huerta MD

## 2022-06-22 NOTE — PROGRESS NOTES
2000 Verbal bedside update given to Keaton Campbell RN returning nurse by Jacques Banks RN off-going nurse. Report included update on pt status and condition, recent results,heart rate and rhythm, respiratory status and MAR. normal... Well appearing, awake, alert, oriented to person, place, time/situation and in no apparent distress.

## 2022-06-23 VITALS
RESPIRATION RATE: 14 BRPM | HEART RATE: 102 BPM | WEIGHT: 279.76 LBS | OXYGEN SATURATION: 99 % | BODY MASS INDEX: 47.28 KG/M2 | TEMPERATURE: 98.4 F | DIASTOLIC BLOOD PRESSURE: 75 MMHG | SYSTOLIC BLOOD PRESSURE: 160 MMHG

## 2022-06-23 LAB
ANION GAP SERPL CALC-SCNC: 6 MMOL/L (ref 5–15)
BASOPHILS # BLD: 0 K/UL (ref 0–0.1)
BASOPHILS NFR BLD: 0 % (ref 0–1)
BUN SERPL-MCNC: 13 MG/DL (ref 6–20)
BUN/CREAT SERPL: 16 (ref 12–20)
CALCIUM SERPL-MCNC: 8.6 MG/DL (ref 8.5–10.1)
CHLORIDE SERPL-SCNC: 106 MMOL/L (ref 97–108)
CO2 SERPL-SCNC: 28 MMOL/L (ref 21–32)
CREAT SERPL-MCNC: 0.79 MG/DL (ref 0.55–1.02)
DIFFERENTIAL METHOD BLD: ABNORMAL
EOSINOPHIL # BLD: 0.9 K/UL (ref 0–0.4)
EOSINOPHIL NFR BLD: 6 % (ref 0–7)
ERYTHROCYTE [DISTWIDTH] IN BLOOD BY AUTOMATED COUNT: 15 % (ref 11.5–14.5)
GLUCOSE SERPL-MCNC: 107 MG/DL (ref 65–100)
HCT VFR BLD AUTO: 20.4 % (ref 35–47)
HGB BLD-MCNC: 7.4 G/DL (ref 11.5–16)
IMM GRANULOCYTES # BLD AUTO: 0.1 K/UL (ref 0–0.04)
IMM GRANULOCYTES NFR BLD AUTO: 1 % (ref 0–0.5)
INR PPP: 2.4 (ref 0.9–1.1)
LYMPHOCYTES # BLD: 2 K/UL (ref 0.8–3.5)
LYMPHOCYTES NFR BLD: 14 % (ref 12–49)
MCH RBC QN AUTO: 29.2 PG (ref 26–34)
MCHC RBC AUTO-ENTMCNC: 36.3 G/DL (ref 30–36.5)
MCV RBC AUTO: 80.6 FL (ref 80–99)
MONOCYTES # BLD: 2 K/UL (ref 0–1)
MONOCYTES NFR BLD: 14 % (ref 5–13)
NEUTS SEG # BLD: 9.5 K/UL (ref 1.8–8)
NEUTS SEG NFR BLD: 65 % (ref 32–75)
NRBC # BLD: 0.02 K/UL (ref 0–0.01)
NRBC BLD-RTO: 0.1 PER 100 WBC
PLATELET # BLD AUTO: 446 K/UL (ref 150–400)
PMV BLD AUTO: 9 FL (ref 8.9–12.9)
POTASSIUM SERPL-SCNC: 3.4 MMOL/L (ref 3.5–5.1)
PROTHROMBIN TIME: 23.3 SEC (ref 9–11.1)
RBC # BLD AUTO: 2.53 M/UL (ref 3.8–5.2)
RBC MORPH BLD: ABNORMAL
SODIUM SERPL-SCNC: 140 MMOL/L (ref 136–145)
WBC # BLD AUTO: 14.5 K/UL (ref 3.6–11)

## 2022-06-23 PROCEDURE — 74011250637 HC RX REV CODE- 250/637: Performed by: STUDENT IN AN ORGANIZED HEALTH CARE EDUCATION/TRAINING PROGRAM

## 2022-06-23 PROCEDURE — 85025 COMPLETE CBC W/AUTO DIFF WBC: CPT

## 2022-06-23 PROCEDURE — 74011000250 HC RX REV CODE- 250: Performed by: STUDENT IN AN ORGANIZED HEALTH CARE EDUCATION/TRAINING PROGRAM

## 2022-06-23 PROCEDURE — 36415 COLL VENOUS BLD VENIPUNCTURE: CPT

## 2022-06-23 PROCEDURE — 80048 BASIC METABOLIC PNL TOTAL CA: CPT

## 2022-06-23 PROCEDURE — 74011250637 HC RX REV CODE- 250/637: Performed by: HOSPITALIST

## 2022-06-23 PROCEDURE — 74011000258 HC RX REV CODE- 258: Performed by: STUDENT IN AN ORGANIZED HEALTH CARE EDUCATION/TRAINING PROGRAM

## 2022-06-23 PROCEDURE — 85610 PROTHROMBIN TIME: CPT

## 2022-06-23 RX ORDER — WARFARIN 2.5 MG/1
2.5 TABLET ORAL ONCE
Status: COMPLETED | OUTPATIENT
Start: 2022-06-23 | End: 2022-06-23

## 2022-06-23 RX ADMIN — CLOTRIMAZOLE AND BETAMETHASONE DIPROPIONATE: 10; .5 CREAM TOPICAL at 08:31

## 2022-06-23 RX ADMIN — MONTELUKAST 10 MG: 10 TABLET, FILM COATED ORAL at 08:30

## 2022-06-23 RX ADMIN — WARFARIN SODIUM 2.5 MG: 2.5 TABLET ORAL at 18:37

## 2022-06-23 RX ADMIN — SODIUM CHLORIDE, PRESERVATIVE FREE 10 ML: 5 INJECTION INTRAVENOUS at 05:06

## 2022-06-23 RX ADMIN — SENNOSIDES AND DOCUSATE SODIUM 1 TABLET: 50; 8.6 TABLET ORAL at 08:31

## 2022-06-23 RX ADMIN — FOLIC ACID: 5 INJECTION, SOLUTION INTRAMUSCULAR; INTRAVENOUS; SUBCUTANEOUS at 18:37

## 2022-06-23 RX ADMIN — FLUTICASONE PROPIONATE 2 SPRAY: 50 SPRAY, METERED NASAL at 08:31

## 2022-06-23 RX ADMIN — POLYETHYLENE GLYCOL 3350 17 G: 17 POWDER, FOR SOLUTION ORAL at 08:30

## 2022-06-23 RX ADMIN — TRAMADOL HYDROCHLORIDE 50 MG: 50 TABLET, COATED ORAL at 05:04

## 2022-06-23 RX ADMIN — METOPROLOL TARTRATE 12.5 MG: 25 TABLET, FILM COATED ORAL at 08:30

## 2022-06-23 RX ADMIN — OXYCODONE 10 MG: 5 TABLET ORAL at 01:30

## 2022-06-23 RX ADMIN — Medication 1000 UNITS: at 08:31

## 2022-06-23 RX ADMIN — OXYCODONE 10 MG: 5 TABLET ORAL at 08:31

## 2022-06-23 RX ADMIN — OXYCODONE 10 MG: 5 TABLET ORAL at 13:25

## 2022-06-23 RX ADMIN — FERROUS SULFATE TAB 325 MG (65 MG ELEMENTAL FE) 325 MG: 325 (65 FE) TAB at 08:30

## 2022-06-23 RX ADMIN — OXYCODONE 10 MG: 5 TABLET ORAL at 18:37

## 2022-06-23 NOTE — PROGRESS NOTES
2000 Verbal bedside report given to Hunter Zamora RN and SABINA Lawrence nurses by Kaylah Woody RN off-going nurse. Report included current pt status and condition, recent results, hx of present illness, heart rate and rhythm, and respiratory status.

## 2022-06-23 NOTE — PROGRESS NOTES
Bedside shift change report given to Sudarshan Selby RN (oncoming nurse) by Chris Duran RN (offgoing nurse).  Report included the following information SBAR, Kardex, ED Summary, MAR and Cardiac Rhythm ST.

## 2022-06-23 NOTE — PROGRESS NOTES
Pharmacist Note - Warfarin Dosing  Consult provided for this 71 y. o.female to manage warfarin for DVT prophylaxis s/p left knee total arthroplasty. INR Goal: 1.7- 2.2    Home regimen/ tablet size: 2.5 mg daily    Drugs that may increase INR: None  Drugs that may decrease INR: None  Other current anticoagulants/ drugs that may increase bleeding risk: None  Risk factors: Age > 65  Daily INR ordered: YES    Recent Labs     06/23/22  0140 06/22/22  0318 06/21/22  0215   HGB 7.4* 7.3* 7.6*   INR 2.4* 2.3* 1.7*     Date               INR                  Dose  6/6                1                 --    6/16                  -                      5 mg  6/17                 1.1                   5 mg  6/18                 1.3                   5 mg  6/19                 1.2                   7.5 mg  6/20                 1.3                   7.5 mg   6/21                 1.7                   5 mg  6/22                 2.3                   2.5 mg  6/23                 2.4                   2.5 mg                                                                               Assessment/ Plan: Will order warfarin 2.5 mg PO x 1 dose. Pharmacy will continue to monitor daily and adjust therapy as indicated. Please contact the pharmacist at [phone] for outpatient recommendations if needed.

## 2022-06-23 NOTE — PROGRESS NOTES
Transition Plan of Care  RUR 17%-Med  Disposition-plan to discharge to [address] [place] [address],[address] [place] [address]. Requested Covid negative. Spoke with admissions at the facility [facility]  and she will call back if bed is available. Transition of Care Plan to SNF/Rehab    SNF/Rehab Transition:  Patient has been accepted to [address] [place] [address],[address] [place] [address] and meets criteria for admission. Patient will transported by [facility] and expected to leave at 1830. Communication to Patient/Family:  Met with patient  And she is agreeable to the transition plan. Communication to SNF/Rehab:  Bedside RN, , has been notified to update the transition plan to the facility and call report (phone number [phone]-  Discharge information has been updated on the AVS.     Discharge instructions to be sent with transport     [] BCPI-A  Patient has been identified as part of the  BCPI-A Program.  For Care Coordination associated with that Bundle Program, please contact   Bundle information has been communication to   Nursing Please include all hard scripts for controlled substances, med rec and dc summary, and AVS in packet. Reviewed and confirmed with facility, [facility] in admissions [address] [place] [address],[address] [place] [address], can manage the patient care needs for the following:     SNF/Rehab Transition:  Patient to follow-up with Home Health after rehab.     Medication:  []  Medications will be available at the facility  []  IV Antibiotics   []  Controlled Substance - hard copy to be sent with patient   []  Weekly Labs   Documents:  [] Hard RX  [] MAR  [] Kardex  [] AVS  []Transfer Summary  []Discharge   Equipment:  []  CPAP/BiPAP  []  Wound Vacuum  []  Ordaz or Urinary Device  []  PICC/Central Line  []  Nebulizer  []  Ventilator   Treatment:  []Isolation (for MRSA, VRE, etc.)  []Surgical Drain Management  []Tracheostomy Care  []Dressing Changes  []Dialysis with transportation and chair time []PEG Care  []Oxygen  []Daily Weights for Heart Failure   Dietary:  []Any diet limitations  []Tube Feedings []Total Parenteral Management (TPN)   Eligible for Medicaid Long Term Services and Supports  Yes:  [] Eligible for medical assistance or will become eligible within 180 days and UAI completed. [] Provider/Patient and/or support system has requested screening. [] UAI copy provided to patient or responsible party, .  [] UAI unavailable at discharge will send once processed to SNF provider. [] UAI unavailable at discharged mailed to patient  No:   [] Private pay and is not financially eligible for Medicaid within the next 180 days. [] Reside out-of-state. [] A residents of a state owned/operated facility that is licensed  by [place] [address] [facility] or [facility]  [] Enrollment in [facility] hospice services  [] [address]  [] Patient /Family declines to have screening completed or provide financial information for screening     Financial Resources:  Medicaid    [] Initiated and application pending   [] Full coverage     Advanced Care Plan:  []Surrogate Decision Maker of Care  []POA  []Communicated Code Status , \"Full\")    Other     [facility] transport at 1830. Nursing to call report to number above. Patient is agreeable to this disposition. Medicare pt has received, reviewed, and signed 2nd IM letter informing them of their right to appeal the discharge. Signed copy has been placed on pt bedside chart.

## 2022-06-23 NOTE — DISCHARGE SUMMARY
Discharge Summary     Patient: Gagandeep Jacob MRN: [id number]  SSN: [id number]    YOB: 1950  Age: 71 y.o.   Sex: female       Admit Date: 6/19/2022    Discharge Date: 6/23/2022      Admission Diagnoses: Sepsis [facility]) [A41.9]    Discharge Diagnoses:   SIRS without an infectious source  Right knee hematoma  Acute on chronic anemia  S/p recent L knee replacement      Problem List as of 6/23/2022 Date Reviewed: 6/14/2022          Codes Class Noted - Resolved    Primary localized osteoarthritis of left knee ICD-10-CM: M17.12  ICD-9-CM: 715.16  6/16/2022 - Present        Chronic renal disease, stage III ICD-10-CM: N18.30  ICD-9-CM: 585.3  5/9/2022 - Present        Morbid obesity ([facility] ICD-10-CM: E66.01  ICD-9-CM: 278.01  11/11/2021 - Present    Overview Signed 11/11/2021  8:23 AM by Tata Simpson NP     Formatting of this note might be different from the original.             Chronic idiopathic constipation ICD-10-CM: K59.04  ICD-9-CM: 564.00  5/11/2021 - Present        Seasonal allergic rhinitis due to pollen ICD-10-CM: J30.1  ICD-9-CM: 477.0  5/11/2021 - Present        Tonsillitis ICD-10-CM: J03.90  ICD-9-CM: 988  1/17/2020 - Present        Sepsis ([facility] ICD-10-CM: A41.9  ICD-9-CM: 038.9, 995.91  1/17/2020 - Present        Sigmoid diverticulosis ICD-10-CM: K57.30  ICD-9-CM: 562.10  7/11/2019 - Present        Adenopathy ICD-10-CM: R59.9  ICD-9-CM: 785.6  3/6/2019 - Present        Thrombocytosis ICD-10-CM: A71.322  ICD-9-CM: 238.71  2/6/2019 - Present        Lymphocytosis ICD-10-CM: W99.833  ICD-9-CM: 288.61  2/6/2019 - Present        Vitamin D deficiency ICD-10-CM: E55.9  ICD-9-CM: 268.9  10/16/2018 - Present        Osteoporosis without current pathological fracture ICD-10-CM: M81.0  ICD-9-CM: 733.00  6/12/2018 - Present        Osteoarthrosis ICD-10-CM: M19.90  ICD-9-CM: 715.90  Unknown - Present    Overview Addendum 11/11/2021  8:23 AM by Tata Simpson NP     managed with diclofenac po prn. seecristian Marie Josué He at [facility]. has had arthroscopy and IA injections in past with ortho. Formatting of this note might be different from the original.             Multiple lung nodules on CT ICD-10-CM: R91.8  ICD-9-CM: 793.19  7/24/2017 - Present        Thoracic aortic aneurysm without rupture ([facility] ICD-10-CM: I71.2  ICD-9-CM: 441.2  1/12/2017 - Present    Overview Signed 1/12/2017  5:05 PM by Kinza Gong MD     12/16/2016 CTA chest 2.8 by 2.5 by 2.9cm descending thoracic aortic aneursym without evidence of rupture. Multiple bilateral lung nodules with mediastinal and axillary adenopathy suspicious for metastatic disease. Allergic rhinitis ICD-10-CM: J30.9  ICD-9-CM: 477.9  11/2/2015 - Present    Overview Signed 11/11/2021  8:23 AM by Gabriella Fisher NP     Formatting of this note might be different from the original.             Essential hypertension ICD-10-CM: I10  ICD-9-CM: 401.9  10/27/2015 - Present    Overview Addendum 11/11/2021  8:23 AM by Gabriella Fisher NP     12/22/16 normal echo, VCS    Formatting of this note might be different from the original.             Cough variant asthma ICD-10-CM: J45.991  ICD-9-CM: 493.82  3/3/2015 - Present    Overview Signed 11/11/2021  8:23 AM by Gabriella Fisher NP     Formatting of this note might be different from the original.             RESOLVED: Hypertension ICD-10-CM: I10  ICD-9-CM: 401.9  Unknown - 7/24/2017        RESOLVED: Aneurysm of thoracic aorta ([facility] ICD-10-CM: I71.2  ICD-9-CM: 441.2  1/1/2017 - 7/24/2017    Overview Signed 7/24/2017  2:30 PM by Jake Jennings MD     saw Dr. Mary Carmen Cruz, then Dr. Erika Duquer: TIA (transient ischemic attack) ICD-10-CM: G45.9  ICD-9-CM: 435.9  3/20/2015 - 3/21/2015               Discharge Condition: Stable    Hospital Course: Marion Miramontes a 71 y. o. female medical history of seasonal allergies that is post knee replacement done on 6/16 by Dr. Juan Aj and d/c'd presents with worsening knee pain and swelling, tachycardia that was found by home visiting nurse prompted her to seek ER attention. She was recently d/c'd 6/18. Patient has had fevers at home. She was admitted with concerns of sepsis. Sepsis - ruled out  -infection ruled out; SIRS determined to be likely due to knee hematoma  -was on empiric broad spectrum abx    Acute on chronic anemia  -Iron studies consistent with LG some component of acute blood loss anemia post procedure, b12 an folate wnl  -started PO iron      Status post left knee replacement  -cont coumadin for dvt ppx       Consults: Infectious Disease and Orthopedics    Significant Diagnostic Studies: see above  XR CHEST PA LAT    Result Date: 6/19/2022  No new lung infiltrate. There is a chronic, diffuse interstitial prominence. MRI KNEE LT W WO CONT    Result Date: 6/20/2022  1. Status post total knee arthroplasty which creates artifact. 2. Fluid collection in the subcutaneous tissues anteriorly. This may represent a postoperative fluid collection, but infection cannot be excluded. Disposition: SNF    S: no acute complaints, knee pain/swelling improving, no n/v/d or dyspnea    Visit Vitals  BP (!) 124/57 (BP 1 Location: Left upper arm, BP Patient Position: At rest;Sitting)   Pulse 92   Temp 98.7 °F (37.1 °C)   Resp 16   Wt 126.9 kg (279 lb 12.2 oz)   SpO2 96%   BMI 47.28 kg/m²     NAD  RRR  Lungs CTAB      Discharge Medications:   Current Discharge Medication List      START taking these medications    Details   ferrous sulfate 325 mg (65 mg iron) tablet Take 1 Tablet by mouth daily (with breakfast) for 30 days. Qty: 30 Tablet, Refills: 0      metoprolol tartrate (LOPRESSOR) 25 mg tablet Take 0.5 Tablets by mouth every twelve (12) hours for 30 days. Qty: 30 Tablet, Refills: 0      polyethylene glycol (MIRALAX) 17 gram packet Take 1 Packet by mouth daily for 30 days.   Qty: 30 Packet, Refills: 0      traMADoL (ULTRAM) 50 mg tablet Take 1 Tablet by mouth every six (6) hours as needed for Pain for up to 3 days. Max Daily Amount: 200 mg. Qty: 9 Tablet, Refills: 0    Associated Diagnoses: Osteoporosis without current pathological fracture, unspecified osteoporosis type      senna (Senokot) 8.6 mg tablet Take 1 Tablet by mouth two (2) times a day for 30 days. Qty: 60 Tablet, Refills: 0         CONTINUE these medications which have CHANGED    Details   oxyCODONE IR (Roxicodone) 5 mg immediate release tablet Take 2 Tablets by mouth every four (4) hours as needed for Pain for up to 3 days. Max Daily Amount: 60 mg.  Qty: 40 Tablet, Refills: 0    Associated Diagnoses: Primary localized osteoarthritis of left knee         CONTINUE these medications which have NOT CHANGED    Details   warfarin (Coumadin) 2.5 mg tablet Take 1 Tablet by mouth daily. Qty: 60 Tablet, Refills: 3      losartan (COZAAR) 100 mg tablet Take 100 mg by mouth Every morning. clotrimazole-betamethasone (LOTRISONE) topical cream APPLY TO AFFECTED AREA TWICE A DAY  Qty: 15 g, Refills: 2      Vitamin D3 25 mcg (1,000 unit) tablet TAKE 2 TABLETS BY MOUTH EVERY DAY  Qty: 180 Tablet, Refills: 1      montelukast (SINGULAIR) 10 mg tablet TAKE 1 TABLET BY MOUTH EVERY DAY  Qty: 90 Tablet, Refills: 1    Associated Diagnoses: Mild intermittent asthma without complication      nystatin (MYCOSTATIN) powder Apply  to affected area three (3) times daily. Qty: 60 g, Refills: 5    Associated Diagnoses: Candidiasis of skin      fluticasone propionate (FLONASE) 50 mcg/actuation nasal spray SPRAY 2 SPRAYS INTO EACH NOSTRIL EVERY DAY  Qty: 1 Bottle, Refills: 1      nystatin-triamcinolone (MYCOLOG) 100,000-0.1 unit/gram-% ointment Apply  to affected area two (2) times a day. Qty: 30 g, Refills: 5    Associated Diagnoses: Candidiasis of skin      albuterol (Ventolin HFA) 90 mcg/actuation inhaler Take 2 Puffs by inhalation every four (4) hours as needed for Wheezing.   Qty: 1 Inhaler, Refills: 1      sennosides/docusate sodium (SENNA PLUS PO) Take 100 mg by mouth daily. Follow-up Information     Follow up With Specialties Details Why Contact Info    Lennox Mowers, MD Internal Medicine Physician   [address]  [address] [address], [address],  Internal Medicine Physician In 3 days  [address]  [phone]      Barrett Mckeon MD Orthopedic Surgery In 1 week  [address] [address]             Signed By: Bobby Ordonez MD     June 23, 2022      Greater than 30 minutes spent on discharge management.

## 2022-06-24 LAB
BACTERIA SPEC CULT: NORMAL
BACTERIA SPEC CULT: NORMAL
SERVICE CMNT-IMP: NORMAL
SERVICE CMNT-IMP: NORMAL

## 2022-06-28 NOTE — DISCHARGE SUMMARY
Discharge Summary     Patient ID:  Luis Mcgarry  [id number]  18 y.o.  1950    Admit Date: 6/16/2022    Discharge Date: 6/28/2022    Admission Diagnoses: Primary osteoarthritis of left knee [M17.12]; Primary localized osteoarthritis of left knee [M17.12]    Surgeon: Tony Hooper MD    Last Procedure: Procedure(s):  LEFT TOTAL KNEE ARTHROPLASTY      Hospital Course: Normal hospital course for this procedure. Significant Diagnostic Studies: none    Discharge Diagnosis: Active Problems:    Primary localized osteoarthritis of left knee (6/16/2022)         Condition on Discharge: good    Disposition:Home    Followup Care:  Discharge Condition: good  See surgical instructions    Keep wound clean and dry    Follow-up Information     Follow up With Specialties Details Why Contact Info    Jaspal Alejandro MD Internal Medicine Physician   [address]  [address] [address], Henry Tolliver NP Internal Medicine Physician   [address]  [facility]  [phone]      Lorelei Castrejon MD Orthopedic Surgery Schedule an appointment as soon as possible for a visit in 2 weeks  [facility] [address]  [facility] Call Please call agency within 24 hours of discharge if you have not heard from them. [address]  [phone]          [place] med list:   Discharge Medication List as of 6/18/2022 11:59 AM      START taking these medications    Details   warfarin (Coumadin) 2.5 mg tablet Take 1 Tablet by mouth daily. , Normal, Disp-60 Tablet, R-3      oxyCODONE IR (Roxicodone) 5 mg immediate release tablet Take 2 Tablets by mouth every four (4) hours as needed for Pain for up to 7 days.  Max Daily Amount: 60 mg., Normal, Disp-40 Tablet, R-0         CONTINUE these medications which have NOT CHANGED    Details   sennosides/docusate sodium (SENNA PLUS PO) Take 100 mg by mouth daily., Historical Med      losartan (COZAAR) 100 mg tablet Take 100 mg by mouth Every morning., Historical Med      clotrimazole-betamethasone (LOTRISONE) topical cream APPLY TO AFFECTED AREA TWICE A DAY, Normal, Disp-15 g, R-2      Vitamin D3 25 mcg (1,000 unit) tablet TAKE 2 TABLETS BY MOUTH EVERY DAY, Normal, Disp-180 Tablet, R-1      montelukast (SINGULAIR) 10 mg tablet TAKE 1 TABLET BY MOUTH EVERY DAY, Normal, Disp-90 Tablet, R-1      nystatin (MYCOSTATIN) powder Apply  to affected area three (3) times daily. , Normal, Disp-60 g, R-5      fluticasone propionate (FLONASE) 50 mcg/actuation nasal spray SPRAY 2 SPRAYS INTO EACH NOSTRIL EVERY DAY, Normal, Disp-1 Bottle, R-1      nystatin-triamcinolone (MYCOLOG) 100,000-0.1 unit/gram-% ointment Apply  to affected area two (2) times a day., Normal, Disp-30 g, R-5      albuterol (Ventolin HFA) 90 mcg/actuation inhaler Take 2 Puffs by inhalation every four (4) hours as needed for Wheezing., Normal, Disp-1 Inhaler,R-1         STOP taking these medications       diclofenac (VOLTAREN) 1 % gel Comments:   Reason for Stopping:         diclofenac EC (VOLTAREN) 75 mg EC tablet Comments:   Reason for Stopping:         aspirin delayed-release 81 mg tablet Comments:   Reason for Stopping:                  Home Going Instructions:   Patient to follow up in one - two weeks. Notify my office if develop fever, chills, redness, unbearble pain or temp.>102. Patient to follow discharge instructions. Patient to call [phone] to set up follow up appointment.         Signed:  Jayme Gillis MD  6/28/2022  7:43 AM .

## 2022-07-04 DIAGNOSIS — Z96.659 HISTORY OF TOTAL KNEE REPLACEMENT, UNSPECIFIED LATERALITY: Primary | ICD-10-CM

## 2022-07-05 RX ORDER — FLUTICASONE PROPIONATE 50 MCG
2 SPRAY, SUSPENSION (ML) NASAL DAILY
Qty: 2 EACH | Refills: 0 | OUTPATIENT
Start: 2022-07-05

## 2022-07-05 RX ORDER — CLOTRIMAZOLE AND BETAMETHASONE DIPROPIONATE 10; .64 MG/G; MG/G
CREAM TOPICAL 2 TIMES DAILY
Qty: 15 G | Refills: 2 | Status: SHIPPED | OUTPATIENT
Start: 2022-07-05

## 2022-07-05 NOTE — TELEPHONE ENCOUNTER
Divine Garner, the PCP field is doing something weird for this refill.  Looks like you saw her 11/2021 and chart lists you as PCP, but is not noted on the refill request.

## 2022-07-05 NOTE — TELEPHONE ENCOUNTER
PCP: No primary care provider on file. Last appt: Visit date not found  Future Appointments   Date Time Provider Van Serra   7/7/2022  4:00 PM MD NOY Cortez BS AMB       Requested Prescriptions     Pending Prescriptions Disp Refills    clotrimazole-betamethasone (LOTRISONE) topical cream 15 g 2     Sig: Apply  to affected area two (2) times a day.  APPLY TO AFFECTED AREA       Prior labs and Blood pressures:  BP Readings from Last 3 Encounters:   06/23/22 (!) 160/75   06/18/22 (!) 155/89   06/06/22 133/81     Lab Results   Component Value Date/Time    Sodium 140 06/23/2022 01:40 AM    Potassium 3.4 (L) 06/23/2022 01:40 AM    Chloride 106 06/23/2022 01:40 AM    CO2 28 06/23/2022 01:40 AM    Anion gap 6 06/23/2022 01:40 AM    Glucose 107 (H) 06/23/2022 01:40 AM    BUN 13 06/23/2022 01:40 AM    Creatinine 0.79 06/23/2022 01:40 AM    BUN/Creatinine ratio 16 06/23/2022 01:40 AM    GFR est AA >60 06/23/2022 01:40 AM    GFR est non-AA >60 06/23/2022 01:40 AM    Calcium 8.6 06/23/2022 01:40 AM     Lab Results   Component Value Date/Time    Hemoglobin A1c 5.4 06/06/2022 08:20 AM     Lab Results   Component Value Date/Time    Cholesterol, total 182 11/11/2021 08:43 AM    HDL Cholesterol 56 11/11/2021 08:43 AM    LDL, calculated 100.6 (H) 11/11/2021 08:43 AM    VLDL, calculated 25.4 11/11/2021 08:43 AM    Triglyceride 127 11/11/2021 08:43 AM    CHOL/HDL Ratio 3.3 11/11/2021 08:43 AM     Lab Results   Component Value Date/Time    Vitamin D 25-Hydroxy 48.6 11/11/2021 08:43 AM       Lab Results   Component Value Date/Time    TSH 2.82 11/11/2021 08:43 AM

## 2022-07-05 NOTE — TELEPHONE ENCOUNTER
Requested Prescriptions     Pending Prescriptions Disp Refills    fluticasone propionate (FLONASE) 50 mcg/actuation nasal spray 2 Each 0     Si Sprays by Both Nostrils route daily. RX refill request from the patient/pharmacy. Patient last seen 21 with labs, and next appt. scheduled for no future appointments.

## 2022-07-07 ENCOUNTER — OFFICE VISIT (OUTPATIENT)
Dept: ORTHOPEDIC SURGERY | Age: 72
End: 2022-07-07
Payer: MEDICARE

## 2022-07-07 DIAGNOSIS — Z96.652 HISTORY OF TOTAL KNEE ARTHROPLASTY, LEFT: Primary | ICD-10-CM

## 2022-07-07 DIAGNOSIS — Z96.652 STATUS POST TOTAL KNEE REPLACEMENT USING CEMENT, LEFT: ICD-10-CM

## 2022-07-07 PROCEDURE — 99024 POSTOP FOLLOW-UP VISIT: CPT | Performed by: ORTHOPAEDIC SURGERY
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
## 2022-07-07 NOTE — PERIOP NOTES
Family updated on start of surgical procedure by Dann Cisneros RN.
Patient: Danielle Cooper MRN: [id number]  SSN: [id number]   YOB: 1950  Age: 71 y.o. Sex: female     Patient is status post Procedure(s):  LEFT TOTAL KNEE ARTHROPLASTY. Surgeon(s) and Role:     Letitia Mejia MD - Primary    Local/Dose/Irrigation:  See [facility]                  Peripheral IV 06/16/22 Posterior;Right Hand (Active)                           Dressing/Packing:  Incision 06/16/22 Knee Left-Dressing/Treatment: Steri-strips;Petroleum impregnated gauze;Gauze dressing/dressing sponge;ABD pad;Cast padding; Ace wrap (Bacitracin on petroleum dressing) (06/16/22 1446)    Splint/Cast:  ]
TRANSFER - OUT REPORT:    Verbal report given to Marcio RN on Janelle Gentile  being transferred to [facility] for routine post - op       Report consisted of patients Situation, Background, Assessment and   Recommendations(SBAR). Time Pre op antibiotic given:1214  Anesthesia Stop time: 7909  Ordaz Present on Transfer to floor:no  Order for Ordaz on Chart:no  Discharge Prescriptions with Chart:no    Information from the following report(s) SBAR, Procedure Summary, Intake/Output and MAR was reviewed with the receiving nurse. Opportunity for questions and clarification was provided. Is the patient on 02? NO       L/Min na       Other na    Is the patient on a monitor? NO    Is the nurse transporting with the patient? NO    Surgical Waiting Area notified of patient's transfer from PACU? YES      The following personal items collected during your admission accompanied patient upon transfer:   Dental Appliance: Dental Appliances: Uppers (to pacu)  Vision:    Hearing Aid:    Jewelry:    Clothing: Clothing:  At bedside (clothes, dentures, walker returned to pt at bedside)  Other Valuables:    Valuables sent to safe:
Resident

## 2022-07-07 NOTE — LETTER
7/7/2022    Patient: Shyann Galdamez   YOB: 1950   Date of Visit: 7/7/2022     REJI Manriquezedita Solizkatlyn  Via In Basket    Dear Nicanor Plaza NP,      Thank you for referring Ms. Cam Arias to [facility] for evaluation. My notes for this consultation are attached. If you have questions, please do not hesitate to call me. I look forward to following your patient along with you.       Sincerely,    Maryse Engle MD

## 2022-07-14 NOTE — TELEPHONE ENCOUNTER
Phone call from [address] [employer] stating that on her incision site she had a steri strip that was pulled up by therapist which may have caused some bleeding by a scab that was attached. Home Health nurse looked at it and put a bandage back on it. Patient states that its still seeping clear fluid but not excessively. She is not running a fever nor is there an odor.  I have told her to keep it covered and if it gets to where it is a large amount she will need to call and see another doctor within the practice in Dr. Linette morrissey [facility]

## 2022-07-15 RX ORDER — CEPHALEXIN 500 MG/1
500 CAPSULE ORAL 3 TIMES DAILY
Qty: 21 CAPSULE | Refills: 0 | Status: SHIPPED | OUTPATIENT
Start: 2022-07-15 | End: 2022-08-05 | Stop reason: ALTCHOICE

## 2022-07-15 NOTE — TELEPHONE ENCOUNTER
Home Health Nurse called eula morales more yellowish drainage superior to patella under 3rd steri strip. Spoke with Dr. Indu Cruz who had left office for the day and he instructed to call in keflex 500 TID and to be seen by a colleague on Monday.     Patient is aware and will see Kourtney Marvin NP on Monday at [facility] 1:15pm eric

## 2022-07-18 ENCOUNTER — OFFICE VISIT (OUTPATIENT)
Dept: ORTHOPEDIC SURGERY | Age: 72
End: 2022-07-18
Payer: MEDICARE

## 2022-07-18 VITALS — WEIGHT: 250 LBS | BODY MASS INDEX: 41.65 KG/M2 | HEIGHT: 65 IN

## 2022-07-18 DIAGNOSIS — Z96.652 HISTORY OF TOTAL KNEE ARTHROPLASTY, LEFT: Primary | ICD-10-CM

## 2022-07-18 DIAGNOSIS — T81.31XA POSTOPERATIVE WOUND DEHISCENCE, INITIAL ENCOUNTER: ICD-10-CM

## 2022-07-18 DIAGNOSIS — Z09 SURGERY FOLLOW-UP: ICD-10-CM

## 2022-07-18 PROCEDURE — 99024 POSTOP FOLLOW-UP VISIT: CPT | Performed by: PHYSICIAN ASSISTANT

## 2022-07-18 NOTE — LETTER
7/18/2022    Patient: Teresa Bunch   YOB: 1950   Date of Visit: 7/18/2022     Garret Miguel NP  [facility] [address]  [facility]  Via In Basket    Dear Garret Miguel NP,      Thank you for referring Ms. Jaspreet Morales to [facility] for evaluation. My notes for this consultation are attached. If you have questions, please do not hesitate to call me. I look forward to following your patient along with you.       Sincerely,    Veronica Boss PA-C
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
## 2022-07-25 ENCOUNTER — OFFICE VISIT (OUTPATIENT)
Dept: ORTHOPEDIC SURGERY | Age: 72
End: 2022-07-25
Payer: MEDICARE

## 2022-07-25 ENCOUNTER — OFFICE VISIT (OUTPATIENT)
Dept: ORTHOPEDIC SURGERY | Age: 72
End: 2022-07-25

## 2022-07-25 VITALS — WEIGHT: 250 LBS | BODY MASS INDEX: 41.65 KG/M2 | HEIGHT: 65 IN

## 2022-07-25 DIAGNOSIS — Z96.652 HISTORY OF TOTAL KNEE ARTHROPLASTY, LEFT: Primary | ICD-10-CM

## 2022-07-25 DIAGNOSIS — M25.562 POSTOPERATIVE PAIN OF LEFT KNEE: Primary | ICD-10-CM

## 2022-07-25 DIAGNOSIS — Z09 SURGERY FOLLOW-UP: ICD-10-CM

## 2022-07-25 DIAGNOSIS — Z96.652 S/P TKR (TOTAL KNEE REPLACEMENT), LEFT: ICD-10-CM

## 2022-07-25 DIAGNOSIS — G89.18 POSTOPERATIVE PAIN OF LEFT KNEE: Primary | ICD-10-CM

## 2022-07-25 DIAGNOSIS — M17.12 PRIMARY OSTEOARTHRITIS OF LEFT KNEE: ICD-10-CM

## 2022-07-25 PROCEDURE — 97162 PT EVAL MOD COMPLEX 30 MIN: CPT | Performed by: PHYSICAL THERAPIST

## 2022-07-25 PROCEDURE — 99024 POSTOP FOLLOW-UP VISIT: CPT | Performed by: PHYSICIAN ASSISTANT

## 2022-07-25 PROCEDURE — 97110 THERAPEUTIC EXERCISES: CPT | Performed by: PHYSICAL THERAPIST

## 2022-07-25 PROCEDURE — 97140 MANUAL THERAPY 1/> REGIONS: CPT | Performed by: PHYSICAL THERAPIST

## 2022-07-25 NOTE — PROGRESS NOTES
Patient Name: Gema Arias  Date:2022  : 1950  [x]  Patient  Verified  Payor: Willie Medici / Plan: [place] MEDICARE PART A & B / Product Type: Medicare /    Total Treatment Time (min): 45+  1:1 Treatment Time ( [address] only): 45+   Rianna Lizama MD  1. Postoperative pain of left knee  2. Primary osteoarthritis of left knee  3. S/P TKR (total knee replacement), left      Subjective:    Patient is a 71 y.o. female referred to physical therapy by Dr. Gatito Godfrey with a diagnosis of a left total knee replacement performed as a result of osteoarthritis on 2022. She reports a longstanding history of progressive knee pain with decreasing function secondary to osteoarthritis. She did have some postoperative difficulties secondary to increased edema and was readmitted to the hospital at the advice of home health nursing secondary to elevated heart rate and hypertension. She was discharged to a skilled nursing facility but states that she only stayed at that facility for few days before she returned home again. She has since participated with home health physical therapy and nursing. She reports discomfort is anywhere from 0 to a 7/10 and states it is more of a \"tightness\" than true pain. She has completed a round of postoperative antibiotics for fear of infection due to her drainage. She is to see the physician again later today. She reports standing/sitting endurance is limited to approximately 15 to 20 minutes. She is using a rolling walker. She is well-known to us here in physical therapy as we have treated her earlier in the year for a left glenoid fracture with full-thickness supraspinatus tear which has not had operative intervention. She does have some known right knee pain as well. She does have a latex allergy. She has hypertension. Remainder of past medical history medication list can otherwise be reviewed per the EHR. Objective:   Incisions: Steristrips intact with additional bandage along superior incision. Gait: Slow but steady gait with Rollator and lack in both flexion and extension. Strength: She is able to demonstrate isometric quad contraction but with appropriate weakness as compared to the contralateral side. Left knee ROM: 0 to 72 degrees. Right knee ROM: 0 to 105 degrees  Circumfererence: increased by approximately 5-6 cm as compared to contralateral side    Ex: 20 min  Treatment today to include instruction in a home exercise program as well as providing patient with written and visual handouts. Man: 15 min  PF/TF mobilization in multiple angles of flexion/extension to improve ROM. Edema massage to the knee with myofascial techniques to the anterior knee and incision line with instructions for self completion at home as well. NMR:  min    All questions were addressed. Assessment:    Patient presents with increased pain and decreased ROM, strength, and mobility consistent with left total knee replacement performed as result of osteoarthritis on June 12, 2022. Long Term Goals. 10 weeks  1. Patient will demonstrate the ability to ambulate on level surfaces, uneven surfaces, stairs with a smooth and nonantalgic gait pattern. 2.  Patient will demonstrate improved AROM of the knee to within 95% or greater as compared to the contralateral side to assist with home/work/community/recreational ADL activity. 3.  Patient will report pain to be consistently less than or equal to 1/10 with all home/work/community/recreational ADL activity. Short Term Goals. 2 visits. Patient will demonstrate independence with HEP. Plan:  Plan of care: Physical therapy consisted of frequency of 2/week for the next 10 weeks. Physical therapy will consist of therapeutic exercise, modalities, patient education, neuromuscular reeducation, manual therapy, therapeutic activity, dry needling, and instruction in home exercise program as appropriate.     Eval  Ex: 15  Man: 10  NMR:     The referring physician has reviewed and approved this evaluation and plan of care as noted by the electronic signature attached to note.     Anne-Marie Wang DPT, ATC
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
## 2022-07-27 ENCOUNTER — OFFICE VISIT (OUTPATIENT)
Dept: ORTHOPEDIC SURGERY | Age: 72
End: 2022-07-27
Payer: MEDICARE

## 2022-07-27 DIAGNOSIS — G89.18 POSTOPERATIVE PAIN OF LEFT KNEE: Primary | ICD-10-CM

## 2022-07-27 DIAGNOSIS — Z96.652 S/P TKR (TOTAL KNEE REPLACEMENT), LEFT: ICD-10-CM

## 2022-07-27 DIAGNOSIS — M25.562 POSTOPERATIVE PAIN OF LEFT KNEE: Primary | ICD-10-CM

## 2022-07-27 DIAGNOSIS — M17.12 PRIMARY OSTEOARTHRITIS OF LEFT KNEE: ICD-10-CM

## 2022-07-27 PROCEDURE — 97110 THERAPEUTIC EXERCISES: CPT | Performed by: PHYSICAL THERAPIST

## 2022-07-27 PROCEDURE — 97140 MANUAL THERAPY 1/> REGIONS: CPT | Performed by: PHYSICAL THERAPIST

## 2022-07-27 NOTE — PROGRESS NOTES
Patient Name: Antonio Stager  Date:2022  : 1950  [x]  Patient  Verified  Payor: [employer] / Plan: VA MEDICARE PART A & B / Product Type: Medicare /    Total Treatment Time (min): 60  1:1 Treatment Time ([facility] only): 30  Referring provider: Evelio Hunter MD  1. Postoperative pain of left knee  2. Primary osteoarthritis of left knee  3. S/P TKR (total knee replacement), left    SUBJECTIVE  Appropriate soreness after initial evaluation. OBJECTIVE  Modality:   []  E-Stim: type _ x _ min     []att   []unatt   []w/US   []w/ice   []w/heat  []  Ultrasound: []cont   []pulse    _ W/cm2 x _  min   []1MHz   []3MHz  []  Ice pack _  Post       [] Hot pack _  Pre       []  Other:    Man: 15 min  PF/TF mobilization in multiple angles of flexion/extension to improve ROM. Myofascial techniques to the incision line, popliteus, and anterior knee. Edema massage to the knee. NMR:  min  Neuromuscular reeducation/proprioceptive training listed in exercise below. Ex: 15 min  Therapeutic exercise/strength/endurance completed here in clinic today per the exercise log. PT Exercise Log         EXERCISE 2022   SAQ/LAQ 1#/3#   Slant board y   sidestep Blue foam   Nu-step 10'                                                                        Ex: 15 min  Man: 15 min  NMR:  min    ASSESSMENT  [x]  See Plan of Care  [x]  Patient will continue to benefit from skilled therapy to address remaining functional deficits:   Patient still has expected edema and soreness with mobility restrictions but shows good early improvement compared to initial evaluation. PLAN  Continue with current plan of care and progress as appropriate towards functional goals.   [x]  Upgrade activities as tolerated     [x]  Continue plan of care  []  Discharge due to:_  [] Other:_       Orpah Kidney, PT, DPT  2022    12:22 PM

## 2022-08-02 ENCOUNTER — OFFICE VISIT (OUTPATIENT)
Dept: ORTHOPEDIC SURGERY | Age: 72
End: 2022-08-02
Payer: MEDICARE

## 2022-08-02 DIAGNOSIS — M17.12 PRIMARY OSTEOARTHRITIS OF LEFT KNEE: ICD-10-CM

## 2022-08-02 DIAGNOSIS — Z96.652 S/P TKR (TOTAL KNEE REPLACEMENT), LEFT: ICD-10-CM

## 2022-08-02 DIAGNOSIS — G89.18 POSTOPERATIVE PAIN OF LEFT KNEE: Primary | ICD-10-CM

## 2022-08-02 DIAGNOSIS — M25.562 POSTOPERATIVE PAIN OF LEFT KNEE: Primary | ICD-10-CM

## 2022-08-02 PROCEDURE — 97110 THERAPEUTIC EXERCISES: CPT

## 2022-08-02 PROCEDURE — 97140 MANUAL THERAPY 1/> REGIONS: CPT

## 2022-08-02 NOTE — PROGRESS NOTES
I have reviewed the notes, assessments, and/or procedures performed by Chevy Daniel PTA, I concur with her/his documentation of Lawrence Sharp.

## 2022-08-02 NOTE — PROGRESS NOTES
Patient Name: Shyann Galdamez  [id number]  : 1950  [x]  Patient  Verified  Payor: Sumaya Rich / Plan: [place] MEDICARE PART A & B / Product Type: Medicare /    Total Treatment Time (min): 60  1:1 Treatment Time ( [address] only): 30  Referring provider: Placido Enrique MD    1. Postoperative pain of left knee  2. Primary osteoarthritis of left knee  3. S/P TKR (total knee replacement), left    SUBJECTIVE  Primary complaint is swelling/stiffness in the knee. She is seeing wound care Friday for the wound at the superior portion of her incision. OBJECTIVE    Knee flexion 70    Modality:   []  E-Stim: type _ x _ min     []att   []unatt   []w/US   []w/ice   []w/heat  []  Ultrasound: []cont   []pulse    _ W/cm2 x _  min   []1MHz   []3MHz  []  Ice pack _  Post       [] Hot pack _  Pre       []  Other:    Man: 15 min  PF/TF mobilization in multiple angles of flexion/extension to improve ROM. Myofascial techniques to the incision line, popliteus, and anterior knee. Edema massage to the knee. Passive hamstring stretching. NMR:  min  Neuromuscular reeducation/proprioceptive training listed in exercise below. Ex: 15 min  Therapeutic exercise/strength/endurance completed here in clinic today per the exercise log. PT Exercise Log         EXERCISE 2022   SAQ/LAQ 1#/3#   Slant board y   sidestep Blue foam   Nu-step 10'   TKE grn                                                                Wound is draining. I applied a fresh non-adhesive sterile pad. See photo below. Ex: 15 min  Man: 15 min  NMR:  min    ASSESSMENT  [x]  See Plan of Care  [x]  Patient will continue to benefit from skilled therapy to address remaining functional deficits:     She is able to produce an isometric quad contraction. No specific c/o knee pain with exercises, but expected discomfort with ROM stretching both directions.      PLAN  Continue with current plan of care and progress as appropriate towards functional goals.  [x]  Upgrade activities as tolerated     [x]  Continue plan of care  []  Discharge due to:_  [] Other:_       Andry Plummer PTA  8/2/2022    12:22 PM

## 2022-08-04 ENCOUNTER — OFFICE VISIT (OUTPATIENT)
Dept: ORTHOPEDIC SURGERY | Age: 72
End: 2022-08-04

## 2022-08-04 DIAGNOSIS — Z96.652 S/P TKR (TOTAL KNEE REPLACEMENT), LEFT: Primary | ICD-10-CM

## 2022-08-04 DIAGNOSIS — M25.562 POSTOPERATIVE PAIN OF LEFT KNEE: Primary | ICD-10-CM

## 2022-08-04 DIAGNOSIS — Z96.652 S/P TKR (TOTAL KNEE REPLACEMENT), LEFT: ICD-10-CM

## 2022-08-04 DIAGNOSIS — M17.12 PRIMARY OSTEOARTHRITIS OF LEFT KNEE: ICD-10-CM

## 2022-08-04 DIAGNOSIS — G89.18 POSTOPERATIVE PAIN OF LEFT KNEE: Primary | ICD-10-CM

## 2022-08-04 PROCEDURE — 97110 THERAPEUTIC EXERCISES: CPT

## 2022-08-04 PROCEDURE — 99024 POSTOP FOLLOW-UP VISIT: CPT | Performed by: ORTHOPAEDIC SURGERY

## 2022-08-04 PROCEDURE — 97140 MANUAL THERAPY 1/> REGIONS: CPT

## 2022-08-04 NOTE — PROGRESS NOTES
Patient Name: Ethel Davis  Date:2022  : 1950  [x]  Patient  Verified  Payor: Sofie Reynolds / Plan: [place] MEDICARE PART A & B / Product Type: Medicare /    Total Treatment Time (min): 60  1:1 Treatment Time ([facility] only): 30  Referring provider: Funmilayo Telles MD    1. Postoperative pain of left knee  2. Primary osteoarthritis of left knee  3. S/P TKR (total knee replacement), left    SUBJECTIVE  Patient reports she was very fatigued after her last appointment but has been working hard on knee flexion ROM since. OBJECTIVE    Knee flexion 70    Modality:   []  E-Stim: type _ x _ min     []att   []unatt   []w/US   []w/ice   []w/heat  []  Ultrasound: []cont   []pulse    _ W/cm2 x _  min   []1MHz   []3MHz  []  Ice pack _  Post       [] Hot pack _  Pre       []  Other:    Man: 15 min  PF/TF mobilization in multiple angles of flexion/extension to improve ROM. Myofascial techniques to the incision line, popliteus, and anterior knee. Edema massage to the knee. Passive hamstring stretching. NMR:  min  Neuromuscular reeducation/proprioceptive training listed in exercise below. Ex: 15 min  Therapeutic exercise/strength/endurance completed here in clinic today per the exercise log. PT Exercise Log         EXERCISE 2022   SAQ/LAQ 1#/3#   Slant board y   sidestep Blue foam   Nu-step 10'   TKE grn   Tandem stand *NMR x                                                          Patient applied a new non-adherent bandage this morning. No visible signs of drainage. She has photo documentation of her wound and is seeing wound care tomorrow morning. Ex: 15 min  Man: 15 min  NMR:  min    ASSESSMENT  [x]  See Plan of Care  [x]  Patient will continue to benefit from skilled therapy to address remaining functional deficits:     Stiff with PF mobility. Still with clear swelling in the L LE and knee stiffness. Continues to use FWW for ambulation in the community over long distances. PLAN  Continue with current plan of care and progress as appropriate towards functional goals.   [x]  Upgrade activities as tolerated     [x]  Continue plan of care  []  Discharge due to:_  [] Other:_       Israel Wilkerson PTA  8/4/2022    12:22 PM
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
## 2022-08-04 NOTE — LETTER
8/4/2022    Patient: Gavi Mack   YOB: 1950   Date of Visit: 8/4/2022     Balwinder Tam NP  [address]  [address]  Via In Basket    Dear Balwinder Tam NP,      Thank you for referring Ms. Dayo Christopher to [facility] for evaluation. My notes for this consultation are attached. If you have questions, please do not hesitate to call me. I look forward to following your patient along with you.       Sincerely,    Farideh Parks MD

## 2022-08-05 ENCOUNTER — HOSPITAL ENCOUNTER (OUTPATIENT)
Dept: WOUND CARE | Age: 72
Discharge: HOME | End: 2022-08-05
Payer: MEDICARE

## 2022-08-05 VITALS
TEMPERATURE: 97.7 F | SYSTOLIC BLOOD PRESSURE: 141 MMHG | DIASTOLIC BLOOD PRESSURE: 64 MMHG | HEART RATE: 103 BPM | RESPIRATION RATE: 18 BRPM

## 2022-08-05 DIAGNOSIS — S81.002A: Primary | ICD-10-CM

## 2022-08-05 PROCEDURE — 99203 OFFICE O/P NEW LOW 30 MIN: CPT | Performed by: SURGERY

## 2022-08-05 PROCEDURE — 99213 OFFICE O/P EST LOW 20 MIN: CPT

## 2022-08-05 RX ORDER — CLOBETASOL PROPIONATE 0.5 MG/G
OINTMENT TOPICAL ONCE
Status: CANCELLED | OUTPATIENT
Start: 2022-08-05 | End: 2022-08-05

## 2022-08-05 RX ORDER — BACITRACIN ZINC AND POLYMYXIN B SULFATE 500; 1000 [USP'U]/G; [USP'U]/G
OINTMENT TOPICAL ONCE
Status: CANCELLED | OUTPATIENT
Start: 2022-08-05 | End: 2022-08-05

## 2022-08-05 RX ORDER — SILVER SULFADIAZINE 10 G/1000G
CREAM TOPICAL ONCE
Status: CANCELLED | OUTPATIENT
Start: 2022-08-05 | End: 2022-08-05

## 2022-08-05 RX ORDER — LIDOCAINE HYDROCHLORIDE 20 MG/ML
JELLY TOPICAL ONCE
Status: CANCELLED | OUTPATIENT
Start: 2022-08-05 | End: 2022-08-05

## 2022-08-05 RX ORDER — LIDOCAINE HYDROCHLORIDE 40 MG/ML
SOLUTION TOPICAL ONCE
Status: CANCELLED | OUTPATIENT
Start: 2022-08-05 | End: 2022-08-05

## 2022-08-05 RX ORDER — BACITRACIN 500 [USP'U]/G
OINTMENT TOPICAL ONCE
Status: CANCELLED | OUTPATIENT
Start: 2022-08-05 | End: 2022-08-05

## 2022-08-05 RX ORDER — MUPIROCIN 20 MG/G
OINTMENT TOPICAL ONCE
Status: CANCELLED | OUTPATIENT
Start: 2022-08-05 | End: 2022-08-05

## 2022-08-05 RX ORDER — GENTAMICIN SULFATE 1 MG/G
OINTMENT TOPICAL ONCE
Status: CANCELLED | OUTPATIENT
Start: 2022-08-05 | End: 2022-08-05

## 2022-08-05 RX ORDER — TRIAMCINOLONE ACETONIDE 1 MG/G
OINTMENT TOPICAL ONCE
Status: CANCELLED | OUTPATIENT
Start: 2022-08-05 | End: 2022-08-05

## 2022-08-05 RX ORDER — LIDOCAINE 40 MG/G
CREAM TOPICAL ONCE
Status: CANCELLED | OUTPATIENT
Start: 2022-08-05 | End: 2022-08-05

## 2022-08-05 RX ORDER — BETAMETHASONE DIPROPIONATE 0.5 MG/G
OINTMENT TOPICAL ONCE
Status: CANCELLED | OUTPATIENT
Start: 2022-08-05 | End: 2022-08-05

## 2022-08-05 NOTE — DISCHARGE INSTRUCTIONS
Discharge Instructions/Wound Orders  [facility]  [address] [facility]  MO[address] [facility] [place], [id number]  Telephone: [phone]    NAME:  Tamar Garcia  YOB: 1950  MEDICAL RECORD NUMBER:  [id number]  DATE:  8/5/2022  Wound Care Orders:  Left knee :Cleanse with saline , or soap and water, pat dry. apply primary dressing medihoney hydrogel sheet cover with secondary dressing   border foam .  Pt./pcg/HH nurse to change (freq) 3 x week and as needed for compromise. F/U in 2 week. Change after shower, every 3 days   Dietary:  [] Diet as tolerated: [] Calorie Diabetic Diet:Low carb and no Sugar [] No Added Salt:[] Increase Protein: [] Other:Limit the amount of liquid you are drinking and avoid drinking in between meals   Activity:  [] Activity as tolerated:  [] Patient has no activity restrictions     [] Strict Bedrest: [] Remain off Work:     [] May return to full duty work:                                   [] Return to work with restrictions:             Return Appointment:   [x] Return Appointment: With DR Vanna Babin  in  2 Week(s)  [] Ordered tests:    Electronically signed Salena Colldao RN on 8/5/2022 at 9:36 AM     Zoey [address]: Should you experience any significant changes in your wound(s) or have questions about your wound care, please contact the [facility] at [address] [place] [address] 8:00 am - 4:30. If you need help with your wound outside these hours and cannot wait until we are again available, contact your PCP or go to the hospital emergency room. PLEASE NOTE: IF YOU ARE UNABLE TO OBTAIN WOUND SUPPLIES, CONTINUE TO USE THE SUPPLIES YOU HAVE AVAILABLE UNTIL YOU ARE ABLE TO REACH US. IT IS MOST IMPORTANT TO KEEP THE WOUND COVERED AT ALL TIMES.      Physician Signature:_______________________    Date: ___________ Time:  ____________

## 2022-08-05 NOTE — WOUND CARE
08/05/22 0926   Wound Knee 08/05/22   Date First Assessed/Time First Assessed: 08/05/22 0924   Present on Hospital Admission: No  Wound Approximate Age at First Assessment (Weeks): 7 weeks  Primary Wound Type: (c)   Location: Knee  Date of First Observation: 08/05/22   Wound Image    Wound Etiology Non-Healing Surgical   Dressing Status   (removed dry gauze)   Cleansed Cleansed with saline   Wound Length (cm) 1 cm   Wound Width (cm) 1.1 cm   Wound Depth (cm) 0.2 cm   Wound Surface Area (cm^2) 1.1 cm^2   Wound Volume (cm^3) 0.22 cm^3   Wound Assessment [place]/red;Slough   Drainage Amount   (reports scant drainage)   Drainage Description Serous   Wound Odor None   [place]-Wound/Incision Assessment Intact   Edges Defined edges   Wound Thickness Description Full thickness   Visit Vitals  BP (!) 141/64 (BP 1 Location: Right upper arm, BP Patient Position: At rest)   Pulse (!) 103   Temp 97.7 °F (36.5 °C)   Resp 18

## 2022-08-05 NOTE — WOUND CARE
08/05/22 0926   Wound Knee 08/05/22   Date First Assessed/Time First Assessed: 08/05/22 0924   Present on Hospital Admission: No  Wound Approximate Age at First Assessment (Weeks): 7 weeks  Primary Wound Type: (c)   Location: Knee  Date of First Observation: 08/05/22   Wound Image    Wound Etiology Non-Healing Surgical   Dressing Status   (removed dry gauze, placed this am..)   Cleansed Cleansed with saline   Wound Length (cm) 1 cm   Wound Width (cm) 1.1 cm   Wound Depth (cm) 0.2 cm   Wound Surface Area (cm^2) 1.1 cm^2   Wound Volume (cm^3) 0.22 cm^3   Wound Assessment [place]/red;Slough   Drainage Amount   (reports mod drainage)   Drainage Description Serous   Wound Odor None   [place]-Wound/Incision Assessment Intact   Edges Defined edges   Wound Thickness Description Full thickness   Visit Vitals  BP (!) 141/64 (BP 1 Location: Right upper arm, BP Patient Position: At rest)   Pulse (!) 103   Temp 97.7 °F (36.5 °C)   Resp 18

## 2022-08-05 NOTE — PROGRESS NOTES
I have reviewed the notes, assessments, and/or procedures performed by Yanni Blankenship PTA, I concur with her/his documentation of Antonio Olivas.

## 2022-08-06 PROBLEM — A41.9 SEPSIS (HCC): Status: RESOLVED | Noted: 2020-01-17 | Resolved: 2022-08-06

## 2022-08-06 NOTE — PROGRESS NOTES
The patient is a 71-year-old woman referred to the wound care center because of a nonhealing wound on the left knee related to recent total knee replacement. The patient underwent total knee replacement on the left on 6/16/2022. She was noted to have failure of healing of the uppermost end of the incision. She has had only mild drainage from the area. The patient reports making good progress with her physical therapy. She is able to ambulate. The patient does not have history of diabetes. She does not have history of anginal symptoms or history of MI or coronary intervention. The patient denies shortness of breath with ambulation or at rest.    Past medical history includes CKD 3, hypertension, obesity, lung nodules on CT, osteoporosis, thoracic aortic aneurysm without rupture, asthma, stroke. The patient has never smoked. Reported weight 250 pounds height 5 feet 4 inches      Physical Examination    Vital signs: blood pressure 141/64, pulse 103, respirations 18, temperature 97.7    The patient is an alert woman in no acute distress. Head and neck examination showed no jaundice. Lungs are clear bilaterally without rales rhonchi or wheezes. Heart is regular without murmur gallop or rub. The patient is alert and oriented. She moves all extremities equally. Facial movement is symmetrical.  Speech is normal.    Examination of the left lower extremity reveals 2+ left dorsalis pedis pulse. There was 1+ edema of the left lower extremity from distal thigh distally. There was a scar secondary to surgical incision longitudinally crossing the anterior left knee. There was a wound at the proximal end of the scar which was 1 x 1.1 x 0.2 cm in dimension. There was mild slough on the wound base. There was no evidence of any deep tracking. There was no erythema of surrounding skin. The patient has a nonhealing wound representing a small portion of a recent surgical site.     Dressing ordered: Applied Medihoney sheet over wound, cover with silicone border dressing. Change dressing every other day and as needed. The patient can shower but will need to apply a new dressing after showering. The patient will follow-up in the wound care center in 2 weeks. Open wound of left knee    S81.002A      G. Marline Merlin, MD

## 2022-08-09 ENCOUNTER — OFFICE VISIT (OUTPATIENT)
Dept: ORTHOPEDIC SURGERY | Age: 72
End: 2022-08-09
Payer: MEDICARE

## 2022-08-09 DIAGNOSIS — M25.562 POSTOPERATIVE PAIN OF LEFT KNEE: ICD-10-CM

## 2022-08-09 DIAGNOSIS — M17.12 PRIMARY OSTEOARTHRITIS OF LEFT KNEE: ICD-10-CM

## 2022-08-09 DIAGNOSIS — G89.18 POSTOPERATIVE PAIN OF LEFT KNEE: ICD-10-CM

## 2022-08-09 DIAGNOSIS — Z96.652 S/P TKR (TOTAL KNEE REPLACEMENT), LEFT: Primary | ICD-10-CM

## 2022-08-09 PROCEDURE — 97140 MANUAL THERAPY 1/> REGIONS: CPT | Performed by: PHYSICAL THERAPIST

## 2022-08-09 PROCEDURE — 97110 THERAPEUTIC EXERCISES: CPT | Performed by: PHYSICAL THERAPIST

## 2022-08-09 NOTE — PROGRESS NOTES
Patient Name: Benjamine Opitz  Date:2022  : 1950  [x]  Patient  Verified  Payor: Judith Members / Plan: [place] MEDICARE PART A & B / Product Type: Medicare /    Total Treatment Time (min): 60  1:1 Treatment Time ( [address] only): 30  Referring provider: Alondra Parks MD    1. S/P TKR (total knee replacement), left  2. Primary osteoarthritis of left knee  3. Postoperative pain of left knee    SUBJECTIVE  Patient states wound care team has set her up with wet-to-dry dressings. Notes that dressings do become loose with endrange flexion but she is continuing to care for wound as able. Otherwise feels knee and gait are improving. OBJECTIVE    Modality:   []  E-Stim: type _ x _ min     []att   []unatt   []w/US   []w/ice   []w/heat  []  Ultrasound: []cont   []pulse    _ W/cm2 x _  min   []1MHz   []3MHz  []  Ice pack _  Post       [] Hot pack _  Pre       []  Other:    Man: 15 min  PF/TF mobilization in multiple angles of flexion/extension to improve ROM while avoiding disruption of wound covering/healing. Myofascial techniques to the incision line, popliteus, and anterior knee. Edema massage to the knee. Passive hamstring stretching. NMR:  min  Neuromuscular reeducation/proprioceptive training listed in exercise below. Ex: 15 min  Therapeutic exercise/strength/endurance completed here in clinic today per the exercise log. PT Exercise Log         EXERCISE 2022   SAQ/LAQ 2#/4#   Slant board y   sidestep Blue foam   Nu-step 10'   TKE grn   Tandem stand *NMR x   TG minisquat Level 14                                                        Ex: 15 min  Man: 15 min  NMR:  min    ASSESSMENT  [x]  See Plan of Care  [x]  Patient will continue to benefit from skilled therapy to address remaining functional deficits:     Still stiff with endrange flexion. Still some globalized edema. Otherwise patient feels good with advancing exercise here in clinic today.     PLAN  Continue with current plan of care and progress as appropriate towards functional goals.   [x]  Upgrade activities as tolerated     [x]  Continue plan of care  []  Discharge due to:_  [] Other:_       Damien Peters PT, DPT  8/9/2022    12:22 PM

## 2022-08-11 ENCOUNTER — OFFICE VISIT (OUTPATIENT)
Dept: ORTHOPEDIC SURGERY | Age: 72
End: 2022-08-11
Payer: MEDICARE

## 2022-08-11 DIAGNOSIS — M25.562 POSTOPERATIVE PAIN OF LEFT KNEE: ICD-10-CM

## 2022-08-11 DIAGNOSIS — R26.2 DIFFICULTY WALKING: ICD-10-CM

## 2022-08-11 DIAGNOSIS — M17.12 PRIMARY OSTEOARTHRITIS OF LEFT KNEE: ICD-10-CM

## 2022-08-11 DIAGNOSIS — G89.18 POSTOPERATIVE PAIN OF LEFT KNEE: ICD-10-CM

## 2022-08-11 DIAGNOSIS — Z96.652 S/P TKR (TOTAL KNEE REPLACEMENT), LEFT: Primary | ICD-10-CM

## 2022-08-11 PROCEDURE — 97110 THERAPEUTIC EXERCISES: CPT | Performed by: PHYSICAL THERAPIST

## 2022-08-11 PROCEDURE — 97140 MANUAL THERAPY 1/> REGIONS: CPT | Performed by: PHYSICAL THERAPIST

## 2022-08-11 NOTE — PROGRESS NOTES
Patient Name: Lala Garcia  Date:2022  : 1950  [x]  Patient  Verified  Payor: Alvaro Cadet / Plan: [place] MEDICARE PART A & B / Product Type: Medicare /    Total Treatment Time (min): 60  1:1 Treatment Time ( [address] only): 30  Referring provider: No ref. provider found    1. S/P TKR (total knee replacement), left  2. Primary osteoarthritis of left knee  3. Postoperative pain of left knee  4. Difficulty walking    SUBJECTIVE  Still with general stiffness. Bandage in place but states she can tell wound is improving. OBJECTIVE    Modality:   []  E-Stim: type _ x _ min     []att   []unatt   []w/US   []w/ice   []w/heat  []  Ultrasound: []cont   []pulse    _ W/cm2 x _  min   []1MHz   []3MHz  []  Ice pack _  Post       [] Hot pack _  Pre       []  Other:    Man: 15 min  PF/TF mobilization in multiple angles of flexion/extension to improve ROM while avoiding disruption of wound covering/healing. Myofascial techniques to the incision line, popliteus, and anterior knee. Edema massage to the knee. Passive hamstring stretching. NMR:  min  Neuromuscular reeducation/proprioceptive training listed in exercise below. Ex: 15 min  Therapeutic exercise/strength/endurance completed here in clinic today per the exercise log. Manual assisted passive flexibility exercises for the: quad/hamstring          PT Exercise Log         EXERCISE 2022   SAQ/LAQ 2#/4#   Slant board y   sidestep Blue foam   Nu-step 10'   TKE grn   Tandem stand *NMR x   TG minisquat Level 14                                                        Ex: 15 min  Man: 15 min  NMR:  min    ASSESSMENT  [x]  See Plan of Care  [x]  Patient will continue to benefit from skilled therapy to address remaining functional deficits:     Appropriate soreness with endrange flexion limitations. Still with globalized edema. PLAN  Continue with current plan of care and progress as appropriate towards functional goals.   [x]  Upgrade activities as tolerated     [x]  Continue plan of care  []  Discharge due to:_  [] Other:_       Carmen Baker PT, DPT  8/11/2022    12:22 PM

## 2022-08-16 ENCOUNTER — OFFICE VISIT (OUTPATIENT)
Dept: ORTHOPEDIC SURGERY | Age: 72
End: 2022-08-16
Payer: MEDICARE

## 2022-08-16 DIAGNOSIS — R26.2 DIFFICULTY WALKING: ICD-10-CM

## 2022-08-16 DIAGNOSIS — G89.18 POSTOPERATIVE PAIN OF LEFT KNEE: ICD-10-CM

## 2022-08-16 DIAGNOSIS — Z96.652 S/P TKR (TOTAL KNEE REPLACEMENT), LEFT: Primary | ICD-10-CM

## 2022-08-16 DIAGNOSIS — M17.12 PRIMARY OSTEOARTHRITIS OF LEFT KNEE: ICD-10-CM

## 2022-08-16 DIAGNOSIS — M25.562 POSTOPERATIVE PAIN OF LEFT KNEE: ICD-10-CM

## 2022-08-16 PROCEDURE — 97110 THERAPEUTIC EXERCISES: CPT

## 2022-08-16 PROCEDURE — 97140 MANUAL THERAPY 1/> REGIONS: CPT

## 2022-08-16 NOTE — PROGRESS NOTES
I reviewed the notes, assessments, and/or procedures performed by Mai Councilman, PTA, I concur with her documentation of Fredis Lima

## 2022-08-16 NOTE — PROGRESS NOTES
Patient Name: Martha Hudson  Date:2022  : 1950  [x]  Patient  Verified  Payor: [address] [place] [address] / Plan: [place] MEDICARE PART A & B / Product Type: Medicare /    Total Treatment Time (min): 60  1:1 Treatment Time ( [address] only): 30  Referring provider: Lita Winslow MD    1. S/P TKR (total knee replacement), left  2. Primary osteoarthritis of left knee  3. Postoperative pain of left knee  4. Difficulty walking    SUBJECTIVE    Patient reports she is still very stiff in the morning, but has been trying to work on knee flexion ROM consistently at home. OBJECTIVE    Modality:   []  E-Stim: type _ x _ min     []att   []unatt   []w/US   []w/ice   []w/heat  []  Ultrasound: []cont   []pulse    _ W/cm2 x _  min   []1MHz   []3MHz  []  Ice pack _  Post       [] Hot pack _  Pre       []  Other:    Man: 15 min  PF/TF mobilization in multiple angles of flexion/extension to improve ROM while avoiding disruption of wound covering/healing. Myofascial techniques to the incision line, popliteus, and anterior knee. Edema massage to the knee. Passive hamstring stretching. NMR:  min  Neuromuscular reeducation/proprioceptive training listed in exercise below. Ex: 15 min  Therapeutic exercise/strength/endurance completed here in clinic today per the exercise log. Manual assisted passive flexibility exercises for the: quad/hamstring          PT Exercise Log         EXERCISE 2022   SAQ/LAQ 2#/4#   Slant board y   sidestep Blue foam   Nu-step 10'   TKE grn   Tandem stand *NMR x   TG minisquat Level 14                                                        Ex: 15 min  Man: 15 min  NMR:  min    ASSESSMENT  [x]  See Plan of Care  [x]  Patient will continue to benefit from skilled therapy to address remaining functional deficits:     Still tight with knee flexion, but we are being careful to avoiding interfering with wound healing while we address knee flexion ROM stretching.  She is getting more confident walking around her house without any A. D. but is still using FWW in the community. PLAN  Continue with current plan of care and progress as appropriate towards functional goals.   [x]  Upgrade activities as tolerated     [x]  Continue plan of care  []  Discharge due to:_  [] Other:_       Art Heal, PTA  8/16/2022    12:22 PM

## 2022-08-18 ENCOUNTER — OFFICE VISIT (OUTPATIENT)
Dept: ORTHOPEDIC SURGERY | Age: 72
End: 2022-08-18
Payer: MEDICARE

## 2022-08-18 DIAGNOSIS — R26.2 DIFFICULTY WALKING: ICD-10-CM

## 2022-08-18 DIAGNOSIS — M17.12 PRIMARY OSTEOARTHRITIS OF LEFT KNEE: ICD-10-CM

## 2022-08-18 DIAGNOSIS — G89.18 POSTOPERATIVE PAIN OF LEFT KNEE: ICD-10-CM

## 2022-08-18 DIAGNOSIS — M25.562 POSTOPERATIVE PAIN OF LEFT KNEE: ICD-10-CM

## 2022-08-18 DIAGNOSIS — Z96.652 S/P TKR (TOTAL KNEE REPLACEMENT), LEFT: Primary | ICD-10-CM

## 2022-08-18 PROCEDURE — 97140 MANUAL THERAPY 1/> REGIONS: CPT

## 2022-08-18 PROCEDURE — 97110 THERAPEUTIC EXERCISES: CPT

## 2022-08-18 NOTE — PROGRESS NOTES
Patient Name: Cuco Cyr  Date:2022  : 1950  [x]  Patient  Verified  Payor: Gita Cronin / Plan: [place] MEDICARE [place] & B / Product Type: Medicare /    Total Treatment Time (min): 60  1:1 Treatment Time ([facility] only): 40  Referring provider: Nicki Santacruz MD    1. S/P TKR (total knee replacement), left  2. Primary osteoarthritis of left knee  3. Postoperative pain of left knee  4. Difficulty walking    SUBJECTIVE    Patient reports she is noticing less swelling/pain in the knee. OBJECTIVE    Modality:   []  E-Stim: type _ x _ min     []att   []unatt   []w/US   []w/ice   []w/heat  []  Ultrasound: []cont   []pulse    _ W/cm2 x _  min   []1MHz   []3MHz  []  Ice pack _  Post       [] Hot pack _  Pre       []  Other:    Man: 15 min  PF/TF mobilization in multiple angles of flexion/extension to improve ROM while avoiding disruption of wound covering/healing. Myofascial techniques to the incision line, popliteus, and anterior knee. Edema massage to the knee. Passive hamstring stretching. NMR:  min  Neuromuscular reeducation/proprioceptive training listed in exercise below. Ex: 25 min  Therapeutic exercise/strength/endurance completed here in clinic today per the exercise log. Manual assisted passive flexibility exercises for the: quad/hamstring          PT Exercise Log         EXERCISE 2022   SAQ/LAQ 2#/4#   Slant board y   sidestep Blue foam   Nu-step 10'   TKE grn   Tandem stand *NMR x   TG minisquat Level 14                                                        Ex: 15 min  Man: 25 min  NMR:  min    ASSESSMENT  [x]  See Plan of Care  [x]  Patient will continue to benefit from skilled therapy to address remaining functional deficits:     She is maneuvering steps w/SPC. Knee flexion still stiff but improving gradually. PLAN  Continue with current plan of care and progress as appropriate towards functional goals.   [x]  Upgrade activities as tolerated     [x]  Continue plan of care  []  Discharge due to:_  [] Other:_       Yanni Blankenship, SHIVA  8/18/2022    12:22 PM

## 2022-08-19 ENCOUNTER — HOSPITAL ENCOUNTER (OUTPATIENT)
Dept: WOUND CARE | Age: 72
Discharge: HOME | End: 2022-08-19
Payer: MEDICARE

## 2022-08-19 VITALS
HEART RATE: 104 BPM | SYSTOLIC BLOOD PRESSURE: 114 MMHG | TEMPERATURE: 97.9 F | RESPIRATION RATE: 18 BRPM | DIASTOLIC BLOOD PRESSURE: 57 MMHG

## 2022-08-19 DIAGNOSIS — S81.002A: Primary | ICD-10-CM

## 2022-08-19 PROCEDURE — 99213 OFFICE O/P EST LOW 20 MIN: CPT

## 2022-08-19 PROCEDURE — 99213 OFFICE O/P EST LOW 20 MIN: CPT | Performed by: SURGERY

## 2022-08-19 RX ORDER — LIDOCAINE HYDROCHLORIDE 40 MG/ML
SOLUTION TOPICAL ONCE
Status: CANCELLED | OUTPATIENT
Start: 2022-08-19 | End: 2022-08-19

## 2022-08-19 RX ORDER — TRIAMCINOLONE ACETONIDE 1 MG/G
OINTMENT TOPICAL ONCE
Status: CANCELLED | OUTPATIENT
Start: 2022-08-19 | End: 2022-08-19

## 2022-08-19 RX ORDER — BETAMETHASONE DIPROPIONATE 0.5 MG/G
OINTMENT TOPICAL ONCE
Status: CANCELLED | OUTPATIENT
Start: 2022-08-19 | End: 2022-08-19

## 2022-08-19 RX ORDER — SILVER SULFADIAZINE 10 G/1000G
CREAM TOPICAL ONCE
Status: CANCELLED | OUTPATIENT
Start: 2022-08-19 | End: 2022-08-19

## 2022-08-19 RX ORDER — LIDOCAINE HYDROCHLORIDE 20 MG/ML
JELLY TOPICAL ONCE
Status: CANCELLED | OUTPATIENT
Start: 2022-08-19 | End: 2022-08-19

## 2022-08-19 RX ORDER — GUAIFENESIN 100 MG/5ML
81 LIQUID (ML) ORAL DAILY
COMMUNITY

## 2022-08-19 RX ORDER — BACITRACIN 500 [USP'U]/G
OINTMENT TOPICAL ONCE
Status: CANCELLED | OUTPATIENT
Start: 2022-08-19 | End: 2022-08-19

## 2022-08-19 RX ORDER — LIDOCAINE 40 MG/G
CREAM TOPICAL ONCE
Status: CANCELLED | OUTPATIENT
Start: 2022-08-19 | End: 2022-08-19

## 2022-08-19 RX ORDER — MUPIROCIN 20 MG/G
OINTMENT TOPICAL ONCE
Status: CANCELLED | OUTPATIENT
Start: 2022-08-19 | End: 2022-08-19

## 2022-08-19 RX ORDER — GENTAMICIN SULFATE 1 MG/G
OINTMENT TOPICAL ONCE
Status: CANCELLED | OUTPATIENT
Start: 2022-08-19 | End: 2022-08-19

## 2022-08-19 RX ORDER — BACITRACIN ZINC AND POLYMYXIN B SULFATE 500; 1000 [USP'U]/G; [USP'U]/G
OINTMENT TOPICAL ONCE
Status: CANCELLED | OUTPATIENT
Start: 2022-08-19 | End: 2022-08-19

## 2022-08-19 RX ORDER — CLOBETASOL PROPIONATE 0.5 MG/G
OINTMENT TOPICAL ONCE
Status: CANCELLED | OUTPATIENT
Start: 2022-08-19 | End: 2022-08-19

## 2022-08-19 NOTE — WOUND CARE
08/19/22 0926   Wound Knee 08/05/22   Date First Assessed/Time First Assessed: 08/05/22 0924   Present on Hospital Admission: No  Wound Approximate Age at First Assessment (Weeks): 7 weeks  Primary Wound Type: (c)   Location: Knee  Date of First Observation: 08/05/22   Dressing Status New dressing applied   Dressing/Treatment Silicone border  (honey sheet)

## 2022-08-19 NOTE — WOUND CARE
08/19/22 0850   Wound Knee 08/05/22   Date First Assessed/Time First Assessed: 08/05/22 0924   Present on Hospital Admission: No  Wound Approximate Age at First Assessment (Weeks): 7 weeks  Primary Wound Type: (c)   Location: Knee  Date of First Observation: 08/05/22   Wound Image    Wound Etiology Non-Healing Surgical   Dressing Status Old drainage noted  (honey sheet and boderfoam removed)   Cleansed Cleansed with saline   Wound Length (cm) 0.6 cm   Wound Width (cm) 1 cm   Wound Depth (cm) 0.2 cm   Wound Surface Area (cm^2) 0.6 cm^2   Change in Wound Size % 45.45   Wound Volume (cm^3) 0.12 cm^3   Wound Healing % 45   Wound Assessment Granulation tissue;Slough   Drainage Amount Moderate   Drainage Description Serous   Wound Odor None   Dorota-Wound/Incision Assessment Intact   Edges Defined edges   Wound Thickness Description Full thickness   Pain 1   Pain Scale 1 Numeric (0 - 10)   Pain Intensity 1 7   Patient Stated Pain Goal 0   Pain Reassessment 1 Yes   Pain Location 1 Knee;Leg   Pain Orientation 1 Left   Pain Description 1 Aching   Pain Intervention(s) 1 Repositioned   Visit Vitals  BP (!) 114/57   Pulse (!) 104   Temp 97.9 °F (36.6 °C)   Resp 18

## 2022-08-19 NOTE — DISCHARGE INSTRUCTIONS
Discharge Instructions [facility]  [address]  MOB 1, [address] [facility] [id number]  Telephone: [phone]    NAME:  Melanie Holguin  YOB: 1950  MEDICAL RECORD NUMBER:  [id number]  DATE:  8/19/2022  WOUND CARE ORDERS:  Left lower leg :Cleanse with saline , apply primary dressing medihoney hydrogel sheet cover with secondary dressing   border foam .  Pt./pcg/HH nurse to change (freq) every other day and as needed for compromise. F/U in 2 week. TREATMENT ORDERS:    Elevate leg(s) above the level of the heart when sitting. Avoid prolonged standing in one place. Do no get dressing/wrap wet. Follow Diet as prescribed:   [x] Diet as tolerated: [] Calorie Diabetic Diet: Low carb and no Sugar [x] No Added Salt:          Return Appointment:  [x] Return Appointment: With DR Nima Maxwell  in  2 [place])     Electronically signed Mau Wolff RN on 8/19/2022 at 9:24 AM     Zoey Shaffer 281: Should you experience any significant changes in your wound(s) or have questions about your wound care, please contact the [facility] Main at [address] [place] [address] 8:00 am - 4:30. If you need help with your wound outside these hours and cannot wait until we are again available, contact your PCP or go to the hospital emergency room. PLEASE NOTE: IF YOU ARE UNABLE TO OBTAIN WOUND SUPPLIES, CONTINUE TO USE THE SUPPLIES YOU HAVE AVAILABLE UNTIL YOU ARE ABLE TO REACH US. IT IS MOST IMPORTANT TO KEEP THE WOUND COVERED AT ALL TIMES.      Physician Signature:_______________________    Date: ___________ Time:  ____________

## 2022-08-19 NOTE — PROGRESS NOTES
The patient is a 78-year-old woman referred to the wound care center because of a nonhealing wound on the left knee related to recent total knee replacement. The patient was first seen at the [facility] [address] on 8/5/2022. The patient underwent total knee replacement on the left on 6/16/2022. She was noted to have failure of healing of the uppermost end of the incision. She has had only mild drainage from the area. The patient reports making good progress with her physical therapy. She is able to ambulate. The patient does not have history of diabetes. She does not have history of anginal symptoms or history of MI or coronary intervention. The patient denies shortness of breath with ambulation or at rest.    Past medical history includes CKD 3, hypertension, obesity, lung nodules on CT, osteoporosis, thoracic aortic aneurysm without rupture, asthma, stroke. The patient has never smoked. Dressing as of 8/5/2022 Applied Medihoney sheet over wound, cover with silicone border dressing. Change dressing every other day and as needed. Reported weight 250 pounds height 5 feet 4 inches       Physical Examination    The patient is an alert woman in no acute distress. Examination of the left lower extremity reveals 2+ left dorsalis pedis pulse. There was trace to 1+ edema of the left lower extremity from distal thigh distally. There was a scar secondary to surgical incision longitudinally crossing the anterior left knee. There was a wound at the proximal end of the scar which was 0.6 x 1 x 0.2 cm in dimension. There was mild slough on the wound base. There was no evidence of any deep tracking. There was no erythema of surrounding skin. The patient has a nonhealing wound representing a small portion of a recent surgical site. Wouund is smaller. Dressing ordered: Applied Medihoney sheet over wound, cover with silicone border dressing.   Change dressing every other day and as needed. The patient can shower but will need to apply a new dressing after showering. The patient will follow-up in the wound care center in 2 weeks. Diagnosis:  Open wound of left knee    S81.002A        BRADEN Causey MD

## 2022-08-23 ENCOUNTER — OFFICE VISIT (OUTPATIENT)
Dept: ORTHOPEDIC SURGERY | Age: 72
End: 2022-08-23
Payer: MEDICARE

## 2022-08-23 DIAGNOSIS — M17.12 PRIMARY OSTEOARTHRITIS OF LEFT KNEE: ICD-10-CM

## 2022-08-23 DIAGNOSIS — M25.562 POSTOPERATIVE PAIN OF LEFT KNEE: ICD-10-CM

## 2022-08-23 DIAGNOSIS — R26.2 DIFFICULTY WALKING: ICD-10-CM

## 2022-08-23 DIAGNOSIS — G89.18 POSTOPERATIVE PAIN OF LEFT KNEE: ICD-10-CM

## 2022-08-23 DIAGNOSIS — Z96.652 S/P TKR (TOTAL KNEE REPLACEMENT), LEFT: Primary | ICD-10-CM

## 2022-08-23 PROCEDURE — 97140 MANUAL THERAPY 1/> REGIONS: CPT

## 2022-08-23 PROCEDURE — 97110 THERAPEUTIC EXERCISES: CPT

## 2022-08-23 NOTE — PROGRESS NOTES
Patient Name: Alli Hernandez  Date:2022  : 1950  [x]  Patient  Verified  Payor: Shanaart Sorensen / Plan: [place] MEDICARE PART A & B / Product Type: Medicare /    Total Treatment Time (min): 60  1:1 Treatment Time ( [address] only): 35  Referring provider: Chiki Hardin MD    1. S/P TKR (total knee replacement), left  2. Primary osteoarthritis of left knee  3. Postoperative pain of left knee  4. Difficulty walking    SUBJECTIVE    Patient reports she returned to work for about 5 hours yesterday and has been swollen/stiff/achy since. She admits she forgot to take her Tylenol and did not ice, but did elevate the leg last night. OBJECTIVE    Modality:   []  E-Stim: type _ x _ min     []att   []unatt   []w/US   []w/ice   []w/heat  []  Ultrasound: []cont   []pulse    _ W/cm2 x _  min   []1MHz   []3MHz  []  Ice pack _  Post       [] Hot pack _  Pre       []  Other:    Man: 15 min  PF/TF mobilization in multiple angles of flexion/extension to improve ROM while avoiding disruption of wound covering/healing. Myofascial techniques to the incision line, popliteus, and anterior knee. Edema massage to the knee. Passive hamstring stretching. NMR:  5 min  Neuromuscular reeducation/proprioceptive training listed in exercise below. Kinesiotaping applied in a peripatellar \"Y\" pattern to promote lymphatic drainage. Ex: 15 min  Therapeutic exercise/strength/endurance completed here in clinic today per the exercise log.   Manual assisted passive flexibility exercises for the: quad/hamstring          PT Exercise Log         EXERCISE 2022   SAQ/LAQ 2#/4#   Slant board y   sidestep Blue foam   Nu-step 10'   TKE grn   Tandem stand *NMR x   TG minisquat Level 15                                                        Ex: 15 min  Man: 15 min  NMR:  5 min    ASSESSMENT  [x]  See Plan of Care  [x]  Patient will continue to benefit from skilled therapy to address remaining functional deficits:     She is now using [facility] for gait in the community. She is stiff with PF mobility but ROM improved with time spent stretching. Assess response to taping at next visit. PLAN  Continue with current plan of care and progress as appropriate towards functional goals.   [x]  Upgrade activities as tolerated     [x]  Continue plan of care  []  Discharge due to:_  [] Other:_       Griselda Plascencia, SHIVA  8/23/2022    12:22 PM

## 2022-08-25 ENCOUNTER — OFFICE VISIT (OUTPATIENT)
Dept: ORTHOPEDIC SURGERY | Age: 72
End: 2022-08-25
Payer: MEDICARE

## 2022-08-25 DIAGNOSIS — Z96.652 S/P TKR (TOTAL KNEE REPLACEMENT), LEFT: Primary | ICD-10-CM

## 2022-08-25 DIAGNOSIS — R26.2 DIFFICULTY WALKING: ICD-10-CM

## 2022-08-25 DIAGNOSIS — G89.18 POSTOPERATIVE PAIN OF LEFT KNEE: ICD-10-CM

## 2022-08-25 DIAGNOSIS — M25.562 POSTOPERATIVE PAIN OF LEFT KNEE: ICD-10-CM

## 2022-08-25 DIAGNOSIS — M17.12 PRIMARY OSTEOARTHRITIS OF LEFT KNEE: ICD-10-CM

## 2022-08-25 PROCEDURE — 97140 MANUAL THERAPY 1/> REGIONS: CPT | Performed by: PHYSICAL THERAPIST

## 2022-08-25 PROCEDURE — 97110 THERAPEUTIC EXERCISES: CPT | Performed by: PHYSICAL THERAPIST

## 2022-08-25 NOTE — PROGRESS NOTES
Patient Name: Lala Garcia  Date:2022  : 1950  [x]  Patient  Verified  Payor: Alvaro Cadet / Plan: VA MEDICARE PART A & B / Product Type: Medicare /    Total Treatment Time (min): 60  1:1 Treatment Time ( [address] only): 35  Referring provider: Mely Salguero MD    1. S/P TKR (total knee replacement), left  2. Primary osteoarthritis of left knee  3. Postoperative pain of left knee  4. Difficulty walking    SUBJECTIVE  Patient has started returning to work 3 days a week and states she is having increasing swelling and soreness with time spent returning to work. Continues to bandage her wound which she states is continuing to heal progressively. Frustrated with continued limitations in endurance to ADLs. OBJECTIVE    Modality:   []  E-Stim: type _ x _ min     []att   []unatt   []w/US   []w/ice   []w/heat  []  Ultrasound: []cont   []pulse    _ W/cm2 x _  min   []1MHz   []3MHz  [x]  Ice pack _  Post       [] Hot pack _  Pre       []  Other:    Man: 15 min  PF/TF mobilization in multiple angles of flexion/extension to improve ROM while avoiding disruption of wound covering/healing. Myofascial techniques to the incision line, popliteus, and anterior knee. Edema massage to the knee. Passive hamstring stretching. NMR:  5 min  Neuromuscular reeducation/proprioceptive training listed in exercise below. Previous Ktaping still intact and left in place. Verbal and tactile cueing for neuromuscular reeducation of isometric quad contraction. Ex: 15 min  Therapeutic exercise/strength/endurance completed here in clinic today per the exercise log. Manual assisted passive flexibility exercises for the: quad/hamstring. Advanced exercise here in clinic today per the exercise log below.           PT Exercise Log         EXERCISE 2022   SAQ/LAQ 5#/7#   Slant board y   sidestep Blue foam   Nu-step 10'   TKE grn   Tandem stand *NMR x   TG minisquat Level 15   Lat walk green LEFS:31%        Ex: 15 min  Man: 15 min  NMR:  5 min    ASSESSMENT  [x]  See Plan of Care  [x]  Patient will continue to benefit from skilled therapy to address remaining functional deficits:     Patient is progressing with return to ADL activity but still with functional limitations due to swelling, weakness, and limited mobility. Continued skilled PT is appropriate to address impairments. Appropriate fatigue with advancing exercise here in clinic today. Swelling does contribute to mobility restrictions. PLAN  Continue with current plan of care and progress as appropriate towards functional goals.   [x]  Upgrade activities as tolerated     [x]  Continue plan of care  []  Discharge due to:_  [] Other:_       Andrew Carey, PT, DPT  8/25/2022    12:22 PM

## 2022-08-25 NOTE — PROGRESS NOTES
I have reviewed the notes, assessments, and/or procedures performed by Israel Wilkerson PTA, I concur with her/his documentation of August Big.

## 2022-08-30 ENCOUNTER — OFFICE VISIT (OUTPATIENT)
Dept: ORTHOPEDIC SURGERY | Age: 72
End: 2022-08-30
Payer: MEDICARE

## 2022-08-30 DIAGNOSIS — R26.2 DIFFICULTY WALKING: ICD-10-CM

## 2022-08-30 DIAGNOSIS — Z96.652 S/P TKR (TOTAL KNEE REPLACEMENT), LEFT: Primary | ICD-10-CM

## 2022-08-30 DIAGNOSIS — M25.562 POSTOPERATIVE PAIN OF LEFT KNEE: ICD-10-CM

## 2022-08-30 DIAGNOSIS — G89.18 POSTOPERATIVE PAIN OF LEFT KNEE: ICD-10-CM

## 2022-08-30 DIAGNOSIS — M17.12 PRIMARY OSTEOARTHRITIS OF LEFT KNEE: ICD-10-CM

## 2022-08-30 PROCEDURE — 97110 THERAPEUTIC EXERCISES: CPT | Performed by: PHYSICAL THERAPIST

## 2022-08-30 PROCEDURE — 97140 MANUAL THERAPY 1/> REGIONS: CPT | Performed by: PHYSICAL THERAPIST

## 2022-08-30 NOTE — PROGRESS NOTES
Patient Name: Suzan Mancera  Date:2022  : 1950  [x]  Patient  Verified  Payor: Tommy Montenegro / Plan: [place] MEDICARE PART A & B / Product Type: Medicare /    Total Treatment Time (min): 60  1:1 Treatment Time ( [address] only): 40+  Referring provider: Alan Almodovar MD    1. S/P TKR (total knee replacement), left  2. Primary osteoarthritis of left knee  3. Postoperative pain of left knee  4. Difficulty walking    SUBJECTIVE  Patient reports that she has been noticing some increasing ADL walking around work but continues to deal with some generalized swelling which leads to stiffness. OBJECTIVE    Modality:   []  E-Stim: type _ x _ min     []att   []unatt   []w/US   []w/ice   []w/heat  []  Ultrasound: []cont   []pulse    _ W/cm2 x _  min   []1MHz   []3MHz  [x]  Ice pack _  Post       [] Hot pack _  Pre       []  Other:    Man: 15 min  PF/TF mobilization in multiple angles of flexion/extension to improve ROM while avoiding disruption of wound covering/healing. Myofascial techniques to the incision line, popliteus, and anterior knee. Edema massage to the knee. Passive hamstring stretching. NMR:  5 min  Neuromuscular reeducation/proprioceptive training listed in exercise below. Previous Ktaping still intact and left in place. Verbal and tactile cueing for neuromuscular reeducation of isometric quad contraction. Ex: 25 min  Therapeutic exercise/strength/endurance completed here in clinic today per the exercise log. Manual assisted passive flexibility exercises for the: quad/hamstring. Advanced exercise here in clinic today per the exercise log below.           PT Exercise Log         EXERCISE 2022   SAQ/LAQ 5#/7#   Slant board y   sidestep Blue foam   Nu-step 10'   TKE grn   Tandem stand *NMR x   TG minisquat Level 15   Lat walk green   Hip ER with Tband in supine green                                                 Ex: 25 min  Man: 15 min  NMR:  5 min    ASSESSMENT  [x]  See Plan of Care  [x]  Patient will continue to benefit from skilled therapy to address remaining functional deficits:     Still with active flexion limitations. Still with generalized swelling throughout the lower extremities. Skilled PT is still appropriate due to her mobility and endurance deficits. PLAN  Continue with current plan of care and progress as appropriate towards functional goals.   [x]  Upgrade activities as tolerated     [x]  Continue plan of care  []  Discharge due to:_  [] Other:_       Jono Zazueta, DIETER, DPT  8/30/2022    12:22 PM

## 2022-09-01 ENCOUNTER — OFFICE VISIT (OUTPATIENT)
Dept: ORTHOPEDIC SURGERY | Age: 72
End: 2022-09-01
Payer: MEDICARE

## 2022-09-01 DIAGNOSIS — G89.18 POSTOPERATIVE PAIN OF LEFT KNEE: ICD-10-CM

## 2022-09-01 DIAGNOSIS — Z96.652 S/P TKR (TOTAL KNEE REPLACEMENT), LEFT: Primary | ICD-10-CM

## 2022-09-01 DIAGNOSIS — M25.562 POSTOPERATIVE PAIN OF LEFT KNEE: ICD-10-CM

## 2022-09-01 DIAGNOSIS — R26.2 DIFFICULTY WALKING: ICD-10-CM

## 2022-09-01 DIAGNOSIS — M17.12 PRIMARY OSTEOARTHRITIS OF LEFT KNEE: ICD-10-CM

## 2022-09-01 PROCEDURE — 97140 MANUAL THERAPY 1/> REGIONS: CPT | Performed by: PHYSICAL THERAPIST

## 2022-09-01 PROCEDURE — 97110 THERAPEUTIC EXERCISES: CPT | Performed by: PHYSICAL THERAPIST

## 2022-09-01 NOTE — PROGRESS NOTES
Patient Name: Shaila Gonsalves  GXMH:[id number]  : 1950  [x]  Patient  Verified  Payor: Darlin Noroton / Plan: [place] MEDICARE PART A & B / Product Type: Medicare /    Total Treatment Time (min): 60  1:1 Treatment Time ( [address] only): 40+  Referring provider: Michelle Bradford MD    1. S/P TKR (total knee replacement), left  2. Primary osteoarthritis of left knee  3. Postoperative pain of left knee  4. Difficulty walking    SUBJECTIVE    Patient reports her back is a little tight today. To see wound care team tomorrow - states she notices wound edges are growing closer together. Patient reports her back is a little stiff and sore today. OBJECTIVE    Modality:   []  E-Stim: type _ x _ min     []att   []unatt   []w/US   []w/ice   []w/heat  []  Ultrasound: []cont   []pulse    _ W/cm2 x _  min   []1MHz   []3MHz  [x]  Ice pack _  Post       [] Hot pack _  Pre       []  Other:    Man: 15 min  PF/TF mobilization in multiple angles of flexion/extension to improve ROM while avoiding disruption of wound covering/healing. Myofascial techniques to the incision line, popliteus, and anterior knee. Edema massage to the knee. Passive hamstring stretching. NMR:  5 min  Neuromuscular reeducation/proprioceptive training listed in exercise below. Verbal and tactile cueing for neuromuscular reeducation of isometric quad contraction. Ex: 25 min  Therapeutic exercise/strength/endurance completed here in clinic today per the exercise log. Manual assisted passive flexibility exercises for the: quad/hamstring. Limited some of today's exercise secondary to unrelated back \"tightness. \"           PT Exercise Log         EXERCISE 2022   SAQ/LAQ 5#/7#   Slant board y   sidestep Blue foam   Nu-step 10'   TKE grn   Tandem stand *NMR x   TG minisquat Level 15   Lat walk green   Hip ER with Tband in supine green                                                 Ex: 25 min  Man: 15 min  NMR:  5 min    ASSESSMENT  [x]  See Plan of Care  [x] Patient will continue to benefit from skilled therapy to address remaining functional deficits:     Still with known endrange flexion loss but patient reports that knee and back both feel subjectively better at end of session today as compared to arrival.    PLAN  Continue with current plan of care and progress as appropriate towards functional goals.   [x]  Upgrade activities as tolerated     [x]  Continue plan of care  []  Discharge due to:_  [] Other:_       Trupti Ratliff PT, DPT  9/1/2022    12:22 PM

## 2022-09-02 ENCOUNTER — HOSPITAL ENCOUNTER (OUTPATIENT)
Dept: WOUND CARE | Age: 72
Discharge: HOME | End: 2022-09-02
Payer: MEDICARE

## 2022-09-02 VITALS
RESPIRATION RATE: 18 BRPM | SYSTOLIC BLOOD PRESSURE: 128 MMHG | DIASTOLIC BLOOD PRESSURE: 70 MMHG | HEART RATE: 97 BPM | TEMPERATURE: 97.7 F

## 2022-09-02 DIAGNOSIS — S81.002S OPEN WOUND OF LEFT KNEE, SEQUELA: Primary | ICD-10-CM

## 2022-09-02 PROCEDURE — 99213 OFFICE O/P EST LOW 20 MIN: CPT

## 2022-09-02 PROCEDURE — 99213 OFFICE O/P EST LOW 20 MIN: CPT | Performed by: SURGERY

## 2022-09-02 RX ORDER — BETAMETHASONE DIPROPIONATE 0.5 MG/G
OINTMENT TOPICAL ONCE
Status: CANCELLED | OUTPATIENT
Start: 2022-09-02 | End: 2022-09-02

## 2022-09-02 RX ORDER — BACITRACIN ZINC AND POLYMYXIN B SULFATE 500; 1000 [USP'U]/G; [USP'U]/G
OINTMENT TOPICAL ONCE
Status: CANCELLED | OUTPATIENT
Start: 2022-09-02 | End: 2022-09-02

## 2022-09-02 RX ORDER — LIDOCAINE HYDROCHLORIDE 20 MG/ML
JELLY TOPICAL ONCE
Status: CANCELLED | OUTPATIENT
Start: 2022-09-02 | End: 2022-09-02

## 2022-09-02 RX ORDER — TRIAMCINOLONE ACETONIDE 1 MG/G
OINTMENT TOPICAL ONCE
Status: CANCELLED | OUTPATIENT
Start: 2022-09-02 | End: 2022-09-02

## 2022-09-02 RX ORDER — LIDOCAINE HYDROCHLORIDE 40 MG/ML
SOLUTION TOPICAL ONCE
Status: CANCELLED | OUTPATIENT
Start: 2022-09-02 | End: 2022-09-02

## 2022-09-02 RX ORDER — LIDOCAINE 40 MG/G
CREAM TOPICAL ONCE
Status: CANCELLED | OUTPATIENT
Start: 2022-09-02 | End: 2022-09-02

## 2022-09-02 RX ORDER — BACITRACIN 500 [USP'U]/G
OINTMENT TOPICAL ONCE
Status: CANCELLED | OUTPATIENT
Start: 2022-09-02 | End: 2022-09-02

## 2022-09-02 RX ORDER — GENTAMICIN SULFATE 1 MG/G
OINTMENT TOPICAL ONCE
Status: CANCELLED | OUTPATIENT
Start: 2022-09-02 | End: 2022-09-02

## 2022-09-02 RX ORDER — SILVER SULFADIAZINE 10 G/1000G
CREAM TOPICAL ONCE
Status: CANCELLED | OUTPATIENT
Start: 2022-09-02 | End: 2022-09-02

## 2022-09-02 RX ORDER — MUPIROCIN 20 MG/G
OINTMENT TOPICAL ONCE
Status: CANCELLED | OUTPATIENT
Start: 2022-09-02 | End: 2022-09-02

## 2022-09-02 RX ORDER — CLOBETASOL PROPIONATE 0.5 MG/G
OINTMENT TOPICAL ONCE
Status: CANCELLED | OUTPATIENT
Start: 2022-09-02 | End: 2022-09-02

## 2022-09-02 NOTE — DISCHARGE INSTRUCTIONS
Discharge Instructions/Wound Orders  [facility]  [address]  MOB 1, [address] [facility] [place], [id number]  Telephone: [phone]    NAME:  Benjamine Opitz  YOB: 1950  MEDICAL RECORD NUMBER:  [id number]  DATE:  9/2/2022  Wound Care Orders:  Left knee wound :Cleanse with saline , apply primary dressing medihoney hydrogel sheet cover with secondary dressing   border foam .   Pt./pcg/HH nurse to change (freq) 3 x week and as needed for compromise. F/U in 2 week. Dietary:  [x] Diet as tolerated: [] Calorie Diabetic Diet:Low carb and no Sugar [] No Added Salt:[x] Increase Protein: [] Other:Limit the amount of liquid you are drinking and avoid drinking in between meals   Activity:  [x] Activity as tolerated:  [] Patient has no activity restrictions     [] Strict Bedrest: [] Remain off Work:     [] May return to full duty work:                                   [] Return to work with restrictions:             Return Appointment:  [x] Return Appointment: With DR Migel Crane  in  2 Week(s)  [] Ordered tests:    Electronically signed Jamshid Aguirre RN on 9/2/2022 at 9:16 AM     [facility] Information: Should you experience any significant changes in your wound(s) or have questions about your wound care, please contact the [facility] Main at [address] [place] [address] 8:00 am - 4:30. If you need help with your wound outside these hours and cannot wait until we are again available, contact your PCP or go to the hospital emergency room. PLEASE NOTE: IF YOU ARE UNABLE TO OBTAIN WOUND SUPPLIES, CONTINUE TO USE THE SUPPLIES YOU HAVE AVAILABLE UNTIL YOU ARE ABLE TO REACH US. IT IS MOST IMPORTANT TO KEEP THE WOUND COVERED AT ALL TIMES.      Physician Signature:_______________________    Date: ___________ Time:  ____________

## 2022-09-02 NOTE — WOUND CARE
09/02/22 0857   Wound Knee 08/05/22   Date First Assessed/Time First Assessed: 08/05/22 0924   Present on Hospital Admission: No  Wound Approximate Age at First Assessment (Weeks): 7 weeks  Primary Wound Type: (c)   Location: Knee  Date of First Observation: 08/05/22   Wound Image    Wound Etiology Non-Healing Surgical   Dressing Status Old drainage noted   Cleansed Cleansed with saline   Dressing/Treatment Silicone border;Honey gel/honey paste   Wound Length (cm) 0.4 cm   Wound Width (cm) 0.7 cm   Wound Depth (cm) 0.1 cm   Wound Surface Area (cm^2) 0.28 cm^2   Change in Wound Size % 74.55   Wound Volume (cm^3) 0.028 cm^3   Wound Healing % 87   Wound Assessment Slough   Drainage Amount Moderate   Drainage Description Serous   Wound Odor None   Dorota-Wound/Incision Assessment Intact   Edges Defined edges   Wound Thickness Description Full thickness   Pain 1   Pain Scale 1 Numeric (0 - 10)   Pain Intensity 1 0

## 2022-09-02 NOTE — PROGRESS NOTES
The patient is a 67-year-old woman referred to the wound care center because of a nonhealing wound on the left knee related to recent total knee replacement. The patient was first seen at the [facility] [address] on 8/5/2022. The patient underwent total knee replacement on the left on 6/16/2022. She was noted to have failure of healing of the uppermost end of the incision. She has had only mild drainage from the area. The patient reports making good progress with her physical therapy. She is able to ambulate. The patient does not have history of diabetes. She does not have history of anginal symptoms or history of MI or coronary intervention. The patient denies shortness of breath with ambulation or at rest.    Past medical history includes CKD 3, hypertension, obesity, lung nodules on CT, osteoporosis, thoracic aortic aneurysm without rupture, asthma, stroke. The patient has never smoked. Dressing as of 8/5/2022 Applied Medihoney sheet over wound, cover with silicone border dressing. Change dressing every other day and as needed. Reported weight 250 pounds height 5 feet 4 inches       Physical Examination    The patient is an alert woman in no acute distress. Examination of the left lower extremity reveals 2+ left dorsalis pedis pulse. There was trace to 1+ edema of the left lower extremity from distal thigh distally. There was a scar secondary to surgical incision longitudinally crossing the anterior left knee. There was a wound at the proximal end of the scar which was 0.4 x 0.7 x 0.1 cm in dimension. There was minimal slough on the wound base. There was no evidence of any deep tracking. There was no erythema of surrounding skin. The patient has a nonhealing wound representing a small portion of a recent surgical site. Wouund is smaller. Dressing ordered: Applied Medihoney sheet over wound, cover with silicone border dressing.   Change dressing 3 times per week and as needed. The patient can shower but will need to apply a new dressing after showering. The patient will follow-up in the wound care center in 2 weeks. Diagnosis:  Open wound of left knee    S81.002A        BRADEN Jurado MD

## 2022-09-06 ENCOUNTER — OFFICE VISIT (OUTPATIENT)
Dept: ORTHOPEDIC SURGERY | Age: 72
End: 2022-09-06
Payer: MEDICARE

## 2022-09-06 DIAGNOSIS — Z96.652 STATUS POST TOTAL LEFT KNEE REPLACEMENT: ICD-10-CM

## 2022-09-06 DIAGNOSIS — Z96.652 HISTORY OF TOTAL KNEE ARTHROPLASTY, LEFT: Primary | ICD-10-CM

## 2022-09-06 PROCEDURE — 99024 POSTOP FOLLOW-UP VISIT: CPT | Performed by: ORTHOPAEDIC SURGERY

## 2022-09-06 NOTE — LETTER
9/6/2022    Patient: Ilene Martínez   YOB: 1950   Date of Visit: 9/6/2022     Maryann Busch MD  [address] [facility]  [address] [address]  Via Fax: [phone]    Dear Maryann Busch MD,      Thank you for referring Ms. Ferdinand Cueva to [facility] for evaluation. My notes for this consultation are attached. If you have questions, please do not hesitate to call me. I look forward to following your patient along with you.       Sincerely,    Dyanne Habermann, MD
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
## 2022-09-09 ENCOUNTER — OFFICE VISIT (OUTPATIENT)
Dept: ORTHOPEDIC SURGERY | Age: 72
End: 2022-09-09
Payer: MEDICARE

## 2022-09-09 DIAGNOSIS — M17.12 PRIMARY OSTEOARTHRITIS OF LEFT KNEE: ICD-10-CM

## 2022-09-09 DIAGNOSIS — Z96.652 STATUS POST TOTAL LEFT KNEE REPLACEMENT: Primary | ICD-10-CM

## 2022-09-09 DIAGNOSIS — R26.2 DIFFICULTY WALKING: ICD-10-CM

## 2022-09-09 DIAGNOSIS — Z96.652 S/P TKR (TOTAL KNEE REPLACEMENT), LEFT: ICD-10-CM

## 2022-09-09 PROCEDURE — 97110 THERAPEUTIC EXERCISES: CPT

## 2022-09-09 PROCEDURE — 97140 MANUAL THERAPY 1/> REGIONS: CPT

## 2022-09-09 PROCEDURE — 97112 NEUROMUSCULAR REEDUCATION: CPT

## 2022-09-09 NOTE — PROGRESS NOTES
I have reviewed the notes, assessments, and/or procedures performed by Romy Greer PTA, I concur with her/his documentation of Mick Severance.

## 2022-09-09 NOTE — PROGRESS NOTES
Patient Name: Pete Frost  Date:2022  : 1950  [x]  Patient  Verified  Payor: Emely Miller / Plan: [place] MEDICARE PART [place] & B / Product Type: Medicare /    Total Treatment Time (min): 60  1:1 Treatment Time ( [address] only): 60  Referring provider: Mariama Lino MD    1. Status post total left knee replacement  2. Primary osteoarthritis of left knee  3. Difficulty walking  4. S/P TKR (total knee replacement), left    SUBJECTIVE    Patient reports her back is a little tight today. To see wound care team tomorrow - states she notices wound edges are growing closer together. Patient reports her back is a little stiff and sore today. OBJECTIVE    Modality:   []  E-Stim: type _ x _ min     []att   []unatt   []w/US   []w/ice   []w/heat  []  Ultrasound: []cont   []pulse    _ W/cm2 x _  min   []1MHz   []3MHz  [x]  Ice pack _  Post       [] Hot pack _  Pre       []  Other:    Man: 20 min  PF/TF mobilization in multiple angles of flexion/extension to improve ROM while avoiding disruption of wound covering/healing. Myofascial techniques to the incision line, popliteus, and anterior knee. Edema massage to the knee. Passive hamstring stretching. NMR: 10 min  Neuromuscular reeducation/proprioceptive training listed in exercise below. Verbal and tactile cueing for neuromuscular reeducation of isometric quad contraction. Ex: 30 min  Therapeutic exercise/strength/endurance completed here in clinic today per the exercise log. Manual assisted passive flexibility exercises for the: quad/hamstring. PT Exercise Log         EXERCISE 2022   SAQ/LAQ 6#/8#   Slant board y   sidestep Blue foam   Nu-step 10'   TKE grn   Tandem stand *NMR x   TG minisquat Level 15   Lat walk green   Bridges w/band green                                             I instructed her in additional options for knee flexion stretching at steps to include in HEP.      Ex: 30 min  Man: 20 min  NMR: 10 min    ASSESSMENT  [x]  See Plan of Care  [x]  Patient will continue to benefit from skilled therapy to address remaining functional deficits:     She does seem to have less swelling in the LE and better resting extension ROM. Still with functional step up/down weakness and end range flexion loss. PLAN  Continue with current plan of care and progress as appropriate towards functional goals.   [x]  Upgrade activities as tolerated     [x]  Continue plan of care  []  Discharge due to:_  [] Other:_       Kannan Roque PTA  9/9/2022    12:22 PM

## 2022-09-12 ENCOUNTER — OFFICE VISIT (OUTPATIENT)
Dept: ORTHOPEDIC SURGERY | Age: 72
End: 2022-09-12
Payer: MEDICARE

## 2022-09-12 DIAGNOSIS — Z96.652 STATUS POST TOTAL LEFT KNEE REPLACEMENT: ICD-10-CM

## 2022-09-12 DIAGNOSIS — Z96.652 S/P TKR (TOTAL KNEE REPLACEMENT), LEFT: ICD-10-CM

## 2022-09-12 DIAGNOSIS — M17.12 PRIMARY OSTEOARTHRITIS OF LEFT KNEE: Primary | ICD-10-CM

## 2022-09-12 DIAGNOSIS — R26.2 DIFFICULTY WALKING: ICD-10-CM

## 2022-09-12 PROCEDURE — 97140 MANUAL THERAPY 1/> REGIONS: CPT

## 2022-09-12 PROCEDURE — 97110 THERAPEUTIC EXERCISES: CPT

## 2022-09-12 NOTE — PROGRESS NOTES
Patient Name: Khushboo Wolfe  Date:2022  : 1950  [x]  Patient  Verified  Payor: Anshul Muniz / Plan: [place] MEDICARE PART A & B / Product Type: Medicare /    Total Treatment Time (min): 60  1:1 Treatment Time ( [address] only): 40  Referring provider: Jeyson Cutler MD    1. Primary osteoarthritis of left knee  2. Status post total left knee replacement  3. Difficulty walking  4. S/P TKR (total knee replacement), left    SUBJECTIVE    Patient reports her knee is a little tight today. She states she still has swelling that interferes with ROM/activity tolerance. OBJECTIVE    Modality:   []  E-Stim: type _ x _ min     []att   []unatt   []w/US   []w/ice   []w/heat  []  Ultrasound: []cont   []pulse    _ W/cm2 x _  min   []1MHz   []3MHz  [x]  Ice pack _  Post       [] Hot pack _  Pre       []  Other:    Man: 15 min  PF/TF mobilization in multiple angles of flexion/extension to improve ROM while avoiding disruption of wound covering/healing. Myofascial techniques to the incision line, popliteus, and anterior knee. Edema massage to the knee. Passive hamstring stretching. NMR:  min  Neuromuscular reeducation/proprioceptive training listed in exercise below. Kinesiotaping applied in a peripatellar \"Y\" pattern to promote lymphatic drainage. Ex: 25 min  Therapeutic exercise/strength/endurance completed here in clinic today per the exercise log. Manual assisted passive flexibility exercises for the: quad/hamstring. PT Exercise Log         EXERCISE 2022   SAQ/LAQ 6#/8#   Slant board y   sidestep Blue foam   Nu-step 10'   TKE grn   Tandem stand *NMR x   TG minisquat Level 15   Lat walk green   Bridges w/band green                                             ROM 5-90    ASSESSMENT  [x]  See Plan of Care  [x]  Patient will continue to benefit from skilled therapy to address remaining functional deficits:     She does plan to start a walking routine later this week.  She is still limited into knee flexion that causes some difficulty with functional transfers. Activity tolerance is improving with therapy but clear functional deficits persist.    PLAN  Continue with current plan of care and progress as appropriate towards functional goals.   [x]  Upgrade activities as tolerated     [x]  Continue plan of care  []  Discharge due to:_  [] Other:_       Bibiana Shaffer PTA  9/12/2022    12:22 PM

## 2022-09-13 NOTE — PROGRESS NOTES
I have reviewed the notes, assessments, and/or procedures performed by Norma Fuss PTA, I concur with her/his documentation of Serjio Crespo.

## 2022-09-16 ENCOUNTER — HOSPITAL ENCOUNTER (OUTPATIENT)
Dept: WOUND CARE | Age: 72
Discharge: HOME | End: 2022-09-16
Payer: MEDICARE

## 2022-09-16 ENCOUNTER — OFFICE VISIT (OUTPATIENT)
Dept: ORTHOPEDIC SURGERY | Age: 72
End: 2022-09-16
Payer: MEDICARE

## 2022-09-16 VITALS
RESPIRATION RATE: 18 BRPM | DIASTOLIC BLOOD PRESSURE: 74 MMHG | HEART RATE: 94 BPM | TEMPERATURE: 97.9 F | SYSTOLIC BLOOD PRESSURE: 135 MMHG

## 2022-09-16 DIAGNOSIS — Z96.652 STATUS POST TOTAL LEFT KNEE REPLACEMENT: ICD-10-CM

## 2022-09-16 DIAGNOSIS — S81.002S OPEN WOUND OF LEFT KNEE, SEQUELA: Primary | ICD-10-CM

## 2022-09-16 DIAGNOSIS — M17.12 PRIMARY OSTEOARTHRITIS OF LEFT KNEE: Primary | ICD-10-CM

## 2022-09-16 DIAGNOSIS — R26.2 DIFFICULTY WALKING: ICD-10-CM

## 2022-09-16 PROCEDURE — 97112 NEUROMUSCULAR REEDUCATION: CPT

## 2022-09-16 PROCEDURE — 99213 OFFICE O/P EST LOW 20 MIN: CPT

## 2022-09-16 PROCEDURE — 97140 MANUAL THERAPY 1/> REGIONS: CPT

## 2022-09-16 PROCEDURE — 99213 OFFICE O/P EST LOW 20 MIN: CPT | Performed by: SURGERY

## 2022-09-16 PROCEDURE — 97110 THERAPEUTIC EXERCISES: CPT

## 2022-09-16 RX ORDER — MUPIROCIN 20 MG/G
OINTMENT TOPICAL ONCE
Status: CANCELLED | OUTPATIENT
Start: 2022-09-16 | End: 2022-09-16

## 2022-09-16 RX ORDER — SILVER SULFADIAZINE 10 G/1000G
CREAM TOPICAL ONCE
Status: CANCELLED | OUTPATIENT
Start: 2022-09-16 | End: 2022-09-16

## 2022-09-16 RX ORDER — LIDOCAINE HYDROCHLORIDE 20 MG/ML
JELLY TOPICAL ONCE
Status: CANCELLED | OUTPATIENT
Start: 2022-09-16 | End: 2022-09-16

## 2022-09-16 RX ORDER — LIDOCAINE HYDROCHLORIDE 40 MG/ML
SOLUTION TOPICAL ONCE
Status: CANCELLED | OUTPATIENT
Start: 2022-09-16 | End: 2022-09-16

## 2022-09-16 RX ORDER — GENTAMICIN SULFATE 1 MG/G
OINTMENT TOPICAL ONCE
Status: CANCELLED | OUTPATIENT
Start: 2022-09-16 | End: 2022-09-16

## 2022-09-16 RX ORDER — BACITRACIN 500 [USP'U]/G
OINTMENT TOPICAL ONCE
Status: CANCELLED | OUTPATIENT
Start: 2022-09-16 | End: 2022-09-16

## 2022-09-16 RX ORDER — BETAMETHASONE DIPROPIONATE 0.5 MG/G
OINTMENT TOPICAL ONCE
Status: CANCELLED | OUTPATIENT
Start: 2022-09-16 | End: 2022-09-16

## 2022-09-16 RX ORDER — CLOBETASOL PROPIONATE 0.5 MG/G
OINTMENT TOPICAL ONCE
Status: CANCELLED | OUTPATIENT
Start: 2022-09-16 | End: 2022-09-16

## 2022-09-16 RX ORDER — BACITRACIN ZINC AND POLYMYXIN B SULFATE 500; 1000 [USP'U]/G; [USP'U]/G
OINTMENT TOPICAL ONCE
Status: CANCELLED | OUTPATIENT
Start: 2022-09-16 | End: 2022-09-16

## 2022-09-16 RX ORDER — LIDOCAINE 40 MG/G
CREAM TOPICAL ONCE
Status: CANCELLED | OUTPATIENT
Start: 2022-09-16 | End: 2022-09-16

## 2022-09-16 RX ORDER — TRIAMCINOLONE ACETONIDE 1 MG/G
OINTMENT TOPICAL ONCE
Status: CANCELLED | OUTPATIENT
Start: 2022-09-16 | End: 2022-09-16

## 2022-09-16 NOTE — PROGRESS NOTES
The patient is a 62-year-old woman referred to the wound care center because of a nonhealing wound on the left knee related to recent total knee replacement. The patient was first seen at the [address] [place] [address] on 8/5/2022. The patient underwent total knee replacement on the left on 6/16/2022. She was noted to have failure of healing of the uppermost end of the incision. She has had only mild drainage from the area. The patient reports making good progress with her physical therapy. She is able to ambulate. The patient does not have history of diabetes. She does not have history of anginal symptoms or history of MI or coronary intervention. The patient denies shortness of breath with ambulation or at rest.    Past medical history includes CKD 3, hypertension, obesity, lung nodules on CT, osteoporosis, thoracic aortic aneurysm without rupture, asthma, stroke. The patient has never smoked. Dressing as of 8/5/2022 Applied Medihoney sheet over wound, cover with silicone border dressing. Change dressing every other day and as needed. Reported weight 250 pounds height 5 feet 4 inches       Physical Examination    The patient is an alert woman in no acute distress. Examination of the left lower extremity reveals 2+ left dorsalis pedis pulse. There was trace to 1+ edema of the left lower extremity from distal thigh distally. There was a scar secondary to surgical incision longitudinally crossing the anterior left knee. There was a wound at the proximal end of the scar which was 0.3 x 0.3 x 0.1 cm in dimension. There was minimal slough on the wound base. There was no evidence of any deep tracking. There was no erythema of surrounding skin. The patient has a nonhealing wound representing a small portion of a recent surgical site. Wound is smaller. Dressing ordered: Applied Medihoney sheet over wound, cover with silicone border dressing.   Change dressing 3 times per week and as needed. The patient can shower but will need to apply a new dressing after showering. The patient will follow-up in the wound care center in 2 weeks. Diagnosis:  Open wound of left knee    S81.002D        BRADEN Burr MD

## 2022-09-16 NOTE — WOUND CARE
09/16/22 0902   Wound Knee 08/05/22   Date First Assessed/Time First Assessed: 08/05/22 0924   Present on Hospital Admission: No  Wound Approximate Age at First Assessment (Weeks): 7 weeks  Primary Wound Type: (c)   Location: Knee  Date of First Observation: 08/05/22   Wound Image    Wound Etiology Non-Healing Surgical   Dressing Status Old drainage noted   Cleansed Cleansed with saline   Wound Length (cm) 0.3 cm   Wound Width (cm) 0.3 cm   Wound Depth (cm) 0.2 cm   Wound Surface Area (cm^2) 0.09 cm^2   Change in Wound Size % 91.82   Wound Volume (cm^3) 0.018 cm^3   Wound Healing % 92   Wound Assessment Slough   Drainage Amount Moderate   Drainage Description Serous   Wound Odor None   Dorota-Wound/Incision Assessment Intact   Edges Defined edges   Wound Thickness Description Full thickness     Visit Vitals  /74   Pulse 94   Temp 97.9 °F (36.6 °C)   Resp 18

## 2022-09-16 NOTE — PROGRESS NOTES
Patient Name: Heriberto Alexis  Date:2022  : 1950  [x]  Patient  Verified  Payor: Mikayla Flores / Plan: [place] MEDICARE PART A & B / Product Type: Medicare /    Total Treatment Time (min): 60  1:1 Treatment Time ( [address] only): 45  Referring provider: Agustin Peter MD    1. Primary osteoarthritis of left knee  2. Status post total left knee replacement  3. Difficulty walking    SUBJECTIVE    Patient reports she started a walking routine which has been fatiguing. She reports generalized soreness/stiffness today. OBJECTIVE    Modality:   []  E-Stim: type _ x _ min     []att   []unatt   []w/US   []w/ice   []w/heat  []  Ultrasound: []cont   []pulse    _ W/cm2 x _  min   []1MHz   []3MHz  [x]  Ice pack _  Post       [] Hot pack _  Pre       []  Other:    Man: 15 min  PF/TF mobilization in multiple angles of flexion/extension to improve ROM while avoiding disruption of wound covering/healing. Myofascial techniques to the incision line, popliteus, and anterior knee. Edema massage to the knee. Passive hamstring stretching. NMR: 15 min  Neuromuscular reeducation/proprioceptive training listed in exercise below. Kinesiotaping applied in a peripatellar \"Y\" pattern to promote lymphatic drainage. Ex: 15 min  Therapeutic exercise/strength/endurance completed here in clinic today per the exercise log. Manual assisted passive flexibility exercises for the: quad/hamstring. PT Exercise Log         EXERCISE 2022   SAQ/LAQ 6#/8#   Slant board y   sidestep Blue foam   Nu-step 10'   TKE grn   Tandem stand *NMR x   TG minisquat Level 15   Lat walk green   Bridges w/band green                                               ASSESSMENT  [x]  See Plan of Care  [x]  Patient will continue to benefit from skilled therapy to address remaining functional deficits: We did make some improvements with TF mobility today with aggressive stretching. Still with functional mobility loss in the knee.      PLAN  Continue with current plan of care and progress as appropriate towards functional goals.   [x]  Upgrade activities as tolerated     [x]  Continue plan of care  []  Discharge due to:_  [] Other:_       Chevy Daniel PTA  9/16/2022    12:22 PM

## 2022-09-16 NOTE — DISCHARGE INSTRUCTIONS
Discharge Instructions/Wound Orders  [address] [address]  MOB 1, [address] [facility] [place], [id number]  Telephone: [phone]    NAME:  Ethel Davis  YOB: 1950  MEDICAL RECORD NUMBER:  [id number]  DATE:  09/16/22  Wound Care Orders:  Left knee wound :Cleanse with Soap and water , apply primary dressing medihoney hydrogel sheet cover with secondary dressing   border foam .  Pt./pcg/HH nurse to change (freq) 3 x week and as needed for compromise. F/U in 2 week. Dietary:  [] Diet as tolerated: [] Diabetic Diet:Low carb and no Sugar   [] No Added Salt:[] Increase Protein:   [] Other:Limit the amount of liquid you are drinking and avoid drinking in between meals   Activity:  [] Activity as tolerated:     Return Appointment:  [x] Return Appointment: With DR Yudy Pinzon  in  [address] [place] [address])  [] Ordered tests:   Electronically signed Jp Davis RN on 9/16/2022 at 9:08 AM   Zoey Shaffer 281: Should you experience any significant changes in your wound(s) or have questions about your wound care, please contact the [facility] at [address] [place] [address] Street 8:00 am - 4:30. If you need help with your wound outside these hours and cannot wait until we are again available, contact your PCP or go to the hospital emergency room. PLEASE NOTE: IF YOU ARE UNABLE TO OBTAIN WOUND SUPPLIES, CONTINUE TO USE THE SUPPLIES YOU HAVE AVAILABLE UNTIL YOU ARE ABLE TO REACH US. IT IS MOST IMPORTANT TO KEEP THE WOUND COVERED AT ALL TIMES.     Physician Signature:_______________________    Date: ___________ Time:  ____________

## 2022-09-19 NOTE — PROGRESS NOTES
I have reviewed the notes, assessments, and/or procedures performed by Shakeel Tao PTA, I concur with her/his documentation of Dakota Palumbo.

## 2022-09-20 ENCOUNTER — OFFICE VISIT (OUTPATIENT)
Dept: ORTHOPEDIC SURGERY | Age: 72
End: 2022-09-20
Payer: MEDICARE

## 2022-09-20 DIAGNOSIS — Z96.652 STATUS POST TOTAL LEFT KNEE REPLACEMENT: ICD-10-CM

## 2022-09-20 DIAGNOSIS — M17.12 PRIMARY OSTEOARTHRITIS OF LEFT KNEE: Primary | ICD-10-CM

## 2022-09-20 DIAGNOSIS — R26.2 DIFFICULTY WALKING: ICD-10-CM

## 2022-09-20 PROCEDURE — 97112 NEUROMUSCULAR REEDUCATION: CPT

## 2022-09-20 PROCEDURE — 97110 THERAPEUTIC EXERCISES: CPT

## 2022-09-20 PROCEDURE — 97140 MANUAL THERAPY 1/> REGIONS: CPT

## 2022-09-20 NOTE — PROGRESS NOTES
Patient Name: Martha Hudson  Date:2022  : 1950  [x]  Patient  Verified  Payor: Randy Wagner / Plan: [place] MEDICARE PART A & B / Product Type: Medicare /    Total Treatment Time (min): 60  1:1 Treatment Time ([facility] only): 45  Referring provider: Florin Vergara MD    1. Primary osteoarthritis of left knee  2. Status post total left knee replacement  3. Difficulty walking    SUBJECTIVE    Patient reports she has been keeping up with a walking routine but is worried she is doing too much. She states knee gets very stiff when she sits for any period of time. OBJECTIVE    Modality:   []  E-Stim: type _ x _ min     []att   []unatt   []w/US   []w/ice   []w/heat  []  Ultrasound: []cont   []pulse    _ W/cm2 x _  min   []1MHz   []3MHz  [x]  Ice pack _  Post       [] Hot pack _  Pre       []  Other:    Man: 15 min  PF/TF mobilization in multiple angles of flexion/extension to improve ROM while avoiding disruption of wound covering/healing. Myofascial techniques to the incision line, popliteus, and anterior knee. Edema massage to the knee. Passive hamstring stretching. NMR: 15 min  Neuromuscular reeducation/proprioceptive training listed in exercise below. Kinesiotaping applied in a peripatellar \"Y\" pattern to promote lymphatic drainage. Ex: 15 min  Therapeutic exercise/strength/endurance completed here in clinic today per the exercise log. Manual assisted passive flexibility exercises for the: quad/hamstring. PT Exercise Log         EXERCISE 2022   SAQ/LAQ 6#/8#   Slant board y   sidestep Blue foam   Nu-step 10' (2)   TKE grn   Tandem stand *NMR x   TG minisquat Level 16   Lat walk green   Bridges w/band green   A/P gait grn                                           ASSESSMENT  [x]  See Plan of Care  [x]  Patient will continue to benefit from skilled therapy to address remaining functional deficits:     Better mobility through available ROM and gait is getting smoother.  Still with TF motion loss and stiffness with prolonged sitting. PLAN  Continue with current plan of care and progress as appropriate towards functional goals.   [x]  Upgrade activities as tolerated     [x]  Continue plan of care  []  Discharge due to:_  [] Other:_       Annie Mancilla, SHIVA  9/20/2022    12:22 PM

## 2022-09-20 NOTE — PROGRESS NOTES
I have reviewed the notes, assessments, and/or procedures performed by Chioma Cedillo PTA, I concur with her/his documentation of Tamar Garcia.

## 2022-09-22 ENCOUNTER — OFFICE VISIT (OUTPATIENT)
Dept: ORTHOPEDIC SURGERY | Age: 72
End: 2022-09-22
Payer: MEDICARE

## 2022-09-22 DIAGNOSIS — R26.2 DIFFICULTY WALKING: ICD-10-CM

## 2022-09-22 DIAGNOSIS — Z96.652 STATUS POST TOTAL LEFT KNEE REPLACEMENT: ICD-10-CM

## 2022-09-22 DIAGNOSIS — M17.12 PRIMARY OSTEOARTHRITIS OF LEFT KNEE: Primary | ICD-10-CM

## 2022-09-22 PROCEDURE — 97110 THERAPEUTIC EXERCISES: CPT

## 2022-09-22 PROCEDURE — 97112 NEUROMUSCULAR REEDUCATION: CPT

## 2022-09-22 PROCEDURE — 97140 MANUAL THERAPY 1/> REGIONS: CPT

## 2022-09-22 NOTE — PROGRESS NOTES
Patient Name: Tamar Garcia  Date:2022  : 1950  [x]  Patient  Verified  Payor: Liza Osmany / Plan: [place] MEDICARE PART A & B / Product Type: Medicare /    Total Treatment Time (min): 60  1:1 Treatment Time ( [address] only): 45  Referring provider: Hilda Bates MD    1. Primary osteoarthritis of left knee  2. Status post total left knee replacement  3. Difficulty walking    SUBJECTIVE    Patient reports she has generally been more active and is starting to sleep better at night. Still with consistent reports of stiffness. OBJECTIVE    Modality:   []  E-Stim: type _ x _ min     []att   []unatt   []w/US   []w/ice   []w/heat  []  Ultrasound: []cont   []pulse    _ W/cm2 x _  min   []1MHz   []3MHz  [x]  Ice pack _  Post       [] Hot pack _  Pre       []  Other:    Man: 15 min  PF/TF mobilization in multiple angles of flexion/extension to improve ROM while avoiding disruption of wound covering/healing. Myofascial techniques to the incision line, popliteus, and anterior knee. Edema massage to the knee. Passive hamstring stretching. NMR: 15 min  Neuromuscular reeducation/proprioceptive training listed in exercise below. Kinesiotaping applied in a peripatellar \"Y\" pattern to promote lymphatic drainage. Ex: 15 min  Therapeutic exercise/strength/endurance completed here in clinic today per the exercise log. Manual assisted passive flexibility exercises for the: quad/hamstring. PT Exercise Log         EXERCISE 2022   SAQ/LAQ 6#/8#   Slant board y   sidestep Blue foam   Nu-step 10' (2)   TKE grn   Tandem stand *NMR x   TG minisquat Level 16   Lat walk green   Bridges w/band green   Posterior gait grn                                         Modified gait activities with band to avoid shoulder pain.     ASSESSMENT  [x]  See Plan of Care  [x]  Patient will continue to benefit from skilled therapy to address remaining functional deficits:     Activity tolerance and knee mobility are improving with current POC. Continues to benefit from skilled interventions to address functional strength/mobility loss. PLAN  Continue with current plan of care and progress as appropriate towards functional goals.   [x]  Upgrade activities as tolerated     [x]  Continue plan of care  []  Discharge due to:_  [] Other:_       Lopez Barry, SHIVA  9/22/2022    12:22 PM

## 2022-09-22 NOTE — PROGRESS NOTES
I have reviewed the notes, assessments, and/or procedures performed by Palmira Smyth PTA, I concur with her/his documentation of Radha Garza.

## 2022-09-23 ENCOUNTER — TELEPHONE (OUTPATIENT)
Dept: ORTHOPEDIC SURGERY | Age: 72
End: 2022-09-23

## 2022-09-23 NOTE — TELEPHONE ENCOUNTER
Patient would like to know if its ok to start diclofenac, she reported she's having problems with right knee \"coming out\"

## 2022-09-27 ENCOUNTER — OFFICE VISIT (OUTPATIENT)
Dept: ORTHOPEDIC SURGERY | Age: 72
End: 2022-09-27
Payer: MEDICARE

## 2022-09-27 DIAGNOSIS — R26.2 DIFFICULTY WALKING: ICD-10-CM

## 2022-09-27 DIAGNOSIS — Z96.652 STATUS POST TOTAL LEFT KNEE REPLACEMENT: ICD-10-CM

## 2022-09-27 DIAGNOSIS — M17.12 PRIMARY OSTEOARTHRITIS OF LEFT KNEE: Primary | ICD-10-CM

## 2022-09-27 PROCEDURE — 97140 MANUAL THERAPY 1/> REGIONS: CPT

## 2022-09-27 PROCEDURE — 97110 THERAPEUTIC EXERCISES: CPT

## 2022-09-27 NOTE — PROGRESS NOTES
I have reviewed the notes, assessments, and/or procedures performed by Guillermina Babin PTA, I concur with her/his documentation of Heriberto Alexis.

## 2022-09-29 ENCOUNTER — OFFICE VISIT (OUTPATIENT)
Dept: ORTHOPEDIC SURGERY | Age: 72
End: 2022-09-29
Payer: MEDICARE

## 2022-09-29 DIAGNOSIS — Z96.652 STATUS POST TOTAL LEFT KNEE REPLACEMENT: ICD-10-CM

## 2022-09-29 DIAGNOSIS — R26.2 DIFFICULTY WALKING: ICD-10-CM

## 2022-09-29 DIAGNOSIS — M17.12 PRIMARY OSTEOARTHRITIS OF LEFT KNEE: Primary | ICD-10-CM

## 2022-09-29 PROCEDURE — 97110 THERAPEUTIC EXERCISES: CPT

## 2022-09-29 PROCEDURE — 97140 MANUAL THERAPY 1/> REGIONS: CPT

## 2022-09-29 NOTE — PROGRESS NOTES
I have reviewed the notes, assessments, and/or procedures performed by Medina Anand PTA, I concur with her/his documentation of Melanie Holguin.

## 2022-09-29 NOTE — PROGRESS NOTES
Patient Name: Martha Hudson  Date:2022  : 1950  [x]  Patient  Verified  Payor: Randy Wagner / Plan: [place] MEDICARE [place] & B / Product Type: Medicare /    Total Treatment Time (min): 60  1:1 Treatment Time ([facility] only): 30+  Referring provider: Florin Vergara MD    1. Primary osteoarthritis of left knee  2. Status post total left knee replacement  3. Difficulty walking    SUBJECTIVE    Patient reports she is feeling encouraged but improved mobility in the knee since starting Diclofenac. OBJECTIVE    Modality:   []  E-Stim: type _ x _ min     []att   []unatt   []w/US   []w/ice   []w/heat  []  Ultrasound: []cont   []pulse    _ W/cm2 x _  min   []1MHz   []3MHz  [x]  Ice pack _  Post       [] Hot pack _  Pre       []  Other:    Man: 15 min  PF/TF mobilization in multiple angles of flexion/extension to improve ROM while avoiding disruption of wound covering/healing. Myofascial techniques to the incision line, popliteus, and anterior knee. Edema massage to the knee. Passive quad/hamstring stretching. NMR: 5 min  Neuromuscular reeducation/proprioceptive training listed in exercise below. Previous Kinesiotaping applied in a peripatellar \"Y\" pattern to promote lymphatic drainage remained intact and effective and was left in place. Ex: 15 min  Therapeutic exercise/strength/endurance completed here in clinic today per the exercise log. PT Exercise Log         EXERCISE 2022   SAQ/LAQ 6#/8#   Slant board y   sidestep Blue foam   Nu-step 10' (2)   TKE grn   Tandem stand *NMR x   TG minisquat Level 16   Lat walk green   Bridges w/band green   Posterior gait grn   Balance board *NMR x                                       ASSESSMENT  [x]  See Plan of Care  [x]  Patient will continue to benefit from skilled therapy to address remaining functional deficits: We are pushing knee flexion ROM to improve functional mobility during gait and with transfers. Exercise/activity tolerance improving. PLAN  Continue with current plan of care and progress as appropriate towards functional goals.   [x]  Upgrade activities as tolerated     [x]  Continue plan of care  []  Discharge due to:_  [] Other:_       Ajay Carrasco PTA  9/29/2022    12:22 PM

## 2022-09-30 ENCOUNTER — HOSPITAL ENCOUNTER (OUTPATIENT)
Dept: WOUND CARE | Age: 72
Discharge: HOME | End: 2022-09-30
Payer: MEDICARE

## 2022-09-30 VITALS
TEMPERATURE: 97.9 F | RESPIRATION RATE: 18 BRPM | DIASTOLIC BLOOD PRESSURE: 70 MMHG | HEART RATE: 85 BPM | SYSTOLIC BLOOD PRESSURE: 143 MMHG

## 2022-09-30 DIAGNOSIS — S81.002S OPEN WOUND OF LEFT KNEE, SEQUELA: Primary | ICD-10-CM

## 2022-09-30 PROCEDURE — 99213 OFFICE O/P EST LOW 20 MIN: CPT

## 2022-09-30 PROCEDURE — 99213 OFFICE O/P EST LOW 20 MIN: CPT | Performed by: SURGERY

## 2022-09-30 RX ORDER — LIDOCAINE 40 MG/G
CREAM TOPICAL ONCE
Status: CANCELLED | OUTPATIENT
Start: 2022-09-30 | End: 2022-09-30

## 2022-09-30 RX ORDER — LIDOCAINE HYDROCHLORIDE 20 MG/ML
JELLY TOPICAL ONCE
Status: CANCELLED | OUTPATIENT
Start: 2022-09-30 | End: 2022-09-30

## 2022-09-30 RX ORDER — GENTAMICIN SULFATE 1 MG/G
OINTMENT TOPICAL ONCE
Status: CANCELLED | OUTPATIENT
Start: 2022-09-30 | End: 2022-09-30

## 2022-09-30 RX ORDER — TRIAMCINOLONE ACETONIDE 1 MG/G
OINTMENT TOPICAL ONCE
Status: CANCELLED | OUTPATIENT
Start: 2022-09-30 | End: 2022-09-30

## 2022-09-30 RX ORDER — SILVER SULFADIAZINE 10 G/1000G
CREAM TOPICAL ONCE
Status: CANCELLED | OUTPATIENT
Start: 2022-09-30 | End: 2022-09-30

## 2022-09-30 RX ORDER — BACITRACIN ZINC AND POLYMYXIN B SULFATE 500; 1000 [USP'U]/G; [USP'U]/G
OINTMENT TOPICAL ONCE
Status: CANCELLED | OUTPATIENT
Start: 2022-09-30 | End: 2022-09-30

## 2022-09-30 RX ORDER — DICLOFENAC SODIUM 75 MG/1
TABLET, DELAYED RELEASE ORAL
COMMUNITY

## 2022-09-30 RX ORDER — BETAMETHASONE DIPROPIONATE 0.5 MG/G
OINTMENT TOPICAL ONCE
Status: CANCELLED | OUTPATIENT
Start: 2022-09-30 | End: 2022-09-30

## 2022-09-30 RX ORDER — CLOBETASOL PROPIONATE 0.5 MG/G
OINTMENT TOPICAL ONCE
Status: CANCELLED | OUTPATIENT
Start: 2022-09-30 | End: 2022-09-30

## 2022-09-30 RX ORDER — LIDOCAINE HYDROCHLORIDE 40 MG/ML
SOLUTION TOPICAL ONCE
Status: CANCELLED | OUTPATIENT
Start: 2022-09-30 | End: 2022-09-30

## 2022-09-30 RX ORDER — BACITRACIN 500 [USP'U]/G
OINTMENT TOPICAL ONCE
Status: CANCELLED | OUTPATIENT
Start: 2022-09-30 | End: 2022-09-30

## 2022-09-30 RX ORDER — MUPIROCIN 20 MG/G
OINTMENT TOPICAL ONCE
Status: CANCELLED | OUTPATIENT
Start: 2022-09-30 | End: 2022-09-30

## 2022-09-30 NOTE — PROGRESS NOTES
The patient is a 21-year-old woman referred to the wound care center because of a nonhealing wound on the left knee related to recent total knee replacement. The patient was first seen at the [address] [place] [address] on 8/5/2022. The patient underwent total knee replacement on the left on 6/16/2022. She was noted to have failure of healing of the uppermost end of the incision. She has had only mild drainage from the area. The patient reports making good progress with her physical therapy. She is able to ambulate. The patient does not have history of diabetes. She does not have history of anginal symptoms or history of MI or coronary intervention. The patient denies shortness of breath with ambulation or at rest.    Past medical history includes CKD 3, hypertension, obesity, lung nodules on CT, osteoporosis, thoracic aortic aneurysm without rupture, asthma, stroke. The patient has never smoked. Dressing as of 8/5/2022 Applied Medihoney sheet over wound, cover with silicone border dressing. Change dressing every other day and as needed. Reported weight 250 pounds height 5 feet 4 inches       Physical Examination    The patient is an alert woman in no acute distress. Examination of the left lower extremity reveals 2+ left dorsalis pedis pulse. There was trace to 1+ edema of the left lower extremity from distal thigh distally. There was a scar secondary to surgical incision longitudinally crossing the anterior left knee. There was a wound at the proximal end of the scar which was 0.2 x 0.2 x 0.1 cm in dimension. There was some yellow slough on the wound base. There was no evidence of any deep tracking. There was no erythema of surrounding skin. Slough removed with forceps with teeth. The patient has a nonhealing wound representing a small portion of a recent surgical site. Wound is smaller.          Dressing ordered: Apply Medihoney gel onto wound, cover with small piece of Medihoney sheet over, cover with silicone border dressing. Change dressing 3 times per week and as needed. The patient can shower but will need to apply a new dressing after showering. The patient will follow-up in the wound care center in 2 weeks. Diagnosis:  Open wound of left knee    S81.002D        BRADEN Barton MD

## 2022-09-30 NOTE — WOUND CARE
09/30/22 0852   Wound Knee 08/05/22   Date First Assessed/Time First Assessed: 08/05/22 0924   Present on Hospital Admission: No  Wound Approximate Age at First Assessment (Weeks): 7 weeks  Primary Wound Type: (c)   Location: Knee  Date of First Observation: 08/05/22   Wound Image    Wound Etiology Non-Healing Surgical   Dressing Status Intact   Cleansed Cleansed with saline   Wound Length (cm) 0.2 cm   Wound Width (cm) 0.2 cm   Wound Depth (cm) 0.2 cm   Wound Surface Area (cm^2) 0.04 cm^2   Change in Wound Size % 96.36   Wound Volume (cm^3) 0.008 cm^3   Wound Healing % 96   Wound Assessment [place]/red;Slough   Drainage Amount Scant   Drainage Description Serous   Wound Odor None   Dorota-Wound/Incision Assessment Intact   Edges Defined edges   Wound Thickness Description Full thickness     Visit Vitals  BP (!) 143/70   Pulse 85   Temp 97.9 °F (36.6 °C)   Resp 18

## 2022-09-30 NOTE — DISCHARGE INSTRUCTIONS
Discharge Instructions/Wound Orders  [facility]  [address]  MO[address], [address] [facility] [place]  Telephone: [phone]    NAME:  Nichol Marvin  YOB: 1950  MEDICAL RECORD NUMBER:  [id number]  DATE:  09/30/22  Wound Care Orders:  Left knee wound :Cleanse with Soap and water , apply primary dressing medihoney hydrogel sheet and Medi honey gel cover with secondary dressing   border foam .  Pt./pcg/HH nurse to change (freq) 3 x week and as needed for compromise. F/U in 2 week. Dietary:  [] Diet as tolerated: [] Diabetic Diet:Low carb and no Sugar   [] No Added Salt:[] Increase Protein:   [] Other:Limit the amount of liquid you are drinking and avoid drinking in between meals   Activity:  [] Activity as tolerated:     Return Appointment:  [] Return Appointment: With DR Earl Amado  in  2 [facility])  [] Ordered tests:   Electronically signed Cali Bravo RN on 9/30/2022 at 9:01 AM   Zoey Shaffer 281: Should you experience any significant changes in your wound(s) or have questions about your wound care, please contact the [facility] Main at [address] [place] [address] 8:00 am - 4:30. If you need help with your wound outside these hours and cannot wait until we are again available, contact your PCP or go to the hospital emergency room. PLEASE NOTE: IF YOU ARE UNABLE TO OBTAIN WOUND SUPPLIES, CONTINUE TO USE THE SUPPLIES YOU HAVE AVAILABLE UNTIL YOU ARE ABLE TO REACH US. IT IS MOST IMPORTANT TO KEEP THE WOUND COVERED AT ALL TIMES.     Physician Signature:_______________________    Date: ___________ Time:  ____________

## 2022-10-04 ENCOUNTER — OFFICE VISIT (OUTPATIENT)
Dept: ORTHOPEDIC SURGERY | Age: 72
End: 2022-10-04
Payer: MEDICARE

## 2022-10-04 DIAGNOSIS — Z96.652 STATUS POST TOTAL LEFT KNEE REPLACEMENT: Primary | ICD-10-CM

## 2022-10-04 DIAGNOSIS — R26.2 DIFFICULTY WALKING: ICD-10-CM

## 2022-10-04 DIAGNOSIS — M17.12 PRIMARY OSTEOARTHRITIS OF LEFT KNEE: ICD-10-CM

## 2022-10-04 DIAGNOSIS — G89.18 POSTOPERATIVE PAIN OF LEFT KNEE: ICD-10-CM

## 2022-10-04 DIAGNOSIS — M25.562 POSTOPERATIVE PAIN OF LEFT KNEE: ICD-10-CM

## 2022-10-04 PROCEDURE — 97140 MANUAL THERAPY 1/> REGIONS: CPT | Performed by: PHYSICAL THERAPIST

## 2022-10-04 PROCEDURE — 97110 THERAPEUTIC EXERCISES: CPT | Performed by: PHYSICAL THERAPIST

## 2022-10-04 NOTE — PROGRESS NOTES
Patient Name: Vance Adan  Date:10/4/2022  : 1950  [x]  Patient  Verified  Payor: Alfonzo Sy / Plan: [place] MEDICARE PART A & B / Product Type: Medicare /    Total Treatment Time (min): 60  1:1 Treatment Time ( [address] only): 30+  Referring provider: Dean Wu MD    1. Status post total left knee replacement  2. Primary osteoarthritis of left knee  3. Postoperative pain of left knee  4. Difficulty walking    SUBJECTIVE    Patient states she is better able to don shoes/socks. Still covering/bandaging 1 open area of incision per wound care instructions. Diclofenac continues to be helpful. OBJECTIVE    Modality:   []  E-Stim: type _ x _ min     []att   []unatt   []w/US   []w/ice   []w/heat  []  Ultrasound: []cont   []pulse    _ W/cm2 x _  min   []1MHz   []3MHz  [x]  Ice pack _  Post       [] Hot pack _  Pre       []  Other:    Man: 15 min  PF/TF mobilization in multiple angles of flexion/extension to improve ROM while avoiding disruption of wound covering/healing. Myofascial techniques to the incision line, popliteus, and anterior knee. Edema massage to the knee. Passive quad/hamstring stretching. NMR: 5 min  Neuromuscular reeducation/proprioceptive training listed in exercise below. Kinesiotaping applied in a peripatellar \"Y\" pattern to promote lymphatic drainage remained intact and effective and was left in place. Ex: 15 min  Therapeutic exercise/strength/endurance completed here in clinic today per the exercise log.         PT Exercise Log         EXERCISE 10/4/2022   SAQ/LAQ 6#/8#   Slant board y   sidestep Blue foam   Nu-step 10' (2)   TKE grn   Tandem stand *NMR x   TG minisquat Level 16   Lat walk green   Bridges w/band green   Posterior gait grn   Balance board *NMR x   Sit-stand Chair height                                   LEFS: 36/80.  45%    ASSESSMENT  [x]  See Plan of Care  [x]  Patient will continue to benefit from skilled therapy to address remaining functional deficits: Patient has made statistically significant improvements in her lower extremity functional scale scoring over the past 10 visits. Increasing her ADL function but still with some globalized edema and stiffness limiting mobility to less than 100 degrees. Still without full wound closure. Continued skilled PT would be appropriate to further monitor her progression, range of motion, and wound healing. PLAN  Continue with current plan of care and progress as appropriate towards functional goals.   [x]  Upgrade activities as tolerated     [x]  Continue plan of care  []  Discharge due to:_  [] Other:_       Robin Poole PT, DPT  10/4/2022    12:22 PM

## 2022-10-06 ENCOUNTER — OFFICE VISIT (OUTPATIENT)
Dept: ORTHOPEDIC SURGERY | Age: 72
End: 2022-10-06
Payer: MEDICARE

## 2022-10-06 ENCOUNTER — TRANSCRIBE ORDER (OUTPATIENT)
Dept: SCHEDULING | Age: 72
End: 2022-10-06

## 2022-10-06 DIAGNOSIS — M25.562 POSTOPERATIVE PAIN OF LEFT KNEE: ICD-10-CM

## 2022-10-06 DIAGNOSIS — G89.18 POSTOPERATIVE PAIN OF LEFT KNEE: ICD-10-CM

## 2022-10-06 DIAGNOSIS — M17.12 PRIMARY OSTEOARTHRITIS OF LEFT KNEE: ICD-10-CM

## 2022-10-06 DIAGNOSIS — Z12.31 SCREENING MAMMOGRAM FOR HIGH-RISK PATIENT: Primary | ICD-10-CM

## 2022-10-06 DIAGNOSIS — Z96.652 STATUS POST TOTAL LEFT KNEE REPLACEMENT: Primary | ICD-10-CM

## 2022-10-06 PROCEDURE — 97110 THERAPEUTIC EXERCISES: CPT | Performed by: PHYSICAL THERAPIST

## 2022-10-06 PROCEDURE — 97140 MANUAL THERAPY 1/> REGIONS: CPT | Performed by: PHYSICAL THERAPIST

## 2022-10-06 NOTE — PROGRESS NOTES
Patient Name: Luke Skelton  Date:10/6/2022  : 1950  [x]  Patient  Verified  Payor: Jose Cuevas / Plan: [place] MEDICARE PART A & B / Product Type: Medicare /    Total Treatment Time (min): 60  1:1 Treatment Time ( [address] only): 30+  Referring provider: Julia Riley MD    1. Status post total left knee replacement  2. Primary osteoarthritis of left knee  3. Postoperative pain of left knee    SUBJECTIVE    Still covering/bandaging 1 open area of incision per wound care instructions. Diclofenac continues to be helpful. Subjectively feels that she is improving with PT but continues to be fatigued with her workday. OBJECTIVE    Modality:   []  E-Stim: type _ x _ min     []att   []unatt   []w/US   []w/ice   []w/heat  []  Ultrasound: []cont   []pulse    _ W/cm2 x _  min   []1MHz   []3MHz  [x]  Ice pack _  Post       [] Hot pack _  Pre       []  Other:    Man: 15 min  PF/TF mobilization in multiple angles of flexion/extension to improve ROM while avoiding disruption of wound covering/healing. Myofascial techniques to the incision line, popliteus, and anterior knee. Edema massage to the knee. Passive quad/hamstring stretching. NMR: 5 min  Neuromuscular reeducation/proprioceptive training listed in exercise below. Kinesiotaping applied in a peripatellar \"Y\" pattern to promote lymphatic drainage remained intact and effective and was left in place. Ex: 15 min  Therapeutic exercise/strength/endurance completed here in clinic today per the exercise log. PT Exercise Log         EXERCISE 10/6/2022   SAQ/LAQ 9#/12#   Slant board y   sidestep Blue foam   Nu-step 10' (2)   TKE grn   Tandem stand *NMR x   TG minisquat Level 16   Lat walk green   Bridges w/band green   Posterior gait grn   Balance board *NMR x   Sit-stand Chair height                                   Range of motion from 0 to 95 degrees.     ASSESSMENT  [x]  See Plan of Care  [x]  Patient will continue to benefit from skilled therapy to address remaining functional deficits:     Range of motion continues to be somewhat slow to change however subjectively she feels she is making improvements in her strength and mobility. Wound continues to be open and requires monitoring. Skilled physical therapy is still appropriate for assistance with mobility and strength progression, particularly with decreased endurance to ADL. PLAN  Continue with current plan of care and progress as appropriate towards functional goals.   [x]  Upgrade activities as tolerated     [x]  Continue plan of care  []  Discharge due to:_  [] Other:_       Anish Drew PT, DPT  10/6/2022    12:22 PM

## 2022-10-11 ENCOUNTER — OFFICE VISIT (OUTPATIENT)
Dept: ORTHOPEDIC SURGERY | Age: 72
End: 2022-10-11
Payer: MEDICARE

## 2022-10-11 DIAGNOSIS — G89.18 POSTOPERATIVE PAIN OF LEFT KNEE: ICD-10-CM

## 2022-10-11 DIAGNOSIS — M17.12 PRIMARY OSTEOARTHRITIS OF LEFT KNEE: ICD-10-CM

## 2022-10-11 DIAGNOSIS — R26.2 DIFFICULTY WALKING: ICD-10-CM

## 2022-10-11 DIAGNOSIS — Z96.652 STATUS POST TOTAL LEFT KNEE REPLACEMENT: Primary | ICD-10-CM

## 2022-10-11 DIAGNOSIS — M25.562 POSTOPERATIVE PAIN OF LEFT KNEE: ICD-10-CM

## 2022-10-11 PROCEDURE — 97110 THERAPEUTIC EXERCISES: CPT | Performed by: PHYSICAL THERAPIST

## 2022-10-11 PROCEDURE — 97140 MANUAL THERAPY 1/> REGIONS: CPT | Performed by: PHYSICAL THERAPIST

## 2022-10-11 NOTE — PROGRESS NOTES
Patient Name: Abiel Pardo  Date:10/11/2022  : 1950  [x]  Patient  Verified  Payor: Lily Pineda / Plan: [place] MEDICARE PART A & B / Product Type: Medicare /    Total Treatment Time (min): 60  1:1 Treatment Time ( [address] only): 30+  Referring provider: Leonarda Bishop MD    1. Status post total left knee replacement  2. Primary osteoarthritis of left knee  3. Postoperative pain of left knee  4. Difficulty walking    SUBJECTIVE    Admits still at times inconsistent with HEP. Still with end range stiffness. OBJECTIVE    Modality:   []  E-Stim: type _ x _ min     []att   []unatt   []w/US   []w/ice   []w/heat  []  Ultrasound: []cont   []pulse    _ W/cm2 x _  min   []1MHz   []3MHz  []  Ice pack _  Post       [] Hot pack _  Pre       []  Other:    Man: 15 min  PF/TF mobilization in multiple angles of flexion/extension to improve ROM while avoiding disruption of wound covering/healing. Myofascial techniques to the incision line, popliteus, and anterior knee. Edema massage to the knee. Passive quad/hamstring stretching. NMR: 5 min  Neuromuscular reeducation/proprioceptive training listed in exercise below. Kinesiotaping applied in a peripatellar \"Y\" pattern to promote lymphatic drainage remained intact and effective and was left in place. Ex: 15 min  Therapeutic exercise/strength/endurance completed here in clinic today per the exercise log. PT Exercise Log         EXERCISE 10/11/2022   SAQ/LAQ 9#/12#   Slant board y   sidestep Blue foam   Nu-step 10' (2)   TKE grn   Tandem stand *NMR x   TG minisquat Level 16   Lat walk green   Bridges w/band green   Posterior gait grn   Balance board *NMR x   Sit-stand Chair height                                   Reviewed home exercise options to accommodate not only her knee but also her pre-existing left shoulder injury.     ASSESSMENT  [x]  See Plan of Care  [x]  Patient will continue to benefit from skilled therapy to address remaining functional deficits: Globalized edema and generalized stiffness continues to limit endrange flexion. Patient does need to be a bit more diligent with HEP to maximize gains with PT but becoming more independent with exercise set up and completion here in therapy. PLAN  Continue with current plan of care and progress as appropriate towards functional goals. [x]  Upgrade activities as tolerated     [x]  Continue plan of care  []  Discharge due to:_  [] Other:_ began planning for weaning back frequency of visits as we prepare for eventual transition to HEP alone.        Raji Simpson, PT, DPT  10/11/2022    12:22 PM

## 2022-10-13 ENCOUNTER — OFFICE VISIT (OUTPATIENT)
Dept: ORTHOPEDIC SURGERY | Age: 72
End: 2022-10-13
Payer: MEDICARE

## 2022-10-13 DIAGNOSIS — M17.12 PRIMARY OSTEOARTHRITIS OF LEFT KNEE: ICD-10-CM

## 2022-10-13 DIAGNOSIS — M25.562 POSTOPERATIVE PAIN OF LEFT KNEE: ICD-10-CM

## 2022-10-13 DIAGNOSIS — Z96.652 STATUS POST TOTAL LEFT KNEE REPLACEMENT: Primary | ICD-10-CM

## 2022-10-13 DIAGNOSIS — G89.18 POSTOPERATIVE PAIN OF LEFT KNEE: ICD-10-CM

## 2022-10-13 PROCEDURE — 97110 THERAPEUTIC EXERCISES: CPT | Performed by: PHYSICAL THERAPIST

## 2022-10-13 PROCEDURE — 97140 MANUAL THERAPY 1/> REGIONS: CPT | Performed by: PHYSICAL THERAPIST

## 2022-10-13 NOTE — PROGRESS NOTES
Patient Name: Mary Ann Grimm  Date:10/13/2022  : 1950  [x]  Patient  Verified  Payor: Breanna Counter / Plan: VA MEDICARE PART A & B / Product Type: Medicare /    Total Treatment Time (min): 60  1:1 Treatment Time ( [address] only): 30+  Referring provider: Sonam River MD    1. Status post total left knee replacement  2. Primary osteoarthritis of left knee  3. Postoperative pain of left knee    SUBJECTIVE    No specific changes/complaints but patient does have some questions with home prone mobilization/stretch technique. OBJECTIVE    Modality:   []  E-Stim: type _ x _ min     []att   []unatt   []w/US   []w/ice   []w/heat  []  Ultrasound: []cont   []pulse    _ W/cm2 x _  min   []1MHz   []3MHz  []  Ice pack _  Post       [] Hot pack _  Pre       []  Other:    Man: 15 min  PF/TF mobilization in multiple angles of flexion/extension while supine/prone to improve ROM while avoiding disruption of wound covering/healing. Myofascial techniques to the incision line, popliteus, and anterior knee. Edema massage to the knee. Passive quad/hamstring stretching. NMR: 5 min  Neuromuscular reeducation/proprioceptive training listed in exercise below. Kinesiotaping applied in a peripatellar \"Y\" pattern to promote lymphatic drainage remained intact and effective and was left in place. Ex: 15 min  Therapeutic exercise/strength/endurance completed here in clinic today per the exercise log. PT Exercise Log         EXERCISE 10/13/2022   SAQ/LAQ 9#/13#   Slant board y   sidestep Blue foam   Nu-step 10' (2)   TKE grn   Tandem stand *NMR x   TG minisquat Level 16   Lat walk green   Bridges w/band green   Posterior gait grn   Balance board *NMR x   Sit-stand Chair height                                   Reviewed home exercise options to accommodate not only her knee but also her pre-existing left shoulder injury.     ASSESSMENT  [x]  See Plan of Care  [x]  Patient will continue to benefit from skilled therapy to address remaining functional deficits:     Appropriate soreness with prone mobilization but seems more effective to achieve endrange than in supine. PLAN  Continue with current plan of care and progress as appropriate towards functional goals. [x]  Upgrade activities as tolerated     [x]  Continue plan of care  []  Discharge due to:_  [] Other:_ began planning for weaning back frequency of visits as we prepare for eventual transition to HEP alone.        Ronny Herrera, PT, DPT  10/13/2022    12:22 PM

## 2022-10-14 ENCOUNTER — HOSPITAL ENCOUNTER (OUTPATIENT)
Dept: MAMMOGRAPHY | Age: 72
Discharge: HOME | End: 2022-10-14
Attending: INTERNAL MEDICINE
Payer: MEDICARE

## 2022-10-14 ENCOUNTER — HOSPITAL ENCOUNTER (OUTPATIENT)
Dept: WOUND CARE | Age: 72
Discharge: HOME | End: 2022-10-14
Payer: MEDICARE

## 2022-10-14 VITALS
DIASTOLIC BLOOD PRESSURE: 67 MMHG | RESPIRATION RATE: 18 BRPM | HEART RATE: 99 BPM | TEMPERATURE: 97.8 F | SYSTOLIC BLOOD PRESSURE: 142 MMHG

## 2022-10-14 DIAGNOSIS — S81.002S OPEN WOUND OF LEFT KNEE, SEQUELA: Primary | ICD-10-CM

## 2022-10-14 DIAGNOSIS — Z12.31 SCREENING MAMMOGRAM FOR HIGH-RISK PATIENT: ICD-10-CM

## 2022-10-14 PROBLEM — S81.002A OPEN WOUND OF LEFT KNEE: Status: RESOLVED | Noted: 2022-08-05 | Resolved: 2022-10-14

## 2022-10-14 PROCEDURE — 77067 SCR MAMMO BI INCL CAD: CPT

## 2022-10-14 PROCEDURE — 99213 OFFICE O/P EST LOW 20 MIN: CPT | Performed by: SURGERY

## 2022-10-14 PROCEDURE — 99212 OFFICE O/P EST SF 10 MIN: CPT

## 2022-10-14 RX ORDER — GENTAMICIN SULFATE 1 MG/G
OINTMENT TOPICAL ONCE
Status: CANCELLED | OUTPATIENT
Start: 2022-10-14 | End: 2022-10-14

## 2022-10-14 RX ORDER — CLOBETASOL PROPIONATE 0.5 MG/G
OINTMENT TOPICAL ONCE
Status: CANCELLED | OUTPATIENT
Start: 2022-10-14 | End: 2022-10-14

## 2022-10-14 RX ORDER — LIDOCAINE 40 MG/G
CREAM TOPICAL ONCE
Status: CANCELLED | OUTPATIENT
Start: 2022-10-14 | End: 2022-10-14

## 2022-10-14 RX ORDER — LIDOCAINE HYDROCHLORIDE 20 MG/ML
JELLY TOPICAL ONCE
Status: CANCELLED | OUTPATIENT
Start: 2022-10-14 | End: 2022-10-14

## 2022-10-14 RX ORDER — BACITRACIN 500 [USP'U]/G
OINTMENT TOPICAL ONCE
Status: CANCELLED | OUTPATIENT
Start: 2022-10-14 | End: 2022-10-14

## 2022-10-14 RX ORDER — SILVER SULFADIAZINE 10 G/1000G
CREAM TOPICAL ONCE
Status: CANCELLED | OUTPATIENT
Start: 2022-10-14 | End: 2022-10-14

## 2022-10-14 RX ORDER — MUPIROCIN 20 MG/G
OINTMENT TOPICAL ONCE
Status: CANCELLED | OUTPATIENT
Start: 2022-10-14 | End: 2022-10-14

## 2022-10-14 RX ORDER — BACITRACIN ZINC AND POLYMYXIN B SULFATE 500; 1000 [USP'U]/G; [USP'U]/G
OINTMENT TOPICAL ONCE
Status: CANCELLED | OUTPATIENT
Start: 2022-10-14 | End: 2022-10-14

## 2022-10-14 RX ORDER — TRIAMCINOLONE ACETONIDE 1 MG/G
OINTMENT TOPICAL ONCE
Status: CANCELLED | OUTPATIENT
Start: 2022-10-14 | End: 2022-10-14

## 2022-10-14 RX ORDER — BETAMETHASONE DIPROPIONATE 0.5 MG/G
OINTMENT TOPICAL ONCE
Status: CANCELLED | OUTPATIENT
Start: 2022-10-14 | End: 2022-10-14

## 2022-10-14 RX ORDER — LIDOCAINE HYDROCHLORIDE 40 MG/ML
SOLUTION TOPICAL ONCE
Status: CANCELLED | OUTPATIENT
Start: 2022-10-14 | End: 2022-10-14

## 2022-10-14 NOTE — DISCHARGE INSTRUCTIONS
Discharge Instructions/Wound Orders  [facility]  MOB [address], [address] [facility] [place], [id number]  Telephone: [phone]    NAME:  Devin Prince  YOB: 1950  MEDICAL RECORD NUMBER:  [id number]  DATE:  10/14/2022  Wound Care Orders:  Left knee :Cleanse with Soap and water , apply primary dressing leave open to air, no dressing required, no further follow-up needed. Dietary:  [x] Diet as tolerated: [] Calorie Diabetic Diet:Low carb and no Sugar [] No Added Salt:[] Increase Protein: [] Other:Limit the amount of liquid you are drinking and avoid drinking in between meals   Activity:  [x] Activity as tolerated:  [] Patient has no activity restrictions     [] Strict Bedrest: [] Remain off Work:     [] May return to full duty work:                                   [] Return to work with restrictions:             Return Appointment:  [] Return Appointment: No further follow-up needed. [] Ordered tests:    Electronically signed Alanis Pugh RN on 10/14/2022 at 9:28 AM     [address] [place] [address] Information: Should you experience any significant changes in your wound(s) or have questions about your wound care, please contact the [facility] Main at [address] [place] [address] 8:00 am - 4:30. If you need help with your wound outside these hours and cannot wait until we are again available, contact your PCP or go to the hospital emergency room. PLEASE NOTE: IF YOU ARE UNABLE TO OBTAIN WOUND SUPPLIES, CONTINUE TO USE THE SUPPLIES YOU HAVE AVAILABLE UNTIL YOU ARE ABLE TO REACH US. IT IS MOST IMPORTANT TO KEEP THE WOUND COVERED AT ALL TIMES.      Physician Signature:_______________________    Date: ___________ Time:  ____________

## 2022-10-14 NOTE — WOUND CARE
10/14/22 0913   Wound Knee 08/05/22   Date First Assessed/Time First Assessed: 08/05/22 0924   Present on Hospital Admission: No  Wound Approximate Age at First Assessment (Weeks): 7 weeks  Primary Wound Type: (c)   Location: Knee  Date of First Observation: 08/05/22   Wound Image    Wound Etiology Non-Healing Surgical   Dressing Status Intact   Cleansed Cleansed with saline   Dressing/Treatment   (Medihoney gel, super absorber)   Wound Length (cm) 0.1 cm   Wound Width (cm) 0.1 cm   Wound Depth (cm) 0.1 cm   Wound Surface Area (cm^2) 0.01 cm^2   Change in Wound Size % 99.09   Wound Volume (cm^3) 0.001 cm^3   Wound Healing % 100   Wound Assessment Epithelialization   Drainage Amount None   Wound Odor None   Dorota-Wound/Incision Assessment Intact   Edges Attached edges   Wound Thickness Description Full thickness   Visit Vitals  BP (!) 142/67 (BP 1 Location: Right upper arm)   Pulse 99   Temp 97.8 °F (36.6 °C)   Resp 18

## 2022-10-14 NOTE — PROGRESS NOTES
The patient is a 61-year-old woman referred to the wound care center because of a nonhealing wound on the left knee related to recent total knee replacement. The patient was first seen at the [facility] on 8/5/2022. The patient underwent total knee replacement on the left on 6/16/2022. She was noted to have failure of healing of the uppermost end of the incision. She has had only mild drainage from the area. The patient reports making good progress with her physical therapy. She is able to ambulate. The patient does not have history of diabetes. She does not have history of anginal symptoms or history of MI or coronary intervention. The patient denies shortness of breath with ambulation or at rest.    Past medical history includes CKD 3, hypertension, obesity, lung nodules on CT, osteoporosis, thoracic aortic aneurysm without rupture, asthma, stroke. The patient has never smoked. Dressing as of 8/5/2022 Applied Medihoney sheet over wound, cover with silicone border dressing. Change dressing every other day and as needed. Reported weight 250 pounds height 5 feet 4 inches       Physical Examination    The patient is an alert woman in no acute distress. Examination of the left lower extremity reveals 2+ left dorsalis pedis pulse. There was trace to 1+ edema of the left lower extremity from distal thigh distally. There was a scar secondary to surgical incision longitudinally crossing the anterior left knee. Wound at upper end of scar fully healed. Wound healed. No follow up needed. Diagnosis:  Open wound of left knee    S81.002D        BRADEN Davey MD

## 2022-10-18 ENCOUNTER — OFFICE VISIT (OUTPATIENT)
Dept: ORTHOPEDIC SURGERY | Age: 72
End: 2022-10-18
Payer: MEDICARE

## 2022-10-18 DIAGNOSIS — R26.2 DIFFICULTY WALKING: ICD-10-CM

## 2022-10-18 DIAGNOSIS — M25.562 POSTOPERATIVE PAIN OF LEFT KNEE: ICD-10-CM

## 2022-10-18 DIAGNOSIS — G89.18 POSTOPERATIVE PAIN OF LEFT KNEE: ICD-10-CM

## 2022-10-18 DIAGNOSIS — M17.12 PRIMARY OSTEOARTHRITIS OF LEFT KNEE: ICD-10-CM

## 2022-10-18 DIAGNOSIS — Z96.652 STATUS POST TOTAL LEFT KNEE REPLACEMENT: Primary | ICD-10-CM

## 2022-10-18 PROCEDURE — 97140 MANUAL THERAPY 1/> REGIONS: CPT | Performed by: PHYSICAL THERAPIST

## 2022-10-18 PROCEDURE — 97110 THERAPEUTIC EXERCISES: CPT | Performed by: PHYSICAL THERAPIST

## 2022-10-18 NOTE — PROGRESS NOTES
Patient Name: Mary Riojas  Date:10/18/2022  : 1950  [x]  Patient  Verified  Payor: Shyann Tony / Plan: [place] MED[place] / Product Type: Medicare /    Total Treatment Time (min): 60  1:1 Treatment Time ( [address] only): 30+  Referring provider: Abundio Hdez MD    1. Status post total left knee replacement  2. Primary osteoarthritis of left knee  3. Postoperative pain of left knee  4. Difficulty walking    SUBJECTIVE    Has been discharged from formal wound care services. No longer needs to apply wound care dressings at home either. Patient states she did notice some new improvements in mobility with decreased pain over the weekend but frustrated that stiffness returns. OBJECTIVE    Modality:   []  E-Stim: type _ x _ min     []att   []unatt   []w/US   []w/ice   []w/heat  []  Ultrasound: []cont   []pulse    _ W/cm2 x _  min   []1MHz   []3MHz  []  Ice pack _  Post       [] Hot pack _  Pre       []  Other:    Man: 15 min  PF/TF mobilization in multiple angles of flexion/extension while supine. Myofascial techniques to the incision line, popliteus, and anterior knee. Edema massage to the knee. Passive quad/hamstring stretching. NMR: 5 min  Neuromuscular reeducation/proprioceptive training listed in exercise below. Ex: 15 min  Therapeutic exercise/strength/endurance completed here in clinic today per the exercise log. PT Exercise Log         EXERCISE 10/18/2022   SAQ/LAQ 9#/13#   Slant board y   sidestep Blue foam   Nu-step 10' (2)   TKE grn   Tandem stand *NMR x   TG minisquat Level 16   Lat walk green   Bridges w/band green   Posterior gait grn   Balance board *NMR x   Sit-stand Chair height                                   Wound healing appears much improved and without signs of infection.      ASSESSMENT  [x]  See Plan of Care  [x]  Patient will continue to benefit from skilled therapy to address remaining functional deficits:     Appropriate discomfort with endrange passive flexion but patient has noticed some new subjective improvements and feels it is beneficial as she is unable to achieve this level stretch at home. PLAN  Continue with current plan of care and progress as appropriate towards functional goals.   [x]  Upgrade activities as tolerated     [x]  Continue plan of care  []  Discharge due to:_  [] Other:_       Zoila Rosa, PT, DPT  10/18/2022    12:22 PM

## 2022-10-25 ENCOUNTER — OFFICE VISIT (OUTPATIENT)
Dept: ORTHOPEDIC SURGERY | Age: 72
End: 2022-10-25
Payer: MEDICARE

## 2022-10-25 DIAGNOSIS — M25.562 POSTOPERATIVE PAIN OF LEFT KNEE: ICD-10-CM

## 2022-10-25 DIAGNOSIS — Z96.652 STATUS POST TOTAL LEFT KNEE REPLACEMENT: Primary | ICD-10-CM

## 2022-10-25 DIAGNOSIS — G89.18 POSTOPERATIVE PAIN OF LEFT KNEE: ICD-10-CM

## 2022-10-25 DIAGNOSIS — M17.12 PRIMARY OSTEOARTHRITIS OF LEFT KNEE: ICD-10-CM

## 2022-10-25 PROCEDURE — 97140 MANUAL THERAPY 1/> REGIONS: CPT | Performed by: PHYSICAL THERAPIST

## 2022-10-25 PROCEDURE — 97110 THERAPEUTIC EXERCISES: CPT | Performed by: PHYSICAL THERAPIST

## 2022-10-25 NOTE — PROGRESS NOTES
Patient Name: Kourtney Helm  Date:10/25/2022  : 1950  [x]  Patient  Verified  Payor: Ivon Dao / Plan: [place] MEDICARE PART A & B / Product Type: Medicare /    Total Treatment Time (min): 60  1:1 Treatment Time ( [address] only): 30+  Referring provider: Benedicto Velasco MD    1. Status post total left knee replacement  2. Primary osteoarthritis of left knee  3. Postoperative pain of left knee    SUBJECTIVE    Patient reports that she is walking farther with decreased swelling later in the day however swelling is still an issue by the end of her workday. OBJECTIVE    Modality:   []  E-Stim: type _ x _ min     []att   []unatt   []w/US   []w/ice   []w/heat  []  Ultrasound: []cont   []pulse    _ W/cm2 x _  min   []1MHz   []3MHz  []  Ice pack _  Post       [] Hot pack _  Pre       []  Other:    Man: 15 min  PF/TF mobilization in multiple angles of flexion/extension while supine. Myofascial techniques to the incision line, popliteus, and anterior knee. Edema massage to the knee. Passive quad/hamstring stretching. NMR: 5 min  Neuromuscular reeducation/proprioceptive training listed in exercise below. Ex: 15 min  Therapeutic exercise/strength/endurance completed here in clinic today per the exercise log. PT Exercise Log         EXERCISE 10/25/2022   SAQ/LAQ 9#/13#   Slant board y   sidestep Blue foam   Nu-step 10' (2)   TKE grn   Tandem stand *NMR x   TG minisquat Level 16   Lat walk green   Bridges w/band green   Posterior gait grn   Balance board *NMR x   Sit-stand Chair height                                   Wound continues to heal. ROM 0-93. ASSESSMENT  [x]  See Plan of Care  [x]  Patient will continue to benefit from skilled therapy to address remaining functional deficits:     ROM somewhat unchanged objectively but patient states she achieves better stretch to maintain her mobility in PT than she is able to achieve at home.  Still reporting subjective improvements in ADL activity/mobility/endurance with skilled PT attendance. PLAN  Continue with current plan of care and progress as appropriate towards functional goals.   [x]  Upgrade activities as tolerated     [x]  Continue plan of care  []  Discharge due to:_  [] Other:_       Brittany Romero, PT, DPT  10/25/2022    12:22 PM

## 2022-10-28 ENCOUNTER — HOSPITAL ENCOUNTER (OUTPATIENT)
Dept: MAMMOGRAPHY | Age: 72
Discharge: HOME | End: 2022-10-28
Attending: INTERNAL MEDICINE
Payer: MEDICARE

## 2022-10-28 DIAGNOSIS — N64.89 BREAST ASYMMETRY: ICD-10-CM

## 2022-10-28 DIAGNOSIS — R92.8 ABNORMAL MAMMOGRAM OF RIGHT BREAST: ICD-10-CM

## 2022-10-28 PROCEDURE — 77061 BREAST TOMOSYNTHESIS UNI: CPT

## 2022-11-01 ENCOUNTER — OFFICE VISIT (OUTPATIENT)
Dept: ORTHOPEDIC SURGERY | Age: 72
End: 2022-11-01
Payer: MEDICARE

## 2022-11-01 DIAGNOSIS — M17.12 PRIMARY OSTEOARTHRITIS OF LEFT KNEE: ICD-10-CM

## 2022-11-01 DIAGNOSIS — G89.18 POSTOPERATIVE PAIN OF LEFT KNEE: ICD-10-CM

## 2022-11-01 DIAGNOSIS — M25.562 POSTOPERATIVE PAIN OF LEFT KNEE: ICD-10-CM

## 2022-11-01 DIAGNOSIS — Z96.652 STATUS POST TOTAL LEFT KNEE REPLACEMENT: Primary | ICD-10-CM

## 2022-11-01 PROCEDURE — 97140 MANUAL THERAPY 1/> REGIONS: CPT | Performed by: PHYSICAL THERAPIST

## 2022-11-01 PROCEDURE — 97110 THERAPEUTIC EXERCISES: CPT | Performed by: PHYSICAL THERAPIST

## 2022-11-01 NOTE — PROGRESS NOTES
Patient Name: Idalmis Leyva  Date:2022  : 1950  [x]  Patient  Verified  Payor: Loly Miranda / Plan: [place] MEDICARE PART A & B / Product Type: Medicare /    Total Treatment Time (min): 60  1:1 Treatment Time ( [address] only): 30+  Referring provider: Mau Velazquez MD    1. Status post total left knee replacement  2. Primary osteoarthritis of left knee  3. Postoperative pain of left knee    SUBJECTIVE    Patient states she had some quad soreness over the weekend after attempting to work on step through gait pattern while descending stairs. OBJECTIVE    Modality:   []  E-Stim: type _ x _ min     []att   []unatt   []w/US   []w/ice   []w/heat  []  Ultrasound: []cont   []pulse    _ W/cm2 x _  min   []1MHz   []3MHz  []  Ice pack _  Post       [] Hot pack _  Pre       []  Other:    Man: 15 min  PF/TF mobilization in multiple angles of flexion/extension while supine. Myofascial techniques to the incision line, popliteus, and anterior knee. Edema massage to the knee. Passive quad/hamstring stretching. NMR: 5 min  Neuromuscular reeducation/proprioceptive training listed in exercise below. Ex: 15 min  Therapeutic exercise/strength/endurance completed here in clinic today per the exercise log. PT Exercise Log         EXERCISE 2022   SAQ/LAQ 9#/13#   Slant board y   sidestep Blue foam   Nu-step 10' (2)   TKE grn   Tandem stand *NMR x   TG minisquat Level 16   Lat walk green   Bridges w/band green   Posterior gait grn   Balance board *NMR x   Sit-stand Chair height                                 ROM 0-95+. ASSESSMENT  [x]  See Plan of Care  [x]  Patient will continue to benefit from skilled therapy to address remaining functional deficits:     Mild objective improvements in flexion today. Still with subjective symptoms but they do center around attempts at increasing activity such as her effort reported today with descending stairs.      PLAN  Continue with current plan of care and progress as appropriate towards functional goals.   [x]  Upgrade activities as tolerated     [x]  Continue plan of care  []  Discharge due to:_  [] Other:_       Monie North PT, DPT  11/1/2022    12:22 PM

## 2022-11-08 ENCOUNTER — OFFICE VISIT (OUTPATIENT)
Dept: ORTHOPEDIC SURGERY | Age: 72
End: 2022-11-08
Payer: MEDICARE

## 2022-11-08 DIAGNOSIS — M17.12 PRIMARY OSTEOARTHRITIS OF LEFT KNEE: ICD-10-CM

## 2022-11-08 DIAGNOSIS — Z96.652 STATUS POST TOTAL LEFT KNEE REPLACEMENT: Primary | ICD-10-CM

## 2022-11-08 DIAGNOSIS — G89.18 POSTOPERATIVE PAIN OF LEFT KNEE: ICD-10-CM

## 2022-11-08 DIAGNOSIS — M25.562 POSTOPERATIVE PAIN OF LEFT KNEE: ICD-10-CM

## 2022-11-08 PROCEDURE — 97110 THERAPEUTIC EXERCISES: CPT | Performed by: PHYSICAL THERAPIST

## 2022-11-08 PROCEDURE — 97112 NEUROMUSCULAR REEDUCATION: CPT | Performed by: PHYSICAL THERAPIST

## 2022-11-08 PROCEDURE — 97140 MANUAL THERAPY 1/> REGIONS: CPT | Performed by: PHYSICAL THERAPIST

## 2022-11-08 NOTE — PROGRESS NOTES
Patient Name: Matilda Evans  Date:2022  : 1950  [x]  Patient  Verified  Payor: Phu Delaney / Plan: [place] MEDICARE PART A & B / Product Type: Medicare /    Total Treatment Time (min): 55  1:1 Treatment Time ( [address] only): 55  Referring provider: Katie Zuniga MD    1. Status post total left knee replacement  2. Primary osteoarthritis of left knee  3. Postoperative pain of left knee    SUBJECTIVE    Patient states that she feels ok, but is always hurting herself. OBJECTIVE    Modality:   []  E-Stim: type _ x _ min     []att   []unatt   []w/US   []w/ice   []w/heat  []  Ultrasound: []cont   []pulse    _ W/cm2 x _  min   []1MHz   []3MHz  []  Ice pack _  Post       [] Hot pack _  Pre       []  Other:    Man: 20 min  PF/TF mobilization in multiple angles of flexion/extension while supine. Myofascial techniques to the incision line, popliteus, and anterior knee. Edema massage to the knee. Passive quad/hamstring stretching. NMR: 5 min  Neuromuscular reeducation/proprioceptive training listed in exercise below. Ex: 30 min  Therapeutic exercise/strength/endurance completed here in clinic today per the exercise log. PT Exercise Log         EXERCISE 2022   SAQ/LAQ 9#/13#   Slant board y   sidestep Blue foam   Nu-step 10' (2)   TKE grn   Tandem stand *NMR x   TG minisquat Level 16   Lat walk green   Bridges w/band green   Posterior gait grn   Balance board *NMR x   Sit-stand Chair height   Step stretch  10x10\"                             ROM 0-95+. ASSESSMENT  [x]  See Plan of Care  [x]  Patient will continue to benefit from skilled therapy to address remaining functional deficits:     Patient continues to be stiff in knee flexion. Encouraged patient to work on knee flexion throughout the day. Tolerating exercise. Needs vc to ambulate w/increased knee flexion. PLAN  Continue with current plan of care and progress as appropriate towards functional goals.   [x]  Upgrade activities as tolerated     [x]  Continue plan of care  []  Discharge due to:_  [] Other:_       Tuan Chua, PT  11/8/2022    12:22 PM

## 2022-12-06 ENCOUNTER — OFFICE VISIT (OUTPATIENT)
Dept: ORTHOPEDIC SURGERY | Age: 72
End: 2022-12-06
Payer: MEDICARE

## 2022-12-06 DIAGNOSIS — G89.29 CHRONIC PAIN OF LEFT KNEE: ICD-10-CM

## 2022-12-06 DIAGNOSIS — M25.562 CHRONIC PAIN OF LEFT KNEE: ICD-10-CM

## 2022-12-06 DIAGNOSIS — Z96.652 HISTORY OF TOTAL KNEE ARTHROPLASTY, LEFT: Primary | ICD-10-CM

## 2022-12-06 PROCEDURE — G8756 NO BP MEASURE DOC: HCPCS | Performed by: ORTHOPAEDIC SURGERY

## 2022-12-06 PROCEDURE — G8417 CALC BMI ABV UP PARAM F/U: HCPCS | Performed by: ORTHOPAEDIC SURGERY

## 2022-12-06 PROCEDURE — 1123F ACP DISCUSS/DSCN MKR DOCD: CPT | Performed by: ORTHOPAEDIC SURGERY

## 2022-12-06 PROCEDURE — G8536 NO DOC ELDER MAL SCRN: HCPCS | Performed by: ORTHOPAEDIC SURGERY

## 2022-12-06 PROCEDURE — G8427 DOCREV CUR MEDS BY ELIG CLIN: HCPCS | Performed by: ORTHOPAEDIC SURGERY

## 2022-12-06 PROCEDURE — 1101F PT FALLS ASSESS-DOCD LE1/YR: CPT | Performed by: ORTHOPAEDIC SURGERY

## 2022-12-06 PROCEDURE — G8432 DEP SCR NOT DOC, RNG: HCPCS | Performed by: ORTHOPAEDIC SURGERY

## 2022-12-06 PROCEDURE — G9899 SCRN MAM PERF RSLTS DOC: HCPCS | Performed by: ORTHOPAEDIC SURGERY

## 2022-12-06 PROCEDURE — 1090F PRES/ABSN URINE INCON ASSESS: CPT | Performed by: ORTHOPAEDIC SURGERY

## 2022-12-06 PROCEDURE — 99213 OFFICE O/P EST LOW 20 MIN: CPT | Performed by: ORTHOPAEDIC SURGERY

## 2022-12-06 PROCEDURE — 3017F COLORECTAL CA SCREEN DOC REV: CPT | Performed by: ORTHOPAEDIC SURGERY
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
## 2022-12-06 NOTE — LETTER
12/6/2022    Patient: Suzan Collazo   YOB: 1950   Date of Visit: 12/6/2022     Itz Napoles MD  [address]  [address] [address]  Via Fax: [phone]    Dear Itz Napoles MD,      Thank you for referring Ms. Danial Gibbons to [place] for evaluation. My notes for this consultation are attached. If you have questions, please do not hesitate to call me. I look forward to following your patient along with you.       Sincerely,    Angela Ervin MD

## 2023-01-19 ENCOUNTER — OFFICE VISIT (OUTPATIENT)
Dept: ORTHOPEDIC SURGERY | Age: 73
End: 2023-01-19
Payer: MEDICARE

## 2023-01-19 DIAGNOSIS — M17.11 PRIMARY LOCALIZED OSTEOARTHRITIS OF RIGHT KNEE: Primary | ICD-10-CM

## 2023-01-19 DIAGNOSIS — E66.01 OBESITY, CLASS III, BMI 40-49.9 (MORBID OBESITY) (HCC): ICD-10-CM

## 2023-01-19 RX ORDER — BUPIVACAINE HYDROCHLORIDE 2.5 MG/ML
5 INJECTION, SOLUTION INFILTRATION; PERINEURAL ONCE
Status: COMPLETED | OUTPATIENT
Start: 2023-01-19 | End: 2023-01-19

## 2023-01-19 RX ORDER — METHYLPREDNISOLONE ACETATE 80 MG/ML
80 INJECTION, SUSPENSION INTRA-ARTICULAR; INTRALESIONAL; INTRAMUSCULAR; SOFT TISSUE ONCE
Status: COMPLETED | OUTPATIENT
Start: 2023-01-19 | End: 2023-01-19

## 2023-01-19 RX ADMIN — BUPIVACAINE HYDROCHLORIDE 12.5 MG: 2.5 INJECTION, SOLUTION INFILTRATION; PERINEURAL at 17:49

## 2023-01-19 RX ADMIN — METHYLPREDNISOLONE ACETATE 80 MG: 80 INJECTION, SUSPENSION INTRA-ARTICULAR; INTRALESIONAL; INTRAMUSCULAR; SOFT TISSUE at 17:49
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
## 2023-01-19 NOTE — LETTER
1/19/2023    Patient: Jenny Candelario   YOB: 1950   Date of Visit: 1/19/2023     Kimberly Polo MD  [address] [address]  Via Fax: [phone]    Dear Kimberly Polo MD,      Thank you for referring Ms. Viji Gruber to [facility] for evaluation. My notes for this consultation are attached. If you have questions, please do not hesitate to call me. I look forward to following your patient along with you.       Sincerely,    William Lowery MD

## 2023-03-13 DIAGNOSIS — Z96.652 HISTORY OF TOTAL KNEE ARTHROPLASTY, LEFT: Primary | ICD-10-CM

## 2023-03-13 RX ORDER — CLINDAMYCIN HYDROCHLORIDE 300 MG/1
300 CAPSULE ORAL 2 TIMES DAILY
Qty: 2 CAPSULE | Refills: 3 | Status: SHIPPED | OUTPATIENT
Start: 2023-03-13

## 2023-03-15 DIAGNOSIS — Z96.652 HISTORY OF TOTAL KNEE ARTHROPLASTY, LEFT: Primary | ICD-10-CM

## 2023-03-15 RX ORDER — CLINDAMYCIN HYDROCHLORIDE 300 MG/1
CAPSULE ORAL
Qty: 4 CAPSULE | Refills: 3 | Status: SHIPPED | OUTPATIENT
Start: 2023-03-15

## 2023-10-18 ENCOUNTER — HOSPITAL ENCOUNTER (OUTPATIENT)
Facility: HOSPITAL | Age: 73
Discharge: HOME | End: 2023-10-21
Payer: MEDICARE

## 2023-10-18 VITALS — WEIGHT: 250 LBS | HEIGHT: 64 IN | BODY MASS INDEX: 42.68 KG/M2

## 2023-10-18 DIAGNOSIS — Z12.31 VISIT FOR SCREENING MAMMOGRAM: ICD-10-CM

## 2023-10-18 PROCEDURE — 77063 BREAST TOMOSYNTHESIS BI: CPT

## 2023-11-09 ENCOUNTER — HOSPITAL ENCOUNTER (EMERGENCY)
Facility: HOSPITAL | Age: 73
Discharge: HOME | End: 2023-11-09
Attending: EMERGENCY MEDICINE
Payer: MEDICARE

## 2023-11-09 ENCOUNTER — OFFICE VISIT (OUTPATIENT)
Age: 73
End: 2023-11-09

## 2023-11-09 VITALS
DIASTOLIC BLOOD PRESSURE: 81 MMHG | BODY MASS INDEX: 42.68 KG/M2 | RESPIRATION RATE: 26 BRPM | SYSTOLIC BLOOD PRESSURE: 145 MMHG | HEIGHT: 64 IN | HEART RATE: 101 BPM | OXYGEN SATURATION: 94 % | WEIGHT: 250 LBS

## 2023-11-09 VITALS
SYSTOLIC BLOOD PRESSURE: 136 MMHG | HEART RATE: 83 BPM | HEIGHT: 64 IN | TEMPERATURE: 97.4 F | WEIGHT: 256.84 LBS | RESPIRATION RATE: 18 BRPM | OXYGEN SATURATION: 97 % | DIASTOLIC BLOOD PRESSURE: 81 MMHG | BODY MASS INDEX: 43.85 KG/M2

## 2023-11-09 DIAGNOSIS — T78.40XA ALLERGIC REACTION, INITIAL ENCOUNTER: Primary | ICD-10-CM

## 2023-11-09 DIAGNOSIS — T63.441A ALLERGIC REACTION TO BEE STING: Primary | ICD-10-CM

## 2023-11-09 LAB
COMMENT:: NORMAL
SPECIMEN HOLD: NORMAL

## 2023-11-09 PROCEDURE — 6360000002 HC RX W HCPCS: Performed by: EMERGENCY MEDICINE

## 2023-11-09 PROCEDURE — 96375 TX/PRO/DX INJ NEW DRUG ADDON: CPT

## 2023-11-09 PROCEDURE — 99284 EMERGENCY DEPT VISIT MOD MDM: CPT

## 2023-11-09 PROCEDURE — 2580000003 HC RX 258: Performed by: EMERGENCY MEDICINE

## 2023-11-09 PROCEDURE — 2500000003 HC RX 250 WO HCPCS: Performed by: EMERGENCY MEDICINE

## 2023-11-09 PROCEDURE — A4216 STERILE WATER/SALINE, 10 ML: HCPCS | Performed by: EMERGENCY MEDICINE

## 2023-11-09 PROCEDURE — 96374 THER/PROPH/DIAG INJ IV PUSH: CPT

## 2023-11-09 RX ORDER — DIPHENHYDRAMINE HYDROCHLORIDE 50 MG/ML
50 INJECTION INTRAMUSCULAR; INTRAVENOUS ONCE
Status: COMPLETED | OUTPATIENT
Start: 2023-11-09 | End: 2023-11-09

## 2023-11-09 RX ORDER — EPINEPHRINE 0.3 MG/.3ML
0.3 INJECTION SUBCUTANEOUS ONCE
Status: COMPLETED | OUTPATIENT
Start: 2023-11-09 | End: 2023-11-09

## 2023-11-09 RX ORDER — FAMOTIDINE 20 MG/1
20 TABLET, FILM COATED ORAL 2 TIMES DAILY
Qty: 10 TABLET | Refills: 0 | Status: SHIPPED | OUTPATIENT
Start: 2023-11-09 | End: 2023-11-14

## 2023-11-09 RX ORDER — PREDNISONE 50 MG/1
50 TABLET ORAL DAILY
Qty: 5 TABLET | Refills: 0 | Status: SHIPPED | OUTPATIENT
Start: 2023-11-09 | End: 2023-11-14

## 2023-11-09 RX ORDER — DIPHENHYDRAMINE HYDROCHLORIDE 50 MG/ML
25 INJECTION INTRAMUSCULAR; INTRAVENOUS
Status: COMPLETED | OUTPATIENT
Start: 2023-11-09 | End: 2023-11-09

## 2023-11-09 RX ORDER — EPINEPHRINE 0.3 MG/.3ML
0.3 INJECTION SUBCUTANEOUS ONCE
Qty: 2 EACH | Refills: 0 | Status: SHIPPED | OUTPATIENT
Start: 2023-11-09 | End: 2023-11-09

## 2023-11-09 RX ADMIN — DIPHENHYDRAMINE HYDROCHLORIDE 50 MG: 50 INJECTION INTRAMUSCULAR; INTRAVENOUS at 13:30

## 2023-11-09 RX ADMIN — FAMOTIDINE 20 MG: 10 INJECTION INTRAVENOUS at 14:23

## 2023-11-09 RX ADMIN — METHYLPREDNISOLONE SODIUM SUCCINATE 40 MG: 40 INJECTION, POWDER, FOR SOLUTION INTRAMUSCULAR; INTRAVENOUS at 14:23

## 2023-11-09 RX ADMIN — EPINEPHRINE 0.3 MG: 0.3 INJECTION SUBCUTANEOUS at 13:28

## 2023-11-09 RX ADMIN — DIPHENHYDRAMINE HYDROCHLORIDE 25 MG: 50 INJECTION INTRAMUSCULAR; INTRAVENOUS at 14:23

## 2023-11-09 ASSESSMENT — PAIN DESCRIPTION - LOCATION: LOCATION: NECK

## 2023-11-09 ASSESSMENT — ENCOUNTER SYMPTOMS
SORE THROAT: 0
COUGH: 0
VOMITING: 0

## 2023-11-09 ASSESSMENT — PAIN SCALES - GENERAL: PAINLEVEL_OUTOF10: 4

## 2023-11-09 ASSESSMENT — PAIN - FUNCTIONAL ASSESSMENT: PAIN_FUNCTIONAL_ASSESSMENT: 0-10

## 2023-11-09 NOTE — ED TRIAGE NOTES
Stung by a bee. Went to [facility] in [facility]. Given 1 dose IM epi, 50 mg benadryl. Feeling lightheaded, +cough. Denies feeling throat tightness or swelling.

## 2023-11-09 NOTE — PROGRESS NOTES
Kate Nguyen (:  1950) is a 68 y.o. female,Established patient, here for evaluation of the following chief complaint(s):  Insect Bite (Stung by a bee about 15-20 mins ago (1:00pm))      ASSESSMENT/PLAN:  Visit Diagnoses and Associated Orders       Allergic reaction, initial encounter    -  Primary    EPINEPHrine (EPIPEN) 0.3 MG/0.3ML injection 0.3 mg [[id number]]      diphenhydrAMINE (BENADRYL) injection 50 mg [[id number]]                   Pt is showing signs of anaphylactic reaction secondary to bee sting  EpiPen administered upon bringing patient to room  50mg of IM benadryl administered  EMS activated and patient is taken to hospital for monitoring and further treatment if needed  Pt departs with EMS in stable condition, reporting improved breathing    SUBJECTIVE/OBJECTIVE:    Insect Bite       68 y.o. female presents with symptoms of anaphylactic reaction to a bee sting that occurred about 10 minutes prior to arrival. 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 No benadryl taken yet. Sting occurred to right upper chest, there is a 2cm itchy hive at the location of the sting. She reports some itching on her right arm. No chest pain. She reports difficulty breathing, anxiety, tightness in her chest, and feeling like her airway is swelling. Vitals:    23 1320   BP: (!) 145/81   Pulse: (!) 101   SpO2: 94%   Weight: 113.4 kg (250 lb)   Height: 1.626 m (5' 4\")       No results found for this visit on 23. Physical Exam  Vitals and nursing note reviewed. Constitutional:       General: She is in acute distress. Appearance: Normal appearance. She is obese. HENT:      Head: Normocephalic and atraumatic. Right Ear: Tympanic membrane, ear canal and external ear normal. There is no impacted cerumen. Left Ear: Tympanic membrane, ear canal and external ear normal. There is no impacted cerumen.       Mouth/Throat:

## 2023-11-09 NOTE — ED NOTES
Discharge instructions reviewed with patient and/or family. Verbalized understanding. Denied further questions at this time.        Charlotte Gaspar RN  11/09/23 1446

## 2023-11-14 ENCOUNTER — OFFICE VISIT (OUTPATIENT)
Age: 73
End: 2023-11-14

## 2023-11-14 VITALS
DIASTOLIC BLOOD PRESSURE: 78 MMHG | WEIGHT: 255.9 LBS | SYSTOLIC BLOOD PRESSURE: 148 MMHG | HEIGHT: 64 IN | TEMPERATURE: 97.8 F | BODY MASS INDEX: 43.69 KG/M2 | HEART RATE: 94 BPM | OXYGEN SATURATION: 96 %

## 2023-11-14 DIAGNOSIS — J45.991 COUGH VARIANT ASTHMA: ICD-10-CM

## 2023-11-14 DIAGNOSIS — I10 ESSENTIAL HYPERTENSION: ICD-10-CM

## 2023-11-14 DIAGNOSIS — T78.40XA ALLERGIC REACTION, INITIAL ENCOUNTER: Primary | ICD-10-CM

## 2023-11-14 RX ORDER — ALBUTEROL SULFATE 90 UG/1
1-2 AEROSOL, METERED RESPIRATORY (INHALATION) 4 TIMES DAILY PRN
Qty: 18 G | Refills: 0 | Status: SHIPPED | OUTPATIENT
Start: 2023-11-14

## 2023-11-15 DIAGNOSIS — T78.40XA ALLERGIC REACTION, INITIAL ENCOUNTER: ICD-10-CM

## 2023-11-15 DIAGNOSIS — J45.991 COUGH VARIANT ASTHMA: ICD-10-CM

## 2023-11-16 RX ORDER — ALBUTEROL SULFATE 90 UG/1
1-2 AEROSOL, METERED RESPIRATORY (INHALATION) 4 TIMES DAILY PRN
Qty: 8.5 EACH | OUTPATIENT
Start: 2023-11-16

## 2023-11-22 NOTE — TELEPHONE ENCOUNTER
Additional diagnoses codes were added to the labs that were ordered on 7/24/2017 encounter and patient had done on 9/28/17. Please try to resubmit. Rifampin Counseling: I discussed with the patient the risks of rifampin including but not limited to liver damage, kidney damage, red-orange body fluids, nausea/vomiting and severe allergy.

## 2023-12-12 ENCOUNTER — HOSPITAL ENCOUNTER (OUTPATIENT)
Facility: HOSPITAL | Age: 73
Discharge: HOME | End: 2023-12-14
Payer: MEDICARE

## 2023-12-12 DIAGNOSIS — M79.89 LEFT LEG SWELLING: ICD-10-CM

## 2023-12-12 PROCEDURE — 93971 EXTREMITY STUDY: CPT

## 2024-04-05 DIAGNOSIS — J45.991 COUGH VARIANT ASTHMA: ICD-10-CM

## 2024-04-05 DIAGNOSIS — T78.40XA ALLERGIC REACTION, INITIAL ENCOUNTER: ICD-10-CM

## 2024-04-05 RX ORDER — ALBUTEROL SULFATE 90 UG/1
1-2 AEROSOL, METERED RESPIRATORY (INHALATION) 4 TIMES DAILY PRN
Qty: 8.5 EACH | OUTPATIENT
Start: 2024-04-05

## 2024-04-08 ENCOUNTER — HOSPITAL ENCOUNTER (OUTPATIENT)
Facility: HOSPITAL | Age: 74
Discharge: HOME | End: 2024-04-11
Attending: STUDENT IN AN ORGANIZED HEALTH CARE EDUCATION/TRAINING PROGRAM
Payer: MEDICARE

## 2024-04-08 ENCOUNTER — OFFICE VISIT (OUTPATIENT)
Age: 74
End: 2024-04-08
Payer: MEDICARE

## 2024-04-08 VITALS
HEART RATE: 90 BPM | OXYGEN SATURATION: 97 % | TEMPERATURE: 98.2 F | DIASTOLIC BLOOD PRESSURE: 79 MMHG | WEIGHT: 262 LBS | HEIGHT: 65 IN | RESPIRATION RATE: 17 BRPM | SYSTOLIC BLOOD PRESSURE: 122 MMHG | BODY MASS INDEX: 43.65 KG/M2

## 2024-04-08 DIAGNOSIS — I82.409 ACUTE DEEP VEIN THROMBOSIS (DVT) OF LOWER EXTREMITY, UNSPECIFIED LATERALITY, UNSPECIFIED VEIN (HCC): ICD-10-CM

## 2024-04-08 DIAGNOSIS — D72.829 LEUKOCYTOSIS, UNSPECIFIED TYPE: ICD-10-CM

## 2024-04-08 DIAGNOSIS — E66.01 OBESITY, CLASS III, BMI 40-49.9 (MORBID OBESITY) (HCC): ICD-10-CM

## 2024-04-08 DIAGNOSIS — D72.829 LEUKOCYTOSIS, UNSPECIFIED TYPE: Primary | ICD-10-CM

## 2024-04-08 LAB — ECHO BSA: 2.33 M2

## 2024-04-08 PROCEDURE — G8417 CALC BMI ABV UP PARAM F/U: HCPCS | Performed by: STUDENT IN AN ORGANIZED HEALTH CARE EDUCATION/TRAINING PROGRAM

## 2024-04-08 PROCEDURE — G8427 DOCREV CUR MEDS BY ELIG CLIN: HCPCS | Performed by: STUDENT IN AN ORGANIZED HEALTH CARE EDUCATION/TRAINING PROGRAM

## 2024-04-08 PROCEDURE — 1090F PRES/ABSN URINE INCON ASSESS: CPT | Performed by: STUDENT IN AN ORGANIZED HEALTH CARE EDUCATION/TRAINING PROGRAM

## 2024-04-08 PROCEDURE — 3074F SYST BP LT 130 MM HG: CPT | Performed by: STUDENT IN AN ORGANIZED HEALTH CARE EDUCATION/TRAINING PROGRAM

## 2024-04-08 PROCEDURE — 3017F COLORECTAL CA SCREEN DOC REV: CPT | Performed by: STUDENT IN AN ORGANIZED HEALTH CARE EDUCATION/TRAINING PROGRAM

## 2024-04-08 PROCEDURE — 99204 OFFICE O/P NEW MOD 45 MIN: CPT | Performed by: STUDENT IN AN ORGANIZED HEALTH CARE EDUCATION/TRAINING PROGRAM

## 2024-04-08 PROCEDURE — 3078F DIAST BP <80 MM HG: CPT | Performed by: STUDENT IN AN ORGANIZED HEALTH CARE EDUCATION/TRAINING PROGRAM

## 2024-04-08 PROCEDURE — 1123F ACP DISCUSS/DSCN MKR DOCD: CPT | Performed by: STUDENT IN AN ORGANIZED HEALTH CARE EDUCATION/TRAINING PROGRAM

## 2024-04-08 PROCEDURE — G8399 PT W/DXA RESULTS DOCUMENT: HCPCS | Performed by: STUDENT IN AN ORGANIZED HEALTH CARE EDUCATION/TRAINING PROGRAM

## 2024-04-08 PROCEDURE — 93970 EXTREMITY STUDY: CPT

## 2024-04-08 PROCEDURE — 1036F TOBACCO NON-USER: CPT | Performed by: STUDENT IN AN ORGANIZED HEALTH CARE EDUCATION/TRAINING PROGRAM

## 2024-04-08 RX ORDER — VITAMIN E 268 MG
400 CAPSULE ORAL DAILY
COMMUNITY

## 2024-04-08 RX ORDER — LOSARTAN POTASSIUM AND HYDROCHLOROTHIAZIDE 12.5; 1 MG/1; MG/1
1 TABLET ORAL DAILY
COMMUNITY
Start: 2024-02-29

## 2024-04-08 ASSESSMENT — ENCOUNTER SYMPTOMS
GASTROINTESTINAL NEGATIVE: 1
RESPIRATORY NEGATIVE: 1
EYES NEGATIVE: 1

## 2024-04-08 NOTE — PROGRESS NOTES
Candida Faulkner is a 73 y.o. female who presents for follow up of   Chief Complaint   Patient presents with    New Patient     Elevated WBC       Mrs. Faulkner is here today for an evaluation of elevated WBC She reports some swelling in her left leg, She denies any recurrent infections, fatigue, pain don night sweats rash or unexplained weight loss. She reports doing very  well overall. She has no other concerns at this time.       Medications reviewed with the patient, and chart updated to reflect changes.      Review of Systems   Constitutional: Negative.    HENT: Negative.     Eyes: Negative.    Respiratory: Negative.     Cardiovascular: Negative.    Gastrointestinal: Negative.    Endocrine: Negative.    Genitourinary: Negative.    Musculoskeletal: Negative.    Skin: Negative.    Allergic/Immunologic: Positive for environmental allergies.   Neurological: Negative.    Hematological: Negative.    Psychiatric/Behavioral: Negative.        
UNIT/GM powder Apply topically 3 times daily      nystatin-triamcinolone (MYCOLOG) 468121-4.1 UNIT/GM-% ointment Apply topically 2 times daily      famotidine (PEPCID) 20 MG tablet Take 1 tablet by mouth 2 times daily for 5 days 10 tablet 0    clindamycin (CLEOCIN) 300 MG capsule Take two one hour before dental  Take two one hour after dental      losartan (COZAAR) 100 MG tablet Take 100 mg by mouth every morning       No current facility-administered medications for this visit.       Allergies   Allergen Reactions    Latex Hives    Bee Venom Anaphylaxis    Chamomile Allison Shortness Of Breath     Fresh cut allison are worse      Ascorbic Acid     Penicillins Hives     Patient screened for any delayed non-IgE-mediated reaction to PCN.        Patient notes the following:    No delayed non-IgE-mediated reaction to PCN    Ascorbate Hives and Nausea And Vomiting     ALL FRUITS             Physical Exam:   /79 (Site: Right Upper Arm, Position: Sitting)   Pulse 90   Temp 98.2 °F (36.8 °C) (Oral)   Resp 17   Ht 1.638 m (5' 4.5\")   Wt 118.8 kg (262 lb)   SpO2 97%   BMI 44.28 kg/m²   ECOG PS: 0  General: alert, cooperative, no distress   Mental  status: normal mood, behavior, speech, dress, motor activity, and thought processes, able to follow commands   HENT: NCAT   Neck: no visualized mass   Resp: no respiratory distress   Neuro: no gross deficits   Skin: no discoloration or lesions of concern on visible areas   Psychiatric: normal affect, consistent with stated mood, no evidence of hallucinations         Results:     Lab Results   Component Value Date    WBC 14.5 (H) 06/23/2022    HGB 7.4 (L) 06/23/2022    HCT 20.4 (L) 06/23/2022    MCV 80.6 06/23/2022     (H) 06/23/2022     Lab Results   Component Value Date     06/23/2022    K 3.4 (L) 06/23/2022     06/23/2022    CO2 28 06/23/2022    BUN 13 06/23/2022    CREATININE 0.79 06/23/2022    GLUCOSE 107 (H) 06/23/2022    CALCIUM 8.6 06/23/2022

## 2024-04-17 DIAGNOSIS — D72.829 LEUKOCYTOSIS, UNSPECIFIED TYPE: ICD-10-CM

## 2024-04-18 LAB — ANA SER QL: NEGATIVE

## 2024-04-19 LAB
ALBUMIN SERPL-MCNC: 3.6 G/DL (ref 3.5–5)
ALBUMIN/GLOB SERPL: 0.9 (ref 1.1–2.2)
ALP SERPL-CCNC: 93 U/L (ref 45–117)
ALT SERPL-CCNC: 24 U/L (ref 12–78)
ANION GAP SERPL CALC-SCNC: 2 MMOL/L (ref 5–15)
AST SERPL-CCNC: 12 U/L (ref 15–37)
BASOPHILS # BLD: 0.1 K/UL (ref 0–0.1)
BASOPHILS NFR BLD: 1 % (ref 0–1)
BILIRUB SERPL-MCNC: 0.4 MG/DL (ref 0.2–1)
BUN SERPL-MCNC: 32 MG/DL (ref 6–20)
BUN/CREAT SERPL: 28 (ref 12–20)
CALCIUM SERPL-MCNC: 9.9 MG/DL (ref 8.5–10.1)
CHLORIDE SERPL-SCNC: 108 MMOL/L (ref 97–108)
CO2 SERPL-SCNC: 31 MMOL/L (ref 21–32)
CREAT SERPL-MCNC: 1.15 MG/DL (ref 0.55–1.02)
CRP SERPL-MCNC: 1.12 MG/DL (ref 0–0.3)
DIFFERENTIAL METHOD BLD: ABNORMAL
EOSINOPHIL # BLD: 0.3 K/UL (ref 0–0.4)
EOSINOPHIL NFR BLD: 2 % (ref 0–7)
ERYTHROCYTE [DISTWIDTH] IN BLOOD BY AUTOMATED COUNT: 15 % (ref 11.5–14.5)
ERYTHROCYTE [SEDIMENTATION RATE] IN BLOOD: 50 MM/HR (ref 0–30)
GLOBULIN SER CALC-MCNC: 3.8 G/DL (ref 2–4)
GLUCOSE SERPL-MCNC: 108 MG/DL (ref 65–100)
HCT VFR BLD AUTO: 33.4 % (ref 35–47)
HGB BLD-MCNC: 11.9 G/DL (ref 11.5–16)
IMM GRANULOCYTES # BLD AUTO: 0.1 K/UL (ref 0–0.04)
IMM GRANULOCYTES NFR BLD AUTO: 1 % (ref 0–0.5)
LDH SERPL L TO P-CCNC: 192 U/L (ref 81–246)
LYMPHOCYTES # BLD: 3.1 K/UL (ref 0.8–3.5)
LYMPHOCYTES NFR BLD: 27 % (ref 12–49)
MCH RBC QN AUTO: 29.2 PG (ref 26–34)
MCHC RBC AUTO-ENTMCNC: 35.6 G/DL (ref 30–36.5)
MCV RBC AUTO: 81.9 FL (ref 80–99)
MONOCYTES # BLD: 1.3 K/UL (ref 0–1)
MONOCYTES NFR BLD: 12 % (ref 5–13)
NEUTS SEG # BLD: 6.5 K/UL (ref 1.8–8)
NEUTS SEG NFR BLD: 57 % (ref 32–75)
NRBC # BLD: 0 K/UL (ref 0–0.01)
NRBC BLD-RTO: 0 PER 100 WBC
PERIPHERAL SMEAR, MD REVIEW: NORMAL
PLATELET # BLD AUTO: 435 K/UL (ref 150–400)
PMV BLD AUTO: 9.5 FL (ref 8.9–12.9)
POTASSIUM SERPL-SCNC: 4.7 MMOL/L (ref 3.5–5.1)
PROT SERPL-MCNC: 7.4 G/DL (ref 6.4–8.2)
RBC # BLD AUTO: 4.08 M/UL (ref 3.8–5.2)
SODIUM SERPL-SCNC: 141 MMOL/L (ref 136–145)
WBC # BLD AUTO: 11.3 K/UL (ref 3.6–11)

## 2024-04-22 ENCOUNTER — OFFICE VISIT (OUTPATIENT)
Age: 74
End: 2024-04-22
Payer: MEDICARE

## 2024-04-22 VITALS
OXYGEN SATURATION: 96 % | TEMPERATURE: 97.7 F | WEIGHT: 268 LBS | BODY MASS INDEX: 44.65 KG/M2 | DIASTOLIC BLOOD PRESSURE: 68 MMHG | HEART RATE: 96 BPM | RESPIRATION RATE: 18 BRPM | HEIGHT: 65 IN | SYSTOLIC BLOOD PRESSURE: 116 MMHG

## 2024-04-22 DIAGNOSIS — D72.829 LEUKOCYTOSIS, UNSPECIFIED TYPE: Primary | ICD-10-CM

## 2024-04-22 PROCEDURE — 1090F PRES/ABSN URINE INCON ASSESS: CPT | Performed by: STUDENT IN AN ORGANIZED HEALTH CARE EDUCATION/TRAINING PROGRAM

## 2024-04-22 PROCEDURE — 3074F SYST BP LT 130 MM HG: CPT | Performed by: STUDENT IN AN ORGANIZED HEALTH CARE EDUCATION/TRAINING PROGRAM

## 2024-04-22 PROCEDURE — 99214 OFFICE O/P EST MOD 30 MIN: CPT | Performed by: STUDENT IN AN ORGANIZED HEALTH CARE EDUCATION/TRAINING PROGRAM

## 2024-04-22 PROCEDURE — 3078F DIAST BP <80 MM HG: CPT | Performed by: STUDENT IN AN ORGANIZED HEALTH CARE EDUCATION/TRAINING PROGRAM

## 2024-04-22 PROCEDURE — 3017F COLORECTAL CA SCREEN DOC REV: CPT | Performed by: STUDENT IN AN ORGANIZED HEALTH CARE EDUCATION/TRAINING PROGRAM

## 2024-04-22 PROCEDURE — 1123F ACP DISCUSS/DSCN MKR DOCD: CPT | Performed by: STUDENT IN AN ORGANIZED HEALTH CARE EDUCATION/TRAINING PROGRAM

## 2024-04-22 PROCEDURE — G8427 DOCREV CUR MEDS BY ELIG CLIN: HCPCS | Performed by: STUDENT IN AN ORGANIZED HEALTH CARE EDUCATION/TRAINING PROGRAM

## 2024-04-22 PROCEDURE — G8399 PT W/DXA RESULTS DOCUMENT: HCPCS | Performed by: STUDENT IN AN ORGANIZED HEALTH CARE EDUCATION/TRAINING PROGRAM

## 2024-04-22 PROCEDURE — 1036F TOBACCO NON-USER: CPT | Performed by: STUDENT IN AN ORGANIZED HEALTH CARE EDUCATION/TRAINING PROGRAM

## 2024-04-22 PROCEDURE — G8417 CALC BMI ABV UP PARAM F/U: HCPCS | Performed by: STUDENT IN AN ORGANIZED HEALTH CARE EDUCATION/TRAINING PROGRAM

## 2024-04-22 NOTE — PROGRESS NOTES
Chief Complaint   Patient presents with    Follow-up     Leukocytosis        /68 (Site: Right Upper Arm, Position: Sitting, Cuff Size: Large Adult)   Pulse 96   Temp 97.7 °F (36.5 °C) (Oral)   Resp 18   Ht 1.638 m (5' 4.5\")   Wt 121.6 kg (268 lb)   SpO2 96%   BMI 45.29 kg/m²      1. Have you been to the ER, urgent care clinic since your last visit?  Hospitalized since your last visit?No    2. Have you seen or consulted any other health care providers outside of the [facility] since your last visit?  Include any pap smears or colon screening. Yes Where: PCP Dr Burdick

## 2024-04-22 NOTE — PROGRESS NOTES
Cancer [place] at [facility]  [address] [address], [place] [address]  W: [phone] F: [phone]    Reason for Visit:   Candida Faulkner is a 73 y.o. female who is seen in consultation at the request of Dr. Burdick for evaluation of Leukocytosis, Thrombocytosis.      Hematology / Oncology Treatment Information:     Hematological/Oncological Diagnosis: Leukocytosis, Thrombocytosis     Date of Diagnosis: 2/2024    Oncology/Hematology Treatment Course:   Treatment course:   1) Surveillance        Pathology and Molecular Testing:       Initial Presentation  (HPI):     73 year old female with history of OA, Asthma, hx of left rotator cuff injury (active), carpal tunnel of right wrist, hypertension, seasonal allergies, who presents for evaluation of elevated WBC and PLT count.     Workup from PCP in February shows mild neutrophil predominant leukocytosis to >14, with elevated PLT of about 480's.  No iron studies on record.     She has new complaints of left leg swelling, isolated. She has pain behind left calf.  No other clinical symptoms of malignancy. No night sweats, no increased frequency of infection.  Overall in good spirits.      Family history reviewed, non contributory  Social history reviewed, also non contributory    She is a non-smoker    Interval History:     4/22/24 4/22/24  History of Present Illness  The patient is a patient who I saw about 3 weeks ago who had complaints of left-sided shoulder pain with rotator cuff injury as well as leukocytosis that was neutrophil predominant and elevated platelet counts. She also had some left leg swelling and pain behind her left calf. On my review of her labs at that time, differential did not show any obvious concerning changes. She presents today for follow-up and review of hematological work-up.      The patient (or guardian, if applicable) and other individuals in attendance with the patient

## 2024-06-19 LAB — FLOW CYTOMETRY: NORMAL

## 2024-08-16 ENCOUNTER — TRANSCRIBE ORDERS (OUTPATIENT)
Facility: HOSPITAL | Age: 74
End: 2024-08-16

## 2024-08-16 DIAGNOSIS — Z12.31 VISIT FOR SCREENING MAMMOGRAM: Primary | ICD-10-CM

## 2024-08-30 ENCOUNTER — HOSPITAL ENCOUNTER (OUTPATIENT)
Facility: HOSPITAL | Age: 74
End: 2024-08-30
Attending: INTERNAL MEDICINE
Payer: MEDICARE

## 2024-08-30 DIAGNOSIS — R91.8 MULTIPLE LUNG NODULES ON CT: ICD-10-CM

## 2024-08-30 LAB — CREAT BLD-MCNC: 1.3 MG/DL (ref 0.6–1.3)

## 2024-08-30 PROCEDURE — 6360000004 HC RX CONTRAST MEDICATION: Performed by: RADIOLOGY

## 2024-08-30 PROCEDURE — 82565 ASSAY OF CREATININE: CPT

## 2024-08-30 PROCEDURE — 71260 CT THORAX DX C+: CPT

## 2024-08-30 RX ORDER — IOPAMIDOL 612 MG/ML
100 INJECTION, SOLUTION INTRAVASCULAR
Status: COMPLETED | OUTPATIENT
Start: 2024-08-30 | End: 2024-08-30

## 2024-08-30 RX ADMIN — IOPAMIDOL 100 ML: 612 INJECTION, SOLUTION INTRAVENOUS at 08:12

## 2024-10-22 ENCOUNTER — OFFICE VISIT (OUTPATIENT)
Age: 74
End: 2024-10-22
Payer: MEDICARE

## 2024-10-22 VITALS
BODY MASS INDEX: 44.73 KG/M2 | SYSTOLIC BLOOD PRESSURE: 123 MMHG | RESPIRATION RATE: 17 BRPM | HEIGHT: 64 IN | DIASTOLIC BLOOD PRESSURE: 63 MMHG | TEMPERATURE: 97.9 F | OXYGEN SATURATION: 98 % | WEIGHT: 262 LBS | HEART RATE: 86 BPM

## 2024-10-22 DIAGNOSIS — D72.820 LYMPHOCYTOSIS: ICD-10-CM

## 2024-10-22 DIAGNOSIS — D72.829 LEUKOCYTOSIS, UNSPECIFIED TYPE: Primary | ICD-10-CM

## 2024-10-22 DIAGNOSIS — D75.839 THROMBOCYTOSIS: ICD-10-CM

## 2024-10-22 DIAGNOSIS — D72.829 LEUKOCYTOSIS, UNSPECIFIED TYPE: ICD-10-CM

## 2024-10-22 LAB
BASOPHILS # BLD: 0.1 K/UL (ref 0–0.1)
BASOPHILS NFR BLD: 1 % (ref 0–1)
DIFFERENTIAL METHOD BLD: ABNORMAL
EOSINOPHIL # BLD: 0.2 K/UL (ref 0–0.4)
EOSINOPHIL NFR BLD: 2 % (ref 0–7)
ERYTHROCYTE [DISTWIDTH] IN BLOOD BY AUTOMATED COUNT: 16.5 % (ref 11.5–14.5)
ERYTHROCYTE [SEDIMENTATION RATE] IN BLOOD: 47 MM/HR (ref 0–30)
HCT VFR BLD AUTO: 33 % (ref 35–47)
HGB BLD-MCNC: 11.8 G/DL (ref 11.5–16)
IMM GRANULOCYTES # BLD AUTO: 0 K/UL (ref 0–0.04)
IMM GRANULOCYTES NFR BLD AUTO: 0 % (ref 0–0.5)
LYMPHOCYTES # BLD: 3.1 K/UL (ref 0.8–3.5)
LYMPHOCYTES NFR BLD: 30 % (ref 12–49)
MCH RBC QN AUTO: 28.8 PG (ref 26–34)
MCHC RBC AUTO-ENTMCNC: 35.8 G/DL (ref 30–36.5)
MCV RBC AUTO: 80.5 FL (ref 80–99)
MONOCYTES # BLD: 1.2 K/UL (ref 0–1)
MONOCYTES NFR BLD: 12 % (ref 5–13)
NEUTS SEG # BLD: 5.8 K/UL (ref 1.8–8)
NEUTS SEG NFR BLD: 55 % (ref 32–75)
NRBC # BLD: 0 K/UL (ref 0–0.01)
NRBC BLD-RTO: 0 PER 100 WBC
PLATELET # BLD AUTO: 437 K/UL (ref 150–400)
PMV BLD AUTO: 9.6 FL (ref 8.9–12.9)
RBC # BLD AUTO: 4.1 M/UL (ref 3.8–5.2)
WBC # BLD AUTO: 10.4 K/UL (ref 3.6–11)

## 2024-10-22 PROCEDURE — G8399 PT W/DXA RESULTS DOCUMENT: HCPCS | Performed by: NURSE PRACTITIONER

## 2024-10-22 PROCEDURE — 99213 OFFICE O/P EST LOW 20 MIN: CPT | Performed by: NURSE PRACTITIONER

## 2024-10-22 PROCEDURE — 1123F ACP DISCUSS/DSCN MKR DOCD: CPT | Performed by: NURSE PRACTITIONER

## 2024-10-22 PROCEDURE — G8484 FLU IMMUNIZE NO ADMIN: HCPCS | Performed by: NURSE PRACTITIONER

## 2024-10-22 PROCEDURE — 1090F PRES/ABSN URINE INCON ASSESS: CPT | Performed by: NURSE PRACTITIONER

## 2024-10-22 PROCEDURE — G8417 CALC BMI ABV UP PARAM F/U: HCPCS | Performed by: NURSE PRACTITIONER

## 2024-10-22 PROCEDURE — 3074F SYST BP LT 130 MM HG: CPT | Performed by: NURSE PRACTITIONER

## 2024-10-22 PROCEDURE — G8427 DOCREV CUR MEDS BY ELIG CLIN: HCPCS | Performed by: NURSE PRACTITIONER

## 2024-10-22 PROCEDURE — 3078F DIAST BP <80 MM HG: CPT | Performed by: NURSE PRACTITIONER

## 2024-10-22 PROCEDURE — 3017F COLORECTAL CA SCREEN DOC REV: CPT | Performed by: NURSE PRACTITIONER

## 2024-10-22 PROCEDURE — 1036F TOBACCO NON-USER: CPT | Performed by: NURSE PRACTITIONER

## 2024-10-22 NOTE — PROGRESS NOTES
Candida Faulkner is a 74 y.o. female who presents for follow up of   Chief Complaint   Patient presents with    Follow-up     Leukocytosis       The patient reports no new clinical symptoms or new complaints since last clinic evaluation. She is here for a lab follow up. No other concerns at this time.       No interval hospitalizations reported    No interval surgery or procedures reported    No reported new medication changes reported       Medications reviewed with the patient, and chart updated to reflect changes.        
    Lab Results   Component Value Date    WBC 11.3 (H) 04/17/2024    HGB 11.9 04/17/2024    HCT 33.4 (L) 04/17/2024    MCV 81.9 04/17/2024     (H) 04/17/2024     Lab Results   Component Value Date     04/17/2024    K 4.7 04/17/2024     04/17/2024    CO2 31 04/17/2024    BUN 32 (H) 04/17/2024    CREATININE 1.30 08/30/2024    GLUCOSE 108 (H) 04/17/2024    CALCIUM 9.9 04/17/2024    BILITOT 0.4 04/17/2024    ALKPHOS 93 04/17/2024    AST 12 (L) 04/17/2024    ALT 24 04/17/2024    LABGLOM 51 (L) 04/17/2024    GFRAA >60 06/23/2022    AGRATIO 0.6 (L) 06/19/2022    GLOB 3.8 04/17/2024     OSH labs reviewed today 10/22/24    Assessment:     # Leukocytosis of unclear etiology with associated Neutrophilia  Lab Results   Component Value Date    HGB 11.9 04/17/2024     Lab Results   Component Value Date    WBC 11.3 (H) 04/17/2024       - Workup completed.    Lab Results   Component Value Date    NEUTROABS 6.5 04/17/2024     Smear shows reactive lymphocytes.  Flow cytometry is pending    ESR/CRP are both elevated - supportive of inflammatory reactive changes for cause of neutrophilia.     LDH was normal. CMP showed some dehydration with elevated BUN of 32 and a creatinine of 1.15. Sedimentation rate was elevated at 50. CRP at 1.12. ELEUTERIO was normal. Hemoglobin improved to 11.3 with an ANC that was normal at 6.5. Monocytes were slightly increased at 1.3. No nucleated red cells at this time.    Peripheral smear showed evidence of atypical lymphocytes that appeared reactive in nature. Ultrasounds of the legs did not show any blood clots.    - Check CBC to trend today      The patient will continue to be seen long-term as part of ongoing care related to her serious or complex medical condition.    Case discussed with Dr. Maricruz Diallo    Follow up with me in 6 months to trend CBC/ANC    Orders Placed This Visit:     Orders Placed This Encounter   Procedures    CBC with Auto Differential    Sedimentation Rate     Return

## 2024-10-24 ENCOUNTER — TELEPHONE (OUTPATIENT)
Age: 74
End: 2024-10-24

## 2024-10-24 NOTE — TELEPHONE ENCOUNTER
----- Message from REBECCA Mercer CNP sent at 10/23/2024  1:27 PM EDT -----  Labs are stable overall  ----- Message -----  From: Ivan Falk Incoming [place] W/Jerri Micro  Sent: 10/22/2024   4:47 PM EDT  To: REBECCA Sanchez CNP

## 2024-10-24 NOTE — TELEPHONE ENCOUNTER
Call placed to pt. HIPAA verified by two patient identifiers.     Pt made aware labs stable per provider message. Pt verbalized understanding. Nurse thanked for call.

## 2024-11-07 ENCOUNTER — HOSPITAL ENCOUNTER (OUTPATIENT)
Facility: HOSPITAL | Age: 74
Discharge: HOME | End: 2024-11-10
Payer: MEDICARE

## 2024-11-07 VITALS — HEIGHT: 65 IN | BODY MASS INDEX: 43.65 KG/M2 | WEIGHT: 262 LBS

## 2024-11-07 DIAGNOSIS — Z12.31 VISIT FOR SCREENING MAMMOGRAM: ICD-10-CM

## 2024-11-07 PROCEDURE — 77063 BREAST TOMOSYNTHESIS BI: CPT

## 2025-04-03 ENCOUNTER — TRANSCRIBE ORDERS (OUTPATIENT)
Facility: HOSPITAL | Age: 75
End: 2025-04-03

## 2025-04-03 DIAGNOSIS — Z78.0 POSTMENOPAUSAL: Primary | ICD-10-CM

## 2025-04-14 ENCOUNTER — HOSPITAL ENCOUNTER (OUTPATIENT)
Facility: HOSPITAL | Age: 75
Discharge: HOME | End: 2025-04-17
Payer: MEDICARE

## 2025-04-14 DIAGNOSIS — Z78.0 POSTMENOPAUSAL: ICD-10-CM

## 2025-04-14 PROCEDURE — 77080 DXA BONE DENSITY AXIAL: CPT

## 2025-04-22 ENCOUNTER — OFFICE VISIT (OUTPATIENT)
Age: 75
End: 2025-04-22
Payer: MEDICARE

## 2025-04-22 VITALS
TEMPERATURE: 98.4 F | SYSTOLIC BLOOD PRESSURE: 107 MMHG | DIASTOLIC BLOOD PRESSURE: 67 MMHG | HEART RATE: 83 BPM | BODY MASS INDEX: 42.99 KG/M2 | HEIGHT: 65 IN | WEIGHT: 258 LBS

## 2025-04-22 DIAGNOSIS — D75.839 THROMBOCYTOSIS: ICD-10-CM

## 2025-04-22 DIAGNOSIS — D72.829 LEUKOCYTOSIS, UNSPECIFIED TYPE: ICD-10-CM

## 2025-04-22 DIAGNOSIS — D72.829 LEUKOCYTOSIS, UNSPECIFIED TYPE: Primary | ICD-10-CM

## 2025-04-22 LAB
BASOPHILS # BLD: 0.05 K/UL (ref 0–0.1)
BASOPHILS NFR BLD: 0.5 % (ref 0–1)
DIFFERENTIAL METHOD BLD: ABNORMAL
EOSINOPHIL # BLD: 0.13 K/UL (ref 0–0.4)
EOSINOPHIL NFR BLD: 1.2 % (ref 0–7)
ERYTHROCYTE [DISTWIDTH] IN BLOOD BY AUTOMATED COUNT: 15.3 % (ref 11.5–14.5)
ERYTHROCYTE [SEDIMENTATION RATE] IN BLOOD: 32 MM/HR (ref 0–30)
HCT VFR BLD AUTO: 33.2 % (ref 35–47)
HGB BLD-MCNC: 12 G/DL (ref 11.5–16)
IMM GRANULOCYTES # BLD AUTO: 0.04 K/UL (ref 0–0.04)
IMM GRANULOCYTES NFR BLD AUTO: 0.4 % (ref 0–0.5)
LYMPHOCYTES # BLD: 3.11 K/UL (ref 0.8–3.5)
LYMPHOCYTES NFR BLD: 28 % (ref 12–49)
MCH RBC QN AUTO: 28.8 PG (ref 26–34)
MCHC RBC AUTO-ENTMCNC: 36.1 G/DL (ref 30–36.5)
MCV RBC AUTO: 79.6 FL (ref 80–99)
MONOCYTES # BLD: 1.22 K/UL (ref 0–1)
MONOCYTES NFR BLD: 11 % (ref 5–13)
NEUTS SEG # BLD: 6.55 K/UL (ref 1.8–8)
NEUTS SEG NFR BLD: 58.9 % (ref 32–75)
NRBC # BLD: 0 K/UL (ref 0–0.01)
NRBC BLD-RTO: 0 PER 100 WBC
PLATELET # BLD AUTO: 411 K/UL (ref 150–400)
PMV BLD AUTO: 9.6 FL (ref 8.9–12.9)
RBC # BLD AUTO: 4.17 M/UL (ref 3.8–5.2)
WBC # BLD AUTO: 11.1 K/UL (ref 3.6–11)

## 2025-04-22 PROCEDURE — 3074F SYST BP LT 130 MM HG: CPT | Performed by: STUDENT IN AN ORGANIZED HEALTH CARE EDUCATION/TRAINING PROGRAM

## 2025-04-22 PROCEDURE — 1036F TOBACCO NON-USER: CPT | Performed by: STUDENT IN AN ORGANIZED HEALTH CARE EDUCATION/TRAINING PROGRAM

## 2025-04-22 PROCEDURE — 1090F PRES/ABSN URINE INCON ASSESS: CPT | Performed by: STUDENT IN AN ORGANIZED HEALTH CARE EDUCATION/TRAINING PROGRAM

## 2025-04-22 PROCEDURE — G8428 CUR MEDS NOT DOCUMENT: HCPCS | Performed by: STUDENT IN AN ORGANIZED HEALTH CARE EDUCATION/TRAINING PROGRAM

## 2025-04-22 PROCEDURE — 1123F ACP DISCUSS/DSCN MKR DOCD: CPT | Performed by: STUDENT IN AN ORGANIZED HEALTH CARE EDUCATION/TRAINING PROGRAM

## 2025-04-22 PROCEDURE — 99214 OFFICE O/P EST MOD 30 MIN: CPT | Performed by: STUDENT IN AN ORGANIZED HEALTH CARE EDUCATION/TRAINING PROGRAM

## 2025-04-22 PROCEDURE — 3017F COLORECTAL CA SCREEN DOC REV: CPT | Performed by: STUDENT IN AN ORGANIZED HEALTH CARE EDUCATION/TRAINING PROGRAM

## 2025-04-22 PROCEDURE — G8417 CALC BMI ABV UP PARAM F/U: HCPCS | Performed by: STUDENT IN AN ORGANIZED HEALTH CARE EDUCATION/TRAINING PROGRAM

## 2025-04-22 PROCEDURE — G8399 PT W/DXA RESULTS DOCUMENT: HCPCS | Performed by: STUDENT IN AN ORGANIZED HEALTH CARE EDUCATION/TRAINING PROGRAM

## 2025-04-22 PROCEDURE — 3078F DIAST BP <80 MM HG: CPT | Performed by: STUDENT IN AN ORGANIZED HEALTH CARE EDUCATION/TRAINING PROGRAM

## 2025-04-22 NOTE — PROGRESS NOTES
Cancer [place] at [facility]  [address], [address]  [place] [address]  W: [phone]  F: [phone]    Hematology/Oncology Office Note:     Reason for Visit:     Candida Faulkner is a 74 y.o. female who is seen in consultation at the request of Dr. Burdick for evaluation of Leukocytosis, Thrombocytosis.    Hematology / Oncology Treatment Information:     Hematological/Oncological Diagnosis: Leukocytosis, Thrombocytosis     Date of Diagnosis: 2/2024    Oncology/Hematology Treatment Course:     Treatment course:   1) Surveillance      Pathology and Molecular Testing:       Initial Presentation  (HPI):     Pleasant 74 y.o. female with history of OA, Asthma, hx of left rotator cuff injury (active), carpal tunnel of right wrist, hypertension, seasonal allergies, who presents for evaluation of elevated WBC and PLT count.     Workup from PCP in February shows mild neutrophil predominant leukocytosis to >14, with elevated PLT of about 480's.  No iron studies on record.     She has new complaints of left leg swelling, isolated. She has pain behind left calf.  No other clinical symptoms of malignancy. No night sweats, no increased frequency of infection.  Overall in good spirits.      Family history reviewed, non contributory  Social history reviewed, also non contributory    She is a non-smoker    Interval History:   4/22/25  Patient is doing well overall, no new issues.   Denies fevers/chills, night sweats, unintentional weight loss. No chest pain and shortness of breath. No recurrent infections.   She has not had labs drawn for this visit.     Review of Systems: A complete review of systems was obtained, negative except as described above.    Past Medical History:   Diagnosis Date    Aneurysm of thoracic aorta 01/2017    saw Dr. GETACHEW Coles, then Dr. ROQUE     Asthma     Hypertension     Knee osteoarthritis     managed with diclofenac po prn. sees Dr. JUAN Salinas at [facility]. has had

## 2025-06-24 ENCOUNTER — ANESTHESIA (OUTPATIENT)
Facility: HOSPITAL | Age: 75
End: 2025-06-24
Payer: MEDICARE

## 2025-06-24 ENCOUNTER — HOSPITAL ENCOUNTER (OUTPATIENT)
Facility: HOSPITAL | Age: 75
Setting detail: OUTPATIENT SURGERY
Discharge: HOME | End: 2025-06-24
Attending: SPECIALIST | Admitting: SPECIALIST
Payer: MEDICARE

## 2025-06-24 ENCOUNTER — ANESTHESIA EVENT (OUTPATIENT)
Facility: HOSPITAL | Age: 75
End: 2025-06-24
Payer: MEDICARE

## 2025-06-24 VITALS
WEIGHT: 258 LBS | BODY MASS INDEX: 42.99 KG/M2 | OXYGEN SATURATION: 98 % | TEMPERATURE: 97.7 F | SYSTOLIC BLOOD PRESSURE: 128 MMHG | HEART RATE: 92 BPM | HEIGHT: 65 IN | RESPIRATION RATE: 16 BRPM | DIASTOLIC BLOOD PRESSURE: 78 MMHG

## 2025-06-24 PROCEDURE — 2709999900 HC NON-CHARGEABLE SUPPLY: Performed by: SPECIALIST

## 2025-06-24 PROCEDURE — 3700000001 HC ADD 15 MINUTES (ANESTHESIA): Performed by: SPECIALIST

## 2025-06-24 PROCEDURE — 2580000003 HC RX 258: Performed by: NURSE ANESTHETIST, CERTIFIED REGISTERED

## 2025-06-24 PROCEDURE — 7100000011 HC PHASE II RECOVERY - ADDTL 15 MIN: Performed by: SPECIALIST

## 2025-06-24 PROCEDURE — 3600007512: Performed by: SPECIALIST

## 2025-06-24 PROCEDURE — 7100000010 HC PHASE II RECOVERY - FIRST 15 MIN: Performed by: SPECIALIST

## 2025-06-24 PROCEDURE — 6360000002 HC RX W HCPCS: Performed by: NURSE ANESTHETIST, CERTIFIED REGISTERED

## 2025-06-24 PROCEDURE — 3700000000 HC ANESTHESIA ATTENDED CARE: Performed by: SPECIALIST

## 2025-06-24 PROCEDURE — 3600007502: Performed by: SPECIALIST

## 2025-06-24 RX ORDER — 0.9 % SODIUM CHLORIDE 0.9 %
INTRAVENOUS SOLUTION INTRAVENOUS
Status: DISCONTINUED | OUTPATIENT
Start: 2025-06-24 | End: 2025-06-24 | Stop reason: SDUPTHER

## 2025-06-24 RX ORDER — LIDOCAINE HYDROCHLORIDE 20 MG/ML
INJECTION, SOLUTION EPIDURAL; INFILTRATION; INTRACAUDAL; PERINEURAL
Status: DISCONTINUED | OUTPATIENT
Start: 2025-06-24 | End: 2025-06-24 | Stop reason: SDUPTHER

## 2025-06-24 RX ADMIN — LIDOCAINE HYDROCHLORIDE 20 MG: 20 INJECTION, SOLUTION EPIDURAL; INFILTRATION; INTRACAUDAL; PERINEURAL at 10:38

## 2025-06-24 RX ADMIN — Medication 500 ML: at 11:03

## 2025-06-24 ASSESSMENT — PAIN - FUNCTIONAL ASSESSMENT: PAIN_FUNCTIONAL_ASSESSMENT: NONE - DENIES PAIN
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
## 2025-06-24 NOTE — OP NOTE
[facility]  [address], [place] [address]                 Colonoscopy Procedure Note    Indications:   See Preoperative Diagnosis above  Referring Physician: Aminah Burdick MD  Anesthesia/Sedation: MAC anesthesia Propofol  Endoscopist:  Dr. Mack Brasher  Assistant:  Circulator: Keya Castillo RN  Endoscopy Technician: Katrin Reyes  Preoperative diagnosis: Colon cancer screening [Z12.11]    Procedure in Detail:  Informed consent was obtained for the procedure, including sedation.  Risks of perforation, hemorrhage, adverse drug reaction, and aspiration were discussed. The patient was placed in the left lateral decubitus position.  Based on the pre-procedure assessment, including review of the patient's medical history, medications, allergies, and review of systems, she had been deemed to be an appropriate candidate for  sedation; she was therefore sedated with the medications listed above.   The patient was monitored continuously with ECG tracing, pulse oximetry, blood pressure monitoring, and direct observations.      A rectal examination was performed. The [id number] was inserted into the rectum and advanced under direct vision to the cecum, which was identified by the ileocecal valve and appendiceal orifice.  The quality of the colonic preparation was good.  A careful inspection was made as the colonoscope was withdrawn, including a retroflexed view of the rectum; findings and interventions are described below.  Appropriate photodocumentation was obtained.    Findings:  Rectum: normal  Sigmoid: mild diverticulosis;  Descending Colon: normal  Transverse Colon: normal  Ascending Colon: normal  Cecum: normal    Specimens:    * No specimens in log *    EBL: None    Complications: None; patient tolerated the procedure well.    Recommendations:     - High fiber diet.  No follow up colonoscopy needed due to age.      Signed By: Mack Brasher MD                        June 24,

## 2025-06-24 NOTE — DISCHARGE INSTRUCTIONS
Candida Faulkner  [id number]  1950    COLON DISCHARGE INSTRUCTIONS  Discomfort:  Redness at IV site- apply warm compress to area; if redness or soreness persist- contact your physician  There may be a slight amount of blood passed from the rectum  Gaseous discomfort- walking, belching will help relieve any discomfort    DIET:   High fiber diet.   - however -  remember your colon is empty and a heavy meal will produce gas.   Avoid these foods:  vegetables, fried / greasy foods, carbonated drinks for today.   You may not drink alcoholic beverages for at least 12 hours    MEDICATIONS:   Regarding Aspirin or Nonsteroidal medications, please see below.    ACTIVITY:  You may resume your normal daily activities it is recommended that you spend the remainder of the day resting -  avoid any strenuous activity.  You may not operate a vehicle for 12 hours  You may not engage in an occupation involving machinery or appliances for rest of today  Avoid making any critical decisions for at least 24 hour    CALL M.D.  ANY SIGN OF:  Increasing pain, nausea, vomiting  Abdominal distension (swelling)  New increased bleeding (oral or rectal)  Fever (chills)  Pain in chest area  Bloody discharge from nose or mouth  Shortness of breath    You may take Tylenol as needed for pain. You may  take any Advil, Aspirin, Ibuprofen, Motrin, Aleve, or Goody’s for 10 days,      Post procedure diagnosis: diverticulosis  Post-procedure recommendations: High fiber diet    Follow-up Instructions:   Call Dr. Brasher  No future routine colonoscopies recommended due to age.  Telephone #  [phone]

## 2025-06-24 NOTE — H&P
Date    Aneurysm of thoracic aorta 01/2017    saw Dr. GETACHEW Coles, then Dr. LARIOS.     Asthma     Hypertension     Knee osteoarthritis     managed with diclofenac po prn. sees Dr. JUAN Salinas at [facility]. has had arthroscopy and IA injections in past with ortho.    Morbid obesity (HCC)     Morbid obesity (Rx: Ozempic)     Multiple lung nodules on CT 7/24/2017    Sciatica     manages with diclofenac. has some DDD.     TIA 2002    Torn rotator cuff     LEFT SHOULDER FX AND TORN ROTATOR CUFF 4/29/22     The patient has a family history of NA    Prior to Admission Medications   Prescriptions Last Dose Informant Patient Reported? Taking?   Aspirin 81 MG CAPS 6/23/2025  Yes Yes   Sig: Take 81 mg by mouth daily   EPINEPHrine (EPIPEN 2-HOME) 0.3 MG/0.3ML SOAJ injection   No No   Sig: Inject 0.3 mLs into the muscle once for 1 dose Use as directed for allergic reaction   Semaglutide,0.25 or 0.5MG/DOS, 2 MG/1.5ML SOPN 6/15/2025  Yes Yes   Sig: Inject into the skin   Sennosides-Docusate Sodium (SENNA PLUS PO) 6/23/2025  Yes Yes   Sig: Take 100 mg by mouth daily   albuterol sulfate HFA (PROVENTIL;VENTOLIN;PROAIR) 108 (90 Base) MCG/ACT inhaler Past Week  Yes Yes   Sig: Inhale 2 puffs into the lungs every 4 hours as needed   clotrimazole-betamethasone (LOTRISONE) 1-0.05 % cream 6/24/2025 Morning  Yes Yes   Sig: Apply topically 2 times daily   diclofenac (VOLTAREN) 75 MG EC tablet 6/23/2025  Yes Yes   Sig: Take by mouth   fluticasone (FLONASE) 50 MCG/ACT nasal spray Past Month  Yes Yes   Sig: SPRAY 2 SPRAYS INTO EACH NOSTRIL EVERY DAY   losartan-hydroCHLOROthiazide (HYZAAR) 100-12.5 MG per tablet 6/23/2025  Yes Yes   Sig: Take 1 tablet by mouth daily   montelukast (SINGULAIR) 10 MG tablet 6/23/2025  Yes Yes   Sig: TAKE 1 TABLET BY MOUTH EVERY DAY   nystatin (MYCOSTATIN) 407487 UNIT/GM powder Unknown  Yes No   Sig: Apply topically 3 times daily   nystatin-triamcinolone (MYCOLOG) 387879-6.1 UNIT/GM-% ointment Unknown  Yes No   Sig:

## 2025-06-24 NOTE — PROGRESS NOTES
Initial RN admission and assessment performed and documented in Endoscopy navigator.     Patient evaluated by anesthesia in pre-procedure holding.     All procedural vital signs, airway assessment, and level of consciousness information monitored and recorded by anesthesia staff on the anesthesia record.     Report received from CRNA post procedure.  Patient transported to recovery area by RN.    Endoscopy post procedure time out was performed and no specimens were confirmed    Endoscope was pre-cleaned at bedside immediately following procedure by Joey.

## 2025-08-21 NOTE — ANESTHESIA POSTPROCEDURE EVALUATION
Department of Anesthesiology  Postprocedure Note    Patient: Candida Faulkner  MRN: [id number]  YOB: 1950  Date of evaluation: 8/21/2025    Procedure Summary       Date: 06/24/25 Room / Location: [facility] 04 / [facility] ENDOSCOPY    Anesthesia Start: 1032 Anesthesia Stop: 1104    Procedure: COLONOSCOPY Diagnosis:       Colon cancer screening      (Colon cancer screening [Z12.11])    Surgeons: Mack Brasher MD Responsible Provider: Gaston Dia MD    Anesthesia Type: TIVA ASA Status: 3            Anesthesia Type: TIVA    Argenis Phase I: Argenis Score: 10    Argenis Phase II: Argenis Score: 9    Anesthesia Post Evaluation    Patient location during evaluation: bedside  Nausea & Vomiting: no nausea  Cardiovascular status: blood pressure returned to baseline  Respiratory status: acceptable  Hydration status: euvolemic      No notable events documented.

## (undated) DEVICE — SUTURE STRATAFIX SYMMETRIC PDS + SZ 1 L18IN ABSRB VLT L48MM SXPP1A400

## (undated) DEVICE — STRYKER PERFORMANCE SERIES SAGITTAL BLADE: Brand: STRYKER PERFORMANCE SERIES

## (undated) DEVICE — HANDLE LT SNAP ON ULT DURABLE LENS FOR TRUMPF ALC DISPOSABLE

## (undated) DEVICE — SUTURE VCRL SZ 0 L27IN ABSRB UD L36MM CT-1 1/2 CIR J260H

## (undated) DEVICE — PIN EXT FIX SCHNZ 3 MM

## (undated) DEVICE — BLADE SAW W098XL354IN THK0047IN CUT THK0047IN SAG

## (undated) DEVICE — SUPPLEMENT DIGESTIVE H2O SOL GI-EASE

## (undated) DEVICE — SOLUTION SURG PREP 26 CC PURPREP

## (undated) DEVICE — SUTURE MCRYL SZ 4-0 L27IN ABSRB UD L24MM PS-1 3/8 CIR PRIM Y935H

## (undated) DEVICE — GARMENT,MEDLINE,DVT,INT,CALF,MED, GEN2: Brand: MEDLINE

## (undated) DEVICE — SUTURE VCRL SZ 2-0 L36IN ABSRB UD L40MM CT 1/2 CIR J957H

## (undated) DEVICE — PADDING CAST SPEC 6INX4YD COT --

## (undated) DEVICE — GOWN,NON-REINFORCED,XXL: Brand: MEDLINE

## (undated) DEVICE — CONTAINER,SPECIMEN,3OZ,OR STRL: Brand: MEDLINE

## (undated) DEVICE — BANDAGE COMPR M W6INXL10YD WHT BGE VELC E MTRX HK AND LOOP

## (undated) DEVICE — GLOVE ORANGE PI 8 1/2   MSG9085

## (undated) DEVICE — SUTURE VCRL SZ 2 L54IN ABSRB UD L65MM TP-1 1/2 CIR J880T

## (undated) DEVICE — SPONGE LAP 18X18IN STRL -- 5/PK

## (undated) DEVICE — SYR 20ML LL STRL LF --

## (undated) DEVICE — SCRUB DRY SURG EZ SCRUB BRUSH PREOPERATIVE GRN

## (undated) DEVICE — TOTAL JOINT - SMH: Brand: MEDLINE INDUSTRIES, INC.

## (undated) DEVICE — HANDPIECE SET WITH BONE CLEANING TIP AND SUCTION TUBE: Brand: INTERPULSE

## (undated) DEVICE — PRECISION THIN EXTENDED SHANK (33.5 X 0.38MM)

## (undated) DEVICE — PAD,ABDOMINAL,5"X9",ST,LF,25/BX: Brand: MEDLINE INDUSTRIES, INC.

## (undated) DEVICE — PENCIL SMK EVAC 10 FT BLADE ELECTRD ROCKER FOR TELSCP

## (undated) DEVICE — PACK SURG PROC KNEE USER GPS

## (undated) DEVICE — 2108 SERIES SAGITTAL BLADE AGGRESSIVE  (25.0 X 1.19 X 85.0MM)

## (undated) DEVICE — SOLUTION IRRIG 1000ML STRL H2O USP PLAS POUR BTL

## (undated) DEVICE — HYPODERMIC SAFETY NEEDLE: Brand: MAGELLAN

## (undated) DEVICE — ZIMMER® STERILE DISPOSABLE TOURNIQUET CUFF WITH PLC, DUAL PORT, SINGLE BLADDER, 34 IN. (86 CM)

## (undated) DEVICE — GOWN,SIRUS,NONRNF,SETINSLV,2XL,18/CS: Brand: MEDLINE

## (undated) DEVICE — 3M™ STERI-DRAPE™ U-DRAPE 1015: Brand: STERI-DRAPE™

## (undated) DEVICE — SOLUTION IRRIG 3000ML 0.9% SOD CHL USP UROMATIC PLAS CONT

## (undated) DEVICE — TUBING SUCT 12FR MAL ALUM SHFT FN CAP VENT UNIV CONN W/ OBT

## (undated) DEVICE — SMOKE EVACUATION PENCIL: Brand: VALLEYLAB

## (undated) DEVICE — 450 ML BOTTLE OF 0.05% CHLORHEXIDINE GLUCONATE IN 99.95% STERILE WATER FOR IRRIGATION, USP AND APPLICATOR.: Brand: IRRISEPT ANTIMICROBIAL WOUND LAVAGE

## (undated) DEVICE — DRESSING PETRO W3XL8IN N ADH OIL EMUL GZ CURAD

## (undated) DEVICE — DISPOSABLE TOURNIQUET CUFF SINGLE BLADDER, DUAL PORT AND QUICK CONNECT CONNECTOR: Brand: COLOR CUFF

## (undated) DEVICE — SPONGE GZ W4XL4IN COT 12 PLY TYP VII WVN C FLD DSGN

## (undated) DEVICE — CUSTOM CAST PD STR

## (undated) DEVICE — STRIP,CLOSURE,WOUND,MEDI-STRIP,1/2X4: Brand: MEDLINE

## (undated) DEVICE — DRAPE,EXTREMITY,89X128,STERILE: Brand: MEDLINE